# Patient Record
Sex: FEMALE | Race: WHITE | NOT HISPANIC OR LATINO | Employment: OTHER | URBAN - METROPOLITAN AREA
[De-identification: names, ages, dates, MRNs, and addresses within clinical notes are randomized per-mention and may not be internally consistent; named-entity substitution may affect disease eponyms.]

---

## 2023-05-16 LAB
ALANINE AMINOTRANSFERASE (SGPT) (U/L) IN SER/PLAS: 12 U/L (ref 7–45)
ALBUMIN (G/DL) IN SER/PLAS: 4 G/DL (ref 3.4–5)
ALKALINE PHOSPHATASE (U/L) IN SER/PLAS: 71 U/L (ref 33–136)
ANION GAP IN SER/PLAS: 17 MMOL/L (ref 10–20)
ASPARTATE AMINOTRANSFERASE (SGOT) (U/L) IN SER/PLAS: 10 U/L (ref 9–39)
BILIRUBIN TOTAL (MG/DL) IN SER/PLAS: 0.8 MG/DL (ref 0–1.2)
CALCIUM (MG/DL) IN SER/PLAS: 9.3 MG/DL (ref 8.6–10.6)
CARBON DIOXIDE, TOTAL (MMOL/L) IN SER/PLAS: 22 MMOL/L (ref 21–32)
CHLORIDE (MMOL/L) IN SER/PLAS: 98 MMOL/L (ref 98–107)
CREATININE (MG/DL) IN SER/PLAS: 1.58 MG/DL (ref 0.5–1.05)
ERYTHROCYTE DISTRIBUTION WIDTH (RATIO) BY AUTOMATED COUNT: 13.4 % (ref 11.5–14.5)
ERYTHROCYTE MEAN CORPUSCULAR HEMOGLOBIN CONCENTRATION (G/DL) BY AUTOMATED: 32.9 G/DL (ref 32–36)
ERYTHROCYTE MEAN CORPUSCULAR VOLUME (FL) BY AUTOMATED COUNT: 96 FL (ref 80–100)
ERYTHROCYTES (10*6/UL) IN BLOOD BY AUTOMATED COUNT: 4.11 X10E12/L (ref 4–5.2)
ESTIMATED AVERAGE GLUCOSE FOR HBA1C: 123 MG/DL
GFR FEMALE: 33 ML/MIN/1.73M2
GLUCOSE (MG/DL) IN SER/PLAS: 114 MG/DL (ref 74–99)
HEMATOCRIT (%) IN BLOOD BY AUTOMATED COUNT: 39.5 % (ref 36–46)
HEMOGLOBIN (G/DL) IN BLOOD: 13 G/DL (ref 12–16)
HEMOGLOBIN A1C/HEMOGLOBIN TOTAL IN BLOOD: 5.9 %
LEUKOCYTES (10*3/UL) IN BLOOD BY AUTOMATED COUNT: 14.7 X10E9/L (ref 4.4–11.3)
NRBC (PER 100 WBCS) BY AUTOMATED COUNT: 0 /100 WBC (ref 0–0)
PLATELETS (10*3/UL) IN BLOOD AUTOMATED COUNT: 209 X10E9/L (ref 150–450)
POTASSIUM (MMOL/L) IN SER/PLAS: 4.4 MMOL/L (ref 3.5–5.3)
PROTEIN TOTAL: 7.3 G/DL (ref 6.4–8.2)
SODIUM (MMOL/L) IN SER/PLAS: 133 MMOL/L (ref 136–145)
THYROTROPIN (MIU/L) IN SER/PLAS BY DETECTION LIMIT <= 0.05 MIU/L: 1.76 MIU/L (ref 0.44–3.98)
UREA NITROGEN (MG/DL) IN SER/PLAS: 32 MG/DL (ref 6–23)

## 2023-11-28 ENCOUNTER — OFFICE VISIT (OUTPATIENT)
Dept: ORTHOPEDIC SURGERY | Facility: HOSPITAL | Age: 78
End: 2023-11-28
Payer: MEDICARE

## 2023-11-28 DIAGNOSIS — M25.511 CHRONIC RIGHT SHOULDER PAIN: Primary | ICD-10-CM

## 2023-11-28 DIAGNOSIS — G89.29 CHRONIC RIGHT SHOULDER PAIN: Primary | ICD-10-CM

## 2023-11-28 PROCEDURE — 1126F AMNT PAIN NOTED NONE PRSNT: CPT | Performed by: ORTHOPAEDIC SURGERY

## 2023-11-28 PROCEDURE — 99213 OFFICE O/P EST LOW 20 MIN: CPT | Performed by: ORTHOPAEDIC SURGERY

## 2023-11-28 NOTE — PROGRESS NOTES
Bilateral shoulder pain    Subjective    Patient ID: Betty Samaniego is a 78 y.o. female.    Chief Complaint: No chief complaint on file.     Last Surgery: No surgery found  Last Surgery Date: No surgery found    BETTY SAMANIEGO is a 77 year old female presenting today for repeat evaluation of her bilateral shoulders. She explains that her shoulder pain has worsened over the last 18 months. She describes having limited range of motion and crepitus. Her right is currently worse than her left. This changes intermittently. She has had injections in the past which provided significant relief and she would like more today if possible.     08/29/23  She returns to the office today for a repeat followup visit regarding her bilateral shoulders.      She reports that she is still happy with injections today. She is hoping to continue them.     11/28/23  Betty returns to the clinic today for a repeat follow up visit regarding her bilateral shoulders.          Past medical history, surgical history, social history, and family history were all reviewed and are as per the [facility] patient health history questionnaire form that I signed and scanned into the chart today.           Objective   Patient is a well-developed, well-nourished female in no acute distress. Breathes with normal chest rises. Pupils are round and symmetric today. Awake alert and oriented x3.     Examination of the bilateral shoulders reveals the skin to be intact. 100° of forward elevation. 30°  of external rotation on right and 0°  on left. Crepitus throughout.       Image Results:    Patient ID: Betty Samaniego is a 78 y.o. female.    Procedures    Assessment/Plan   Encounter Diagnoses:  No diagnosis found.  Patient with bilateral shoulder arthritis     At this time we had a long discussion about the various options for shoulder arthritis.   1. Do nothing and continue activity modifications.   2. Consider cortisone injections into the glenohumeral joint.  This would give pain relief that is temporary. This would not stop the progression of arthritis. For some patients, this injection can provide no relief at all, relief for 2 weeks, 4 weeks, 3 months or longer. The risk of the injection is fairly minimal but does include included a very small chance of infection.   3. Option is a total shoulder replacement. This will give the patient a more permanent solution to pain relief. It would also improve function. There is no rush to this procedure it is an elective procedure.         No orders of the defined types were placed in this encounter.    No follow-ups on file.

## 2024-02-18 NOTE — PROGRESS NOTES
Bilateral shoulder pain    Subjective    Patient ID: Betty Samaniego is a 78 y.o. female.    Chief Complaint: No chief complaint on file.     Last Surgery: No surgery found  Last Surgery Date: No surgery found    BETTY SAMANIEGO is a 77 year old female presenting today for repeat evaluation of her bilateral shoulders. She explains that her shoulder pain has worsened over the last 18 months. She describes having limited range of motion and crepitus. Her right is currently worse than her left. This changes intermittently. She has had injections in the past which provided significant relief and she would like more today if possible.     08/29/23  She returns to the office today for a repeat followup visit regarding her bilateral shoulders.      She reports that she is still happy with injections today. She is hoping to continue them.     11/28/23  Betty returns to the clinic today for a repeat follow up visit regarding her bilateral shoulders.     02/20/24  Betty returns to the clinic for a follow up visit regarding her bilateral shoulders.     She reports she is here for repeat injections. They have continued to help her.      Past medical history, surgical history, social history, and family history were all reviewed and are as per the [facility] patient health history questionnaire form that I signed and scanned into the chart today.           Objective   Patient is a well-developed, well-nourished female in no acute distress. Breathes with normal chest rises. Pupils are round and symmetric today. Awake alert and oriented x3.     Examination of the bilateral shoulders reveals the skin to be intact. 100° of forward elevation. 30°  of external rotation on right and 0°  on left. Crepitus throughout.       Image Results:    Patient ID: Betty Samaniego is a 78 y.o. female.    L Inj/Asp: bilateral glenohumeral on 2/20/2024 12:41 PM  Indications: pain  Details: 20 G needle, anterior approach  Outcome: tolerated well, no  immediate complications  Procedure, treatment alternatives, risks and benefits explained, specific risks discussed. Consent was given by the patient. Immediately prior to procedure a time out was called to verify the correct patient, procedure, equipment, support staff and site/side marked as required. Patient was prepped and draped in the usual sterile fashion.           Assessment/Plan   Encounter Diagnoses:  No diagnosis found.  Patient with bilateral shoulder arthritis     At this time we had a long discussion about the various options for shoulder arthritis.   1. Do nothing and continue activity modifications.   2. Consider cortisone injections into the glenohumeral joint. This would give pain relief that is temporary. This would not stop the progression of arthritis. For some patients, this injection can provide no relief at all, relief for 2 weeks, 4 weeks, 3 months or longer. The risk of the injection is fairly minimal but does include included a very small chance of infection.   3. Option is a total shoulder replacement. This will give the patient a more permanent solution to pain relief. It would also improve function. There is no rush to this procedure it is an elective procedure.     We did repeat injections for her today. She tolerated them well. We will see her back in 3 months for consideration of repeat injections at that time.     No orders of the defined types were placed in this encounter.    Follow up in 3 months    Scribe Attestation  By signing my name below, Bria BUCHANAN Scribe   attest that this documentation has been prepared under the direction and in the presence of Kaleb Friedman MD.

## 2024-02-20 ENCOUNTER — OFFICE VISIT (OUTPATIENT)
Dept: ORTHOPEDIC SURGERY | Facility: HOSPITAL | Age: 79
End: 2024-02-20
Payer: MEDICARE

## 2024-02-20 VITALS — WEIGHT: 148 LBS | HEIGHT: 65 IN | BODY MASS INDEX: 24.66 KG/M2

## 2024-02-20 DIAGNOSIS — M25.511 CHRONIC RIGHT SHOULDER PAIN: Primary | ICD-10-CM

## 2024-02-20 DIAGNOSIS — G89.29 CHRONIC RIGHT SHOULDER PAIN: Primary | ICD-10-CM

## 2024-02-20 PROCEDURE — 1126F AMNT PAIN NOTED NONE PRSNT: CPT | Performed by: ORTHOPAEDIC SURGERY

## 2024-02-20 PROCEDURE — 1159F MED LIST DOCD IN RCRD: CPT | Performed by: ORTHOPAEDIC SURGERY

## 2024-02-20 PROCEDURE — 1036F TOBACCO NON-USER: CPT | Performed by: ORTHOPAEDIC SURGERY

## 2024-02-20 PROCEDURE — 99213 OFFICE O/P EST LOW 20 MIN: CPT | Performed by: ORTHOPAEDIC SURGERY

## 2024-02-20 PROCEDURE — 20610 DRAIN/INJ JOINT/BURSA W/O US: CPT | Performed by: ORTHOPAEDIC SURGERY

## 2024-02-20 RX ORDER — AMLODIPINE BESYLATE 5 MG/1
1 TABLET ORAL DAILY
COMMUNITY
Start: 2022-04-06 | End: 2024-03-20 | Stop reason: ALTCHOICE

## 2024-02-20 ASSESSMENT — PAIN - FUNCTIONAL ASSESSMENT: PAIN_FUNCTIONAL_ASSESSMENT: 0-10

## 2024-02-20 ASSESSMENT — PAIN SCALES - GENERAL: PAINLEVEL_OUTOF10: 6

## 2024-03-20 ENCOUNTER — OFFICE VISIT (OUTPATIENT)
Dept: CARDIOLOGY | Facility: CLINIC | Age: 79
End: 2024-03-20
Payer: MEDICARE

## 2024-03-20 VITALS
OXYGEN SATURATION: 100 % | WEIGHT: 132.8 LBS | SYSTOLIC BLOOD PRESSURE: 173 MMHG | BODY MASS INDEX: 22.13 KG/M2 | HEART RATE: 58 BPM | HEIGHT: 65 IN | DIASTOLIC BLOOD PRESSURE: 85 MMHG
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
## 2024-03-20 PROCEDURE — 1160F RVW MEDS BY RX/DR IN RCRD: CPT | Performed by: NURSE PRACTITIONER

## 2024-03-20 PROCEDURE — 1036F TOBACCO NON-USER: CPT | Performed by: NURSE PRACTITIONER

## 2024-03-20 PROCEDURE — 99214 OFFICE O/P EST MOD 30 MIN: CPT | Performed by: NURSE PRACTITIONER

## 2024-03-20 PROCEDURE — 1159F MED LIST DOCD IN RCRD: CPT | Performed by: NURSE PRACTITIONER

## 2024-03-20 PROCEDURE — 1126F AMNT PAIN NOTED NONE PRSNT: CPT | Performed by: NURSE PRACTITIONER

## 2024-03-20 RX ORDER — ATENOLOL 50 MG/1
50 TABLET ORAL
COMMUNITY
End: 2024-05-24 | Stop reason: HOSPADM

## 2024-03-20 ASSESSMENT — ENCOUNTER SYMPTOMS
CARDIOVASCULAR NEGATIVE: 1
MUSCULOSKELETAL NEGATIVE: 1
CONSTITUTIONAL NEGATIVE: 1
OCCASIONAL FEELINGS OF UNSTEADINESS: 1
LOSS OF SENSATION IN FEET: 0
RESPIRATORY NEGATIVE: 1
GASTROINTESTINAL NEGATIVE: 1
DEPRESSION: 1
NEUROLOGICAL NEGATIVE: 1

## 2024-03-20 ASSESSMENT — PAIN SCALES - GENERAL: PAINLEVEL: 0-NO PAIN

## 2024-03-20 NOTE — PROGRESS NOTES
"Chief Complaint:   Follow-up    History Of Present Illness:    .Ms Samaniego returns in follow up.  She has some bilateral pedal edema which is at baseline.  Denies chest pain, sob, or palpitations. Has had several back surgeries and has been falling recently.           Last Recorded Vitals:  Blood pressure 173/85, pulse 58, height 1.651 m (5' 5\"), weight 60.2 kg (132 lb 12.8 oz), SpO2 100 %.     Past Medical History:  No past medical history on file.     Past Surgical History:  Past Surgical History:   Procedure Laterality Date    AORTIC VALVE REPLACEMENT  02/11/2014    Aortic Valve Replacement    BREAST LUMPECTOMY  02/11/2014    Breast Surgery Lumpectomy    CERVICAL DISCECTOMY  02/11/2014    Spinal Diskectomy Cervical    HYSTERECTOMY  02/11/2014    Hysterectomy    OTHER SURGICAL HISTORY  02/11/2014    Aortic Coarctation Repair       Social History:  Social History     Socioeconomic History    Marital status:      Spouse name: None    Number of children: None    Years of education: None    Highest education level: None   Occupational History    None   Tobacco Use    Smoking status: Former     Types: Cigarettes    Smokeless tobacco: Never   Vaping Use    Vaping Use: Never used   Substance and Sexual Activity    Alcohol use: Yes    Drug use: Never    Sexual activity: None   Other Topics Concern    None   Social History Narrative    None     Social Determinants of Health     Financial Resource Strain: Not on file   Food Insecurity: Not on file   Transportation Needs: Not on file   Physical Activity: Not on file   Stress: Not on file   Social Connections: Not on file   Intimate Partner Violence: Not on file   Housing Stability: Not on file       Family History:  No family history on file.      Allergies:  Sulfa (sulfonamide antibiotics), Sulfamethoxazole-trimethoprim, Ciprofloxacin, and Nitrofurantoin    Outpatient Medications:  Current Outpatient Medications   Medication Sig Dispense Refill    atenolol (Tenormin) " 50 mg tablet Take 1 tablet (50 mg) by mouth once daily.       No current facility-administered medications for this visit.        Physical Exam:  Cardiovascular:      PMI at left midclavicular line. Normal rate. Regular rhythm. Normal S1. Normal S2.       Murmurs: There is a grade 1to 2/6 systolic murmur.      No gallop.  No click. No rub.   Pulses:     Intact distal pulses.   Edema:     Peripheral edema absent.         ROS:  Review of Systems   Constitutional: Negative.   Cardiovascular: Negative.    Respiratory: Negative.     Skin: Negative.    Musculoskeletal: Negative.    Gastrointestinal: Negative.    Genitourinary: Negative.    Neurological: Negative.           Last Labs:  CBC -  Lab Results   Component Value Date    WBC 14.7 (H) 05/16/2023    HGB 13.0 05/16/2023    HCT 39.5 05/16/2023    MCV 96 05/16/2023     05/16/2023       CMP -  Lab Results   Component Value Date    CALCIUM 9.3 05/16/2023    PHOS 3.4 08/28/2019    PROT 7.3 05/16/2023    ALBUMIN 4.0 05/16/2023    AST 10 05/16/2023    ALT 12 05/16/2023    ALKPHOS 71 05/16/2023    BILITOT 0.8 05/16/2023       LIPID PANEL -   Lab Results   Component Value Date    CHOL 234 (H) 02/03/2021    TRIG 223 (H) 02/03/2021    HDL 50.6 02/03/2021    CHHDL 4.6 02/03/2021    LDLF 139 (H) 02/03/2021    VLDL 45 (H) 02/03/2021    NHDL 183 02/03/2021       RENAL FUNCTION PANEL -   Lab Results   Component Value Date    GLUCOSE 114 (H) 05/16/2023     (L) 05/16/2023    K 4.4 05/16/2023    CL 98 05/16/2023    CO2 22 05/16/2023    ANIONGAP 17 05/16/2023    BUN 32 (H) 05/16/2023    CREATININE 1.58 (H) 05/16/2023    CALCIUM 9.3 05/16/2023    PHOS 3.4 08/28/2019    ALBUMIN 4.0 05/16/2023        Lab Results   Component Value Date    HGBA1C 5.9 (A) 05/16/2023         Assessment/Plan   Problem List Items Addressed This Visit    None    Assessment:     1. Status post aortic valve replacement utilizing #23 mm Freestyle bioprosthesis along with replacement of the ascending  aorta and transverse hemiarch utilizing a #24 mm Gelweave graft plus tricuspid valve repair utilizing a #21 mm Grayson ring plus occlusion of small membranous VSD, 03/06/2008, . This patient is doing extremely well from the cardiac standpoint. The patient denies any unusual complaints other than frequent urinary tract infections. She did have a repeat surveillance echocardiogram performed today which again demonstrates a preserved left ventricular ejection fraction of approximately 55% with mild to moderate hypokinesis of the anteroseptal wall due to previous thoracotomy. The bioprosthetic aortic valve replacement has a normal appearance and function. There is mild to moderate left atrial enlargement. Repeat echo done 08/2019 showed EF 55-60%, stentless AV bioprothesis, AV gradient is 9.5 mm Hg. The patient will return in 6 months for routine visit. Echo done 09/2023 was essentially unchanged with the addition of some mild to moderate MR.  2. Hypertension. Blood pressure is well within normal range today. The patient will continue on atenolol 100 mg daily unchanged.   3. Remote repair of coarctation of the aorta.  4. Status post lumpectomy and radiation therapy for left-sided breast carcinoma.  5. Status post C3 diskectomy and fusion, C5 corpectomy and anterior plate placement from C3 to C6, 04/12/2012, Dr. Burt, [facility]. Patient had laminectomy and facetectomy June 30, 2017 with Dr. Jessica Burt. Had another surgery on 09/2019.  6. Abdominal hysterectomy.  7. Former smoking.  8. Former alcohol excess.  9. Pedal edema. At baseline.  10. CKD. See Dr Thomas.   11. Venous insufficiency.          Terri Pena, APRN-CNP

## 2024-03-26 ENCOUNTER — HOSPITAL ENCOUNTER (OUTPATIENT)
Dept: RADIOLOGY | Facility: CLINIC | Age: 79
Discharge: HOME | End: 2024-03-26
Payer: MEDICARE

## 2024-03-26 ENCOUNTER — OFFICE VISIT (OUTPATIENT)
Dept: ORTHOPEDIC SURGERY | Facility: CLINIC | Age: 79
End: 2024-03-26
Payer: MEDICARE

## 2024-03-26 DIAGNOSIS — M48.061 SPINAL STENOSIS OF LUMBAR REGION, UNSPECIFIED WHETHER NEUROGENIC CLAUDICATION PRESENT: ICD-10-CM

## 2024-03-26 DIAGNOSIS — M54.16 LUMBAR RADICULOPATHY: Primary | ICD-10-CM
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
## 2024-03-26 DIAGNOSIS — M51.36 DISCOGENIC LOW BACK PAIN: ICD-10-CM

## 2024-03-26 PROCEDURE — 1160F RVW MEDS BY RX/DR IN RCRD: CPT

## 2024-03-26 PROCEDURE — 1125F AMNT PAIN NOTED PAIN PRSNT: CPT

## 2024-03-26 PROCEDURE — 72120 X-RAY BEND ONLY L-S SPINE: CPT

## 2024-03-26 PROCEDURE — 99214 OFFICE O/P EST MOD 30 MIN: CPT

## 2024-03-26 PROCEDURE — 1036F TOBACCO NON-USER: CPT

## 2024-03-26 PROCEDURE — 72114 X-RAY EXAM L-S SPINE BENDING: CPT | Performed by: RADIOLOGY

## 2024-03-26 PROCEDURE — 1159F MED LIST DOCD IN RCRD: CPT

## 2024-03-26 RX ORDER — PREDNISONE 10 MG/1
TABLET ORAL
Qty: 30 TABLET | Refills: 0 | Status: SHIPPED
Start: 2024-03-26 | End: 2024-04-16 | Stop reason: HOSPADM

## 2024-03-26 RX ORDER — KETOROLAC TROMETHAMINE 10 MG/1
10 TABLET, FILM COATED ORAL EVERY 8 HOURS PRN
Qty: 15 TABLET | Refills: 0 | Status: SHIPPED | OUTPATIENT
Start: 2024-03-26 | End: 2024-03-31

## 2024-03-26 ASSESSMENT — PAIN SCALES - GENERAL: PAINLEVEL_OUTOF10: 7

## 2024-03-26 ASSESSMENT — PAIN - FUNCTIONAL ASSESSMENT: PAIN_FUNCTIONAL_ASSESSMENT: 0-10

## 2024-03-26 NOTE — PROGRESS NOTES
HPI:  Rebecca is a pleasant 78-year-old female who presents today with a 1 month random onset history of low back pain that does not radiate down the legs.  She has a history of L2-5 fusion with cement augmentation about 3 years ago.  She states the pain is relatively constant but is worse when trying to walk and stand up straight.  She does state she has some numbness and tingling in the legs bilaterally that happens occasionally.  No pain is associated with this.  She has been taking Advil which helps somewhat.  She has not done therapy.    ROS:  Reviewed on EMR and patient intake sheet.    PMH/SH:  Reviewed on EMR and patient intake sheet.    Exam:  MSK: 4/5 strength of lower extremities bilaterally.  Negative straight leg raise bilaterally.  No tenderness to palpation of thoracic, lumbar spine midline or paraspinal muscles.  General: No acute distress. Awake and conversant.  Eyes: Normal conjunctiva, anicteric. Round symmetric pupils.  ENT: Hearing grossly intact. No nasal discharge.  Neck: Neck is supple. No masses or thyromegaly.  Respiratory: Respirations are non-labored. No wheezing.  Skin: Warm. No rashes or ulcers.  Psych: Alert and oriented. Cooperative, appropriate mood and affect, normal judgement.  CV: No lower extremity edema.  Neuro: Sensation and CN II-XII grossly normal.    Radiology:     Lumbar x-rays personally reviewed and demonstrate stable L2-L5 lumbar fusion with cement augmentation seated appropriately.  Adjacent level degeneration noted at L1/2 and L5/S1.  No acute fractures or dislocations.    Diagnosis:    Discogenic low back pain  Lumbar radiculopathy    Assessment and Plan:  Patient was seen today and evaluated for progressively worsening low back pain that is impeding her ability to stand and walk.  At this point, I discussed conservative management with the patient.  She was referred to physical therapy with core strengthening today.  Additionally, a prescribed a mild prednisone taper  and a short course of Toradol.  I educated the patient that she should discontinue other anti-inflammatories while taking the Toradol.  She should continue trying to walk and ambulate as tolerated.  Additionally, I referred the patient to pain management.  She may follow-up if her symptoms do not improve or worsen over the next few weeks with conservative management and physical therapy.  Patient feels all questions were properly answered at time of visit today.  Patient agrees to the plan above.    This note was dictated using speech recognition software and was not corrected for spelling or grammatical errors    Timo Avilez PA-C  Department of Orthopaedic Surgery  10:36 AM  03/26/24      [id number] [place] [address]    Voicemail: [phone]   Appts: [phone]  Fax: [phone]

## 2024-04-08 ENCOUNTER — APPOINTMENT (OUTPATIENT)
Dept: PHYSICAL THERAPY | Facility: CLINIC | Age: 79
End: 2024-04-08
Payer: MEDICARE

## 2024-04-08 ENCOUNTER — EVALUATION (OUTPATIENT)
Dept: PHYSICAL THERAPY | Facility: CLINIC | Age: 79
End: 2024-04-08
Payer: MEDICARE

## 2024-04-08 DIAGNOSIS — R29.898 LEG WEAKNESS: ICD-10-CM

## 2024-04-08 DIAGNOSIS — R26.9 GAIT DIFFICULTY: ICD-10-CM
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
## 2024-04-08 DIAGNOSIS — M54.16 LUMBAR RADICULOPATHY: ICD-10-CM

## 2024-04-08 DIAGNOSIS — M54.9 BACK PAIN: Primary | ICD-10-CM

## 2024-04-08 PROCEDURE — 97162 PT EVAL MOD COMPLEX 30 MIN: CPT | Mod: GP

## 2024-04-08 PROCEDURE — 97530 THERAPEUTIC ACTIVITIES: CPT | Mod: GP

## 2024-04-08 ASSESSMENT — ENCOUNTER SYMPTOMS
DEPRESSION: 0
OCCASIONAL FEELINGS OF UNSTEADINESS: 1
LOSS OF SENSATION IN FEET: 1

## 2024-04-08 NOTE — PROGRESS NOTES
Physical Therapy    Physical Therapy Evaluation and Treatment      Patient Name: Rebecca Samaniego  MRN: [id number]  Today's Date: 4/8/2024    Time Calculation  Start Time: 1020  Stop Time: 1110  Time Calculation (min): 50 min    Current Problem:   1. Back pain        2. Fall, initial encounter  Referral to Physical Therapy      3. Lumbar radiculopathy  Referral to Physical Therapy      4. Leg weakness        5. Gait difficulty            SUBJECTIVE:  Rebecca Samaniego is a 78 y.o. female referred to PT for falls, lumbar radiculopathy. Patient presents with chief complaint of difficulty with gait. Patient reports that about 3 months ago had trouble walking and 2 months ago needed assistance with everything. Patient presents with  for evaluation. Patient reports that she just woke up one day and had trouble walking. Patient's  states that patient tried to get out of bed and fell right down, had trouble getting up without assistance. Patient's  states that trying to get her to this appointment she fell twice which caused him to fall. Patient's  states that he had two people helping to get patient out of house secondary to stairs. Patient's  voices concern about how he is going to get her back into home going up stairs as they have not done that.  Patient denies injuries with fall or head injuries.  Patient's  states that he has to lead her everywhere. Patient does not use assistive device at home, has a cane that she can use for uses  instead. Patient states that walker is too big to fit in the home. Patient does not have wheel chair at home.     Patient denies numbness/tingling in legs, denies loss of bowel or bladder control. Patient states that she has neuropathy in R LE that keeps her up at night, not currently seeing neurologist. Patient denies legs giving out on her, states that she can feel wobbly and dizzy occasionally but that has been going on for a while,  currently seeing cardiologist, takes medication. Patient's  states that she has trouble putting off of chair and her legs are no help. Patient's  states that she does not have any arm strength to use assistive device.   Patient had back surgery 3 years ago (two fusions) and has had back pain since.       Onset: 3 months ago   Imaging:   XR lumbar spine (3/26/24)   FINDINGS:  L4-5 anterior and posterior fusion has been revised in the interval from the prior study. The fusion hardware now is from L2-L5 with vertebroplasties L2 to L4. The alignment is grossly unchanged with a mild anterolisthesis L4-5. Upper lumbar degenerative changes particularly above the level of the fusion at L1-2 progressed from previous. No pathologic motion.      IMPRESSION:  Satisfactory appearance L2-L5 fusion.  Progression of degenerative change L1-2.    Occupation: retired   Home living: Lives with    3 stairs to get into home - railing on both sides (has not tried to get up secondary to this being first time getting back in home)   Bedroom on second floor 13 steps- railing and a wall   Patient sleeps on bed on main floor (staying on main level for about a year)   Tub shower, shower chair, grab bars   Toilet seat standard     Relevant Past Medical History:Reviewed in chart; cancer, heart disease, high blood pressure, neuropathy, headaches, migraines, RA, kidney disease   Surgical History: Reviewed in chart; Aortic valve replacement, breast surgery lumpectomy, spinal discectomy cervical, hysterectomy   Medications: Reviewed in chart; Atenolol, prednisone   Allergies: sulfamethoxazole-trimethoprim, sulfa, ciprofloxacin, nitrofurantoin     Precautions: PMHx considerations: cancer, heart disease, high blood pressure, neuropathy, headaches, migraines, RA, kidney disease; L2-L5 lumbar fusion  STEADI Fall Risk: 10 (score of 4+ indicates fall risk)       PT Outcome Measure:   Modified Oswestry: patient did not fill out  properly.     Pain:  Lowest:  5/10  Highest:  8/10  Location:  lower back in middle   Description:  pain  Aggravating Factors:  nothing   Relieving Factors:  medication   Sleep Pattern:  back can cause her to wake up at night.   Previous Interventions:  none     Red flags: denies numbness/tingling or saddle paresthesia, no changes to bowel or bladder    Patient/Family Goal: able to walk     OBJECTIVE:    Observation/Posture: Forward flexed kyphotic posture, B knee valgus.     Gait: Patient presents in wheel chair for evaluation.  TU minutes 10 seconds with Min A using gait belt and FWW     Functional Mobility:   Patient required B UE support and Min A to perform sit to stand transfer- cues required for transfer  Patient demonstrates poor eccentric control and poor safety awareness with stand to sit transfer- despite cues to improve safety.     Range of Motion:   Unable to assess ROM at this date secondary to limited time during evaluation.     Strength:  HIP MMT LEFT RIGHT   Flexion 3/5 3/5   Adduction  3-/5 3-/5   Abduction 3-/5 3-/5     KNEE MMT LEFT RIGHT   Extension 3-/5 3-/5     ANKLE MMT LEFT RIGHT   Dorsiflexion 3-/5 3-/5     Treatments:  Therapeutic Exercise: ( minutes)  Verbally went over HEP with demonstration and pictures secondary to limited time.  Manual Therapy: ( minutes)    Gait Training: ( minutes)    Neuromuscular Re-education: ( minutes)    Therapeutic Activity: (15 minutes)  OP Education: Patient education on proper sit to stand transfer techniques with patient and patient's  to improve safety. Patient education on proper lifting mechanics with patients  to improve safety and promote improved biomechanics of  using gait belt secondary to concerns with getting patient in and out of house and within home. Patient education on importance of walking with assistive device to improve safety within home. Patient encouraged to call fire department to assist getting into home if  needed,. Patient's  education on use of gait belt to improve safety and body mechanics. Patient education on use of FWW to improve safety with ambulation around the home. Patient education on benefit from HHPT at this time secondary to current level of function, high fall risk, poor safety awareness, difficulty getting in and out of home.      HEP:  Access Code: [email or web address]  URL: [email or web address]  Date: 04/08/2024  Prepared by: Carmen    Exercises  - Seated Long Arc Quad  - 1 x daily - 7 x weekly - 2 sets - 10 reps  - Seated March  - 1 x daily - 7 x weekly - 2 sets - 10 reps  - Seated Ankle Pumps  - 1 x daily - 7 x weekly - 2 sets - 10 reps    ASSESSMENT:  Rebecca Samaniego is a 78 y.o. female referred to PT for falls, lumbar radiculopathy. Patient presents with chief complaint of difficulty with gait. Patient demonstrates decreased LE strength, high fall risk, poor gait mechanics and poor safety awareness. Patient demonstrates difficulty with transfers and ambulation without assistance. Patient demonstrated high fall risk through TUG test. Patient and family education on way to improve safety within home and safety with transfers. Patient discussion to contact MD to trial HHPT secondary to patients safety and current status to get in and out home safely. Patient education on benefit from HHPT at this time secondary to current level of function, high fall risk, poor safety awareness, difficulty getting in and out of home with and without cavegiver. Patient and  agreeable to plan.     Response to treatment: Pt verbalized good understanding of education provided. Pt demonstrated appropriate performance of HEP with good understanding. Did not exhibit exacerbation of symptoms at end of session.     PLAN:  Patient to trial HHPT secondary to safety and current status of getting in and out of home.     Goals:  Not appropriate at this time.

## 2024-04-09 ENCOUNTER — HOME HEALTH ADMISSION (OUTPATIENT)
Dept: HOME HEALTH SERVICES | Facility: HOME HEALTH | Age: 79
End: 2024-04-09
Payer: MEDICARE

## 2024-04-09 ENCOUNTER — DOCUMENTATION (OUTPATIENT)
Dept: HOME HEALTH SERVICES | Facility: HOME HEALTH | Age: 79
End: 2024-04-09
Payer: MEDICARE

## 2024-04-09 ENCOUNTER — DOCUMENTATION (OUTPATIENT)
Dept: PHYSICAL THERAPY | Facility: CLINIC | Age: 79
End: 2024-04-09
Payer: MEDICARE

## 2024-04-09 DIAGNOSIS — M54.16 LUMBAR RADICULOPATHY: Primary | ICD-10-CM

## 2024-04-09 NOTE — HH CARE COORDINATION
Home Care received a Referral for Physical Therapy. We have processed the referral for a Start of Care on 04/10.     If you have any questions or concerns, please feel free to contact us at [phone]. Follow the prompts, enter your five digit zip code, and you will be directed to your care team on [facility].

## 2024-04-09 NOTE — PROGRESS NOTES
I was contacted by Carmen Pena, PT stating Rebecca had difficulty ambulating to therapy and fell twice just trying to get to the appointment.  Carmen recommended home health physical therapy due to the patient's high risk of falling and inability to ambulate on her own.  Home health therapy ordered.

## 2024-04-09 NOTE — PROGRESS NOTES
"Physical Therapy                 Therapy Communication Note    Patient Name: Rebecca Samaniego  MRN: [id number]  Today's Date: 4/9/2024     Discipline: Physical Therapy    Patient and  called asking \"what is wrong with me\". Patient was educated on importance of home health PT secondary to high fall risk and poor safety. After discussion patient revealed that they were contacted to begin home health PT. Patient's  states that they misplaced the phone number. PT provided phone number to follow up with Home Health Care.  "

## 2024-04-10 ENCOUNTER — HOSPITAL ENCOUNTER (INPATIENT)
Facility: HOSPITAL | Age: 79
LOS: 5 days | Discharge: NURSING, REHAB OR LONG-TERM CARE | DRG: 064 | End: 2024-04-16
Attending: EMERGENCY MEDICINE | Admitting: INTERNAL MEDICINE
Payer: MEDICARE

## 2024-04-10 ENCOUNTER — APPOINTMENT (OUTPATIENT)
Dept: RADIOLOGY | Facility: HOSPITAL | Age: 79
DRG: 064 | End: 2024-04-10
Payer: MEDICARE

## 2024-04-10 ENCOUNTER — APPOINTMENT (OUTPATIENT)
Dept: CARDIOLOGY | Facility: HOSPITAL | Age: 79
DRG: 064 | End: 2024-04-10
Payer: MEDICARE

## 2024-04-10 DIAGNOSIS — G93.41 METABOLIC ENCEPHALOPATHY: ICD-10-CM

## 2024-04-10 DIAGNOSIS — M54.16 LUMBAR RADICULOPATHY: Primary | ICD-10-CM

## 2024-04-10 DIAGNOSIS — E86.0 DEHYDRATION: ICD-10-CM

## 2024-04-10 DIAGNOSIS — R29.6 FREQUENT FALLS: ICD-10-CM

## 2024-04-10 DIAGNOSIS — R79.89 ELEVATED TROPONIN: ICD-10-CM

## 2024-04-10 DIAGNOSIS — R21 RASH: ICD-10-CM

## 2024-04-10 DIAGNOSIS — R55 SYNCOPE AND COLLAPSE: ICD-10-CM

## 2024-04-10 DIAGNOSIS — R29.6 REPEATED FALLS: ICD-10-CM

## 2024-04-10 DIAGNOSIS — I10 PRIMARY HYPERTENSION: ICD-10-CM

## 2024-04-10 DIAGNOSIS — R53.1 GENERALIZED WEAKNESS: ICD-10-CM

## 2024-04-10 DIAGNOSIS — R26.89 SHUFFLING GAIT: ICD-10-CM

## 2024-04-10 DIAGNOSIS — N17.9 ACUTE RENAL FAILURE SUPERIMPOSED ON CHRONIC KIDNEY DISEASE, UNSPECIFIED ACUTE RENAL FAILURE TYPE, UNSPECIFIED CKD STAGE (CMS-HCC): ICD-10-CM

## 2024-04-10 DIAGNOSIS — I63.9 CEREBROVASCULAR ACCIDENT (CVA), UNSPECIFIED MECHANISM (MULTI): ICD-10-CM

## 2024-04-10 DIAGNOSIS — N39.0 UTI (URINARY TRACT INFECTION): Primary | ICD-10-CM

## 2024-04-10 DIAGNOSIS — N39.0 URINARY TRACT INFECTION WITHOUT HEMATURIA, SITE UNSPECIFIED: ICD-10-CM

## 2024-04-10 DIAGNOSIS — N18.9 ACUTE RENAL FAILURE SUPERIMPOSED ON CHRONIC KIDNEY DISEASE, UNSPECIFIED ACUTE RENAL FAILURE TYPE, UNSPECIFIED CKD STAGE (CMS-HCC): ICD-10-CM

## 2024-04-10 DIAGNOSIS — I63.81 OTHER CEREBRAL INFARCTION DUE TO OCCLUSION OR STENOSIS OF SMALL ARTERY: ICD-10-CM

## 2024-04-10 LAB
ALBUMIN SERPL-MCNC: 3.2 G/DL (ref 3.5–5)
ALP BLD-CCNC: 71 U/L (ref 35–125)
ALT SERPL-CCNC: 10 U/L (ref 5–40)
ANION GAP SERPL CALC-SCNC: 15 MMOL/L
AST SERPL-CCNC: 17 U/L (ref 5–40)
BASOPHILS # BLD AUTO: 0.03 X10*3/UL (ref 0–0.1)
BASOPHILS NFR BLD AUTO: 0.4 %
BILIRUB SERPL-MCNC: 0.4 MG/DL (ref 0.1–1.2)
BUN SERPL-MCNC: 41 MG/DL (ref 8–25)
CALCIUM SERPL-MCNC: 8.6 MG/DL (ref 8.5–10.4)
CHLORIDE SERPL-SCNC: 98 MMOL/L (ref 97–107)
CO2 SERPL-SCNC: 19 MMOL/L (ref 24–31)
CREAT SERPL-MCNC: 1.9 MG/DL (ref 0.4–1.6)
EGFRCR SERPLBLD CKD-EPI 2021: 27 ML/MIN/1.73M*2
EOSINOPHIL # BLD AUTO: 0.03 X10*3/UL (ref 0–0.4)
EOSINOPHIL NFR BLD AUTO: 0.4 %
ERYTHROCYTE [DISTWIDTH] IN BLOOD BY AUTOMATED COUNT: 13.3 % (ref 11.5–14.5)
GLUCOSE SERPL-MCNC: 106 MG/DL (ref 65–99)
HCT VFR BLD AUTO: 30.6 % (ref 36–46)
HGB BLD-MCNC: 10.1 G/DL (ref 12–16)
IMM GRANULOCYTES # BLD AUTO: 0.06 X10*3/UL (ref 0–0.5)
IMM GRANULOCYTES NFR BLD AUTO: 0.8 % (ref 0–0.9)
LIPASE SERPL-CCNC: 68 U/L (ref 16–63)
LYMPHOCYTES # BLD AUTO: 1.01 X10*3/UL (ref 0.8–3)
LYMPHOCYTES NFR BLD AUTO: 13.2 %
MAGNESIUM SERPL-MCNC: 1.9 MG/DL (ref 1.6–3.1)
MCH RBC QN AUTO: 33.6 PG (ref 26–34)
MCHC RBC AUTO-ENTMCNC: 33 G/DL (ref 32–36)
MCV RBC AUTO: 102 FL (ref 80–100)
MONOCYTES # BLD AUTO: 1.09 X10*3/UL (ref 0.05–0.8)
MONOCYTES NFR BLD AUTO: 14.2 %
NEUTROPHILS # BLD AUTO: 5.44 X10*3/UL (ref 1.6–5.5)
NEUTROPHILS NFR BLD AUTO: 71 %
NRBC BLD-RTO: 0 /100 WBCS (ref 0–0)
PLATELET # BLD AUTO: 191 X10*3/UL (ref 150–450)
POTASSIUM SERPL-SCNC: 4.5 MMOL/L (ref 3.4–5.1)
PROT SERPL-MCNC: 5.9 G/DL (ref 5.9–7.9)
RBC # BLD AUTO: 3.01 X10*6/UL (ref 4–5.2)
SODIUM SERPL-SCNC: 132 MMOL/L (ref 133–145)
TROPONIN T SERPL-MCNC: 69 NG/L
WBC # BLD AUTO: 7.7 X10*3/UL (ref 4.4–11.3)

## 2024-04-10 PROCEDURE — 83735 ASSAY OF MAGNESIUM: CPT

## 2024-04-10 PROCEDURE — 80053 COMPREHEN METABOLIC PANEL: CPT

## 2024-04-10 PROCEDURE — 85025 COMPLETE CBC W/AUTO DIFF WBC: CPT

## 2024-04-10 PROCEDURE — 83690 ASSAY OF LIPASE: CPT

## 2024-04-10 PROCEDURE — 36415 COLL VENOUS BLD VENIPUNCTURE: CPT

## 2024-04-10 PROCEDURE — 70450 CT HEAD/BRAIN W/O DYE: CPT

## 2024-04-10 PROCEDURE — 93005 ELECTROCARDIOGRAM TRACING: CPT

## 2024-04-10 PROCEDURE — 70450 CT HEAD/BRAIN W/O DYE: CPT | Performed by: SURGERY

## 2024-04-10 PROCEDURE — 99285 EMERGENCY DEPT VISIT HI MDM: CPT | Mod: 25

## 2024-04-10 PROCEDURE — 84484 ASSAY OF TROPONIN QUANT: CPT

## 2024-04-10 RX ORDER — TRAMADOL HYDROCHLORIDE 50 MG/1
50 TABLET ORAL EVERY 8 HOURS PRN
Qty: 15 TABLET | Refills: 0 | Status: SHIPPED
Start: 2024-04-10 | End: 2024-04-16 | Stop reason: HOSPADM

## 2024-04-10 ASSESSMENT — COLUMBIA-SUICIDE SEVERITY RATING SCALE - C-SSRS
1. IN THE PAST MONTH, HAVE YOU WISHED YOU WERE DEAD OR WISHED YOU COULD GO TO SLEEP AND NOT WAKE UP?: NO
6. HAVE YOU EVER DONE ANYTHING, STARTED TO DO ANYTHING, OR PREPARED TO DO ANYTHING TO END YOUR LIFE?: NO
2. HAVE YOU ACTUALLY HAD ANY THOUGHTS OF KILLING YOURSELF?: NO

## 2024-04-10 ASSESSMENT — PAIN SCALES - GENERAL: PAINLEVEL_OUTOF10: 10 - WORST POSSIBLE PAIN

## 2024-04-10 ASSESSMENT — PAIN - FUNCTIONAL ASSESSMENT: PAIN_FUNCTIONAL_ASSESSMENT: 0-10

## 2024-04-10 ASSESSMENT — LIFESTYLE VARIABLES
HAVE PEOPLE ANNOYED YOU BY CRITICIZING YOUR DRINKING: NO
EVER FELT BAD OR GUILTY ABOUT YOUR DRINKING: NO
EVER HAD A DRINK FIRST THING IN THE MORNING TO STEADY YOUR NERVES TO GET RID OF A HANGOVER: NO
TOTAL SCORE: 0
HAVE YOU EVER FELT YOU SHOULD CUT DOWN ON YOUR DRINKING: NO

## 2024-04-11 ENCOUNTER — APPOINTMENT (OUTPATIENT)
Dept: RADIOLOGY | Facility: HOSPITAL | Age: 79
DRG: 064 | End: 2024-04-11
Payer: MEDICARE

## 2024-04-11 ENCOUNTER — APPOINTMENT (OUTPATIENT)
Dept: CARDIOLOGY | Facility: HOSPITAL | Age: 79
DRG: 064 | End: 2024-04-11
Payer: MEDICARE

## 2024-04-11 PROBLEM — N18.9 ACUTE ON CHRONIC KIDNEY FAILURE (CMS-HCC): Status: ACTIVE | Noted: 2024-04-11

## 2024-04-11 PROBLEM — N17.9 ACUTE ON CHRONIC KIDNEY FAILURE (CMS-HCC): Status: ACTIVE | Noted: 2024-04-11

## 2024-04-11 PROBLEM — R29.6 REPEATED FALLS: Status: ACTIVE | Noted: 2024-04-11

## 2024-04-11 PROBLEM — N39.0 UTI (URINARY TRACT INFECTION): Status: ACTIVE | Noted: 2024-04-11

## 2024-04-11 PROBLEM — R26.81 GAIT INSTABILITY: Status: ACTIVE | Noted: 2024-04-08

## 2024-04-11 PROBLEM — E86.0 DEHYDRATION: Status: ACTIVE | Noted: 2024-04-11

## 2024-04-11 LAB
ANION GAP SERPL CALC-SCNC: 16 MMOL/L
APPEARANCE UR: CLEAR
ATRIAL RATE: 70 BPM
BACTERIA #/AREA URNS AUTO: ABNORMAL /HPF
BILIRUB UR STRIP.AUTO-MCNC: NEGATIVE MG/DL
BUN SERPL-MCNC: 36 MG/DL (ref 8–25)
CALCIUM SERPL-MCNC: 8.8 MG/DL (ref 8.5–10.4)
CHLORIDE SERPL-SCNC: 100 MMOL/L (ref 97–107)
CO2 SERPL-SCNC: 19 MMOL/L (ref 24–31)
COLOR UR: ABNORMAL
CREAT SERPL-MCNC: 1.5 MG/DL (ref 0.4–1.6)
EGFRCR SERPLBLD CKD-EPI 2021: 35 ML/MIN/1.73M*2
ERYTHROCYTE [DISTWIDTH] IN BLOOD BY AUTOMATED COUNT: 13.2 % (ref 11.5–14.5)
FOLATE SERPL-MCNC: 9.7 NG/ML (ref 4.2–19.9)
GLUCOSE SERPL-MCNC: 86 MG/DL (ref 65–99)
GLUCOSE UR STRIP.AUTO-MCNC: NORMAL MG/DL
HCT VFR BLD AUTO: 31.4 % (ref 36–46)
HGB BLD-MCNC: 10.6 G/DL (ref 12–16)
KETONES UR STRIP.AUTO-MCNC: ABNORMAL MG/DL
LEUKOCYTE ESTERASE UR QL STRIP.AUTO: ABNORMAL
MCH RBC QN AUTO: 32.5 PG (ref 26–34)
MCHC RBC AUTO-ENTMCNC: 33.8 G/DL (ref 32–36)
MCV RBC AUTO: 96 FL (ref 80–100)
MUCOUS THREADS #/AREA URNS AUTO: ABNORMAL /LPF
NITRITE UR QL STRIP.AUTO: ABNORMAL
NRBC BLD-RTO: 0 /100 WBCS (ref 0–0)
P AXIS: 71 DEGREES
P OFFSET: 165 MS
P ONSET: 113 MS
PH UR STRIP.AUTO: 5 [PH]
PLATELET # BLD AUTO: 204 X10*3/UL (ref 150–450)
POTASSIUM SERPL-SCNC: 4.7 MMOL/L (ref 3.4–5.1)
PR INTERVAL: 212 MS
PROT UR STRIP.AUTO-MCNC: NEGATIVE MG/DL
Q ONSET: 219 MS
QRS COUNT: 12 BEATS
QRS DURATION: 80 MS
QT INTERVAL: 372 MS
QTC CALCULATION(BAZETT): 401 MS
QTC FREDERICIA: 391 MS
R AXIS: -30 DEGREES
RBC # BLD AUTO: 3.26 X10*6/UL (ref 4–5.2)
RBC # UR STRIP.AUTO: NEGATIVE /UL
RBC #/AREA URNS AUTO: ABNORMAL /HPF
SODIUM SERPL-SCNC: 135 MMOL/L (ref 133–145)
SP GR UR STRIP.AUTO: 1.01
SQUAMOUS #/AREA URNS AUTO: ABNORMAL /HPF
T AXIS: 49 DEGREES
T OFFSET: 405 MS
TROPONIN T SERPL-MCNC: 57 NG/L
TROPONIN T SERPL-MCNC: 66 NG/L
TSH SERPL DL<=0.05 MIU/L-ACNC: 1.57 MIU/L (ref 0.27–4.2)
UROBILINOGEN UR STRIP.AUTO-MCNC: NORMAL MG/DL
VENTRICULAR RATE: 70 BPM
VIT B12 SERPL-MCNC: 597 PG/ML (ref 211–946)
WBC # BLD AUTO: 7 X10*3/UL (ref 4.4–11.3)
WBC #/AREA URNS AUTO: ABNORMAL /HPF

## 2024-04-11 PROCEDURE — 72148 MRI LUMBAR SPINE W/O DYE: CPT | Performed by: RADIOLOGY

## 2024-04-11 PROCEDURE — 97530 THERAPEUTIC ACTIVITIES: CPT | Mod: GP

## 2024-04-11 PROCEDURE — 99222 1ST HOSP IP/OBS MODERATE 55: CPT | Performed by: INTERNAL MEDICINE

## 2024-04-11 PROCEDURE — 84443 ASSAY THYROID STIM HORMONE: CPT | Performed by: INTERNAL MEDICINE

## 2024-04-11 PROCEDURE — 71045 X-RAY EXAM CHEST 1 VIEW: CPT

## 2024-04-11 PROCEDURE — 70551 MRI BRAIN STEM W/O DYE: CPT

## 2024-04-11 PROCEDURE — 2500000004 HC RX 250 GENERAL PHARMACY W/ HCPCS (ALT 636 FOR OP/ED): Performed by: INTERNAL MEDICINE

## 2024-04-11 PROCEDURE — 84484 ASSAY OF TROPONIN QUANT: CPT

## 2024-04-11 PROCEDURE — 97166 OT EVAL MOD COMPLEX 45 MIN: CPT | Mod: GO

## 2024-04-11 PROCEDURE — 70551 MRI BRAIN STEM W/O DYE: CPT | Performed by: RADIOLOGY

## 2024-04-11 PROCEDURE — 2500000001 HC RX 250 WO HCPCS SELF ADMINISTERED DRUGS (ALT 637 FOR MEDICARE OP): Performed by: STUDENT IN AN ORGANIZED HEALTH CARE EDUCATION/TRAINING PROGRAM

## 2024-04-11 PROCEDURE — 82746 ASSAY OF FOLIC ACID SERUM: CPT | Performed by: INTERNAL MEDICINE

## 2024-04-11 PROCEDURE — 93005 ELECTROCARDIOGRAM TRACING: CPT

## 2024-04-11 PROCEDURE — 80048 BASIC METABOLIC PNL TOTAL CA: CPT | Performed by: INTERNAL MEDICINE

## 2024-04-11 PROCEDURE — 36415 COLL VENOUS BLD VENIPUNCTURE: CPT

## 2024-04-11 PROCEDURE — 97161 PT EVAL LOW COMPLEX 20 MIN: CPT | Mod: GP

## 2024-04-11 PROCEDURE — 81001 URINALYSIS AUTO W/SCOPE: CPT

## 2024-04-11 PROCEDURE — 72148 MRI LUMBAR SPINE W/O DYE: CPT

## 2024-04-11 PROCEDURE — 71045 X-RAY EXAM CHEST 1 VIEW: CPT | Performed by: RADIOLOGY

## 2024-04-11 PROCEDURE — 85027 COMPLETE CBC AUTOMATED: CPT | Performed by: INTERNAL MEDICINE

## 2024-04-11 PROCEDURE — 2500000004 HC RX 250 GENERAL PHARMACY W/ HCPCS (ALT 636 FOR OP/ED): Performed by: EMERGENCY MEDICINE

## 2024-04-11 PROCEDURE — 82607 VITAMIN B-12: CPT | Performed by: INTERNAL MEDICINE

## 2024-04-11 PROCEDURE — 87186 SC STD MICRODIL/AGAR DIL: CPT | Mod: WESLAB

## 2024-04-11 PROCEDURE — 36415 COLL VENOUS BLD VENIPUNCTURE: CPT | Performed by: INTERNAL MEDICINE

## 2024-04-11 PROCEDURE — 1200000002 HC GENERAL ROOM WITH TELEMETRY DAILY

## 2024-04-11 PROCEDURE — 2500000001 HC RX 250 WO HCPCS SELF ADMINISTERED DRUGS (ALT 637 FOR MEDICARE OP): Performed by: INTERNAL MEDICINE

## 2024-04-11 RX ORDER — SODIUM CHLORIDE 9 MG/ML
75 INJECTION, SOLUTION INTRAVENOUS CONTINUOUS
Status: ACTIVE | OUTPATIENT
Start: 2024-04-11 | End: 2024-04-11

## 2024-04-11 RX ORDER — BACITRACIN 500 [USP'U]/G
OINTMENT TOPICAL 3 TIMES DAILY
Status: DISCONTINUED | OUTPATIENT
Start: 2024-04-11 | End: 2024-04-16 | Stop reason: HOSPADM

## 2024-04-11 RX ORDER — ONDANSETRON 4 MG/1
4 TABLET, ORALLY DISINTEGRATING ORAL EVERY 8 HOURS PRN
Status: DISCONTINUED | OUTPATIENT
Start: 2024-04-11 | End: 2024-04-16 | Stop reason: HOSPADM

## 2024-04-11 RX ORDER — ACETAMINOPHEN 650 MG/1
650 SUPPOSITORY RECTAL EVERY 4 HOURS PRN
Status: DISCONTINUED | OUTPATIENT
Start: 2024-04-11 | End: 2024-04-14

## 2024-04-11 RX ORDER — ATENOLOL 50 MG/1
50 TABLET ORAL DAILY
Status: DISCONTINUED | OUTPATIENT
Start: 2024-04-11 | End: 2024-04-11

## 2024-04-11 RX ORDER — GUAIFENESIN 600 MG/1
600 TABLET, EXTENDED RELEASE ORAL EVERY 12 HOURS PRN
Status: DISCONTINUED | OUTPATIENT
Start: 2024-04-11 | End: 2024-04-16 | Stop reason: HOSPADM

## 2024-04-11 RX ORDER — GUAIFENESIN/DEXTROMETHORPHAN 100-10MG/5
5 SYRUP ORAL EVERY 4 HOURS PRN
Status: DISCONTINUED | OUTPATIENT
Start: 2024-04-11 | End: 2024-04-16 | Stop reason: HOSPADM

## 2024-04-11 RX ORDER — LOPERAMIDE HYDROCHLORIDE 2 MG/1
2 CAPSULE ORAL 4 TIMES DAILY PRN
Status: DISCONTINUED | OUTPATIENT
Start: 2024-04-11 | End: 2024-04-16 | Stop reason: HOSPADM

## 2024-04-11 RX ORDER — ACETAMINOPHEN 325 MG/1
650 TABLET ORAL EVERY 4 HOURS PRN
Status: DISCONTINUED | OUTPATIENT
Start: 2024-04-11 | End: 2024-04-14

## 2024-04-11 RX ORDER — TRAMADOL HYDROCHLORIDE 50 MG/1
25 TABLET ORAL EVERY 12 HOURS PRN
Status: DISCONTINUED | OUTPATIENT
Start: 2024-04-11 | End: 2024-04-13

## 2024-04-11 RX ORDER — CEFTRIAXONE 1 G/50ML
1 INJECTION, SOLUTION INTRAVENOUS EVERY 24 HOURS
Qty: 200 ML | Refills: 0 | Status: DISCONTINUED | OUTPATIENT
Start: 2024-04-12 | End: 2024-04-13

## 2024-04-11 RX ORDER — POLYETHYLENE GLYCOL 3350 17 G/17G
17 POWDER, FOR SOLUTION ORAL DAILY PRN
Status: DISCONTINUED | OUTPATIENT
Start: 2024-04-11 | End: 2024-04-16 | Stop reason: HOSPADM

## 2024-04-11 RX ORDER — ACETAMINOPHEN 160 MG/5ML
650 SOLUTION ORAL EVERY 4 HOURS PRN
Status: DISCONTINUED | OUTPATIENT
Start: 2024-04-11 | End: 2024-04-14

## 2024-04-11 RX ORDER — CEFTRIAXONE 1 G/50ML
1 INJECTION, SOLUTION INTRAVENOUS ONCE
Status: COMPLETED | OUTPATIENT
Start: 2024-04-11 | End: 2024-04-11

## 2024-04-11 RX ORDER — METOPROLOL SUCCINATE 50 MG/1
50 TABLET, EXTENDED RELEASE ORAL DAILY
Status: DISCONTINUED | OUTPATIENT
Start: 2024-04-12 | End: 2024-04-13

## 2024-04-11 RX ORDER — ONDANSETRON HYDROCHLORIDE 2 MG/ML
4 INJECTION, SOLUTION INTRAVENOUS EVERY 8 HOURS PRN
Status: DISCONTINUED | OUTPATIENT
Start: 2024-04-11 | End: 2024-04-16 | Stop reason: HOSPADM

## 2024-04-11 RX ORDER — CEFTRIAXONE 1 G/50ML
1 INJECTION, SOLUTION INTRAVENOUS EVERY 24 HOURS
Status: DISCONTINUED | OUTPATIENT
Start: 2024-04-12 | End: 2024-04-11

## 2024-04-11 RX ORDER — HEPARIN SODIUM 5000 [USP'U]/ML
5000 INJECTION, SOLUTION INTRAVENOUS; SUBCUTANEOUS EVERY 12 HOURS
Status: DISCONTINUED | OUTPATIENT
Start: 2024-04-11 | End: 2024-04-16 | Stop reason: HOSPADM

## 2024-04-11 RX ADMIN — HEPARIN SODIUM 5000 UNITS: 5000 INJECTION, SOLUTION INTRAVENOUS; SUBCUTANEOUS at 08:25

## 2024-04-11 RX ADMIN — BACITRACIN: 500 OINTMENT TOPICAL at 15:00

## 2024-04-11 RX ADMIN — SODIUM CHLORIDE 500 ML: 9 INJECTION, SOLUTION INTRAVENOUS at 03:25

## 2024-04-11 RX ADMIN — SODIUM CHLORIDE 75 ML/HR: 900 INJECTION, SOLUTION INTRAVENOUS at 07:21

## 2024-04-11 RX ADMIN — TRAMADOL HYDROCHLORIDE 25 MG: 50 TABLET ORAL at 21:40

## 2024-04-11 RX ADMIN — LOPERAMIDE HYDROCHLORIDE 2 MG: 2 CAPSULE ORAL at 11:48

## 2024-04-11 RX ADMIN — CEFTRIAXONE SODIUM 1 G: 1 INJECTION, SOLUTION INTRAVENOUS at 03:25

## 2024-04-11 RX ADMIN — ATENOLOL 50 MG: 50 TABLET ORAL at 08:25

## 2024-04-11 RX ADMIN — BACITRACIN: 500 OINTMENT TOPICAL at 09:00

## 2024-04-11 RX ADMIN — TRAMADOL HYDROCHLORIDE 25 MG: 50 TABLET ORAL at 11:46

## 2024-04-11 RX ADMIN — BACITRACIN: 500 OINTMENT TOPICAL at 21:57

## 2024-04-11 SDOH — SOCIAL STABILITY: SOCIAL INSECURITY: WERE YOU ABLE TO COMPLETE ALL THE BEHAVIORAL HEALTH SCREENINGS?: YES

## 2024-04-11 SDOH — SOCIAL STABILITY: SOCIAL INSECURITY: HAVE YOU HAD THOUGHTS OF HARMING ANYONE ELSE?: NO

## 2024-04-11 SDOH — SOCIAL STABILITY: SOCIAL INSECURITY: ABUSE: ADULT

## 2024-04-11 SDOH — SOCIAL STABILITY: SOCIAL INSECURITY: DO YOU FEEL ANYONE HAS EXPLOITED OR TAKEN ADVANTAGE OF YOU FINANCIALLY OR OF YOUR PERSONAL PROPERTY?: NO

## 2024-04-11 SDOH — SOCIAL STABILITY: SOCIAL INSECURITY: DOES ANYONE TRY TO KEEP YOU FROM HAVING/CONTACTING OTHER FRIENDS OR DOING THINGS OUTSIDE YOUR HOME?: NO

## 2024-04-11 SDOH — SOCIAL STABILITY: SOCIAL INSECURITY: HAS ANYONE EVER THREATENED TO HURT YOUR FAMILY OR YOUR PETS?: NO

## 2024-04-11 SDOH — SOCIAL STABILITY: SOCIAL INSECURITY: DO YOU FEEL UNSAFE GOING BACK TO THE PLACE WHERE YOU ARE LIVING?: NO

## 2024-04-11 SDOH — SOCIAL STABILITY: SOCIAL INSECURITY: ARE YOU OR HAVE YOU BEEN THREATENED OR ABUSED PHYSICALLY, EMOTIONALLY, OR SEXUALLY BY ANYONE?: NO

## 2024-04-11 SDOH — SOCIAL STABILITY: SOCIAL INSECURITY: ARE THERE ANY APPARENT SIGNS OF INJURIES/BEHAVIORS THAT COULD BE RELATED TO ABUSE/NEGLECT?: NO

## 2024-04-11 ASSESSMENT — COGNITIVE AND FUNCTIONAL STATUS - GENERAL
DAILY ACTIVITIY SCORE: 13
DRESSING REGULAR LOWER BODY CLOTHING: A LOT
MOBILITY SCORE: 12
EATING MEALS: A LITTLE
STANDING UP FROM CHAIR USING ARMS: A LOT
WALKING IN HOSPITAL ROOM: TOTAL
CLIMB 3 TO 5 STEPS WITH RAILING: TOTAL
MOVING TO AND FROM BED TO CHAIR: A LOT
DRESSING REGULAR LOWER BODY CLOTHING: A LOT
DAILY ACTIVITIY SCORE: 13
WALKING IN HOSPITAL ROOM: TOTAL
HELP NEEDED FOR BATHING: A LOT
TURNING FROM BACK TO SIDE WHILE IN FLAT BAD: A LOT
DAILY ACTIVITIY SCORE: 13
TOILETING: A LOT
PERSONAL GROOMING: A LOT
MOBILITY SCORE: 11
CLIMB 3 TO 5 STEPS WITH RAILING: TOTAL
CLIMB 3 TO 5 STEPS WITH RAILING: TOTAL
DRESSING REGULAR UPPER BODY CLOTHING: A LOT
STANDING UP FROM CHAIR USING ARMS: A LOT
PERSONAL GROOMING: A LOT
DRESSING REGULAR LOWER BODY CLOTHING: A LOT
MOVING FROM LYING ON BACK TO SITTING ON SIDE OF FLAT BED WITH BEDRAILS: A LITTLE
TOILETING: A LOT
TURNING FROM BACK TO SIDE WHILE IN FLAT BAD: A LITTLE
MOBILITY SCORE: 12
TURNING FROM BACK TO SIDE WHILE IN FLAT BAD: A LITTLE
MOVING FROM LYING ON BACK TO SITTING ON SIDE OF FLAT BED WITH BEDRAILS: A LITTLE
HELP NEEDED FOR BATHING: A LOT
STANDING UP FROM CHAIR USING ARMS: A LOT
DRESSING REGULAR LOWER BODY CLOTHING: A LOT
MOVING FROM LYING ON BACK TO SITTING ON SIDE OF FLAT BED WITH BEDRAILS: A LITTLE
PATIENT BASELINE BEDBOUND: NO
EATING MEALS: A LITTLE
TURNING FROM BACK TO SIDE WHILE IN FLAT BAD: A LOT
STANDING UP FROM CHAIR USING ARMS: A LOT
TOILETING: A LOT
DRESSING REGULAR UPPER BODY CLOTHING: A LOT
MOVING TO AND FROM BED TO CHAIR: A LOT
MOVING FROM LYING ON BACK TO SITTING ON SIDE OF FLAT BED WITH BEDRAILS: A LITTLE
MOVING TO AND FROM BED TO CHAIR: A LOT
WALKING IN HOSPITAL ROOM: TOTAL
EATING MEALS: A LITTLE
MOVING TO AND FROM BED TO CHAIR: A LOT
PATIENT BASELINE BEDBOUND: NO
EATING MEALS: A LITTLE
HELP NEEDED FOR BATHING: A LOT
WALKING IN HOSPITAL ROOM: TOTAL
DRESSING REGULAR UPPER BODY CLOTHING: A LOT
HELP NEEDED FOR BATHING: A LOT
PERSONAL GROOMING: A LOT
DRESSING REGULAR UPPER BODY CLOTHING: A LOT
CLIMB 3 TO 5 STEPS WITH RAILING: TOTAL
DAILY ACTIVITIY SCORE: 13
TOILETING: A LOT
MOBILITY SCORE: 11
PERSONAL GROOMING: A LOT

## 2024-04-11 ASSESSMENT — PAIN SCALES - GENERAL
PAINLEVEL_OUTOF10: 0 - NO PAIN
PAINLEVEL_OUTOF10: 0 - NO PAIN
PAINLEVEL_OUTOF10: 4
PAINLEVEL_OUTOF10: 7
PAINLEVEL_OUTOF10: 0 - NO PAIN
PAINLEVEL_OUTOF10: 10 - WORST POSSIBLE PAIN
PAINLEVEL_OUTOF10: 7

## 2024-04-11 ASSESSMENT — LIFESTYLE VARIABLES
AUDIT-C TOTAL SCORE: 0
HOW MANY STANDARD DRINKS CONTAINING ALCOHOL DO YOU HAVE ON A TYPICAL DAY: PATIENT DOES NOT DRINK
HOW OFTEN DO YOU HAVE A DRINK CONTAINING ALCOHOL: NEVER
HOW OFTEN DO YOU HAVE 6 OR MORE DRINKS ON ONE OCCASION: NEVER
SKIP TO QUESTIONS 9-10: 1
AUDIT-C TOTAL SCORE: 0

## 2024-04-11 ASSESSMENT — PAIN - FUNCTIONAL ASSESSMENT
PAIN_FUNCTIONAL_ASSESSMENT: 0-10
PAIN_FUNCTIONAL_ASSESSMENT: FLACC (FACE, LEGS, ACTIVITY, CRY, CONSOLABILITY)

## 2024-04-11 ASSESSMENT — ACTIVITIES OF DAILY LIVING (ADL)
BATHING_ASSISTANCE: MODERATE
HEARING - RIGHT EAR: FUNCTIONAL
JUDGMENT_ADEQUATE_SAFELY_COMPLETE_DAILY_ACTIVITIES: YES
DRESSING YOURSELF: NEEDS ASSISTANCE
HEARING - LEFT EAR: FUNCTIONAL
FEEDING YOURSELF: INDEPENDENT
WALKS IN HOME: NEEDS ASSISTANCE
TOILETING: NEEDS ASSISTANCE
PATIENT'S MEMORY ADEQUATE TO SAFELY COMPLETE DAILY ACTIVITIES?: NO
ADL_ASSISTANCE: NEEDS ASSISTANCE
ADEQUATE_TO_COMPLETE_ADL: YES
GROOMING: NEEDS ASSISTANCE
BATHING: NEEDS ASSISTANCE

## 2024-04-11 ASSESSMENT — PAIN DESCRIPTION - LOCATION: LOCATION: FOOT

## 2024-04-11 ASSESSMENT — PATIENT HEALTH QUESTIONNAIRE - PHQ9
2. FEELING DOWN, DEPRESSED OR HOPELESS: NOT AT ALL
1. LITTLE INTEREST OR PLEASURE IN DOING THINGS: NOT AT ALL
SUM OF ALL RESPONSES TO PHQ9 QUESTIONS 1 & 2: 0

## 2024-04-11 ASSESSMENT — PAIN DESCRIPTION - DESCRIPTORS: DESCRIPTORS: DULL;NAGGING

## 2024-04-11 ASSESSMENT — PAIN DESCRIPTION - ORIENTATION: ORIENTATION: RIGHT;LEFT

## 2024-04-11 NOTE — H&P
History Of Present Illness        Rebecca Samaniego is a 78 y.o. female presenting with Multiple Mechanical Falls today at home. Denies LOC and any head trauma. She does not take any blood thinners.       She was too weak to get off of the toilet.       Patient is a 78-year-old female presenting to the emergency department accompanied by  for evaluation of frequent falls and generalized weakness.  was at bedside helping to provide history to ED staff prior to my arrival.  stated that over the past few weeks the patient has been very weak and unable to care for herself adequately at home. He states that she is barely able to walk and has had multiple falls at home over the past few days. He states she also occasionally hallucinates and thinks that people are there when they are not. She denies chest pain, shortness of breath, fever, chills, nausea, vomiting abdominal pain, recent travel, recent illness, cough, congestion, headaches, numbness, tingling.       In the ED the patient vital signs were stated to be normal.  Her temperature was Tmax 36.3, pulse rate 69, respiratory rate 18, /63, saturation 95%.      The patient's ED diagnostic workup was noted for a normal WBC count of 7.7.  The patient's H&H was low normal at 10.1/30.6.  Patient's platelet count was normal at 191.  The blood chemistry was noted for a minimally elevated glucose 106.  The sodium was 132.  The bicarbonate was 19.  The anion gap was normal.  Creatinine was a slightly elevated 1.9 compared to her baseline.  BUN was elevated 41.  Albumin level was 3.2.  Patient's magnesium level was normal at 1.9.  The lipase was slightly elevated 68.  She denied any abdominal pain.  Urinalysis was suggestive of infection with positive leuk esterase but minimal pyorrhea.  First set and second set cardiac enzyme levels were 69 and 66, respectively.  Patient's chest x-ray was noted for central vascular congestion and interstitial  "prominence suggesting mild edema.  Vague lucency overlying the left humeral head this could be representing over changing osteophyte or age-indeterminate fracture.  CT of the brain without contrast was negative for any evidence of acute cortical infarct or intracranial hemorrhage.  Chronic ischemic changes, including chronic right frontal lobe infarct.      Upon further questioning the patient is frustrated that she has been falling more frequently recently.  She states all she wants to do is get back to driving her big \"[employer].\"  She mentions some modifications she did to her car that she is looking forward to driving again 1 day.      EKG: Sinus rhythm with first-degree AV block, left axis deviation, otherwise intervals within normal limits, no STEMI. Similar to previous from May 16, 2023.        Past Medical History        History reviewed. No pertinent past medical history.        Surgical History        Past Surgical History:   Procedure Laterality Date    AORTIC VALVE REPLACEMENT  02/11/2014    Aortic Valve Replacement    BREAST LUMPECTOMY  02/11/2014    Breast Surgery Lumpectomy    CERVICAL DISCECTOMY  02/11/2014    Spinal Diskectomy Cervical    HYSTERECTOMY  02/11/2014    Hysterectomy    OTHER SURGICAL HISTORY  02/11/2014    Aortic Coarctation Repair          Social History      She reports that she has quit smoking. Her smoking use included cigarettes. She has never used smokeless tobacco. She reports current alcohol use. She reports that she does not use drugs.      Family History      No family history on file.       Allergies      Sulfa (sulfonamide antibiotics), Sulfamethoxazole-trimethoprim, Ciprofloxacin, and Nitrofurantoin        Review of Systems      14-point ROS otherwise negative, as per HPI/Interval History.    General: No change in weight. Yes weakenss. No Fevers/Chills/Night Sweats   Skin: No skin/hair/nail changes. No rashes or sores.  Head:  No trauma. No Headache/nasuea/vomitting. "   Eyes: No visual changes. No tearing. No itching.   Ears: No hearing loss. No tinnitus. No vertigo. No discharge.  Nose, Sinuses: No rhinorrhea, No nasal congestion. No epistaxis.  Mouth, Throat, Neck: No bleeding gums, hoarseness, sore throat or swollen neck  Cardiac: No palpitations. No VELASCO. No PND. No Orthopnea.   Respiratory: No Shortness of Breath. No wheezing. No cough. No hemoptysis.   GI: No nausea/vomiting. No indigestion. No diarrhea. No constipation.   Extremities: No numbness or tingling. No paresthesias.   Urinary: No change in urinary frequency. No change in hesitancy. No hematuria. No incontinence.         Physical Exam        Constitutional:  Delightful  Eyes: PERRL, EOMI,   ENMT: mucous membranes moist  Head/Neck: Neck supple, No JVD,   Respiratory/Thorax: Patent airways, CTAB,   Cardiovascular: Regular, rate and rhythm, no murmurs  Gastrointestinal: Soft, non-distended, +BS.  Extremities: peripheral pulses intact; no edema  Neurological: Alert and Oriented x 3; no focal deficits; gross motor and sensation intact; CN II-XII intact. No asterixis.  Psychological: Appropriate mood and behavior  Skin: B/L LE and UE ecchymotic lesions and excoriation         Last Recorded Vitals  Blood pressure 110/63, pulse 69, temperature 36.3 °C (97.3 °F), temperature source Oral, resp. rate 18, SpO2 95%.    Relevant Results    Lab Results   Component Value Date    WBC 7.7 04/10/2024    HGB 10.1 (L) 04/10/2024    HCT 30.6 (L) 04/10/2024     (H) 04/10/2024     04/10/2024       Lab Results   Component Value Date    GLUCOSE 106 (H) 04/10/2024    CALCIUM 8.6 04/10/2024     (L) 04/10/2024    K 4.5 04/10/2024    CO2 19 (L) 04/10/2024    CL 98 04/10/2024    BUN 41 (H) 04/10/2024    CREATININE 1.90 (H) 04/10/2024       Lab Results   Component Value Date    HGBA1C 5.9 (A) 05/16/2023         CT head wo IV contrast    Result Date: 4/10/2024  Interpreted By:  Gurvinder Kilgore, STUDY: CT HEAD WO IV CONTRAST;   4/10/2024 11:38 pm   INDICATION: Signs/Symptoms:AMS, generalized weakness.   COMPARISON: None.   ACCESSION NUMBER(S): [id number]   ORDERING CLINICIAN: EARLE ANSARI   TECHNIQUE: Noncontrast axial CT scan of head was performed. Angled reformats in brain and bone windows were generated. The images were reviewed in bone, brain, blood and soft tissue windows.   FINDINGS: CSF Spaces: The ventricles, sulci and basal cisterns are within normal limits. There is no extraaxial fluid collection.   Parenchyma: Advanced volume loss.  There is periventricular and subcortical white matter hypoattenuation, most in keeping with chronic microvascular ischemic change. Right frontal encephalomalacia. The grey-white differentiation is intact. There is no mass effect or midline shift.  There is no intracranial hemorrhage.   Calvarium: The calvarium is unremarkable.   Paranasal sinuses and mastoids: Visualized paranasal sinuses and mastoids are clear.       No evidence of acute cortical infarct or intracranial hemorrhage. Chronic ischemic changes, including chronic right frontal lobe infarct.     MACRO: None   Signed by: Gurvinder Kilgore 4/10/2024 11:57 PM Dictation workstation:   [id number]       Scheduled medications  atenolol, 50 mg, oral, Daily  cefTRIAXone, 1 g, intravenous, Once  [START ON 4/12/2024] cefTRIAXone, 1 g, intravenous, q24h  heparin (porcine), 5,000 Units, subcutaneous, q12h  sodium chloride, 500 mL, intravenous, Once  sodium chloride, 500 mL, intravenous, Once      Continuous medications  sodium chloride 0.9%, 75 mL/hr      PRN medications  PRN medications: acetaminophen **OR** acetaminophen **OR** acetaminophen, benzocaine-menthol, dextromethorphan-guaifenesin, guaiFENesin, ondansetron ODT **OR** ondansetron, polyethylene glycol        Assessment/Plan   Principal Problem:    UTI (urinary tract infection)  Active Problems:    Gait instability    Back pain    Leg weakness    Acute on chronic kidney failure (CMS/HCC)     Dehydration    Repeated falls        Rebecca Samaniego is a 78 y.o. female presenting with Multiple Mechanical Falls today at home. Denies LOC and any head trauma. She does not take any blood thinners.  Patient admitted for further evaluation and management.          Multiple Mechanical Falls/Gait Instability    Admit to [facility]  CTH w/o Contrast appreciated   PT/OT evaluation  Fall Precautions  Aspiration Precuations  Up w/ Assist   TSH level in AM  Vitamin B12 and folate levels requested to evaluate for peripheral neuropathy  Neurology consultation  Possible lumbar radiculopathy as patient has history of 3 for lumbar laminectomy and 4 5 lumbar laminectomy  U/A obtained and evaluated   Gentle IVF      Dehydration    Elevated BUN and creatinine from baseline  Gentle IVF in the setting of possible central vascular congestion      Acute on Chronic Renal Failure/NAGMA    Continue to monitor renal function  Gentle IVF for now  Can consider establishing care with nephrologist if kidney function worsens      Presumed UTI    Empiric IV antibiotics      Elevated Troponin Level    Most probably in the setting of demand ischemia acute on chronic renal failure  She denies any chest pain  I have consulted her cardiologist, Dr. Martin      History of sternotomy status post bioprosthetic aortic valve replacement    Stable              This Dictation was Transcribed using a Nuance Dragon Voice Recognition System Device (with Compatible Computer + Software) and as such may contain Grammatical Errors and Unintentional Typing Misprints.      I spent 31 minutes in the professional and overall care of this patient.      Waqas Callaway MD

## 2024-04-11 NOTE — PROGRESS NOTES
Rebecca Samaniego is a 78 y.o. female on day 0 of admission presenting with UTI (urinary tract infection).      Subjective   Having soft, pudding like stools. Patient states she has a hx of colitis and she has diarrhea intermittently - takes a medication at home for this but she is unsure what the medication is called. Tolerating PO.        Objective     Last Recorded Vitals  /82 (BP Location: Right arm)   Pulse 71   Temp 36.3 °C (97.3 °F) (Oral)   Resp 14   Wt 59.9 kg (132 lb)   SpO2 100%   Intake/Output last 3 Shifts:    Intake/Output Summary (Last 24 hours) at 4/11/2024 0948  Last data filed at 4/11/2024 0430  Gross per 24 hour   Intake 550 ml   Output --   Net 550 ml       Admission Weight  Weight: 59.9 kg (132 lb) (04/11/24 0548)    Daily Weight  04/11/24 : 59.9 kg (132 lb)    Image Results  ECG 12 lead  Sinus rhythm with 1st degree AV block  Left axis deviation  Anterior infarct , age undetermined  Abnormal ECG  When compared with ECG of 16-MAY-2023 14:57,  QRS voltage has decreased  ST now depressed in Anterior leads  Non-specific change in ST segment in Lateral leads  T wave inversion no longer evident in Lateral leads  XR chest 1 view  Narrative: Interpreted By:  Wilfred Avilez,   STUDY:  XR CHEST 1 VIEW;  4/11/2024 12:15 am      INDICATION:  Signs/Symptoms:weakness.      COMPARISON:  Chest radiograph 09/11/2017      ACCESSION NUMBER(S):  [id number]      ORDERING CLINICIAN:  EARLE ANSARI      FINDINGS:  Median sternotomy changes. Heart is normal in size. Calcifications of  the aortic arch. Median sternotomy changes and evidence of cardiac  valvuloplasty. Mild central pulmonary vascular congestion and  interstitial prominence. Slight elevation of the left hemidiaphragm  similar to prior exam. No focal consolidation, large pleural fluid,  or discernible pneumothorax. Vague lucency overlying the left humeral  head. Severe arthrosis of the bilateral shoulders.      Impression: 1. Central vascular  congestion and interstitial prominence suggesting  mild edema.  2. Vague lucency overlying the left humeral head, this could  represent overhanging osteophyte or age-indeterminate fracture.          Signed by: Wilfred Avilez 4/11/2024 12:27 AM  Dictation workstation:   [id number]      Physical Exam  Constitutional:       General: She is not in acute distress.     Appearance: She is not toxic-appearing.   HENT:      Mouth/Throat:      Mouth: Mucous membranes are moist.      Pharynx: Oropharynx is clear.   Eyes:      General: No scleral icterus.  Cardiovascular:      Rate and Rhythm: Normal rate.   Pulmonary:      Effort: No respiratory distress.      Breath sounds: No wheezing.   Abdominal:      General: There is no distension.      Palpations: Abdomen is soft.   Musculoskeletal:      Right lower leg: Edema present.      Left lower leg: Edema present.   Skin:     Findings: Bruising (scattered ecchymosis on B/L upper and lower extremities) present.   Neurological:      Mental Status: She is alert.      Comments: Able to state that she's at a  hospital in [place], year is 2024, and her name         Relevant Results               Assessment/Plan        Principal Problem:    UTI (urinary tract infection)  Active Problems:    Gait instability    Back pain    Leg weakness    Acute on chronic kidney failure (CMS/HCC)    Dehydration    Repeated falls    Frequent falls/gait instability  Lumbar radiculopathy  Hx of L2-L5 fusion  Follows with orthopedic surgeon Dr. Friedman  Neurology consulted  TSH wnl  B12 wnl  PT/OT  Fall precautions    ROSE on CKD stage 3, resolved  Suspect due to dehydration    Presumed UTI  UA + bacteria, nitrite, leukocyte esterase  Empirically treat with rocephin for a total of 5 days  Follow up urine culture    Elevated troponin  Suspect in the setting of demand and ROSE  Cardiology consulted    Hx of collitis  Add loperamide PRN    Chronic lower extremity edema  S/p aortic valve  replacment  HTN  Patient is unable to state whether or not lower extremity edema is at baseline, will defer management to cardiology  Continue home atenolol    Dispo: awaiting neurology, cardiology, PT/OT eval. May benefit from SNF placement at discharge.                   Lina Pena MD

## 2024-04-11 NOTE — PROGRESS NOTES
Physical Therapy    Physical Therapy Evaluation & Treatment    Patient Name: Rebecca Samaniego  MRN: [id number]  Today's Date: 4/11/2024   Time Calculation  Start Time: 1135  Stop Time: 1150  Time Calculation (min): 15 min    Assessment/Plan   PT Assessment  PT Assessment Results: Decreased strength, Decreased endurance, Impaired balance, Decreased mobility, Pain  Rehab Prognosis: Good  Evaluation/Treatment Tolerance: Patient tolerated treatment well  Medical Staff Made Aware: Yes  End of Session Communication: Bedside nurse  Assessment Comment: pt demonstrating decreased BLE strength, impaired balance, decreased tolerance to activity.  pt is a high fall risk at this time. requires mod A at this time.  End of Session Patient Position: Bed, 2 rail up, Alarm off, not on at start of session (spouse present)   IP OR SWING BED PT PLAN  Inpatient or Swing Bed: Inpatient  PT Plan  Treatment/Interventions: Bed mobility, Transfer training, Gait training, Stair training, Balance training, Strengthening, Endurance training, Therapeutic exercise, Therapeutic activity  PT Plan: Skilled PT  PT Frequency: 4 times per week  PT Discharge Recommendations: Moderate intensity level of continued care  Equipment Recommended upon Discharge: Wheeled walker  PT Recommended Transfer Status: Assist x1  PT - OK to Discharge: Yes      Subjective     General Visit Information:  General  Reason for Referral: 78-year-old female presenting to the emergency department for evaluation of frequent falls and generalized weakness. pt reports this has been going on for several months. denies paresthesias or pain radiating to BLEs. denies bladder changes.  Referred By: Waqas Callaway MD  Past Medical History Relevant to Rehab: gait instability, back pain, lumbar laminectomy, CKD, falls  Family/Caregiver Present: Yes (spouse)  Prior to Session Communication: Bedside nurse  Patient Position Received: Bed, 2 rail up, Alarm off, not on at start of  session  General Comment: cleared for therapy by nursing, supine upon arrival and eager to participate with therapy.  Home Living:  Home Living  Type of Home: House  Lives With: Spouse  Home Adaptive Equipment: Cane, Walker rolling or standard  Home Layout: One level  Home Access: Stairs to enter with rails  Entrance Stairs-Rails: None  Entrance Stairs-Number of Steps: 3  Bathroom Shower/Tub: Tub/shower unit  Bathroom Toilet: Handicapped height  Bathroom Equipment: Grab bars in shower, Shower chair with back  Prior Level of Function:  Prior Function Per Pt/Caregiver Report  Level of [place]: Needs assistance with ADLs, Needs assistance with functional transfers  Receives Help From: Family  ADL Assistance: Needs assistance  Homemaking Assistance: Needs assistance  Ambulatory Assistance:  (mod ind with cane)  Prior Function Comments: reports too many falls to count in past several months  Precautions:  Precautions  Hearing/Visual Limitations: +reading glasses  Medical Precautions: Fall precautions (spinal precautions for safety)      Objective   Pain:  Pain Assessment  Pain Assessment: 0-10  Pain Score: 7  Pain Type: Chronic pain  Pain Location: Back  Pain Orientation: Lower  Pain Interventions: Repositioned, Other (Comment) (RN aware and in to medicate)  Cognition:  Cognition  Overall Cognitive Status: Within Functional Limits    General Assessments:  General Observation  General Observation: alert, NAD.  bruising and lacerations throughout all extremities    Activity Tolerance  Endurance: Decreased tolerance for upright activites    Sensation  Light Touch: No apparent deficits  Sensation Comment: denies paresthesias in B legs; chronic neuropathy B feet    Strength  Strength Comments: BLEs grossly >3/5  Strength  Strength Comments: BLEs grossly >3/5     Coordination  Coordination Comment: decreased rate of movements    Postural Control  Posture Comment: forward head and rounded shoulders    Static Sitting  Balance  Static Sitting-Balance Support: Feet supported, Bilateral upper extremity supported  Static Sitting-Level of Assistance: Close supervision  Static Sitting-Comment/Number of Minutes: good    Static Standing Balance  Static Standing-Balance Support: Bilateral upper extremity supported  Static Standing-Level of Assistance: Moderate assistance  Static Standing-Comment/Number of Minutes: 1 min; fair-  Functional Assessments:  Bed Mobility  Bed Mobility: Yes  Bed Mobility 1  Bed Mobility 1: Supine to sitting, Sitting to supine  Level of Assistance 1: Close supervision  Bed Mobility Comments 1: cues for log roll technique; increased time/effort with use of bed rail and HOB slightly elevated; able to scoot to EOB with increased time. denies s/s    Transfers  Transfer: Yes  Transfer 1  Technique 1: Sit to stand, Stand to sit  Transfer Device 1: Walker  Transfer Level of Assistance 1: Moderate assistance  Trials/Comments 1: cues for safe hand placement, upright posture, forward weight shifting and pushing down on walker.  very retropulsive with difficulty obtaining COG over APARNA.  pt performed several standing marches and then a few steps to HOB.    Ambulation/Gait Training  Ambulation/Gait Training Performed:  (see transfer)  Extremity/Trunk Assessments:  RLE   RLE : Within Functional Limits  LLE   LLE : Within Functional Limits  Treatments:     Bed Mobility  Bed Mobility: Yes  Bed Mobility 1  Bed Mobility 1: Supine to sitting, Sitting to supine  Level of Assistance 1: Close supervision  Bed Mobility Comments 1: cues for log roll technique; increased time/effort with use of bed rail and HOB slightly elevated; able to scoot to EOB with increased time. denies s/s    Ambulation/Gait Training  Ambulation/Gait Training Performed:  (see transfer)  Transfers  Transfer: Yes  Transfer 1  Technique 1: Sit to stand, Stand to sit  Transfer Device 1: Walker  Transfer Level of Assistance 1: Moderate assistance  Trials/Comments 1:  cues for safe hand placement, upright posture, forward weight shifting and pushing down on walker.  very retropulsive with difficulty obtaining COG over APARNA.  pt performed several standing marches and then a few steps to HOB.  Outcome Measures:  [facility] Basic Mobility  Turning from your back to your side while in a flat bed without using bedrails: A little  Moving from lying on your back to sitting on the side of a flat bed without using bedrails: A little  Moving to and from bed to chair (including a wheelchair): A lot  Standing up from a chair using your arms (e.g. wheelchair or bedside chair): A lot  To walk in hospital room: Total  Climbing 3-5 steps with railing: Total  Basic Mobility - Total Score: 12    Encounter Problems       Encounter Problems (Active)       PT Problem       Patient will transfer supine <> sit modified independently  (Progressing)       Start:  04/11/24    Expected End:  05/08/24            Patient will transfer sit <> stand modified independently and use of rolling walker  (Progressing)       Start:  04/11/24    Expected End:  05/08/24            Patient will ambulate 50 ft with minimal assist and use of rolling walker  (Progressing)       Start:  04/11/24    Expected End:  05/08/24            .Patient will demonstrate improvements in strength  (Progressing)       Start:  04/11/24    Expected End:  05/08/24                   Education Documentation  Precautions, taught by Vicki Harding PT at 4/11/2024 12:06 PM.  Learner: Patient  Readiness: Acceptance  Method: Explanation  Response: Needs Reinforcement    Body Mechanics, taught by Vicki Harding PT at 4/11/2024 12:06 PM.  Learner: Patient  Readiness: Acceptance  Method: Explanation  Response: Needs Reinforcement    Mobility Training, taught by Vicki Harding PT at 4/11/2024 12:06 PM.  Learner: Patient  Readiness: Acceptance  Method: Explanation  Response: Needs Reinforcement    Education Comments  No comments found.

## 2024-04-11 NOTE — CONSULTS
Consults  History Of Present Illness:    Rebecca Samaniego is a 78 y.o. female presenting with .    The patient's past medical history includes the followin. Status post aortic valve replacement utilizing #23 mm Freestyle bioprosthesis along with replacement of the ascending aorta and transverse hemiarch utilizing a #24 mm Gelweave graft plus tricuspid valve repair utilizing a #21 mm Grayson ring plus occlusion of small membranous VSD, 2008, . This patient is doing extremely well from the cardiac standpoint. The patient denies any unusual complaints other than frequent urinary tract infections. She did have a repeat surveillance echocardiogram performed today which again demonstrates a preserved left ventricular ejection fraction of approximately 55% with mild to moderate hypokinesis of the anteroseptal wall due to previous thoracotomy. The bioprosthetic aortic valve replacement has a normal appearance and function. There is mild to moderate left atrial enlargement. Repeat echo done 2019 showed EF 55-60%, stentless AV bioprothesis, AV gradient is 9.5 mm Hg. The patient will return in 6 months for routine visit. Echo done 2023 was essentially unchanged with the addition of some mild to moderate MR.  2. Hypertension. Blood pressure is well within normal range today. The patient will continue on atenolol 100 mg daily unchanged.   3. Remote repair of coarctation of the aorta.  4. Status post lumpectomy and radiation therapy for left-sided breast carcinoma.  5. Status post C3 diskectomy and fusion, C5 corpectomy and anterior plate placement from C3 to C6, 2012, Dr. Burt, [facility]. Patient had laminectomy and facetectomy 2017 with Dr. Jessica Burt. Had another surgery on 2019.  6. Abdominal hysterectomy.  7. Former smoking.  8. Former alcohol excess.  9. Pedal edema. At baseline.  10. CKD. See Dr Thomas.   11. Venous insufficiency.     The patient presented to the  [facility] emergency room yesterday because of increasing weakness and several falls.  Patient had been unable to provide self-care and when was barely able to walk according to the report from the .  She had occasional delusions and delirium as well.  The initial evaluation in the emergency room thus far is included lumbosacral spine back films showing a satisfactory appearance of the L2-L5 fusion progressive degenerative change at L1 and L2.  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  CBC included hematocrit 30.6 WBC of 7700.  The comprehensive metabolic panel included creatinine 1.9 potassium of 4.5.  High-sensitivity troponins have been 69, 66, 57.  EKG demonstrates sinus rhythm left axis deviation poor R wave progression nonspecific ST-T abnormality  Last Recorded Vitals:    Last Labs:  CBC - 4/11/2024:  6:36 AM  7.0 10.6 204    31.4      CMP - 4/11/2024:  6:36 AM  8.8 5.9 17 --- 0.4   _ 3.2 10 71      PTT - No results in last year.  _   _ _     Hemoglobin A1C   Date/Time Value Ref Range Status   05/16/2023 03:49 PM 5.9 (A) % Final     Comment:          Diagnosis of Diabetes-Adults   Non-Diabetic: < or = 5.6%   Increased risk for developing diabetes: 5.7-6.4%   Diagnostic of diabetes: > or = 6.5%  .       Monitoring of Diabetes                Age (y)     Therapeutic Goal (%)   Adults:          >18           <7.0   Pediatrics:    13-18           <7.5                   7-12           <8.0                   0- 6            7.5-8.5   American Diabetes Association. Diabetes Care 33(S1), Jan 2010.     04/26/2022 12:56 PM 6.0 (A) % Final     Comment:          Diagnosis of Diabetes-Adults   Non-Diabetic: < or = 5.6%   Increased risk for developing diabetes: 5.7-6.4%   Diagnostic of diabetes: > or = 6.5%  .       Monitoring of Diabetes                Age (y)     Therapeutic Goal (%)   Adults:          >18           <7.0   Pediatrics:    13-18            "<7.5                   7-12           <8.0                   0- 6            7.5-8.5   American Diabetes Association. Diabetes Care 33(S1), Jan 2010.       VLDL   Date/Time Value Ref Range Status   02/03/2021 11:42 AM 45 (H) 0 - 40 mg/dL Final      Last I/O:  I/O last 3 completed shifts:  In: 550 (9.2 mL/kg) [IV Piggyback:550]  Out: - (0 mL/kg)   Weight: 59.9 kg     Past Cardiology Tests (Last 3 Years):  EKG:  ECG 12 lead 04/10/2024 (Preliminary)    Echo:  No results found for this or any previous visit from the past 1095 days.    Ejection Fractions:  No results found for: \"EF\"  Cath:  No results found for this or any previous visit from the past 1095 days.    Stress Test:  No results found for this or any previous visit from the past 1095 days.    Cardiac Imaging:  No results found for this or any previous visit from the past 1095 days.      Past Medical History:  She has no past medical history on file.    Past Surgical History:  She has a past surgical history that includes Aortic valve replacement (02/11/2014); Other surgical history (02/11/2014); Cervical discectomy (02/11/2014); Breast lumpectomy (02/11/2014); and Hysterectomy (02/11/2014).      Social History:  She reports that she has quit smoking. Her smoking use included cigarettes. She has never used smokeless tobacco. She reports current alcohol use. She reports that she does not use drugs.    Family History:  No family history on file.     Allergies:  Sulfa (sulfonamide antibiotics), Sulfamethoxazole-trimethoprim, Ciprofloxacin, and Nitrofurantoin    Inpatient Medications:  Scheduled medications   Medication Dose Route Frequency    atenolol  50 mg oral Daily    bacitracin   Topical TID    [START ON 4/12/2024] cefTRIAXone  1 g intravenous q24h    heparin (porcine)  5,000 Units subcutaneous q12h    sodium chloride  500 mL intravenous Once     PRN medications   Medication    acetaminophen    Or    acetaminophen    Or    acetaminophen    benzocaine-menthol "    dextromethorphan-guaifenesin    guaiFENesin    loperamide    ondansetron ODT    Or    ondansetron    polyethylene glycol     Continuous Medications   Medication Dose Last Rate    sodium chloride 0.9%  75 mL/hr 75 mL/hr (04/11/24 0721)     Outpatient Medications:  Current Outpatient Medications   Medication Instructions    atenolol (TENORMIN) 50 mg, oral, Daily RT    traMADol (ULTRAM) 50 mg, oral, Every 8 hours PRN       Physical Exam:  The patient is an elderly nonobese white female lying in bed.  She is awake alert conversant somewhat disagreeable  JVP not elevated carotid and pulses are 2+ no bruit  Chest fair air movement breath sounds are clear  The cardiac rhythm is regular no premature beats S1-S2 normal no gallop murmur rub or click.  Well-healed sternotomy incision scar  Abdomen is soft and benign  There is no peripheral edema.  There are multiple ecchymoses over the upper and lower extremities     Assessment/Plan     4/11: The patient is a 78-year-old white female whose past medical history includes hypertension, chronic kidney disease, former alcohol excess, status post bioprosthetic aortic valve replacement utilizing a number 23 mm freestyle bioprosthesis along with replacement of the ascending aorta and transverse hemiarch using a number 24 mm Gelweave graft plus tricuspid valve repair utilizing number 21 mm Mohini ring plus 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  This patient has done very well since her cardiac surgery with her last surveillance echocardiogram on 9/25/2023 showing an LV ejection fraction of 55-60% 1-2+ mitral valve regurgitation normal-appearing stentless aortic valve bioprosthesis with a peak systolic gradient of 11 mmHg and a prosthetic graft in the aortic root and ascending/arch position.  Patient is currently admitted with failure to thrive inability to provide self-care difficulty walking which appears to be related to progressing dementia.  Initial head CT did not  show new CVA.  Urine culture pending to rule out UTI as a cause of delirium and the patient has already been started on empiric IV Rocephin.  The patient's atenolol 50 mg daily will be switched to Toprol-XL 50 mg daily.  The minor elevation in high-sensitivity troponin is nondiagnostic no clinical concern at this point for an acute coronary syndrome.  Physical therapy has been consulted and most likely patient will need skilled nursing home placement unless the delirium substantially improves with antibiotic therapy.    Peripheral IV 04/11/24 20 G  Left Antecubital (Active)   Site Assessment Clean;Dry;Intact 04/11/24 0900   Dressing Status Clean;Dry 04/11/24 0900   Number of days: 0       Code Status:  Full Code    I spent  minutes in the professional and overall care of this patient.        Shaheed Martin MD

## 2024-04-11 NOTE — PROGRESS NOTES
Occupational Therapy    Evaluation    Patient Name: Rebecca Samaniego  MRN: [id number]  Today's Date: 4/11/2024  Time Calculation  Start Time: 0916  Stop Time: 0927  Time Calculation (min): 11 min        Assessment:  OT Assessment: pt presents with generalized weakness, frequent falls and decreased balance/endurance which impedes ADL performance. pt would benefit from skilled OT services to address these deficits and to facilitate highest level of independence.  Prognosis: Fair  Barriers to Discharge: Inaccessible home environment  Evaluation/Treatment Tolerance: Patient limited by fatigue  Medical Staff Made Aware: Yes  End of Session Communication: Bedside nurse  End of Session Patient Position: On cart, Bed, 2 rail up, Alarm off, caregiver present  OT Assessment Results: Decreased ADL status, Decreased endurance, Decreased functional mobility, Decreased trunk control for functional activities  Prognosis: Fair  Barriers to Discharge: Inaccessible home environment  Evaluation/Treatment Tolerance: Patient limited by fatigue  Medical Staff Made Aware: Yes  Strengths: Attitude of self, Ability to acquire knowledge  Barriers to Participation: Comorbidities  Plan:  Treatment Interventions: ADL retraining, Functional transfer training, UE strengthening/ROM, Endurance training, Equipment evaluation/education, Compensatory technique education  OT Frequency: 3 times per week  OT Discharge Recommendations: Moderate intensity level of continued care  Equipment Recommended upon Discharge: Wheeled walker  OT Recommended Transfer Status: Assist of 1, Moderate assist  OT - OK to Discharge: Yes  Treatment Interventions: ADL retraining, Functional transfer training, UE strengthening/ROM, Endurance training, Equipment evaluation/education, Compensatory technique education    Subjective       General:  General  Reason for Referral: 78-year-old female presenting to the emergency department for evaluation of frequent falls and generalized  weakness.  Referred By: Waqas Callaway MD  Family/Caregiver Present: No  Prior to Session Communication: Bedside nurse  Patient Position Received: On cart, Bed, 2 rail up  Preferred Learning Style: auditory, kinesthetic  General Comment: pt cooperative with OT Noreen.  Session cut short due to transport arriving to take patient upstairs  Precautions:  Medical Precautions: Fall precautions  Vital Signs:  Heart Rate: 67  Pain:  Pain Assessment  Pain Assessment: 0-10  Pain Score: 0 - No pain    Objective   Cognition:  Overall Cognitive Status: Within Functional Limits  Orientation Level: Oriented X4           Home Living:  Type of Home: House  Lives With: Spouse  Home Adaptive Equipment: Cane, Walker rolling or standard  Home Layout: One level  Home Access: Stairs to enter with rails  Entrance Stairs-Rails: None  Entrance Stairs-Number of Steps: 3  Bathroom Shower/Tub: Tub/shower unit  Bathroom Toilet: Handicapped height  Bathroom Equipment: Grab bars in shower, Shower chair with back  Prior Function:  Level of [place]: Needs assistance with ADLs, Needs assistance with functional transfers  Receives Help From:  ()  Vocational: Retired  Prior Function Comments:  assists with dressing, bathing and toileting as well as providing steadying assist when patient ambulates with cane and when negotiating stairs at baseline     ADL:  Eating Deficit: Setup  Grooming Assistance: Moderate  Grooming Deficit:  (anticipated)  Bathing Assistance: Moderate  Bathing Deficit:  (anticipated)  UE Dressing Assistance: Moderate  UE Dressing Deficit:  (anticipated)  LE Dressing Assistance: Maximal  LE Dressing Deficit:  (assist to don pita socks due to fatigue and decreased sitting balance)  Toileting Assistance with Device: Total  Toileting Deficit:  (external purewick, incontinence)  Activity Tolerance:  Endurance: Decreased tolerance for upright activites  Bed Mobility/Transfers: Bed Mobility  Bed Mobility: Yes  Bed  Mobility 1  Bed Mobility 1: Supine to sitting  Level of Assistance 1: Minimum assistance  Bed Mobility Comments 1: pt able to manage BLE to edge of bed however required MIN A for lifting trunk.  performed with HOB slightly elevated and cues for technique  Bed Mobility 2  Bed Mobility  2: Sitting to supine  Level of Assistance 2: Moderate assistance  Bed Mobility Comments 2: assist for managing BLE back into bed    Transfers  Transfer: Yes  Transfer 1  Technique 1: Sit to stand, Stand to sit  Transfer Device 1: Gait belt  Transfer Level of Assistance 1: Moderate assistance  Trials/Comments 1: from edge of bed with arm and arm assist; cues for positioning and hand placement; effortful  Transfers 2  Technique 2: Lateral  Transfer Device 2: Gait belt  Transfer Level of Assistance 2: Moderate assistance  Trials/Comments 2: lateral stepping towards left side to position self closer to head of bed; pt requiring MOD A throughout for steady assist    Sitting Balance:  Static Sitting Balance  Static Sitting-Balance Support: Feet supported  Static Sitting-Level of Assistance: Contact guard  Static Sitting-Comment/Number of Minutes: MIN for dynamic  Standing Balance:  Static Standing Balance  Static Standing-Balance Support: Bilateral upper extremity supported  Static Standing-Level of Assistance: Moderate assistance      Vision:Vision - Basic Assessment  Current Vision: No visual deficits  Sensation:  Light Touch: No apparent deficits  Strength:  Strength Comments: BUE grossly 3+/5 distally; pt with generalized weakness  Perception:  Inattention/Neglect: Appears intact  Coordination:  Movements are Fluid and Coordinated: Yes   Hand Function:  Gross Grasp: Functional  Coordination: Functional  Extremities: RUE   RUE :  (sh flex limited to ~90 degrees, WFL distally) and DAVIE   AMBROCIO:  (sh flex limited to ~90 degrees, WFL distally)    Outcome Measures:[facility] Daily Activity  Putting on and taking off regular lower body clothing: A  lot  Bathing (including washing, rinsing, drying): A lot  Putting on and taking off regular upper body clothing: A lot  Toileting, which includes using toilet, bedpan or urinal: A lot  Taking care of personal grooming such as brushing teeth: A lot  Eating Meals: A little  Daily Activity - Total Score: 13        Education Documentation  Body Mechanics, taught by Shoaib Mesa OT at 4/11/2024 10:35 AM.  Learner: Patient  Readiness: Acceptance  Method: Explanation, Demonstration  Response: Verbalizes Understanding    ADL Training, taught by Shoaib Mesa OT at 4/11/2024 10:35 AM.  Learner: Patient  Readiness: Acceptance  Method: Explanation, Demonstration  Response: Verbalizes Understanding    Education Comments  No comments found.        OP EDUCATION:       Goals:  Encounter Problems       Encounter Problems (Active)       ADLs       Patient with complete lower body dressing with minimal assist  level of assistance donning and doffing all LE clothes  with PRN adaptive equipment while edge of bed  (Progressing)       Start:  04/11/24    Expected End:  04/25/24            Patient will complete daily grooming tasks with supervision level of assistance and PRN adaptive equipment while standing. (Progressing)       Start:  04/11/24    Expected End:  04/25/24            Patient will complete toileting including hygiene clothing management/hygiene with minimal assist  level of assistance and raised toilet seat and grab bars. (Progressing)       Start:  04/11/24    Expected End:  04/25/24               MOBILITY       Patient will perform Functional mobility max Household distances/Community Distances with contact guard assist level of assistance and least restrictive device in order to improve safety and functional mobility. (Progressing)       Start:  04/11/24    Expected End:  04/25/24               TRANSFERS       Patient will complete functional transfer to toilet/commode with least restrictive device with contact  guard assist level of assistance. (Progressing)       Start:  04/11/24    Expected End:  04/25/24

## 2024-04-11 NOTE — ED PROVIDER NOTES
HPI   Chief Complaint   Patient presents with    Fall     Multiple falls today due to increased weakness, denies LOC and denies hitting head. Patient states she was too weak to get off toilet    Weakness, Gen       Patient is a 78-year-old female presenting to the emergency department accompanied by  for evaluation of frequent falls and generalized weakness.   is at bedside helping to provide history.   states that over the past few weeks patient has been very weak and unable to care for herself adequately at home.  He states that she is barely able to walk and has had multiple falls at home over the past few days.  He states she also occasionally hallucinates and thinks that people are there when they are not.  She denies chest pain, shortness of breath, fever, chills, nausea, vomiting abdominal pain, recent travel, recent illness, cough, congestion, headaches, numbness, tingling.                          [place] Coma Scale Score: 15                     Patient History   No past medical history on file.  Past Surgical History:   Procedure Laterality Date    AORTIC VALVE REPLACEMENT  02/11/2014    Aortic Valve Replacement    BREAST LUMPECTOMY  02/11/2014    Breast Surgery Lumpectomy    CERVICAL DISCECTOMY  02/11/2014    Spinal Diskectomy Cervical    HYSTERECTOMY  02/11/2014    Hysterectomy    OTHER SURGICAL HISTORY  02/11/2014    Aortic Coarctation Repair     No family history on file.  Social History     Tobacco Use    Smoking status: Former     Types: Cigarettes    Smokeless tobacco: Never   Vaping Use    Vaping status: Never Used   Substance Use Topics    Alcohol use: Yes    Drug use: Never       Physical Exam   ED Triage Vitals [04/10/24 2044]   Temperature Heart Rate Respirations BP   36.3 °C (97.3 °F) 69 18 110/63      Pulse Ox Temp Source Heart Rate Source Patient Position   95 % Oral Monitor --      BP Location FiO2 (%)     -- --       Physical Exam  Vitals and nursing note reviewed.    Constitutional:       General: She is not in acute distress.     Appearance: Normal appearance. She is not ill-appearing or toxic-appearing.   HENT:      Head: Normocephalic and atraumatic.      Nose: Nose normal.      Mouth/Throat:      Mouth: Mucous membranes are dry.   Eyes:      Extraocular Movements: Extraocular movements intact.      Conjunctiva/sclera: Conjunctivae normal.      Pupils: Pupils are equal, round, and reactive to light.   Cardiovascular:      Rate and Rhythm: Normal rate and regular rhythm.      Pulses: Normal pulses.      Heart sounds: Normal heart sounds. No murmur heard.     No friction rub. No gallop.   Pulmonary:      Effort: Pulmonary effort is normal.      Breath sounds: Normal breath sounds. No wheezing, rhonchi or rales.   Abdominal:      General: Abdomen is flat.      Palpations: Abdomen is soft.      Tenderness: There is no abdominal tenderness. There is no guarding or rebound.   Musculoskeletal:         General: Normal range of motion.      Cervical back: Normal range of motion.   Skin:     General: Skin is warm and dry.      Findings: Bruising (Extensive bruising noted on the bilateral upper extremities) present.   Neurological:      General: No focal deficit present.      Mental Status: She is alert and oriented to person, place, and time.   Psychiatric:         Mood and Affect: Mood normal.         Behavior: Behavior normal.         ED Course & MDM   ED Course as of 04/11/24 0137   Wed Apr 10, 2024   2257 ECG 12 lead  ECG performed on April 10, 2024 at 2230 and interpreted at 2231 showing a sinus rhythm with first-degree AV block, left axis deviation, otherwise intervals within normal limits, no STEMI.  Similar to previous from May 16, 2023 [EG]      ED Course User Index  [EG] Enid Pepe MD         Diagnoses as of 04/11/24 0137   Generalized weakness       Medical Decision Making  **Disclaimer parts of this chart have been completed using voice recognition software.  Please excuse any errors of transcription.     Patient seen in conjunction with attending physician .     HPI: Detailed above.    Exam: A medically appropriate exam performed, outlined above, given the known history and presentation.    History obtained from: Patient and  at bedside    EKG: Reviewed and interpreted by my attending physician    Labs/Diagnostics:  Labs Reviewed   CBC WITH AUTO DIFFERENTIAL - Abnormal       Result Value    WBC 7.7      nRBC 0.0      RBC 3.01 (*)     Hemoglobin 10.1 (*)     Hematocrit 30.6 (*)      (*)     MCH 33.6      MCHC 33.0      RDW 13.3      Platelets 191      Neutrophils % 71.0      Immature Granulocytes %, Automated 0.8      Lymphocytes % 13.2      Monocytes % 14.2      Eosinophils % 0.4      Basophils % 0.4      Neutrophils Absolute 5.44      Immature Granulocytes Absolute, Automated 0.06      Lymphocytes Absolute 1.01      Monocytes Absolute 1.09 (*)     Eosinophils Absolute 0.03      Basophils Absolute 0.03     COMPREHENSIVE METABOLIC PANEL - Abnormal    Glucose 106 (*)     Sodium 132 (*)     Potassium 4.5      Chloride 98      Bicarbonate 19 (*)     Urea Nitrogen 41 (*)     Creatinine 1.90 (*)     eGFR 27 (*)     Calcium 8.6      Albumin 3.2 (*)     Alkaline Phosphatase 71      Total Protein 5.9      AST 17      Bilirubin, Total 0.4      ALT 10      Anion Gap 15     LIPASE - Abnormal    Lipase 68 (*)    SERIAL TROPONIN, INITIAL ([facility]) - Abnormal    Troponin T, High Sensitivity 69 (*)    MAGNESIUM - Normal    Magnesium 1.90     URINALYSIS WITH REFLEX CULTURE AND MICROSCOPIC    Narrative:     The following orders were created for panel order Urinalysis with Reflex Culture and Microscopic.  Procedure                               Abnormality         Status                     ---------                               -----------         ------                     Urinalysis with Reflex C...[[id number]]                                                 Extra  Urine Mccormick Tube[[id number]]                                                         Please view results for these tests on the individual orders.   TROPONIN T SERIES, HIGH SENSITIVITY (0, 2 HR, 6 HR)    Narrative:     The following orders were created for panel order Troponin T Series, High Sensitivity (0, 2HR, 6HR).  Procedure                               Abnormality         Status                     ---------                               -----------         ------                     Serial Troponin, Initial...[[id number]]  Abnormal            Final result               Serial Troponin, 2 Hour ...[[id number]]                                                 Serial Troponin, 6 Hour ...[[id number]]                                                   Please view results for these tests on the individual orders.   URINALYSIS WITH REFLEX CULTURE AND MICROSCOPIC   EXTRA URINE GRAY TUBE   SERIAL TROPONIN,  2 HOUR ([facility])   SERIAL TROPONIN, 6 HOUR ([facility])     XR chest 1 view   Final Result   1. Central vascular congestion and interstitial prominence suggesting   mild edema.   2. Vague lucency overlying the left humeral head, this could   represent overhanging osteophyte or age-indeterminate fracture.             Signed by: Wilfred Avilez 4/11/2024 12:27 AM   Dictation workstation:   [id number]      CT head wo IV contrast   Final Result   No evidence of acute cortical infarct or intracranial hemorrhage.   Chronic ischemic changes, including chronic right frontal lobe   infarct.             MACRO:   None        Signed by: Gurvinder Kilgore 4/10/2024 11:57 PM   Dictation workstation:   [id number]        EMERGENCY DEPARTMENT COURSE and DIFFERENTIAL DIAGNOSIS/MDM:  Patient is a 78-year-old female presenting to the emergency department for evaluation of generalized weakness and multiple falls at home.  On physical exam vital signs stable patient is in no acute distress.  Abdomen is nontender to palpation with no rebound or guarding.  She  does have a significant amount of bruising noted on her arms from previous falls however  states she is not on any blood thinners.  Diagnostic labs and imaging ordered.  CMP shows sodium of 132 as well as a creatinine of 1.9 and EGFR of 27.  Creatinine is slightly elevated from previous creatinines which have been between 1.3 and 1.5.  Initial troponin 69.  CBC showed no leukocytosis but did show anemia with no transfusion necessary at this time.  Chest x-ray showed central vascular congestion interstitial prominence suggesting mild edema as well as vague lucency overlying the left femoral head.  CT of the head showed no evidence of acute cortical infarct or intracranial hemorrhage.  Urine is pending at this time and has reached the end of my shift.  Continuation of care as well as final disposition will be determined by attending physician in the emergency department.  Suspect patient will be admitted due to failure to thrive and generalized weakness.    Vitals:    Vitals:    04/10/24 2044   BP: 110/63   Pulse: 69   Resp: 18   Temp: 36.3 °C (97.3 °F)   TempSrc: Oral   SpO2: 95%     History Limited by:    Altered Mental Status/Confusion    Independent history obtained from:    Significant Other/Spouse      External records reviewed:    None      Diagnostics interpreted by me:    CT Scan(s) see MDM and Xrays - see my independent interpretation in MDM      Discussions with other clinicians:    None      Chronic conditions impacting care:    None      Social determinants of health affecting care:    None    Diagnostic tests considered but not performed: None    ED Medications managed:    Medications   sodium chloride 0.9 % bolus 500 mL (has no administration in time range)     Prescription drugs considered:    None      Procedure  Procedures     Renay Tang PA-C  04/11/24 0138

## 2024-04-11 NOTE — NURSING NOTE
Assumed patient care. BSSR received from previous Nurse. Seen patient lying on bed awake, no distress or discomfort noted.  is in the room. Safety measures ensured and call light in reach.   Plan of care ongoing.    1955H:  Patient off unit for MRI.    2120H:  Patient on the floor.  Awake, alert and oriented.

## 2024-04-11 NOTE — CONSULTS
Inpatient consult to Neurology  Consult performed by: Abdiaziz Anthony MD  Consult ordered by: Waqas Callaway MD      Chief complaints: Gait ataxia with multiple falls    History Of Present Illness  Rebecca Samaniego is a 78 y.o. female presenting with frequent falls and generalized weakness.  At this time, patient is a poor historian and history is mainly obtained from the chart.  As per the chart, the  has noticed that over the past few weeks the patient has been very weak and unable to care for herself and was unable to walk.  Patient upon questioning states that she has had significant right heel pain which has stopped her from ambulating.  Patient denies any other complaints at this time.     Past Medical History  She has no past medical history on file.    Surgical History  She has a past surgical history that includes Aortic valve replacement (2014); Other surgical history (2014); Cervical discectomy (2014); Breast lumpectomy (2014); and Hysterectomy (2014).     Social History  She reports that she has quit smoking. Her smoking use included cigarettes. She has never used smokeless tobacco. She reports current alcohol use. She reports that she does not use drugs.    Family history: No significant family history is noted    Allergies  Sulfa (sulfonamide antibiotics), Sulfamethoxazole-trimethoprim, Ciprofloxacin, and Nitrofurantoin    Medications  Medications Prior to Admission   Medication Sig Dispense Refill Last Dose    atenolol (Tenormin) 50 mg tablet Take 1 tablet (50 mg) by mouth once daily.   Unknown    [] predniSONE (Deltasone) 10 mg tablet Take 3 tablets (30 mg) by mouth once daily for 5 days, THEN 2 tablets (20 mg) once daily for 5 days, THEN 1 tablet (10 mg) once daily for 5 days. 30 tablet 0     traMADol (Ultram) 50 mg tablet Take 1 tablet (50 mg) by mouth every 8 hours if needed for severe pain (7 - 10) for up to 5 days. 15 tablet 0 Unknown     Review  "of Systems  14 point review of systems was reviewed and negative except as noted above.    Neurological Exam  Physical Exam  Mental Status :  Alert , O x 2  Attention and concentration normal    Speech : Clear , fluent  No aphasia or dysarthria noted    CN 2-12 :  Pupils round , regular reacting to light  Fundus could not be assessed  VA intact, EOM intact  Facial sensation intact  No facial asymmetry noted  Hearing acuity intact bilaterally  Tongue midline  Shoulder shrug intact    Motor:  Strength moves all extremities against gravity  No pronator drift  Normal bulk and tone    Sensory:   Intact to light touch    Reflexes : 1+  symmetric with equivocal toes    Coordination : Intact to Finger to Nose    Gait : Unable to assess.      Last Recorded Vitals   Blood pressure 169/60, pulse 66, temperature 36.5 °C (97.7 °F), temperature source Oral, resp. rate 17, height 1.651 m (5' 5\"), weight 59.9 kg (132 lb), SpO2 94%.    Relevant Results  ECG 12 lead    Result Date: 4/11/2024   Sinus rhythmÂ with 1st degree AV block Left axis deviation  PROMINENT R-WAVE  Nonspecific ST abnormalityÂ  Abnormal ECG When compared with ECG ofÂ 16-MAY-2023 14:57, QRS voltageÂ has decreased ST now depressed inÂ Anterior leads Non-specific change in ST segment inÂ Lateral leads T wave inversion no longer evident inÂ Lateral leads Â  Confirmed by Shaheed Martin ([id number]) on 4/11/2024 10:04:17 AM    XR chest 1 view    Result Date: 4/11/2024  Interpreted By:  Wilfred Avilez, STUDY: XR CHEST 1 VIEW;  4/11/2024 12:15 am   INDICATION: Signs/Symptoms:weakness.   COMPARISON: Chest radiograph 09/11/2017   ACCESSION NUMBER(S): [id number]   ORDERING CLINICIAN: EARLE ANSARI   FINDINGS: Median sternotomy changes. Heart is normal in size. Calcifications of the aortic arch. Median sternotomy changes and evidence of cardiac valvuloplasty. Mild central pulmonary vascular congestion and interstitial prominence. Slight elevation of the left hemidiaphragm similar " to prior exam. No focal consolidation, large pleural fluid, or discernible pneumothorax. Vague lucency overlying the left humeral head. Severe arthrosis of the bilateral shoulders.       1. Central vascular congestion and interstitial prominence suggesting mild edema. 2. Vague lucency overlying the left humeral head, this could represent overhanging osteophyte or age-indeterminate fracture.     Signed by: Wilfred Avilez 4/11/2024 12:27 AM Dictation workstation:   [id number]    CT head wo IV contrast    Result Date: 4/10/2024  Interpreted By:  Gurvinder Kilgore, STUDY: CT HEAD WO IV CONTRAST;  4/10/2024 11:38 pm   INDICATION: Signs/Symptoms:AMS, generalized weakness.   COMPARISON: None.   ACCESSION NUMBER(S): [id number]   ORDERING CLINICIAN: EARLE ANSARI   TECHNIQUE: Noncontrast axial CT scan of head was performed. Angled reformats in brain and bone windows were generated. The images were reviewed in bone, brain, blood and soft tissue windows.   FINDINGS: CSF Spaces: The ventricles, sulci and basal cisterns are within normal limits. There is no extraaxial fluid collection.   Parenchyma: Advanced volume loss.  There is periventricular and subcortical white matter hypoattenuation, most in keeping with chronic microvascular ischemic change. Right frontal encephalomalacia. The grey-white differentiation is intact. There is no mass effect or midline shift.  There is no intracranial hemorrhage.   Calvarium: The calvarium is unremarkable.   Paranasal sinuses and mastoids: Visualized paranasal sinuses and mastoids are clear.       No evidence of acute cortical infarct or intracranial hemorrhage. Chronic ischemic changes, including chronic right frontal lobe infarct.     MACRO: None   Signed by: Gurvinder Kilgore 4/10/2024 11:57 PM Dictation workstation:   [id number]     Results for orders placed or performed during the hospital encounter of 04/10/24 (from the past 24 hour(s))   CBC and Auto Differential   Result Value Ref Range     WBC 7.7 4.4 - 11.3 x10*3/uL    nRBC 0.0 0.0 - 0.0 /100 WBCs    RBC 3.01 (L) 4.00 - 5.20 x10*6/uL    Hemoglobin 10.1 (L) 12.0 - 16.0 g/dL    Hematocrit 30.6 (L) 36.0 - 46.0 %     (H) 80 - 100 fL    MCH 33.6 26.0 - 34.0 pg    MCHC 33.0 32.0 - 36.0 g/dL    RDW 13.3 11.5 - 14.5 %    Platelets 191 150 - 450 x10*3/uL    Neutrophils % 71.0 40.0 - 80.0 %    Immature Granulocytes %, Automated 0.8 0.0 - 0.9 %    Lymphocytes % 13.2 13.0 - 44.0 %    Monocytes % 14.2 2.0 - 10.0 %    Eosinophils % 0.4 0.0 - 6.0 %    Basophils % 0.4 0.0 - 2.0 %    Neutrophils Absolute 5.44 1.60 - 5.50 x10*3/uL    Immature Granulocytes Absolute, Automated 0.06 0.00 - 0.50 x10*3/uL    Lymphocytes Absolute 1.01 0.80 - 3.00 x10*3/uL    Monocytes Absolute 1.09 (H) 0.05 - 0.80 x10*3/uL    Eosinophils Absolute 0.03 0.00 - 0.40 x10*3/uL    Basophils Absolute 0.03 0.00 - 0.10 x10*3/uL   Comprehensive metabolic panel   Result Value Ref Range    Glucose 106 (H) 65 - 99 mg/dL    Sodium 132 (L) 133 - 145 mmol/L    Potassium 4.5 3.4 - 5.1 mmol/L    Chloride 98 97 - 107 mmol/L    Bicarbonate 19 (L) 24 - 31 mmol/L    Urea Nitrogen 41 (H) 8 - 25 mg/dL    Creatinine 1.90 (H) 0.40 - 1.60 mg/dL    eGFR 27 (L) >60 mL/min/1.73m*2    Calcium 8.6 8.5 - 10.4 mg/dL    Albumin 3.2 (L) 3.5 - 5.0 g/dL    Alkaline Phosphatase 71 35 - 125 U/L    Total Protein 5.9 5.9 - 7.9 g/dL    AST 17 5 - 40 U/L    Bilirubin, Total 0.4 0.1 - 1.2 mg/dL    ALT 10 5 - 40 U/L    Anion Gap 15 <=19 mmol/L   Magnesium   Result Value Ref Range    Magnesium 1.90 1.60 - 3.10 mg/dL   Lipase   Result Value Ref Range    Lipase 68 (H) 16 - 63 U/L   Serial Troponin, Initial ([facility])   Result Value Ref Range    Troponin T, High Sensitivity 69 (HH) <=14 ng/L   ECG 12 lead   Result Value Ref Range    Ventricular Rate 70 BPM    Atrial Rate 70 BPM    AL Interval 212 ms    QRS Duration 80 ms    QT Interval 372 ms    QTC Calculation(Bazett) 401 ms    P Axis 71 degrees    R Axis -30 degrees    T Axis 49  degrees    QRS Count 12 beats    Q Onset 219 ms    P Onset 113 ms    P Offset 165 ms    T Offset 405 ms    QTC Fredericia 391 ms   Serial Troponin, 2 Hour ([facility])   Result Value Ref Range    Troponin T, High Sensitivity 66 (HH) <=14 ng/L   TSH   Result Value Ref Range    Thyroid Stimulating Hormone 1.57 0.27 - 4.20 mIU/L   Vitamin B12   Result Value Ref Range    Vitamin B12 597 211 - 946 pg/mL   Folate   Result Value Ref Range    Folate, Serum 9.7 4.2 - 19.9 ng/mL   Urinalysis with Reflex Culture and Microscopic   Result Value Ref Range    Color, Urine Light-Yellow Light-Yellow, Yellow, Dark-Yellow    Appearance, Urine Clear Clear    Specific Gravity, Urine 1.011 1.005 - 1.035    pH, Urine 5.0 5.0, 5.5, 6.0, 6.5, 7.0, 7.5, 8.0    Protein, Urine NEGATIVE NEGATIVE, 10 (TRACE), 20 (TRACE) mg/dL    Glucose, Urine Normal Normal mg/dL    Blood, Urine NEGATIVE NEGATIVE    Ketones, Urine TRACE (A) NEGATIVE mg/dL    Bilirubin, Urine NEGATIVE NEGATIVE    Urobilinogen, Urine Normal Normal mg/dL    Nitrite, Urine 1+ (A) NEGATIVE    Leukocyte Esterase, Urine 250 Jeremy/µL (A) NEGATIVE   Microscopic Only, Urine   Result Value Ref Range    WBC, Urine 1-5 1-5, NONE /HPF    RBC, Urine 1-2 NONE, 1-2, 3-5 /HPF    Squamous Epithelial Cells, Urine 1-9 (SPARSE) Reference range not established. /HPF    Bacteria, Urine 4+ (A) NONE SEEN /HPF    Mucus, Urine FEW Reference range not established. /LPF   Serial Troponin, 6 Hour ([facility])   Result Value Ref Range    Troponin T, High Sensitivity 57 (HH) <=14 ng/L   CBC   Result Value Ref Range    WBC 7.0 4.4 - 11.3 x10*3/uL    nRBC 0.0 0.0 - 0.0 /100 WBCs    RBC 3.26 (L) 4.00 - 5.20 x10*6/uL    Hemoglobin 10.6 (L) 12.0 - 16.0 g/dL    Hematocrit 31.4 (L) 36.0 - 46.0 %    MCV 96 80 - 100 fL    MCH 32.5 26.0 - 34.0 pg    MCHC 33.8 32.0 - 36.0 g/dL    RDW 13.2 11.5 - 14.5 %    Platelets 204 150 - 450 x10*3/uL   Basic metabolic panel   Result Value Ref Range    Glucose 86 65 - 99 mg/dL    Sodium 135 133 -  145 mmol/L    Potassium 4.7 3.4 - 5.1 mmol/L    Chloride 100 97 - 107 mmol/L    Bicarbonate 19 (L) 24 - 31 mmol/L    Urea Nitrogen 36 (H) 8 - 25 mg/dL    Creatinine 1.50 0.40 - 1.60 mg/dL    eGFR 35 (L) >60 mL/min/1.73m*2    Calcium 8.8 8.5 - 10.4 mg/dL    Anion Gap 16 <=19 mmol/L         Assessment/Plan   Principal Problem:    UTI (urinary tract infection)  Active Problems:    Gait instability    Back pain    Leg weakness    Acute on chronic kidney failure (CMS/HCC)    Dehydration    Repeated falls    Rebecca Samaniego is a 78 y.o. female presenting with frequent falls and generalized weakness rule out lumbar radiculopathy and CVA ,though less likely metabolic, septic, toxic causes contributing, stable.    Recommendations;  Reviewed CT scan brain and carotid ultrasound/ CT angiogram head /neck results.  MRI brain and lumbar spine pending  Sepsis workup and treatment  Stroke work up including 2 d echo and labs  Correct underlying metabolic abnormalities  PT/OT eval and treatment  Blood pressure / Blood sugar monitoring and control  Telemetry monitoring   Continue current management  Discussed the above plan with the patient / nurse .    Parts of this chart have been completed using voice recognition software. Please excuse any errors of transcription.

## 2024-04-12 ENCOUNTER — APPOINTMENT (OUTPATIENT)
Dept: CARDIOLOGY | Facility: HOSPITAL | Age: 79
DRG: 064 | End: 2024-04-12
Payer: MEDICARE

## 2024-04-12 PROBLEM — I63.9 STROKE (MULTI): Status: ACTIVE | Noted: 2024-04-12

## 2024-04-12 LAB
ANION GAP SERPL CALC-SCNC: 11 MMOL/L
AORTIC VALVE MEAN GRADIENT: 6 MMHG
AORTIC VALVE PEAK VELOCITY: 1.56 M/S
AV PEAK GRADIENT: 9.7 MMHG
BUN SERPL-MCNC: 23 MG/DL (ref 8–25)
CALCIUM SERPL-MCNC: 8.4 MG/DL (ref 8.5–10.4)
CHLORIDE SERPL-SCNC: 100 MMOL/L (ref 97–107)
CHOLEST SERPL-MCNC: 182 MG/DL (ref 133–200)
CHOLEST/HDLC SERPL: 4.3 {RATIO}
CO2 SERPL-SCNC: 21 MMOL/L (ref 24–31)
CREAT SERPL-MCNC: 1 MG/DL (ref 0.4–1.6)
EGFRCR SERPLBLD CKD-EPI 2021: 57 ML/MIN/1.73M*2
EJECTION FRACTION APICAL 4 CHAMBER: 64
EST. AVERAGE GLUCOSE BLD GHB EST-MCNC: 103 MG/DL
GLUCOSE SERPL-MCNC: 114 MG/DL (ref 65–99)
HBA1C MFR BLD: 5.2 %
HDLC SERPL-MCNC: 42 MG/DL
HOLD SPECIMEN: NORMAL
LDLC SERPL CALC-MCNC: 106 MG/DL (ref 65–130)
LEFT ATRIUM VOLUME AREA LENGTH INDEX BSA: 19.8 ML/M2
LEFT VENTRICLE INTERNAL DIMENSION DIASTOLE: 4.48 CM (ref 3.5–6)
LV EJECTION FRACTION BIPLANE: 59 %
MITRAL VALVE E/A RATIO: 0.26
MITRAL VALVE E/E' RATIO: 3.47
POTASSIUM SERPL-SCNC: 4.1 MMOL/L (ref 3.4–5.1)
RIGHT VENTRICLE FREE WALL PEAK S': 13.8 CM/S
RIGHT VENTRICLE PEAK SYSTOLIC PRESSURE: 34.1 MMHG
SODIUM SERPL-SCNC: 132 MMOL/L (ref 133–145)
TRICUSPID ANNULAR PLANE SYSTOLIC EXCURSION: 1 CM
TRIGL SERPL-MCNC: 171 MG/DL (ref 40–150)

## 2024-04-12 PROCEDURE — 2500000001 HC RX 250 WO HCPCS SELF ADMINISTERED DRUGS (ALT 637 FOR MEDICARE OP): Performed by: INTERNAL MEDICINE

## 2024-04-12 PROCEDURE — 36415 COLL VENOUS BLD VENIPUNCTURE: CPT | Performed by: STUDENT IN AN ORGANIZED HEALTH CARE EDUCATION/TRAINING PROGRAM

## 2024-04-12 PROCEDURE — 93306 TTE W/DOPPLER COMPLETE: CPT

## 2024-04-12 PROCEDURE — 2500000004 HC RX 250 GENERAL PHARMACY W/ HCPCS (ALT 636 FOR OP/ED): Performed by: INTERNAL MEDICINE

## 2024-04-12 PROCEDURE — 83718 ASSAY OF LIPOPROTEIN: CPT | Performed by: STUDENT IN AN ORGANIZED HEALTH CARE EDUCATION/TRAINING PROGRAM

## 2024-04-12 PROCEDURE — 93306 TTE W/DOPPLER COMPLETE: CPT | Performed by: INTERNAL MEDICINE

## 2024-04-12 PROCEDURE — 93880 EXTRACRANIAL BILAT STUDY: CPT

## 2024-04-12 PROCEDURE — 83036 HEMOGLOBIN GLYCOSYLATED A1C: CPT | Performed by: STUDENT IN AN ORGANIZED HEALTH CARE EDUCATION/TRAINING PROGRAM

## 2024-04-12 PROCEDURE — 80048 BASIC METABOLIC PNL TOTAL CA: CPT | Performed by: STUDENT IN AN ORGANIZED HEALTH CARE EDUCATION/TRAINING PROGRAM

## 2024-04-12 PROCEDURE — 2060000001 HC INTERMEDIATE ICU ROOM DAILY

## 2024-04-12 PROCEDURE — 93880 EXTRACRANIAL BILAT STUDY: CPT | Performed by: SURGERY

## 2024-04-12 PROCEDURE — 2500000004 HC RX 250 GENERAL PHARMACY W/ HCPCS (ALT 636 FOR OP/ED): Performed by: STUDENT IN AN ORGANIZED HEALTH CARE EDUCATION/TRAINING PROGRAM

## 2024-04-12 PROCEDURE — 99232 SBSQ HOSP IP/OBS MODERATE 35: CPT | Performed by: INTERNAL MEDICINE

## 2024-04-12 RX ORDER — PANTOPRAZOLE SODIUM 40 MG/1
40 TABLET, DELAYED RELEASE ORAL
Status: DISCONTINUED | OUTPATIENT
Start: 2024-04-13 | End: 2024-04-16 | Stop reason: HOSPADM

## 2024-04-12 RX ORDER — DEXTROSE MONOHYDRATE AND SODIUM CHLORIDE 5; .45 G/100ML; G/100ML
75 INJECTION, SOLUTION INTRAVENOUS CONTINUOUS
Status: DISCONTINUED | OUTPATIENT
Start: 2024-04-12 | End: 2024-04-14

## 2024-04-12 RX ORDER — PANTOPRAZOLE SODIUM 40 MG/10ML
40 INJECTION, POWDER, LYOPHILIZED, FOR SOLUTION INTRAVENOUS
Status: DISCONTINUED | OUTPATIENT
Start: 2024-04-13 | End: 2024-04-16 | Stop reason: HOSPADM

## 2024-04-12 RX ORDER — NAPROXEN SODIUM 220 MG/1
81 TABLET, FILM COATED ORAL DAILY
Status: DISCONTINUED | OUTPATIENT
Start: 2024-04-12 | End: 2024-04-16 | Stop reason: HOSPADM

## 2024-04-12 RX ORDER — CLOPIDOGREL BISULFATE 75 MG/1
75 TABLET ORAL DAILY
Status: DISCONTINUED | OUTPATIENT
Start: 2024-04-12 | End: 2024-04-16 | Stop reason: HOSPADM

## 2024-04-12 RX ORDER — ATORVASTATIN CALCIUM 40 MG/1
40 TABLET, FILM COATED ORAL NIGHTLY
Status: DISCONTINUED | OUTPATIENT
Start: 2024-04-12 | End: 2024-04-16 | Stop reason: HOSPADM

## 2024-04-12 RX ADMIN — BACITRACIN: 500 OINTMENT TOPICAL at 09:18

## 2024-04-12 RX ADMIN — HEPARIN SODIUM 5000 UNITS: 5000 INJECTION, SOLUTION INTRAVENOUS; SUBCUTANEOUS at 20:04

## 2024-04-12 RX ADMIN — HEPARIN SODIUM 5000 UNITS: 5000 INJECTION, SOLUTION INTRAVENOUS; SUBCUTANEOUS at 09:17

## 2024-04-12 RX ADMIN — DEXTROSE MONOHYDRATE AND SODIUM CHLORIDE 75 ML/HR: 5; .45 INJECTION, SOLUTION INTRAVENOUS at 18:09

## 2024-04-12 RX ADMIN — CEFTRIAXONE SODIUM 1 G: 1 INJECTION, SOLUTION INTRAVENOUS at 04:37

## 2024-04-12 RX ADMIN — BACITRACIN: 500 OINTMENT TOPICAL at 21:00

## 2024-04-12 SDOH — SOCIAL STABILITY: SOCIAL NETWORK
DO YOU BELONG TO ANY CLUBS OR ORGANIZATIONS SUCH AS CHURCH GROUPS UNIONS, FRATERNAL OR ATHLETIC GROUPS, OR SCHOOL GROUPS?: NO

## 2024-04-12 SDOH — SOCIAL STABILITY: SOCIAL NETWORK
DO YOU BELONG TO ANY CLUBS OR ORGANIZATIONS SUCH AS CHURCH GROUPS, UNIONS, FRATERNAL OR ATHLETIC GROUPS, OR SCHOOL GROUPS?: NO

## 2024-04-12 SDOH — SOCIAL STABILITY: SOCIAL NETWORK: HOW OFTEN DO YOU ATTEND CHURCH OR RELIGIOUS SERVICES?: NEVER

## 2024-04-12 SDOH — HEALTH STABILITY: MENTAL HEALTH: HOW OFTEN DO YOU HAVE 6 OR MORE DRINKS ON ONE OCCASION?: NEVER

## 2024-04-12 SDOH — SOCIAL STABILITY: SOCIAL NETWORK: ARE YOU MARRIED, WIDOWED, DIVORCED, SEPARATED, NEVER MARRIED, OR LIVING WITH A PARTNER?: MARRIED

## 2024-04-12 SDOH — HEALTH STABILITY: MENTAL HEALTH: HOW MANY STANDARD DRINKS CONTAINING ALCOHOL DO YOU HAVE ON A TYPICAL DAY?: 1 OR 2

## 2024-04-12 SDOH — SOCIAL STABILITY: SOCIAL INSECURITY: WITHIN THE LAST YEAR, HAVE YOU BEEN AFRAID OF YOUR PARTNER OR EX-PARTNER?: PATIENT UNABLE TO ANSWER

## 2024-04-12 SDOH — SOCIAL STABILITY: SOCIAL NETWORK: IN A TYPICAL WEEK, HOW MANY TIMES DO YOU TALK ON THE PHONE WITH FAMILY, FRIENDS, OR NEIGHBORS?: PATIENT UNABLE TO ANSWER

## 2024-04-12 SDOH — SOCIAL STABILITY: SOCIAL NETWORK: HOW OFTEN DO YOU GET TOGETHER WITH FRIENDS OR RELATIVES?: PATIENT UNABLE TO ANSWER

## 2024-04-12 SDOH — HEALTH STABILITY: MENTAL HEALTH
HOW OFTEN DO YOU NEED TO HAVE SOMEONE HELP YOU WHEN YOU READ INSTRUCTIONS, PAMPHLETS, OR OTHER WRITTEN MATERIAL FROM YOUR DOCTOR OR PHARMACY?: NEVER

## 2024-04-12 SDOH — SOCIAL STABILITY: SOCIAL INSECURITY
WITHIN THE LAST YEAR, HAVE YOU BEEN RAPED OR FORCED TO HAVE ANY KIND OF SEXUAL ACTIVITY BY YOUR PARTNER OR EX-PARTNER?: PATIENT UNABLE TO ANSWER

## 2024-04-12 SDOH — SOCIAL STABILITY: SOCIAL NETWORK: HOW OFTEN DO YOU ATTEND MEETINGS OF THE CLUBS OR ORGANIZATIONS YOU BELONG TO?: NEVER

## 2024-04-12 SDOH — ECONOMIC STABILITY: INCOME INSECURITY: IN THE PAST 12 MONTHS HAS THE ELECTRIC, GAS, OIL, OR WATER COMPANY THREATENED TO SHUT OFF SERVICES IN YOUR HOME?: NO

## 2024-04-12 SDOH — SOCIAL STABILITY: SOCIAL INSECURITY
WITHIN THE LAST YEAR, HAVE TO BEEN RAPED OR FORCED TO HAVE ANY KIND OF SEXUAL ACTIVITY BY YOUR PARTNER OR EX-PARTNER?: PATIENT UNABLE TO ANSWER

## 2024-04-12 SDOH — ECONOMIC STABILITY: INCOME INSECURITY: IN THE PAST 12 MONTHS, HAS THE ELECTRIC, GAS, OIL, OR WATER COMPANY THREATENED TO SHUT OFF SERVICE IN YOUR HOME?: NO

## 2024-04-12 SDOH — SOCIAL STABILITY: SOCIAL NETWORK: HOW OFTEN DO YOU ATTENT MEETINGS OF THE CLUB OR ORGANIZATION YOU BELONG TO?: NEVER

## 2024-04-12 SDOH — HEALTH STABILITY: MENTAL HEALTH: HOW OFTEN DO YOU HAVE A DRINK CONTAINING ALCOHOL?: 4 OR MORE TIMES A WEEK

## 2024-04-12 SDOH — HEALTH STABILITY: MENTAL HEALTH: HOW MANY DRINKS CONTAINING ALCOHOL DO YOU HAVE ON A TYPICAL DAY WHEN YOU ARE DRINKING?: 1 OR 2

## 2024-04-12 SDOH — SOCIAL STABILITY: SOCIAL INSECURITY
WITHIN THE LAST YEAR, HAVE YOU BEEN KICKED, HIT, SLAPPED, OR OTHERWISE PHYSICALLY HURT BY YOUR PARTNER OR EX-PARTNER?: PATIENT UNABLE TO ANSWER

## 2024-04-12 SDOH — HEALTH STABILITY: MENTAL HEALTH
STRESS IS WHEN SOMEONE FEELS TENSE, NERVOUS, ANXIOUS, OR CAN'T SLEEP AT NIGHT BECAUSE THEIR MIND IS TROUBLED. HOW STRESSED ARE YOU?: PATIENT UNABLE TO ANSWER

## 2024-04-12 SDOH — SOCIAL STABILITY: SOCIAL INSECURITY: ARE YOU MARRIED, WIDOWED, DIVORCED, SEPARATED, NEVER MARRIED, OR LIVING WITH A PARTNER?: MARRIED

## 2024-04-12 SDOH — HEALTH STABILITY: MENTAL HEALTH: HOW OFTEN DO YOU HAVE SIX OR MORE DRINKS ON ONE OCCASION?: NEVER

## 2024-04-12 SDOH — SOCIAL STABILITY: SOCIAL INSECURITY
WITHIN THE LAST YEAR, HAVE YOU BEEN HUMILIATED OR EMOTIONALLY ABUSED IN OTHER WAYS BY YOUR PARTNER OR EX-PARTNER?: PATIENT UNABLE TO ANSWER

## 2024-04-12 SDOH — HEALTH STABILITY: MENTAL HEALTH
DO YOU FEEL STRESS - TENSE, RESTLESS, NERVOUS, OR ANXIOUS, OR UNABLE TO SLEEP AT NIGHT BECAUSE YOUR MIND IS TROUBLED ALL THE TIME - THESE DAYS?: PATIENT UNABLE TO ANSWER

## 2024-04-12 ASSESSMENT — LIFESTYLE VARIABLES
SKIP TO QUESTIONS 9-10: 1
AUDIT-C TOTAL SCORE: 4

## 2024-04-12 ASSESSMENT — COGNITIVE AND FUNCTIONAL STATUS - GENERAL
TURNING FROM BACK TO SIDE WHILE IN FLAT BAD: A LOT
MOVING FROM LYING ON BACK TO SITTING ON SIDE OF FLAT BED WITH BEDRAILS: A LOT
DRESSING REGULAR UPPER BODY CLOTHING: A LOT
CLIMB 3 TO 5 STEPS WITH RAILING: TOTAL
TOILETING: A LOT
TURNING FROM BACK TO SIDE WHILE IN FLAT BAD: A LOT
DRESSING REGULAR UPPER BODY CLOTHING: A LOT
HELP NEEDED FOR BATHING: A LOT
MOVING TO AND FROM BED TO CHAIR: A LOT
EATING MEALS: A LITTLE
HELP NEEDED FOR BATHING: A LOT
DRESSING REGULAR UPPER BODY CLOTHING: A LOT
TURNING FROM BACK TO SIDE WHILE IN FLAT BAD: A LOT
EATING MEALS: A LITTLE
WALKING IN HOSPITAL ROOM: TOTAL
EATING MEALS: A LITTLE
DRESSING REGULAR LOWER BODY CLOTHING: A LOT
STANDING UP FROM CHAIR USING ARMS: A LOT
MOBILITY SCORE: 10
TOILETING: A LOT
MOBILITY SCORE: 10
PERSONAL GROOMING: A LOT
CLIMB 3 TO 5 STEPS WITH RAILING: TOTAL
WALKING IN HOSPITAL ROOM: TOTAL
PERSONAL GROOMING: A LOT
MOVING FROM LYING ON BACK TO SITTING ON SIDE OF FLAT BED WITH BEDRAILS: A LOT
MOVING TO AND FROM BED TO CHAIR: A LOT
CLIMB 3 TO 5 STEPS WITH RAILING: TOTAL
DAILY ACTIVITIY SCORE: 13
DRESSING REGULAR LOWER BODY CLOTHING: A LOT
WALKING IN HOSPITAL ROOM: TOTAL
MOVING FROM LYING ON BACK TO SITTING ON SIDE OF FLAT BED WITH BEDRAILS: A LOT
TOILETING: A LOT
MOBILITY SCORE: 10
HELP NEEDED FOR BATHING: A LOT
DAILY ACTIVITIY SCORE: 13
DAILY ACTIVITIY SCORE: 13
STANDING UP FROM CHAIR USING ARMS: A LOT
MOVING TO AND FROM BED TO CHAIR: A LOT
DRESSING REGULAR LOWER BODY CLOTHING: A LOT
PERSONAL GROOMING: A LOT
STANDING UP FROM CHAIR USING ARMS: A LOT

## 2024-04-12 ASSESSMENT — PAIN SCALES - GENERAL
PAINLEVEL_OUTOF10: 0 - NO PAIN
PAINLEVEL_OUTOF10: 0 - NO PAIN

## 2024-04-12 ASSESSMENT — ACTIVITIES OF DAILY LIVING (ADL): LACK_OF_TRANSPORTATION: NO

## 2024-04-12 NOTE — DOCUMENTATION CLARIFICATION NOTE
"    PATIENT:               BETTY CELESTIN  ACCT #:                  [id number]  MRN:                       [id number]  :                       1945  ADMIT DATE:       4/10/2024 8:39 PM  DISCH DATE:  RESPONDING PROVIDER #:        [id number]          PROVIDER RESPONSE TEXT:    Metabolic Encephalopathy 2/2 UTI/ROSE    CDI QUERY TEXT:    Clarification        Instruction:    Based on your assessment of the patient and the clinical information, please provide the requested documentation by clicking on the appropriate radio button and enter any additional information if prompted.    Question: Please further clarify the type of Encephalopathy as        When answering this query, please exercise your independent professional judgment. The fact that a question is being asked, does not imply that any particular answer is desired or expected.    The patient's clinical indicators include:  Clinical Information:  78-year-old female presenting to the emergency department accompanied by  for evaluation of frequent falls and generalized weakness.   is at bedside helping to provide history.   states that over the past few weeks patient has been very weak and unable to care for herself adequately at home.  He states that she is barely able to walk and has had multiple falls at home over the past few days.  He states she also occasionally hallucinates and thinks that people are there when they are not.    Clinical Indicators:  4/10 ED PN:  \"Her lab work however does show that she has an acute kidney injury.  Patient is being treated with ceftriaxone for the urinary tract infection as well as small boluses of IV fluids to address the acute kidney injury.\"  \"Altered Mental Status/Confusion  Independent history obtained from:  Significant Other/Spouse\"  \" Although her frequent falls from generalized weakness could be explained by the urinary tract infection as well as encephalopathy,\"  -UA: Leuk Es:  250; Nitrites " "+1  -Creat:  1.5    4/10 Head CT WO:  \"IMPRESSION:  No evidence of acute cortical infarct or intracranial hemorrhage.  Chronic ischemic changes, including chronic right frontal lobe  infarct.\"    4/11 Cards Consult:  \"Urine culture pending to rule out UTI as a cause of delirium and the patient has already been started on empiric IV Rocephin.\"  \"Physical therapy has been consulted and most likely patient will need skilled nursing home placement unless the delirium substantially improves with antibiotic therapy.'    Treatment:  Head CT WO; IVF; UA/C&S; Ceftriaxone;    Risk Factors:  Age; CKD  Options provided:  -- Metabolic Encephalopathy 2/2 UTI/ROSE  -- Unspecified Dementia with Delirium only  -- Metabolic Encephalopathy 2/2 UTI/ROSE with Unspecified Dementia with Delirium  -- Other - I will add my own diagnosis  -- Refer to Clinical Documentation Reviewer    Query created by: Yue Oconnor on 4/11/2024 4:07 PM      Electronically signed by:  ALEX CAAL MD 4/11/2024 9:42 PM          "

## 2024-04-12 NOTE — DOCUMENTATION CLARIFICATION NOTE
"    PATIENT:               BETTY CELESTIN  ACCT #:                  [id number]  MRN:                       [id number]  :                       1945  ADMIT DATE:       4/10/2024 8:39 PM  DISCH DATE:  RESPONDING PROVIDER #:        [id number]          PROVIDER RESPONSE TEXT:    Demand Ischemia 2/2 ROSE only    CDI QUERY TEXT:    Clarification        Instruction:    Based on your assessment of the patient and the clinical information, please provide the requested documentation by clicking on the appropriate radio button and enter any additional information if prompted.    Question: Please further clarify the type and acuity of congestive heart failure    When answering this query, please exercise your independent professional judgment. The fact that a question is being asked, does not imply that any particular answer is desired or expected.    The patient's clinical indicators include:  Clinical Information:  Patient is a 78-year-old female presenting to the emergency department accompanied by  for evaluation of frequent falls and generalized weakness.   is at bedside helping to provide history.   states that over the past few weeks patient has been very weak and unable to care for herself adequately at home.  He states that she is barely able to walk and has had multiple falls at home over the past few days.    Clinical Indicators:  4/10  -CXR 1 V:  \"1.Central vascular congestion and interstitial prominence suggesting  mild edema.\"  -Troponin:  69/66/57     H/P:  \"Elevated Troponin Level Most probably in the setting of demand ischemia acute on chronic renal failure \"  \"She denies any chest pain'     Hospitalist PN:  \"Elevated troponin  Suspect in the setting of demand and ROSE \"     Cards Consult:  \"She did have a repeat surveillance echocardiogram performed today which again demonstrates a preserved left ventricular ejection fraction of approximately 55% with mild to moderate " "hypokinesis of the anteroseptal wall due to previous thoracotomy.\"  \"9/25/2023 showing an LV ejection fraction of 55-60%\"    Treatment: Echo; Cards consult;    Risk Factors:  Age; CKD; HTN; Tricuspid valve repair; H/O VSD; Former Smoker; Former ETOH abuse  Options provided:  -- Demand Ischemia 2/2 Acute Diastolic Congestive Heart Failure/ROSE  -- Demand Ischemia 2/2 Acute on Chronic Diastolic Congestive Heart Failure/ROSE  -- Demand Ischemia 2/2 ROSE only  -- Other - I will add my own diagnosis  -- Refer to Clinical Documentation Reviewer    Query created by: Yue Oconnor on 4/11/2024 4:43 PM      Electronically signed by:  ALEX CAAL MD 4/11/2024 9:42 PM          "

## 2024-04-12 NOTE — PROGRESS NOTES
04/12/24 1522   Discharge Planning   Patient expects to be discharged to: SNF     Met with patient and spouse at bedside.   A list of SNF was provided to spouse.     DC PLAN IS NOT SECURE

## 2024-04-12 NOTE — PROGRESS NOTES
Physical Therapy                 Therapy Communication Note    Patient Name: Rebecca Samaniego  MRN: [id number]  Today's Date: 4/12/2024     Discipline: Physical Therapy    Missed Visit Reason: Missed Visit Reason: Other (Comment) (off floor to [facility])    Missed Time: Attempt

## 2024-04-12 NOTE — PROGRESS NOTES
"Rebecca Samaniego is a 78 y.o. female on day 1 of admission presenting with UTI (urinary tract infection).      Subjective   Patient seen and examined.  Patient MRI consistent with acute occipital CVA.  Patient more lethargic and less responsive today.   at bedside.       Medications  Medications Prior to Admission   Medication Sig Dispense Refill Last Dose    atenolol (Tenormin) 50 mg tablet Take 1 tablet (50 mg) by mouth once daily.   Unknown    [] predniSONE (Deltasone) 10 mg tablet Take 3 tablets (30 mg) by mouth once daily for 5 days, THEN 2 tablets (20 mg) once daily for 5 days, THEN 1 tablet (10 mg) once daily for 5 days. 30 tablet 0     traMADol (Ultram) 50 mg tablet Take 1 tablet (50 mg) by mouth every 8 hours if needed for severe pain (7 - 10) for up to 5 days. 15 tablet 0 Unknown       Review of Systems  14 point review of systems was reviewed and negative except as noted above.    Objective     Last Recorded Vitals  Blood pressure 168/65, pulse 85, temperature 37.1 °C (98.8 °F), temperature source Axillary, resp. rate 18, height 1.651 m (5' 5\"), weight 59.9 kg (132 lb), SpO2 95%.    Neurological Exam: Limited neurological examination was obtained as patient is lethargic and less responsive  Physical Exam  Mental Status :  Patient less responsive and more lethargic today but follows simple questions.     Speech : Clear , slow  Verbally minimal     CN 2-12 :  Pupils round , sluggishly reacting to light  Fundus could not be assessed  Corneal reflex present  No facial asymmetry noted  Hearing acuity intact bilaterally  Tongue midline       Motor:  Strength moves all extremities spontaneously  Normal bulk and tone     Sensory:  Withdraws to painful stimuli     Reflexes : 1+  symmetric with equivocal toes     Coordination : Could not be assessed     Gait : Unable to assess.    Relevant Results  MR brain wo IV contrast    Addendum Date: 2024    Interpreted By:  Kaleb Barboza, ADDENDUM: Kaleb " Tamra discussed the significance and urgency of this critical finding by secure chat with  ARNOLDO MCGEE on 4/12/2024 at 9:43 am.  (**-RCF-**) Findings:  See findings.     Signed by: Kaleb Barboza 4/12/2024 9:51 AM   -------- ORIGINAL REPORT -------- Dictation workstation:   [id number]    Result Date: 4/12/2024  Interpreted By:  Kaleb Barboza, STUDY: MR BRAIN WO IV CONTRAST;  4/11/2024 9:07 pm   INDICATION: Signs/Symptoms:pt with gait atxia.r/o cva.   COMPARISON: CT head dated 04/10/2024.   ACCESSION NUMBER(S): [id number]   ORDERING CLINICIAN: ARNOLDO MCGEE   TECHNIQUE: Standard multiplanar multisequence MR imaging was performed through the brain without intravenous contrast. Axial T2, FLAIR, DWI, gradient echo T2 and sagittal and coronal T1 weighted images of brain were acquired.   FINDINGS: Parenchyma: There is a small focus of acute diffusion restriction signal abnormality involving the cortex of the left occipital lobe. There is corresponding T2/FLAIR hyperintensity within this region. There is no mass effect or hemorrhagic transformation. Moderate chronic small vessel ischemic disease with tiny remote bilateral basal ganglia lacunar infarcts. Additional remote ischemic insults within the deep white matter of the right frontal lobe. Tiny remote cerebellar infarcts bilaterally.   CSF Spaces: There is moderate to advanced brain atrophy. Basilar cisterns are patent.   Extra-axial spaces: No extra-axial fluid collection.   Paranasal Sinuses: Visualized paranasal sinuses are clear.   Mastoids: Clear.   Orbits: Normal.   Calvarium: No suspicious osseous marrow signal.       Small acute cortical infarct involving the left occipital lobe. No mass effect or hemorrhagic transformation.   Additional moderate chronic small vessel ischemic disease with tiny remote ischemic insults involving the deep white matter of the right frontal lobe, bilateral basal ganglia, and cerebellum.   Moderate to advanced brain  atrophy.   MACRO: None   Signed by: Kaleb Barboza 4/12/2024 9:43 AM Dictation workstation:   [id number]    MR lumbar spine wo IV contrast    Result Date: 4/12/2024  Interpreted By:  Kaleb Barboza, STUDY: MR LUMBAR SPINE WO IV CONTRAST performed 4/11/2024 9:13 pm   INDICATION: Signs/Symptoms:pt with multiple falls.r/o lumbar radiculopathy.   COMPARISON: Lumbar spine radiographs dated 03/26/2024. MRI lumbar spine dated 07/30/2019.   ACCESSION NUMBER(S): [id number]   ORDERING CLINICIAN: ARNOLDO MCGEE   TECHNIQUE: Multiplanar multisequence MR imaging of the lumbar spine performed without intravenous contrast. Sagittal T1, T2, STIR, axial T1 and T2 weighted images of the lumbar spine were acquired.   FINDINGS: Segmentation: Normal.   Conus: The lower thoracic cord appears unremarkable. The conus terminates at the L1-L2 interspace level. Cauda equina are unremarkable.   Epidural fluid: None.   Hardware: Postoperative change of L2-L5 posterior spinal fusion with bilateral posterior pedicular screws and stabilization rods. Interbody fixation at L4-L5. Laminectomy/posterior decompression involving the fusion levels.   Alignment: Grade 1 anterolisthesis of L4 on L5.   Vertebral bodies: Vertebral body heights are grossly maintained.   Marrow signal: Trace type 1 Modic endplate signal change suggested at L1-L2. Otherwise no suspicious STIR hyperintensity or focal marrow replacing lesion.   Intervertebral discs: Moderate degenerative disc height loss at L1-L2 with disc desiccation, significantly worsened from previous MRI. Additional moderate multilevel degenerative disc height loss.     Degenerative change:   T12-L1: Unremarkable.   L1-2: Mild facet arthropathy. Moderate to marked ligamentum flavum hypertrophy. There is moderate disc bulge with superimposed broad-based central disc extrusion with elements of cranial and caudal migration. Mildly prominent dorsal epidural fat. There is severe spinal canal stenosis with  the thecal sac measuring less than 5 mm in anterior-posterior dimension. Lateral recess narrowing/effacement is also evident. Moderate bilateral neural foraminal narrowing.   L2-3: Mild facet arthropathy and ligamentum flavum hypertrophy. Mild disc bulge and endplate spurring. Mild narrowing of the lateral recesses with no additional spinal canal stenosis. Mild bilateral neural foraminal narrowing.   L3-4: Facet arthropathy and mild endplate spurring. No spinal canal stenosis. Minimal/mild neural foraminal narrowing secondary to residual endplate and facet spurring.   L4-5: Disc uncovering with endplate spurring. There is lateral recess narrowing with no additional spinal canal stenosis. Mild-to-moderate right and mild left neural foraminal narrowing suggested secondary to disc uncovering and endplate spurring with hardware artifact slightly degrading assessment.   L5-S1: Mild facet arthropathy. Disc bulge and hypertrophic endplate spurring with left subarticular disc osteophyte protrusion component. Left lateral recess narrowing with probable mass effect on the S1 nerve root. Moderate left and mild right neural foraminal narrowing.   Soft tissues: Within the deep posterior paraspinal soft tissues overlying the laminectomy bed there is a 5.8 x 4.9 x 1.3 cm fluid collection with a few internal septation suggested and otherwise relative simple appearance, likely representing a postoperative seroma. There is additional scarring and fatty atrophy involving the inferior posterior paraspinal musculature. Small cortical renal cysts suggested bilaterally. Dependent sludge versus stones within the partially visualized gallbladder.       In comparison with prior MRI (07/03/2019) there has been revision/extension of posterior spinal fusion and decompression with L2-L5 posterior fusion/decompression evident. There is development of adjacent segment disease at L1-L2 with severe degenerative discogenic change and element of  epidural lipomatosis contributing to severe spinal canal stenosis and lateral recess effacement. There is moderate bilateral neural foraminal narrowing at this level as well.   At L5-S1 there is a left subarticular disc osteophyte protrusion component contributing to left lateral recess stenosis and suspected moderate left neural foraminal narrowing.   MACRO: None   Signed by: Kaleb Barboza 4/12/2024 9:35 AM Dictation workstation:   [id number]    ECG 12 lead    Result Date: 4/11/2024   Sinus rhythmÂ with 1st degree AV block Left axis deviation  PROMINENT R-WAVE  Nonspecific ST abnormalityÂ  Abnormal ECG When compared with ECG ofÂ 16-MAY-2023 14:57, QRS voltageÂ has decreased ST now depressed inÂ Anterior leads Non-specific change in ST segment inÂ Lateral leads T wave inversion no longer evident inÂ Lateral leads Â  Confirmed by Shaheed Martin ([id number]) on 4/11/2024 10:04:17 AM    XR chest 1 view    Result Date: 4/11/2024  Interpreted By:  Wilfred Avilez, STUDY: XR CHEST 1 VIEW;  4/11/2024 12:15 am   INDICATION: Signs/Symptoms:weakness.   COMPARISON: Chest radiograph 09/11/2017   ACCESSION NUMBER(S): [id number]   ORDERING CLINICIAN: EARLE ANSARI   FINDINGS: Median sternotomy changes. Heart is normal in size. Calcifications of the aortic arch. Median sternotomy changes and evidence of cardiac valvuloplasty. Mild central pulmonary vascular congestion and interstitial prominence. Slight elevation of the left hemidiaphragm similar to prior exam. No focal consolidation, large pleural fluid, or discernible pneumothorax. Vague lucency overlying the left humeral head. Severe arthrosis of the bilateral shoulders.       1. Central vascular congestion and interstitial prominence suggesting mild edema. 2. Vague lucency overlying the left humeral head, this could represent overhanging osteophyte or age-indeterminate fracture.     Signed by: Wilfred Avilez 4/11/2024 12:27 AM Dictation workstation:   [id number]    CT head wo IV  contrast    Result Date: 4/10/2024  Interpreted By:  Gurvinder Kilgore, STUDY: CT HEAD WO IV CONTRAST;  4/10/2024 11:38 pm   INDICATION: Signs/Symptoms:AMS, generalized weakness.   COMPARISON: None.   ACCESSION NUMBER(S): [id number]   ORDERING CLINICIAN: EARLE ANSARI   TECHNIQUE: Noncontrast axial CT scan of head was performed. Angled reformats in brain and bone windows were generated. The images were reviewed in bone, brain, blood and soft tissue windows.   FINDINGS: CSF Spaces: The ventricles, sulci and basal cisterns are within normal limits. There is no extraaxial fluid collection.   Parenchyma: Advanced volume loss.  There is periventricular and subcortical white matter hypoattenuation, most in keeping with chronic microvascular ischemic change. Right frontal encephalomalacia. The grey-white differentiation is intact. There is no mass effect or midline shift.  There is no intracranial hemorrhage.   Calvarium: The calvarium is unremarkable.   Paranasal sinuses and mastoids: Visualized paranasal sinuses and mastoids are clear.       No evidence of acute cortical infarct or intracranial hemorrhage. Chronic ischemic changes, including chronic right frontal lobe infarct.     MACRO: None   Signed by: Gurvinder Kilgore 4/10/2024 11:57 PM Dictation workstation:   [id number]     Results for orders placed or performed during the hospital encounter of 04/10/24 (from the past 24 hour(s))   Basic metabolic panel   Result Value Ref Range    Glucose 114 (H) 65 - 99 mg/dL    Sodium 132 (L) 133 - 145 mmol/L    Potassium 4.1 3.4 - 5.1 mmol/L    Chloride 100 97 - 107 mmol/L    Bicarbonate 21 (L) 24 - 31 mmol/L    Urea Nitrogen 23 8 - 25 mg/dL    Creatinine 1.00 0.40 - 1.60 mg/dL    eGFR 57 (L) >60 mL/min/1.73m*2    Calcium 8.4 (L) 8.5 - 10.4 mg/dL    Anion Gap 11 <=19 mmol/L   Lavender Top   Result Value Ref Range    Extra Tube Hold for add-ons.    Lipid panel   Result Value Ref Range    Cholesterol 182 133 - 200 mg/dL     HDL-Cholesterol 42.0 (L) >50.0 mg/dL    Cholesterol/HDL Ratio 4.3 SEE COMMENT    LDL Calculated 106 65 - 130 mg/dL    Triglycerides 171 (H) 40 - 150 mg/dL   Hemoglobin A1c   Result Value Ref Range    Hemoglobin A1C 5.2 See below %    Estimated Average Glucose 103 Not Established mg/dL             Assessment/Plan      Principal Problem:    UTI (urinary tract infection)  Active Problems:    Gait instability    Back pain    Leg weakness    Acute on chronic kidney failure (CMS-HCC)    Dehydration    Repeated falls    Stroke (Multi)    Rebecca Samaniego is a 78 y.o. female presenting with frequent falls and generalized weakness with MRI consistent with acute CVA and lumbar radiculopathy and with worsening mental status to rule out encephalopathy with metabolic, septic, toxic causes contributing, prognosis guarded.     Recommendations;  Reviewed CT scan brain and carotid ultrasound/ CT angiogram head /neck results.  MRI brain and lumbar spine results reviewed and discussed with the   Sepsis workup and treatment  Routine EEG  Correct underlying metabolic abnormalities  PT/OT eval and treatment  Blood pressure / Blood sugar monitoring and control  Telemetry monitoring   Continue current management  Discussed the above plan with the patient,  and nurse .     Parts of this chart have been completed using voice recognition software. Please excuse any errors of transcription.       I spent 40 minutes in the professional and overall care of this patient.      Abdiaziz Anthony MD

## 2024-04-12 NOTE — PROGRESS NOTES
Subjective Data:  Patient slightly more alert but remains confused.   is present    Overnight Events:    As described below     Objective Data:  Last Recorded Vitals:  Vitals:    04/11/24 1527 04/11/24 2313 04/12/24 0700 04/12/24 1202   BP: 169/60 160/61 144/60 168/65   BP Location: Right arm Right arm Right arm Right arm   Patient Position: Lying Lying Lying Lying   Pulse: 66 73 85    Resp: 17 17 18    Temp: 36.5 °C (97.7 °F) 36.5 °C (97.7 °F) 36 °C (96.8 °F) 37.1 °C (98.8 °F)   TempSrc: Oral Oral Oral Axillary   SpO2: 94% 98% 94% 95%   Weight:       Height:           Last Labs:  CBC - 4/11/2024:  6:36 AM  7.0 10.6 204    31.4      CMP - 4/12/2024:  7:46 AM  8.4 5.9 17 --- 0.4   _ 3.2 10 71      PTT - No results in last year.  _   _ _     HGBA1C   Date/Time Value Ref Range Status   04/12/2024 07:46 AM 5.2 See below % Final   05/16/2023 03:49 PM 5.9 % Final     Comment:          Diagnosis of Diabetes-Adults   Non-Diabetic: < or = 5.6%   Increased risk for developing diabetes: 5.7-6.4%   Diagnostic of diabetes: > or = 6.5%  .       Monitoring of Diabetes                Age (y)     Therapeutic Goal (%)   Adults:          >18           <7.0   Pediatrics:    13-18           <7.5                   7-12           <8.0                   0- 6            7.5-8.5   American Diabetes Association. Diabetes Care 33(S1), Jan 2010.     04/26/2022 12:56 PM 6.0 % Final     Comment:          Diagnosis of Diabetes-Adults   Non-Diabetic: < or = 5.6%   Increased risk for developing diabetes: 5.7-6.4%   Diagnostic of diabetes: > or = 6.5%  .       Monitoring of Diabetes                Age (y)     Therapeutic Goal (%)   Adults:          >18           <7.0   Pediatrics:    13-18           <7.5                   7-12           <8.0                   0- 6            7.5-8.5   American Diabetes Association. Diabetes Care 33(S1), Jan 2010.       LDLCALC   Date/Time Value Ref Range Status   04/12/2024 07:46  65 - 130 mg/dL Final  "    VLDL   Date/Time Value Ref Range Status   02/03/2021 11:42 AM 45 0 - 40 mg/dL Final      Last I/O:  I/O last 3 completed shifts:  In: 2398.8 (40.1 mL/kg) [P.O.:925; I.V.:873.8 (14.6 mL/kg); IV Piggyback:600]  Out: 450 (7.5 mL/kg) [Urine:450 (0.2 mL/kg/hr)]  Weight: 59.9 kg     Past Cardiology Tests (Last 3 Years):  EKG:  ECG 12 lead 04/10/2024    Echo:  Transthoracic Echo (TTE) Complete 04/12/2024    Ejection Fractions:  No results found for: \"EF\"  Cath:  No results found for this or any previous visit from the past 1095 days.    Stress Test:  No results found for this or any previous visit from the past 1095 days.    Cardiac Imaging:  No results found for this or any previous visit from the past 1095 days.      Inpatient Medications:  Scheduled medications   Medication Dose Route Frequency    aspirin  81 mg oral Daily    atorvastatin  40 mg oral Nightly    bacitracin   Topical TID    cefTRIAXone  1 g intravenous q24h    clopidogrel  75 mg oral Daily    heparin (porcine)  5,000 Units subcutaneous q12h    metoprolol succinate XL  50 mg oral Daily    [START ON 4/13/2024] pantoprazole  40 mg oral Daily before breakfast    Or    [START ON 4/13/2024] pantoprazole  40 mg intravenous Daily before breakfast     PRN medications   Medication    acetaminophen    Or    acetaminophen    Or    acetaminophen    benzocaine-menthol    dextromethorphan-guaifenesin    guaiFENesin    loperamide    ondansetron ODT    Or    ondansetron    polyethylene glycol    traMADol     Continuous Medications   Medication Dose Last Rate       Physical Exam:  The patient is an elderly nonobese white female lying in bed.  She is awake alert conversant somewhat disagreeable  JVP not elevated carotid and pulses are 2+ no bruit  Chest fair air movement breath sounds are clear  The cardiac rhythm is regular no premature beats S1-S2 normal no gallop murmur rub or click.  Well-healed sternotomy incision scar  Abdomen is soft and benign  There is no " peripheral edema.  There are multiple ecchymoses over the upper and lower extremities        Assessment/Plan     4/11: The patient is a 78-year-old white female whose past medical history includes hypertension, chronic kidney disease, former alcohol excess, status post bioprosthetic aortic valve replacement utilizing a number 23 mm freestyle bioprosthesis along with replacement of the ascending aorta and transverse hemiarch using a number 24 mm Gelweave graft plus tricuspid valve repair utilizing number 21 mm [place] ring plus 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  This patient has done very well since her cardiac surgery with her last surveillance echocardiogram on 9/25/2023 showing an LV ejection fraction of 55-60% 1-2+ mitral valve regurgitation normal-appearing stentless aortic valve bioprosthesis with a peak systolic gradient of 11 mmHg and a prosthetic graft in the aortic root and ascending/arch position.  Patient is currently admitted with failure to thrive inability to provide self-care difficulty walking which appears to be related to progressing dementia.  Initial head CT did not show new CVA.  Urine culture pending to rule out UTI as a cause of delirium and the patient has already been started on empiric IV Rocephin.  The patient's atenolol 50 mg daily will be switched to Toprol-XL 50 mg daily.  The minor elevation in high-sensitivity troponin is nondiagnostic no clinical concern at this point for an acute coronary syndrome.  Physical therapy has been consulted and most likely patient will need skilled nursing home placement unless the delirium substantially improves with antibiotic therapy.    4/12: Patient is somewhat more alert and aware but remains generally confused.  MRI of the brain actually confirms the occurrence of a small acute cortical infarct involving the left occipital lobe.  She has moderate chronic small vessel ischemic disease with possible tiny insults of the deep white matter  in the right frontal lobe bilateral basal ganglia and cerebellum plus moderate to advanced brain atrophy.  MRI of the lumbar spine demonstrated revision extension of posterior spinal fusion and decompression with L2-L5 posterior fusion/decompression findings.  There is new adjacent segment of disease at the L1-L2 segment with severe degenerative discogenic change and an element of epidural lipomatosis contributing to severe spinal canal stenosis.  There was moderate bilateral neuroforaminal narrowing at this level as well.  There were is also left subarticular disc osteophyte protrusions with moderate left neuroforaminal narrowing.  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  The patient's echocardiogram demonstrates a preserved LV ejection fraction at 60% with a normal-appearing stentless bioprosthetic aortic valve replacement with a minimal systolic pressure gradient.  There is evidence of a prior tricuspid valve repair with only mild to moderate tricuspid valve regurgitation.  There is trace to mild mitral valve regurgitation.  Bubble study was negative for any patent foramen ovale or residual VSD which was closed at time of her operative procedure.   is aware that the patient will require skilled nursing facility placement.  The patient currently is n.p.o. and appears to be clinically dry and will begin low-dose replacement IV fluids.       Peripheral IV 04/11/24 20 G  Left Antecubital (Active)   Site Assessment Clean;Dry;Intact 04/12/24 0900   Dressing Status Dry;Clean 04/12/24 0900   Number of days: 1       Code Status:  Full Code    I spent 20 minutes in the professional and overall care of this patient.        Shaheed Martin MD

## 2024-04-12 NOTE — CARE PLAN
The patient's goals for the shift include feel better    The clinical goals for the shift include Adequate sleep, pain control    Over the shift, the patient did not make progress toward the following goals. Barriers to progression include . Recommendations to address these barriers include .      Problem: Pain  Goal: My pain/discomfort is manageable  4/12/2024 0617 by Pa Thompson RN  Outcome: Progressing  4/12/2024 0056 by Pa Thompson RN  Flowsheets (Taken 4/12/2024 0056)  Resident's pain/discomfort is manageable:   Include resident/family/caregiver in decisions related to pain management   Administer pain medication prior to activities that may trigger pain   Offer non-pharmacological pain management interventions   Identify and avoid pain triggers     Problem: Safety  Goal: Patient will be injury free during hospitalization  Outcome: Progressing  Goal: I will remain free of falls  4/12/2024 0617 by Pa Thompson RN  Outcome: Progressing  4/12/2024 0056 by Pa Thompson RN  Flowsheets (Taken 4/12/2024 0056)  Resident will remain free of falls:   Utilize fall response kit   Apply bed/chair alarms as appropriate   Assist with toileting as orderd   Maintain bed at position as ordered (chair height, low bed)   Assess and monitor medications that may increase fall risk   Visual checks per facility policy   Consult with physical therapy as needed     Problem: Daily Care  Goal: Daily care needs are met  4/12/2024 0617 by Pa Thompson RN  Outcome: Progressing  4/12/2024 0056 by Pa Thompson RN  Flowsheets (Taken 4/12/2024 0056)  Daily care needs are met:   Assess and monitor ability to perform self care and identify potential discharge needs   Assist patient with activities of daily living as needed   Provide mouth care   Assess skin integrity/risk for skin breakdown   Encourage independent activity per ability   Include patient/family/caregiver in decisions related to daily care     Problem: Psychosocial Needs  Goal:  Demonstrates ability to cope with hospitalization/illness  4/12/2024 0617 by Pa Thompson RN  Outcome: Progressing  4/12/2024 0056 by Pa Thompson RN  Flowsheets (Taken 4/12/2024 0056)  Demonstrates ability to cope with hospitalization/illness:   Assist resident to identify and practice own strengths and abilities   Encourage verbalization of feelings/concerns/expectations   Include resident/family/caregiver in decisions related to psychosocial needs   Encourage participation in diversional activities   Provide low-stimulation environment as needed   Encourage resident to set and complete small goals for self   Reinforce positive adaptation of new coping behaviors  Goal: Collaborate with me, my family, and caregiver to identify my specific goals  4/12/2024 0617 by Pa Thompson RN  Outcome: Progressing  4/12/2024 0056 by Pa Thompson RN  Flowsheets (Taken 4/11/2024 1023 by Patricia Rivera RN)  Cultural Requests During Hospitalization: N/A  Spiritual Requests During Hospitalization: N/A     Problem: Discharge Barriers  Goal: My discharge needs are met  4/12/2024 0617 by Pa Thompson RN  Outcome: Progressing  4/12/2024 0056 by Pa Thompson RN  Flowsheets (Taken 4/12/2024 0056)  Resident's discharge needs are met:   Identify potential discharge barriers on admission and throughout stay   Involve resident/family/caregiver in discharge planning process     Problem: Fall/Injury  Goal: Verbalize understanding of personal risk factors for fall in the hospital  Outcome: Progressing  Goal: Verbalize understanding of risk factor reduction measures to prevent injury from fall in the home  Outcome: Progressing  Goal: Use assistive devices by end of the shift  Outcome: Progressing  Goal: Pace activities to prevent fatigue by end of the shift  Outcome: Progressing     Problem: Skin  Goal: Prevent/manage excess moisture  4/12/2024 0617 by Pa Thompson RN  Outcome: Progressing  4/12/2024 0058 by Pa Thompson RN  Flowsheets (Taken  4/11/2024 1032 by Patricia Rivera RN)  Prevent/manage excess moisture:   Moisturize dry skin   Cleanse incontinence/protect with barrier cream   Monitor for/manage infection if present  4/12/2024 0056 by Pa Thompson RN  Flowsheets (Taken 4/11/2024 1032 by Patricia Rivera RN)  Prevent/manage excess moisture:   Moisturize dry skin   Cleanse incontinence/protect with barrier cream   Monitor for/manage infection if present  Goal: Prevent/minimize sheer/friction injuries  4/12/2024 0617 by Pa Thompson RN  Outcome: Progressing  4/12/2024 0058 by Pa Thompson RN  Flowsheets (Taken 4/11/2024 1032 by Patricia Rivera RN)  Prevent/minimize sheer/friction injuries:   Increase activity/out of bed for meals   Use pull sheet   HOB 30 degrees or less   Turn/reposition every 2 hours/use positioning/transfer devices  4/12/2024 0056 by Pa Thompson RN  Flowsheets (Taken 4/11/2024 1032 by Patricia Rivera RN)  Prevent/minimize sheer/friction injuries:   Increase activity/out of bed for meals   Use pull sheet   HOB 30 degrees or less   Turn/reposition every 2 hours/use positioning/transfer devices  Goal: Promote/optimize nutrition  4/12/2024 0617 by Pa Thompson RN  Outcome: Progressing  4/12/2024 0058 by Pa Thompson RN  Flowsheets (Taken 4/11/2024 1032 by Patricia Rivera, BECK)  Promote/optimize nutrition:   Assist with feeding   Monitor/record intake including meals   Consume > 50% meals/supplements  4/12/2024 0056 by Pa Thompson RN  Flowsheets (Taken 4/11/2024 1032 by Patricia Rivera, BECK)  Promote/optimize nutrition:   Assist with feeding   Monitor/record intake including meals   Consume > 50% meals/supplements  Goal: Promote skin healing  4/12/2024 0617 by Pa Thompson RN  Outcome: Progressing  4/12/2024 0058 by Pa Thompson RN  Flowsheets (Taken 4/11/2024 1032 by Patricia Rivera RN)  Promote skin healing:   Protective dressings over bony prominences   Turn/reposition every 2 hours/use positioning/transfer  devices  4/12/2024 0056 by Pa Thompson RN  Flowsheets (Taken 4/11/2024 1032 by Patricia Rivera RN)  Promote skin healing:   Protective dressings over bony prominences   Turn/reposition every 2 hours/use positioning/transfer devices

## 2024-04-12 NOTE — PROGRESS NOTES
Speech-Language Pathology                 Therapy Communication Note    Patient Name: Rebecca Samaniego  MRN: [id number]  Today's Date: 4/12/2024     Discipline: Speech Language Pathology    Missed Visit Reason:  Pt off floor for testing @ this time    Missed Time: Attempt    Comment:

## 2024-04-12 NOTE — CARE PLAN
Problem: Skin  Goal: Prevent/manage excess moisture  4/12/2024 0058 by Pa Thompson RN  Flowsheets (Taken 4/11/2024 1032 by Patricia Rivera RN)  Prevent/manage excess moisture:   Moisturize dry skin   Cleanse incontinence/protect with barrier cream   Monitor for/manage infection if present  4/12/2024 0056 by Pa Thompson RN  Flowsheets (Taken 4/11/2024 1032 by Patricia Rivera RN)  Prevent/manage excess moisture:   Moisturize dry skin   Cleanse incontinence/protect with barrier cream   Monitor for/manage infection if present     Problem: Skin  Goal: Prevent/minimize sheer/friction injuries  4/12/2024 0058 by Pa Thompson RN  Flowsheets (Taken 4/11/2024 1032 by Patricia Rivera RN)  Prevent/minimize sheer/friction injuries:   Increase activity/out of bed for meals   Use pull sheet   HOB 30 degrees or less   Turn/reposition every 2 hours/use positioning/transfer devices  4/12/2024 0056 by Pa Thompson RN  Flowsheets (Taken 4/11/2024 1032 by Patricia Rivera RN)  Prevent/minimize sheer/friction injuries:   Increase activity/out of bed for meals   Use pull sheet   HOB 30 degrees or less   Turn/reposition every 2 hours/use positioning/transfer devices

## 2024-04-12 NOTE — PROGRESS NOTES
04/12/24 1512   Stress   Do you feel stress - tense, restless, nervous, or anxious, or unable to sleep at night because your mind is troubled all the time - these days? Pt Unable   Social Connections   In a typical week, how many times do you talk on the phone with family, friends, or neighbors? Pt Unable   How often do you get together with friends or relatives? Pt Unable   How often do you attend Bahai or Confucianist services? Never   Do you belong to any clubs or organizations such as Bahai groups, unions, fraAuramist or athletic groups, or school groups? No   How often do you attend meetings of the clubs or organizations you belong to? Never   Are you , , , , never , or living with a partner?    Intimate Partner Violence   Within the last year, have you been afraid of your partner or ex-partner? Pt Unable   Within the last year, have you been humiliated or emotionally abused in other ways by your partner or ex-partner? Pt Unable   Within the last year, have you been kicked, hit, slapped, or otherwise physically hurt by your partner or ex-partner? Pt Unable   Within the last year, have you been raped or forced to have any kind of sexual activity by your partner or ex-partner? Pt Unable   Alcohol Use   Q1: How often do you have a drink containing alcohol? 4 or more ti   Q2: How many drinks containing alcohol do you have on a typical day when you are drinking? 1 or 2   Q3: How often do you have six or more drinks on one occasion? Never   Utilities   In the past 12 months has the electric, gas, oil, or water company threatened to shut off services in your home? No   Health Literacy   How often do you need to have someone help you when you read instructions, pamphlets, or other written material from your doctor or pharmacy? Never     Met with patient and spouse at bedside.  An explanation of dischagre planning was provided.  Patient is unable to respond to questions.   Assessment questions answered by spouse.  Patient resides in a two story house with her spouse .  There are three steps to enter thee shome.  Patient Is unable to navigate the steps into the home.  Spouse has to call the fire department to help her into the home. Patient does have a walker available in the home but does not use it.  Spouse  does all of the cooking , cleaning and shopping.  Patient does not smoke.  Drinks 2-3 glasses of wine daily. Patient sees cardiologist Dr. Martin for all needs. Spouse is not yet POA.  A copy of paperwork was provided to spouse.  Spouse understands that patient must be alert and oriented and able to provide POA choice .

## 2024-04-12 NOTE — PROGRESS NOTES
Occupational Therapy                 Therapy Communication Note    Patient Name: Rebecca Samaniego  MRN: [id number]  Today's Date: 4/12/2024     Discipline: Occupational Therapy    Missed Visit Reason: Missed Visit Reason:  (off floor to [facility])    Missed Time: Attempt    Comment:

## 2024-04-12 NOTE — PROGRESS NOTES
Rebecca Samaniego is a 78 y.o. female on day 1 of admission presenting with UTI (urinary tract infection).      Subjective   Sleeping comfortably in bed. Awakens easily to voice. Seems a little more confused today. States she feels tired. Denies SOB, CP, abd pain.        Objective     Last Recorded Vitals  /60 (BP Location: Right arm, Patient Position: Lying)   Pulse 85   Temp 36 °C (96.8 °F) (Oral)   Resp 18   Wt 59.9 kg (132 lb)   SpO2 94%   Intake/Output last 3 Shifts:    Intake/Output Summary (Last 24 hours) at 4/12/2024 1138  Last data filed at 4/12/2024 0437  Gross per 24 hour   Intake 1548.75 ml   Output 450 ml   Net 1098.75 ml       Admission Weight  Weight: 59.9 kg (132 lb) (04/11/24 0548)    Daily Weight  04/11/24 : 59.9 kg (132 lb)    Image Results  MR brain wo IV contrast  Addendum: Interpreted By:  Kaleb Barboza,    ADDENDUM:   Kaleb Barboza discussed the significance and urgency of this critical   finding by secure chat with  ARNOLDO MCGEE on 4/12/2024 at 9:43   am.  (**-RCF-**) Findings:  See findings.             Signed by: Kaleb Barboza 4/12/2024 9:51 AM        -------- ORIGINAL REPORT --------   Dictation workstation:   [id number]  Narrative: Interpreted By:  Kaleb Barboza,   STUDY:  MR BRAIN WO IV CONTRAST;  4/11/2024 9:07 pm      INDICATION:  Signs/Symptoms:pt with gait atxia.r/o cva.      COMPARISON:  CT head dated 04/10/2024.      ACCESSION NUMBER(S):  [id number]      ORDERING CLINICIAN:  ARNOLDO MCGEE      TECHNIQUE:  Standard multiplanar multisequence MR imaging was performed through  the brain without intravenous contrast. Axial T2, FLAIR, DWI,  gradient echo T2 and sagittal and coronal T1 weighted images of brain  were acquired.      FINDINGS:  Parenchyma: There is a small focus of acute diffusion restriction  signal abnormality involving the cortex of the left occipital lobe.  There is corresponding T2/FLAIR hyperintensity within this region.  There is no mass  effect or hemorrhagic transformation. Moderate  chronic small vessel ischemic disease with tiny remote bilateral  basal ganglia lacunar infarcts. Additional remote ischemic insults  within the deep white matter of the right frontal lobe. Tiny remote  cerebellar infarcts bilaterally.      CSF Spaces: There is moderate to advanced brain atrophy. Basilar  cisterns are patent.      Extra-axial spaces: No extra-axial fluid collection.      Paranasal Sinuses: Visualized paranasal sinuses are clear.      Mastoids: Clear.      Orbits: Normal.      Calvarium: No suspicious osseous marrow signal.      Impression: Small acute cortical infarct involving the left occipital lobe. No  mass effect or hemorrhagic transformation.      Additional moderate chronic small vessel ischemic disease with tiny  remote ischemic insults involving the deep white matter of the right  frontal lobe, bilateral basal ganglia, and cerebellum.      Moderate to advanced brain atrophy.      MACRO:  None      Signed by: Kaleb Barboza 4/12/2024 9:43 AM  Dictation workstation:   [id number]  MR lumbar spine wo IV contrast  Narrative: Interpreted By:  Kaleb Barboza,   STUDY:  MR LUMBAR SPINE WO IV CONTRAST performed 4/11/2024 9:13 pm      INDICATION:  Signs/Symptoms:pt with multiple falls.r/o lumbar radiculopathy.      COMPARISON:  Lumbar spine radiographs dated 03/26/2024. MRI lumbar spine dated  07/30/2019.      ACCESSION NUMBER(S):  [id number]      ORDERING CLINICIAN:  ARNOLDO MCGEE      TECHNIQUE:  Multiplanar multisequence MR imaging of the lumbar spine performed  without intravenous contrast. Sagittal T1, T2, STIR, axial T1 and T2  weighted images of the lumbar spine were acquired.      FINDINGS:  Segmentation: Normal.      Conus: The lower thoracic cord appears unremarkable. The conus  terminates at the L1-L2 interspace level. Cauda equina are  unremarkable.      Epidural fluid: None.      Hardware: Postoperative change of L2-L5 posterior  spinal fusion with  bilateral posterior pedicular screws and stabilization rods.  Interbody fixation at L4-L5. Laminectomy/posterior decompression  involving the fusion levels.      Alignment: Grade 1 anterolisthesis of L4 on L5.      Vertebral bodies: Vertebral body heights are grossly maintained.      Marrow signal: Trace type 1 Modic endplate signal change suggested at  L1-L2. Otherwise no suspicious STIR hyperintensity or focal marrow  replacing lesion.      Intervertebral discs: Moderate degenerative disc height loss at L1-L2  with disc desiccation, significantly worsened from previous MRI.  Additional moderate multilevel degenerative disc height loss.          Degenerative change:      T12-L1: Unremarkable.      L1-2: Mild facet arthropathy. Moderate to marked ligamentum flavum  hypertrophy. There is moderate disc bulge with superimposed  broad-based central disc extrusion with elements of cranial and  caudal migration. Mildly prominent dorsal epidural fat. There is  severe spinal canal stenosis with the thecal sac measuring less than  5 mm in anterior-posterior dimension. Lateral recess  narrowing/effacement is also evident. Moderate bilateral neural  foraminal narrowing.      L2-3: Mild facet arthropathy and ligamentum flavum hypertrophy. Mild  disc bulge and endplate spurring. Mild narrowing of the lateral  recesses with no additional spinal canal stenosis. Mild bilateral  neural foraminal narrowing.      L3-4: Facet arthropathy and mild endplate spurring. No spinal canal  stenosis. Minimal/mild neural foraminal narrowing secondary to  residual endplate and facet spurring.      L4-5: Disc uncovering with endplate spurring. There is lateral recess  narrowing with no additional spinal canal stenosis. Mild-to-moderate  right and mild left neural foraminal narrowing suggested secondary to  disc uncovering and endplate spurring with hardware artifact slightly  degrading assessment.      L5-S1: Mild facet  arthropathy. Disc bulge and hypertrophic endplate  spurring with left subarticular disc osteophyte protrusion component.  Left lateral recess narrowing with probable mass effect on the S1  nerve root. Moderate left and mild right neural foraminal narrowing.      Soft tissues: Within the deep posterior paraspinal soft tissues  overlying the laminectomy bed there is a 5.8 x 4.9 x 1.3 cm fluid  collection with a few internal septation suggested and otherwise  relative simple appearance, likely representing a postoperative  seroma. There is additional scarring and fatty atrophy involving the  inferior posterior paraspinal musculature. Small cortical renal cysts  suggested bilaterally. Dependent sludge versus stones within the  partially visualized gallbladder.      Impression: In comparison with prior MRI (07/03/2019) there has been  revision/extension of posterior spinal fusion and decompression with  L2-L5 posterior fusion/decompression evident. There is development of  adjacent segment disease at L1-L2 with severe degenerative discogenic  change and element of epidural lipomatosis contributing to severe  spinal canal stenosis and lateral recess effacement. There is  moderate bilateral neural foraminal narrowing at this level as well.      At L5-S1 there is a left subarticular disc osteophyte protrusion  component contributing to left lateral recess stenosis and suspected  moderate left neural foraminal narrowing.      MACRO:  None      Signed by: Kaleb Barboza 4/12/2024 9:35 AM  Dictation workstation:   [id number]      Physical Exam  Constitutional:       General: She is not in acute distress.     Appearance: She is not toxic-appearing.      Comments: Sleeping but awakens easily to voice   HENT:      Head: Normocephalic.      Mouth/Throat:      Mouth: Mucous membranes are dry.      Pharynx: Oropharynx is clear.   Eyes:      General: No scleral icterus.  Cardiovascular:      Rate and Rhythm: Normal rate.  "  Pulmonary:      Effort: No respiratory distress.      Breath sounds: No wheezing.   Abdominal:      General: There is no distension.      Palpations: Abdomen is soft.   Musculoskeletal:      Right lower leg: Edema present.      Left lower leg: Edema present.   Skin:     Findings: Bruising (scattered bruises throughout B/L upper and lower extremities) present.   Neurological:      Comments: A&Ox2, oriented to self and year. Thinks she's \"[place]\"         Relevant Results               Assessment/Plan          Principal Problem:    UTI (urinary tract infection)  Active Problems:    Gait instability    Back pain    Leg weakness    Acute on chronic kidney failure (CMS-HCC)    Dehydration    Repeated falls    Stroke (Multi)    Frequent falls/gait instability  Lumbar radiculopathy  Hx of L2-L5 fusion  Follows with orthopedic surgeon Dr. Friedman  Neurology consulted  MRI brain showing small, acute cortical infarct involving the left occipital lobe, chronic small vessel ischemic disease, moderate to advanced brain atrophy  MRI lumbar spine showing severe spinal canal stenosis   TSH wnl  B12 wnl  PT/OT  Fall precautions    Acute stroke  Neurology following  MRI brain showing small acute cortical infarct involving the left occipital lobe  Will order echo and carotid US  Check lipid panel  Check HbA1c  Start aspirin, plavix, and lipitor    Metabolic encephalopathy  Suspect likely has cognitive impairment/dementia at baseline, given MRI brain possibly ?vascular dementia   May also be related to UTI  Maintain appropriate sleep-wake cycles  Ambulate and OOB when able     ROSE on CKD stage 3, resolved  Suspect due to dehydration     Presumed UTI  UA + bacteria, nitrite, leukocyte esterase  Urine culture positive for enteric bacilli  Continue rocephin for a total of 5 days     Elevated troponin  Suspect in the setting of demand and ROSE  Cardiology consulted  Low suspicion for ACS     Hx of collitis  Add loperamide " PRN     Chronic lower extremity edema  S/p aortic valve replacment  HTN  Patient is unable to state whether or not lower extremity edema is at baseline, will defer management to cardiology  Continue home atenolol    Given acute stroke on MRI brain, will transfer to step down.     Addendum 11:00 - had lengthy discussion with patient's , Alex, who is at bedside. Discussed MRI lumbar spine showing severe spinal canal stenosis and how that is likely contributing to her frequent falls. Discussed MRI brain that showed acute stroke. Because of this, echo and carotid US were also ordered for stroke work up. Alex states that patient has been having worsening confusion and intermittent hallucinations over the last month or so. Seems like patient is normally A&Ox1-2, usually self and time but gets confused about place which is consistent with her mentation while in the hospital. Discussed that given the confusion, hallucinations, and MRI brain showing chronic ischemic changes/moderate to advanced atrophy, I am concerned that patient could have vascular dementia. Patient's  is concerned about his ability to care for her at home as she has become more and more debilitated.  is agreeable to SNF placement. Discussed patient's code status with  -  states that she would want to be FULL CODE.        Dispo: transfer to step down. Telemetry. Cardiology and neurology following. Anticipate SNF placement at discharge.               Lina Pena MD

## 2024-04-13 ENCOUNTER — APPOINTMENT (OUTPATIENT)
Dept: RADIOLOGY | Facility: HOSPITAL | Age: 79
DRG: 064 | End: 2024-04-13
Payer: MEDICARE

## 2024-04-13 LAB
ANION GAP SERPL CALC-SCNC: 12 MMOL/L
BACTERIA UR CULT: ABNORMAL
BUN SERPL-MCNC: 17 MG/DL (ref 8–25)
CALCIUM SERPL-MCNC: 8.1 MG/DL (ref 8.5–10.4)
CHLORIDE SERPL-SCNC: 100 MMOL/L (ref 97–107)
CO2 SERPL-SCNC: 21 MMOL/L (ref 24–31)
CREAT SERPL-MCNC: 0.9 MG/DL (ref 0.4–1.6)
EGFRCR SERPLBLD CKD-EPI 2021: 65 ML/MIN/1.73M*2
ERYTHROCYTE [DISTWIDTH] IN BLOOD BY AUTOMATED COUNT: 13 % (ref 11.5–14.5)
FLUAV RNA RESP QL NAA+PROBE: NOT DETECTED
FLUBV RNA RESP QL NAA+PROBE: NOT DETECTED
GLUCOSE SERPL-MCNC: 179 MG/DL (ref 65–99)
HCT VFR BLD AUTO: 28.7 % (ref 36–46)
HGB BLD-MCNC: 9.8 G/DL (ref 12–16)
LACTATE BLDV-SCNC: 1.3 MMOL/L (ref 0.4–2)
MCH RBC QN AUTO: 32.3 PG (ref 26–34)
MCHC RBC AUTO-ENTMCNC: 34.1 G/DL (ref 32–36)
MCV RBC AUTO: 95 FL (ref 80–100)
NRBC BLD-RTO: 0 /100 WBCS (ref 0–0)
PLATELET # BLD AUTO: 185 X10*3/UL (ref 150–450)
POTASSIUM SERPL-SCNC: 3.4 MMOL/L (ref 3.4–5.1)
RBC # BLD AUTO: 3.03 X10*6/UL (ref 4–5.2)
SARS-COV-2 RNA RESP QL NAA+PROBE: NOT DETECTED
SODIUM SERPL-SCNC: 133 MMOL/L (ref 133–145)
WBC # BLD AUTO: 11.7 X10*3/UL (ref 4.4–11.3)

## 2024-04-13 PROCEDURE — 87636 SARSCOV2 & INF A&B AMP PRB: CPT | Performed by: STUDENT IN AN ORGANIZED HEALTH CARE EDUCATION/TRAINING PROGRAM

## 2024-04-13 PROCEDURE — 99223 1ST HOSP IP/OBS HIGH 75: CPT

## 2024-04-13 PROCEDURE — 92610 EVALUATE SWALLOWING FUNCTION: CPT | Mod: GN

## 2024-04-13 PROCEDURE — 99231 SBSQ HOSP IP/OBS SF/LOW 25: CPT | Performed by: NURSE PRACTITIONER

## 2024-04-13 PROCEDURE — 36415 COLL VENOUS BLD VENIPUNCTURE: CPT | Performed by: STUDENT IN AN ORGANIZED HEALTH CARE EDUCATION/TRAINING PROGRAM

## 2024-04-13 PROCEDURE — 71045 X-RAY EXAM CHEST 1 VIEW: CPT

## 2024-04-13 PROCEDURE — 71045 X-RAY EXAM CHEST 1 VIEW: CPT | Performed by: RADIOLOGY

## 2024-04-13 PROCEDURE — 85027 COMPLETE CBC AUTOMATED: CPT | Performed by: INTERNAL MEDICINE

## 2024-04-13 PROCEDURE — 2500000004 HC RX 250 GENERAL PHARMACY W/ HCPCS (ALT 636 FOR OP/ED): Performed by: STUDENT IN AN ORGANIZED HEALTH CARE EDUCATION/TRAINING PROGRAM

## 2024-04-13 PROCEDURE — 2500000001 HC RX 250 WO HCPCS SELF ADMINISTERED DRUGS (ALT 637 FOR MEDICARE OP): Performed by: INTERNAL MEDICINE

## 2024-04-13 PROCEDURE — 2500000004 HC RX 250 GENERAL PHARMACY W/ HCPCS (ALT 636 FOR OP/ED): Performed by: INTERNAL MEDICINE

## 2024-04-13 PROCEDURE — 80048 BASIC METABOLIC PNL TOTAL CA: CPT | Performed by: INTERNAL MEDICINE

## 2024-04-13 PROCEDURE — 87081 CULTURE SCREEN ONLY: CPT | Mod: WESLAB | Performed by: STUDENT IN AN ORGANIZED HEALTH CARE EDUCATION/TRAINING PROGRAM

## 2024-04-13 PROCEDURE — 2060000001 HC INTERMEDIATE ICU ROOM DAILY

## 2024-04-13 PROCEDURE — 99497 ADVNCD CARE PLAN 30 MIN: CPT

## 2024-04-13 PROCEDURE — C9113 INJ PANTOPRAZOLE SODIUM, VIA: HCPCS | Performed by: STUDENT IN AN ORGANIZED HEALTH CARE EDUCATION/TRAINING PROGRAM

## 2024-04-13 PROCEDURE — 36415 COLL VENOUS BLD VENIPUNCTURE: CPT | Performed by: INTERNAL MEDICINE

## 2024-04-13 PROCEDURE — 87040 BLOOD CULTURE FOR BACTERIA: CPT | Mod: WESLAB | Performed by: STUDENT IN AN ORGANIZED HEALTH CARE EDUCATION/TRAINING PROGRAM

## 2024-04-13 PROCEDURE — 83605 ASSAY OF LACTIC ACID: CPT | Performed by: STUDENT IN AN ORGANIZED HEALTH CARE EDUCATION/TRAINING PROGRAM

## 2024-04-13 PROCEDURE — 2500000005 HC RX 250 GENERAL PHARMACY W/O HCPCS: Performed by: INTERNAL MEDICINE

## 2024-04-13 RX ORDER — VANCOMYCIN HYDROCHLORIDE 1 G/20ML
INJECTION, POWDER, LYOPHILIZED, FOR SOLUTION INTRAVENOUS DAILY PRN
Status: DISCONTINUED | OUTPATIENT
Start: 2024-04-13 | End: 2024-04-15

## 2024-04-13 RX ORDER — METOPROLOL TARTRATE 1 MG/ML
5 INJECTION, SOLUTION INTRAVENOUS EVERY 6 HOURS PRN
Status: DISCONTINUED | OUTPATIENT
Start: 2024-04-13 | End: 2024-04-16 | Stop reason: HOSPADM

## 2024-04-13 RX ORDER — VANCOMYCIN 1 G/200ML
1 INJECTION, SOLUTION INTRAVENOUS EVERY 24 HOURS
Status: DISCONTINUED | OUTPATIENT
Start: 2024-04-13 | End: 2024-04-14

## 2024-04-13 RX ADMIN — HEPARIN SODIUM 5000 UNITS: 5000 INJECTION, SOLUTION INTRAVENOUS; SUBCUTANEOUS at 09:13

## 2024-04-13 RX ADMIN — METOPROLOL TARTRATE 5 MG: 5 INJECTION INTRAVENOUS at 15:28

## 2024-04-13 RX ADMIN — DEXTROSE MONOHYDRATE AND SODIUM CHLORIDE 75 ML/HR: 5; .45 INJECTION, SOLUTION INTRAVENOUS at 05:04

## 2024-04-13 RX ADMIN — BACITRACIN: 500 OINTMENT TOPICAL at 09:00

## 2024-04-13 RX ADMIN — PIPERACILLIN SODIUM AND TAZOBACTAM SODIUM 3.38 G: 3; .375 INJECTION, SOLUTION INTRAVENOUS at 14:43

## 2024-04-13 RX ADMIN — CEFTRIAXONE SODIUM 1 G: 1 INJECTION, SOLUTION INTRAVENOUS at 05:04

## 2024-04-13 RX ADMIN — VANCOMYCIN 1 G: 1 INJECTION, SOLUTION INTRAVENOUS at 10:43

## 2024-04-13 RX ADMIN — PANTOPRAZOLE SODIUM 40 MG: 40 INJECTION, POWDER, FOR SOLUTION INTRAVENOUS at 05:03

## 2024-04-13 RX ADMIN — ACETAMINOPHEN 650 MG: 650 SUPPOSITORY RECTAL at 18:09

## 2024-04-13 RX ADMIN — PIPERACILLIN SODIUM AND TAZOBACTAM SODIUM 3.38 G: 3; .375 INJECTION, SOLUTION INTRAVENOUS at 21:55

## 2024-04-13 RX ADMIN — BACITRACIN: 500 OINTMENT TOPICAL at 21:00

## 2024-04-13 RX ADMIN — BACITRACIN: 500 OINTMENT TOPICAL at 15:00

## 2024-04-13 RX ADMIN — PIPERACILLIN SODIUM AND TAZOBACTAM SODIUM 3.38 G: 3; .375 INJECTION, SOLUTION INTRAVENOUS at 09:13

## 2024-04-13 RX ADMIN — DEXTROSE MONOHYDRATE AND SODIUM CHLORIDE 75 ML/HR: 5; .45 INJECTION, SOLUTION INTRAVENOUS at 21:55

## 2024-04-13 RX ADMIN — HEPARIN SODIUM 5000 UNITS: 5000 INJECTION, SOLUTION INTRAVENOUS; SUBCUTANEOUS at 21:54

## 2024-04-13 ASSESSMENT — PAIN SCALES - GENERAL
PAINLEVEL_OUTOF10: 0 - NO PAIN
PAINLEVEL_OUTOF10: 2
PAINLEVEL_OUTOF10: 0 - NO PAIN

## 2024-04-13 ASSESSMENT — PAIN - FUNCTIONAL ASSESSMENT
PAIN_FUNCTIONAL_ASSESSMENT: FLACC (FACE, LEGS, ACTIVITY, CRY, CONSOLABILITY)
PAIN_FUNCTIONAL_ASSESSMENT: 0-10
PAIN_FUNCTIONAL_ASSESSMENT: 0-10

## 2024-04-13 ASSESSMENT — COGNITIVE AND FUNCTIONAL STATUS - GENERAL
HELP NEEDED FOR BATHING: A LOT
DRESSING REGULAR LOWER BODY CLOTHING: A LOT
PERSONAL GROOMING: A LOT
DAILY ACTIVITIY SCORE: 11
DAILY ACTIVITIY SCORE: 13
TOILETING: A LOT
MOBILITY SCORE: 10
MOVING TO AND FROM BED TO CHAIR: A LOT
CLIMB 3 TO 5 STEPS WITH RAILING: TOTAL
MOVING FROM LYING ON BACK TO SITTING ON SIDE OF FLAT BED WITH BEDRAILS: A LOT
EATING MEALS: A LITTLE
MOVING TO AND FROM BED TO CHAIR: A LOT
TURNING FROM BACK TO SIDE WHILE IN FLAT BAD: A LOT
HELP NEEDED FOR BATHING: A LOT
DRESSING REGULAR LOWER BODY CLOTHING: A LOT
WALKING IN HOSPITAL ROOM: TOTAL
STANDING UP FROM CHAIR USING ARMS: A LOT
STANDING UP FROM CHAIR USING ARMS: A LOT
EATING MEALS: TOTAL
DRESSING REGULAR UPPER BODY CLOTHING: A LOT
MOVING FROM LYING ON BACK TO SITTING ON SIDE OF FLAT BED WITH BEDRAILS: A LOT
MOBILITY SCORE: 10
CLIMB 3 TO 5 STEPS WITH RAILING: TOTAL
TURNING FROM BACK TO SIDE WHILE IN FLAT BAD: A LOT
DRESSING REGULAR UPPER BODY CLOTHING: A LOT
WALKING IN HOSPITAL ROOM: TOTAL
PERSONAL GROOMING: A LOT
TOILETING: A LOT

## 2024-04-13 NOTE — PROGRESS NOTES
Spiritual Care Visit    Clinical Encounter Type  Visited With: Patient and family together  Routine Visit: Introduction  Continue Visiting: Yes         Values/Beliefs  Spiritual Requests During Hospitalization: Anointed today    Sacramental Encounters  Sacrament of Sick-Anointing: Anointed     =Alex  Nealsuad Das

## 2024-04-13 NOTE — CONSULTS
Consults    Reason For Consult  Reason for Consult: patient/family support     History Of Present Illness  Rebecca Samaniego is a 78 y.o. female with past medical history of Dementia from home with her  is presenting with falls and generalized weakness over the past few weeks prior to this hospitalization. Her  can no longer take care of her at home. She also just recently started to have hallucinations of seeing people who were not there at home. During this hospital stay she was found to have a stroke and UTI. Palliative care was consulted for goals of care.      Symptoms (0 - 10, Best to Worst)  [facility] Symptom Assessment System  Pain Score: 0 - No pain    BM in last 48 hours? unknown      Personal/Social History  She reports that she has quit smoking. Her smoking use included cigarettes. She has never used smokeless tobacco. She reports current alcohol use. She reports that she does not use drugs.    Caregiving/Caregiver Support  Does the patient require assistance in some or all components of his care, including coordination of medical care? Yes  If Yes, which person serves that role?  spouse   Caregiver emotional or practical needs:      Past Medical History  She has no past medical history on file.    Surgical History  She has a past surgical history that includes Aortic valve replacement (02/11/2014); Other surgical history (02/11/2014); Cervical discectomy (02/11/2014); Breast lumpectomy (02/11/2014); and Hysterectomy (02/11/2014).     Family History  No family history on file.  Allergies  Sulfa (sulfonamide antibiotics), Sulfamethoxazole-trimethoprim, Ciprofloxacin, and Nitrofurantoin    Review of Systems   Reason unable to perform ROS: BALBINA - due to patient cognition.        Physical Exam  Vitals and nursing note reviewed.   Constitutional:       General: She is not in acute distress.  HENT:      Head: Normocephalic and atraumatic.      Mouth/Throat:      Mouth: Mucous membranes are dry.  "  Eyes:      General: No scleral icterus.     Pupils: Pupils are equal, round, and reactive to light.   Cardiovascular:      Rate and Rhythm: Tachycardia present.      Heart sounds: No murmur heard.     No friction rub. No gallop.   Pulmonary:      Effort: No respiratory distress.   Abdominal:      General: Bowel sounds are normal.      Tenderness: There is no abdominal tenderness. There is no guarding or rebound.   Musculoskeletal:      Cervical back: No rigidity.      Right lower leg: Edema present.      Left lower leg: Edema present.   Skin:     General: Skin is warm and dry.      Coloration: Skin is pale.   Neurological:      Mental Status: She is disoriented.       Last Recorded Vitals  Blood pressure 159/78, pulse (!) 111, temperature 38.5 °C (101.3 °F), temperature source Axillary, resp. rate 21, height 1.651 m (5' 5\"), weight 62.9 kg (138 lb 10.7 oz), SpO2 98%.    Relevant Results  Results for orders placed or performed during the hospital encounter of 04/10/24 (from the past 24 hour(s))   Transthoracic Echo (TTE) Complete   Result Value Ref Range    AV pk nayeli 1.56 m/s    AV mn grad 6.0 mmHg    MV E/A ratio 0.26     Tricuspid annular plane systolic excursion 1.0 cm    LV Biplane EF 59 %    LA vol index A/L 19.8 ml/m2    MV avg E/e' ratio 3.47     RV free wall pk S' 13.80 cm/s    RVSP 34.1 mmHg    LVIDd 4.48 cm    AV pk grad 9.7 mmHg    LV A4C EF 64.0    CBC   Result Value Ref Range    WBC 11.7 (H) 4.4 - 11.3 x10*3/uL    nRBC 0.0 0.0 - 0.0 /100 WBCs    RBC 3.03 (L) 4.00 - 5.20 x10*6/uL    Hemoglobin 9.8 (L) 12.0 - 16.0 g/dL    Hematocrit 28.7 (L) 36.0 - 46.0 %    MCV 95 80 - 100 fL    MCH 32.3 26.0 - 34.0 pg    MCHC 34.1 32.0 - 36.0 g/dL    RDW 13.0 11.5 - 14.5 %    Platelets 185 150 - 450 x10*3/uL   Basic Metabolic Panel   Result Value Ref Range    Glucose 179 (H) 65 - 99 mg/dL    Sodium 133 133 - 145 mmol/L    Potassium 3.4 3.4 - 5.1 mmol/L    Chloride 100 97 - 107 mmol/L    Bicarbonate 21 (L) 24 - 31 " mmol/L    Urea Nitrogen 17 8 - 25 mg/dL    Creatinine 0.90 0.40 - 1.60 mg/dL    eGFR 65 >60 mL/min/1.73m*2    Calcium 8.1 (L) 8.5 - 10.4 mg/dL    Anion Gap 12 <=19 mmol/L   BLOOD GAS LACTIC ACID, VENOUS   Result Value Ref Range    POCT Lactate, Venous 1.3 0.4 - 2.0 mmol/L      Transthoracic Echo (TTE) Complete  Result Date: 4/12/2024  CONCLUSIONS:  1. Left ventricular systolic function is normal with a 60% estimated ejection fraction.  2. Abnormal septal motion consistent with post-operative status.  3. Spectral Doppler shows an impaired relaxation pattern of left ventricular diastolic filling.  4. Trace to mild mitral valve regurgitation.  5. Mild to moderate tricuspid regurgitation.  6. RVSP within normal limits.  7. There is a tricuspid annular ring repair.  8. Aortic valve appears abnormal.  9. There is a stentless aortic valve bioprosthesis. 10. Prosthetic graft in the ascending and transverse aorta position. 11. The estimated PASP is 34 mmHg. 12. There is no evidence of a patent foramen ovale. 13. The peak instantaneous gradient of the aortic valve is 9.7 mmHg.     Carotid duplex bilateral  Result Date: 4/12/2024  PRELIMINARY CONCLUSIONS:  Right Carotid: Findings are consistent with less than 50% stenosis of the right proximal internal carotid artery. Laminar flow seen by color Doppler. There are elevated velocities in the right ECA that are suggestive of disease. The right vertebral artery is patent with antegrade flow. No evidence of hemodynamically significant stenosis in the right subclavian artery. Left Carotid: Findings are consistent with less than 50% stenosis of the left proximal internal carotid artery. The internal carotid artery appears tortuous. Left external carotid artery appears patent with no evidence of stenosis. The left vertebral artery is patent with antegrade flow. No evidence of hemodynamically significant stenosis in the left subclavian artery. Additional Findings: Technically difficult  exam due to patient's positioning.  Imaging & Doppler Findings: Right Plaque Morph: The proximal right external carotid artery demonstrates heterogenous plaque. Left Plaque Morph: The proximal left external carotid artery demonstrates heterogenous and homogenous plaque. The distal left common carotid artery demonstrates heterogenous and homogenous plaque.       MR brain wo IV contrast  Addendum Date: 4/12/2024    Result Date: 4/12/2024  Small acute cortical infarct involving the left occipital lobe. No mass effect or hemorrhagic transformation.   Additional moderate chronic small vessel ischemic disease with tiny remote ischemic insults involving the deep white matter of the right frontal lobe, bilateral basal ganglia, and cerebellum.   Moderate to advanced brain atrophy.   MACRO: None   Signed by: Kaleb Barboza 4/12/2024 9:43 AM Dictation workstation:   [id number]    MR lumbar spine wo IV contrast  Result Date: 4/12/2024  In comparison with prior MRI (07/03/2019) there has been revision/extension of posterior spinal fusion and decompression with L2-L5 posterior fusion/decompression evident. There is development of adjacent segment disease at L1-L2 with severe degenerative discogenic change and element of epidural lipomatosis contributing to severe spinal canal stenosis and lateral recess effacement. There is moderate bilateral neural foraminal narrowing at this level as well.   At L5-S1 there is a left subarticular disc osteophyte protrusion component contributing to left lateral recess stenosis and suspected moderate left neural foraminal narrowing.   MACRO: None   Signed by: Kaleb Barboza 4/12/2024 9:35 AM Dictation workstation:   [id number]    ECG 12 lead  Result Date: 4/11/2024   Sinus rhythmÂ with 1st degree AV block Left axis deviation  PROMINENT R-WAVE  Nonspecific ST abnormalityÂ  Abnormal ECG When compared with ECG ofÂ 16-MAY-2023 14:57, QRS voltageÂ has decreased ST now depressed inÂ Anterior leads  Non-specific change in ST segment inÂ Lateral leads T wave inversion no longer evident inÂ Lateral leads Â  Confirmed by Shaheed Martin ([id number]) on 4/11/2024 10:04:17 AM    XR chest 1 view  Result Date: 4/11/2024  1. Central vascular congestion and interstitial prominence suggesting mild edema. 2. Vague lucency overlying the left humeral head, this could represent overhanging osteophyte or age-indeterminate fracture.     Signed by: Wilfred Avilez 4/11/2024 12:27 AM Dictation workstation:   [id number]    CT head wo IV contrast  Result Date: 4/10/2024  No evidence of acute cortical infarct or intracranial hemorrhage. Chronic ischemic changes, including chronic right frontal lobe infarct.     MACRO: None   Signed by: Gurvinder Kilgore 4/10/2024 11:57 PM Dictation workstation:   [id number]     Assessment/Plan   IMP:    Sepsis  - WBC 11.7  - Lactate 1.3  - Blood cultures in progress   - On Zosyn & Vanco   - ID consulted     2. Acute CVA   - MRI brain showing Small acute cortical infarct involving the left occipital lobe. No  mass effect or hemorrhagic transformation.  - Neuro following     3. UTI  - culture showing E-coli    4. Metabolic Encephalopathy  - suspect due to underlying disease process and acute infection, as well as history of dementia.   - CT brain shows Chronic ischemic changes, including chronic right frontal lobe  infarct.    5. Elevated troponin   - Cardiology following   - ECHO with Preserved EF s/p bioprosthetic aortic valve replacement, tricuspid valve repair, ascending aorta and transverse hemiarch repair    6. ROSE on CKD   - resolving   - suspect r/t dehydration and current infection   - IVF    7. Weakness/Falls   - with malnutrition, Albumin 3.2  - The patient has had a poor appetite and not been eating well in the past few months, increasing inability to take care of self     8. Palliative Care   FULL CODE  The patient does not have decision making capacity   is Kaleb Gustavojess - surrogate decision  maker  There are no ACP documents     4/13/2024  Discussion with  about patient current illness and trajectory. He does endorse a decline over the last 3 months. He states that he can no longer take care of the patient at home. We did talk about the patient having dementia and how this is a progressive illness where the trajectory does not typically improve (albeit wax and wean) but typically if it continues down the usual trajectory, this will worsen over time.     Symptom Management  Pain: no  Medications recommended for pain?  No  Tiredness: yes   Nausea: no  Depression: BALBINA  Anxiety: BALBINA  Drowsiness: mild  Appetite: poor  Wellbeing: BALBINA  Dyspnea: No  Intervention recommended for dyspnea?  no  Other: N/A   Intervention recommended for constipation?  NA    Patient/proxy preference for information  Prefers full information    Goals of Care  Treatment model of care     I spent 60 minutes in the professional and overall care of this patient. Total ACP time was 20 minutes.    Rhonda Potts, APRN-CNP

## 2024-04-13 NOTE — CONSULTS
"Vancomycin Dosing by Pharmacy- INITIAL    Rebecca Samaniego is a 78 y.o. year old female who Pharmacy has been consulted for vancomycin dosing for other uti . Based on the patient's indication and renal status this patient will be dosed based on a goal AUC of 400-600.     Renal function is currently stable.    Visit Vitals  /78 (BP Location: Right arm, Patient Position: Lying)   Pulse (!) 111   Temp 38.5 °C (101.3 °F) (Axillary)   Resp 21        Lab Results   Component Value Date    CREATININE 0.90 04/13/2024    CREATININE 1.00 04/12/2024    CREATININE 1.50 04/11/2024    CREATININE 1.90 (H) 04/10/2024        Patient weight is No results found for: \"PTWEIGHT\"    No results found for: \"CULTURE\"     I/O last 3 completed shifts:  In: 916.3 (14.6 mL/kg) [I.V.:816.3 (13 mL/kg); IV Piggyback:100]  Out: 600 (9.5 mL/kg) [Urine:600 (0.3 mL/kg/hr)]  Weight: 62.9 kg   [unfilled]    No results found for: \"PATIENTTEMP\"       Assessment/Plan     Patient will not be given a loading dose.  Will initiate vancomycin maintenance,  1000 mg every 24 hours.    This dosing regimen is predicted by [employer] to result in the following pharmacokinetic parameters:    Loading dose: N/A  Regimen: 1000 mg IV every 24 hours.  Start time: 07:46 on 04/13/2024  Exposure target: AUC24 (range)400-600 mg/L.hr   AUC24,ss: 403 mg/L.hr  Probability of AUC24 > 400: 51 %  Ctrough,ss: 11.8 mg/L  Probability of Ctrough,ss > 20: 6 %  Probability of nephrotoxicity (Lodise DARIN 2009): 7 %    Follow-up level will be ordered on 4/14 at 0500 unless clinically indicated sooner.  Will continue to monitor renal function daily while on vancomycin and order serum creatinine at least every 48 hours if not already ordered.  Follow for continued vancomycin needs, clinical response, and signs/symptoms of toxicity.       Bria Jacques, PharmD       "

## 2024-04-13 NOTE — PROGRESS NOTES
"Rebecca Samaniego is a 78 y.o. female on day 2 of admission presenting with UTI (urinary tract infection).      Subjective   Patient seen and examined.  Patient more alert and interactive today compared to yesterday.  Follows simple commands but uncooperative at times.  Daughter at bedside who states at times the patient does not want to be bothered.       Medications  Medications Prior to Admission   Medication Sig Dispense Refill Last Dose    atenolol (Tenormin) 50 mg tablet Take 1 tablet (50 mg) by mouth once daily.   Unknown    [] predniSONE (Deltasone) 10 mg tablet Take 3 tablets (30 mg) by mouth once daily for 5 days, THEN 2 tablets (20 mg) once daily for 5 days, THEN 1 tablet (10 mg) once daily for 5 days. 30 tablet 0     traMADol (Ultram) 50 mg tablet Take 1 tablet (50 mg) by mouth every 8 hours if needed for severe pain (7 - 10) for up to 5 days. 15 tablet 0 Unknown       Review of Systems  14 point review of systems was reviewed and negative except as noted above.    Objective     Last Recorded Vitals  Blood pressure 140/66, pulse (!) 122, temperature 38.5 °C (101.3 °F), temperature source Axillary, resp. rate 22, height 1.651 m (5' 5\"), weight 62.9 kg (138 lb 10.7 oz), SpO2 98%.    Neurological Exam: Limited neurological examination was obtained given the patient's uncooperativeness to participate.  Physical Exam  Mental Status :  Patient more alert and interactive today but follows simple questions.     Speech : Clear , slow       CN 2-12 :  Pupils round , sluggishly reacting to light  Fundus could not be assessed  No facial asymmetry noted  Hearing acuity intact bilaterally  Tongue midline        Motor:  Strength moves all extremities spontaneously  Normal bulk and tone     Sensory:  Intact to light touch     Reflexes : 1+  symmetric with equivocal toes     Coordination : Could not be assessed     Gait : Unable to assess.    Relevant Results  Transthoracic Echo (TTE) Complete    Result Date: " 4/12/2024           [facility] [id number] [place] [address]            Phone [phone] TRANSTHORACIC ECHOCARDIOGRAM REPORT  Patient Name:     BETTY PETER FAWAD     Reading Physician:  [id number] Shaheed Martin MD Study Date:       4/12/2024            Ordering Provider:  [id number] SHILPA TEJADA MRN/PID:          [id number]             Fellow: Accession#:       [id number]         Nurse: Date of           1945 / 78 years Sonographer:        Homa Romano MIGUELINA Birth/Age: Gender:           F                    Additional Staff: Height:           165.10 cm            Admit Date:         4/12/2024 Weight:           59.88 kg             Admission Status:   Inpatient - Routine BSA / BMI:        1.66 m2 / 21.97      Department          [facility]                   kg/m2                Location: Blood Pressure: 168 /65 mmHg Study Type:    TRANSTHORACIC ECHO (TTE) COMPLETE Diagnosis/ICD: Cerebral Infarction, unspecified-I63.9 Indication:    Cerebrovascular Accident CPT Codes:     Echo Complete w Full Doppler-[id number] Patient History: Valve Disorders: Aortic Valve Replacement. Study Detail: The following Echo studies were performed: 2D, M-Mode, Doppler and               color flow. Agitated saline used as a contrast agent for               intraseptal flow evaluation.  PHYSICIAN INTERPRETATION: Left Ventricle: Left ventricular systolic function is normal, with an estimated ejection fraction of 60%. The calculated ejection fraction is normal at 59 % using the Zamora's Bi-plane MOD calculation. There are no regional wall motion abnormalities. The left ventricular cavity size is normal. There is borderline concentric left ventricular hypertrophy. Abnormal (paradoxical) septal motion consistent with post-operative status. Spectral Doppler shows an impaired relaxation pattern of left ventricular diastolic filling. There is no ventricular septal  defect. Left Atrium: The left atrium is mildly dilated. There is no evidence of a patent foramen ovale. A bubble study using agitated saline was performed. Bubble study is negative. Right Ventricle: The right ventricle is normal in size. There is normal right ventriclar wall thickness. There is normal right ventricular global systolic function. Right Atrium: The right atrium is normal in size. Aortic Valve: The aortic valve appears abnormal. There is stentless aortic valve bioprosthesis. There is no evidence of aortic valve regurgitation. The peak instantaneous gradient of the aortic valve is 9.7 mmHg. The mean gradient of the aortic valve is 6.0 mmHg. Mitral Valve: The mitral valve is normal in structure. There is normal mitral valve leaflet mobility. There is trace to mild mitral valve regurgitation. Tricuspid Valve: The tricuspid valve is structurally normal. There is normal tricuspid valve leaflet mobility. Status post triscuspid annular ring repair. There is mild to moderate tricuspid regurgitation. The Doppler estimated RVSP is within normal limits at 34.1 mmHg. There is a tricuspid annular ring repair. Pulmonic Valve: The pulmonic valve is structurally normal. There is no indication of pulmonic valve regurgitation. Pericardium: There is no pericardial effusion noted. There is a pericardial fat pad present. Aorta: The aortic root is normal. There is no dilatation of the aortic arch. There is no dilatation of the ascending aorta. There is no dilatation of the aortic root. There is a prosthetic graft seen in the position of the ascending and transverse aorta. Pulmonary Artery: The pulmonary artery is normal in size. The tricuspid regurgitant velocity is 2.79 m/s, and with an estimated right atrial pressure of 3 mmHg, the estimated pulmonary artery pressure is normal with the RVSP at 34.1 mmHg. The estimated PASP is 34 mmHg.  CONCLUSIONS:  1. Left ventricular systolic function is normal with a 60% estimated  ejection fraction.  2. Abnormal septal motion consistent with post-operative status.  3. Spectral Doppler shows an impaired relaxation pattern of left ventricular diastolic filling.  4. Trace to mild mitral valve regurgitation.  5. Mild to moderate tricuspid regurgitation.  6. RVSP within normal limits.  7. There is a tricuspid annular ring repair.  8. Aortic valve appears abnormal.  9. There is a stentless aortic valve bioprosthesis. 10. Prosthetic graft in the ascending and transverse aorta position. 11. The estimated PASP is 34 mmHg. 12. There is no evidence of a patent foramen ovale. 13. The peak instantaneous gradient of the aortic valve is 9.7 mmHg. QUANTITATIVE DATA SUMMARY: 2D MEASUREMENTS:                          Normal Ranges: IVSd:          0.90 cm   (0.6-1.1cm) LVPWd:         0.84 cm   (0.6-1.1cm) LVIDd:         4.48 cm   (3.9-5.9cm) LVIDs:         2.95 cm LV Mass Index: 75.9 g/m2 LV % FS        34.2 % LA VOLUME:                               Normal Ranges: LA Vol A4C:        36.1 ml    (22+/-6mL/m2) LA Vol A2C:        25.4 ml LA Vol BP:         32.9 ml LA Vol Index A4C:  21.8ml/m2 LA Vol Index A2C:  15.3 ml/m2 LA Vol Index BP:   19.8 ml/m2 LA Area A4C:       13.2 cm2 LA Area A2C:       10.2 cm2 LA Major Axis A4C: 4.1 cm LA Major Axis A2C: 3.5 cm LA Volume Index:   17.9 ml/m2 LA Vol A4C:        30.4 ml LA Vol A2C:        24.9 ml RA VOLUME BY A/L METHOD:                               Normal Ranges: RA Vol A4C:        24.6 ml    (8.3-19.5ml) RA Vol Index A4C:  14.8 ml/m2 RA Area A4C:       11.3 cm2 RA Major Axis A4C: 4.4 cm AORTA MEASUREMENTS:                    Normal Ranges: Asc Ao, d: 2.57 cm (2.1-3.4cm) LV SYSTOLIC FUNCTION BY 2D PLANIMETRY (MOD):                     Normal Ranges: EF-A4C View: 64.0 % (>=55%) EF-A2C View: 53.9 % EF-Biplane:  59.1 % LV DIASTOLIC FUNCTION:                            Normal Ranges: MV Peak E:      0.36 m/s   (0.7-1.2 m/s) MV Peak A:      1.42 m/s   (0.42-0.7 m/s) E/A  Ratio:      0.26       (1.0-2.2) MV e'           0.11 m/s   (>8.0) MV lateral e'   0.14 m/s MV medial e'    0.07 m/s E/e' Ratio:     3.47       (<8.0) PulmV Sys Santy:  47.60 cm/s PulmV Harris Santy: 58.30 cm/s PulmV S/D Santy:  0.80 MITRAL VALVE:                 Normal Ranges: MV DT: 121 msec (150-240msec) AORTIC VALVE:                                   Normal Ranges: AoV Vmax:                1.56 m/s (<=1.7m/s) AoV Peak P.7 mmHg (<20mmHg) AoV Mean P.0 mmHg (1.7-11.5mmHg) LVOT Max Santy:            1.00 m/s (<=1.1m/s) AoV VTI:                 30.10 cm (18-25cm) LVOT VTI:                19.70 cm AoV Dimensionless Index: 0.65  RIGHT VENTRICLE: RV Basal 2.73 cm RV Mid   3.55 cm RV Major 6.6 cm TAPSE:   10.4 mm RV s'    0.14 m/s TRICUSPID VALVE/RVSP:                             Normal Ranges: Peak TR Velocity: 2.79 m/s RV Syst Pressure: 34.1 mmHg (< 30mmHg) Pulmonary Veins: PulmV Harris Santy: 58.30 cm/s PulmV S/D Santy:  0.80 PulmV Sys Santy:  47.60 cm/s  [id number] Shaheed Martin MD Electronically signed on 2024 at 4:16:02 PM  ** Final **     Carotid duplex bilateral    Result Date: 2024  Preliminary Cardiology [place] [address]            Phone [phone]      Preliminary Vascular Lab Report  [facility] CAROTID ARTERY DUPLEX BILATERAL  Patient Name:     BETTY Springer Physician:  [id number] Leong MD Study Date:       2024        Ordering Provider:  [id number] SHILPA TEJADA MRN/PID:          [id number]         Fellow: Accession#:       [id number]     Technologist:       Zarina Riggins RVT YOB: 1945        Technologist 2: Gender:           F                Encounter#:         [id number] Admission Status: Inpatient        Location Performed: [facility]  Diagnosis/ICD: Syncope and collapse-R55 CPT Codes:     53031 Cerebrovascular Carotid Duplex scan complete  PRELIMINARY CONCLUSIONS:  Right  Carotid: Findings are consistent with less than 50% stenosis of the right proximal internal carotid artery. Laminar flow seen by color Doppler. There are elevated velocities in the right ECA that are suggestive of disease. The right vertebral artery is patent with antegrade flow. No evidence of hemodynamically significant stenosis in the right subclavian artery. Left Carotid: Findings are consistent with less than 50% stenosis of the left proximal internal carotid artery. The internal carotid artery appears tortuous. Left external carotid artery appears patent with no evidence of stenosis. The left vertebral artery is patent with antegrade flow. No evidence of hemodynamically significant stenosis in the left subclavian artery. Additional Findings: Technically difficult exam due to patient's positioning.  Imaging & Doppler Findings: Right Plaque Morph: The proximal right external carotid artery demonstrates heterogenous plaque. Left Plaque Morph: The proximal left external carotid artery demonstrates heterogenous and homogenous plaque. The distal left common carotid artery demonstrates heterogenous and homogenous plaque.   Right                        Left   PSV      EDV                PSV      EDV 65 cm/s            CCA P    68 cm/s 62 cm/s            CCA D    57 cm/s 52 cm/s  8 cm/s    ICA P    75 cm/s  15 cm/s 55 cm/s  13 cm/s   ICA M    68 cm/s  12 cm/s 89 cm/s  15 cm/s   ICA D    77 cm/s  17 cm/s 223 cm/s            ECA     75 cm/s 48 cm/s  9 cm/s  Vertebral  59 cm/s  15 cm/s 109 cm/s         Subclavian 130 cm/s                Right Left ICA/CCA Ratio  0.8  1.3   VASCULAR PRELIMINARY REPORT completed by Zarina Riggins T on 4/12/2024 at 4:06:10 PM  ** Final **     MR brain wo IV contrast    Addendum Date: 4/12/2024    Interpreted By:  Kaleb Barboza, ADDENDUM: Kaleb Barboza discussed the significance and urgency of this critical finding by secure chat with  ARNOLDO MCGEE on 4/12/2024 at 9:43 am.   (**-RCF-**) Findings:  See findings.     Signed by: Kaleb Barboza 4/12/2024 9:51 AM   -------- ORIGINAL REPORT -------- Dictation workstation:   [id number]    Result Date: 4/12/2024  Interpreted By:  Kaleb Barboza, STUDY: MR BRAIN WO IV CONTRAST;  4/11/2024 9:07 pm   INDICATION: Signs/Symptoms:pt with gait atxia.r/o cva.   COMPARISON: CT head dated 04/10/2024.   ACCESSION NUMBER(S): [id number]   ORDERING CLINICIAN: ARNOLDO MCGEE   TECHNIQUE: Standard multiplanar multisequence MR imaging was performed through the brain without intravenous contrast. Axial T2, FLAIR, DWI, gradient echo T2 and sagittal and coronal T1 weighted images of brain were acquired.   FINDINGS: Parenchyma: There is a small focus of acute diffusion restriction signal abnormality involving the cortex of the left occipital lobe. There is corresponding T2/FLAIR hyperintensity within this region. There is no mass effect or hemorrhagic transformation. Moderate chronic small vessel ischemic disease with tiny remote bilateral basal ganglia lacunar infarcts. Additional remote ischemic insults within the deep white matter of the right frontal lobe. Tiny remote cerebellar infarcts bilaterally.   CSF Spaces: There is moderate to advanced brain atrophy. Basilar cisterns are patent.   Extra-axial spaces: No extra-axial fluid collection.   Paranasal Sinuses: Visualized paranasal sinuses are clear.   Mastoids: Clear.   Orbits: Normal.   Calvarium: No suspicious osseous marrow signal.       Small acute cortical infarct involving the left occipital lobe. No mass effect or hemorrhagic transformation.   Additional moderate chronic small vessel ischemic disease with tiny remote ischemic insults involving the deep white matter of the right frontal lobe, bilateral basal ganglia, and cerebellum.   Moderate to advanced brain atrophy.   MACRO: None   Signed by: Kaleb Barboza 4/12/2024 9:43 AM Dictation workstation:   [id number]    MR lumbar spine wo IV  contrast    Result Date: 4/12/2024  Interpreted By:  Kaleb Barboza, STUDY: MR LUMBAR SPINE WO IV CONTRAST performed 4/11/2024 9:13 pm   INDICATION: Signs/Symptoms:pt with multiple falls.r/o lumbar radiculopathy.   COMPARISON: Lumbar spine radiographs dated 03/26/2024. MRI lumbar spine dated 07/30/2019.   ACCESSION NUMBER(S): [id number]   ORDERING CLINICIAN: ARNOLDO MCGEE   TECHNIQUE: Multiplanar multisequence MR imaging of the lumbar spine performed without intravenous contrast. Sagittal T1, T2, STIR, axial T1 and T2 weighted images of the lumbar spine were acquired.   FINDINGS: Segmentation: Normal.   Conus: The lower thoracic cord appears unremarkable. The conus terminates at the L1-L2 interspace level. Cauda equina are unremarkable.   Epidural fluid: None.   Hardware: Postoperative change of L2-L5 posterior spinal fusion with bilateral posterior pedicular screws and stabilization rods. Interbody fixation at L4-L5. Laminectomy/posterior decompression involving the fusion levels.   Alignment: Grade 1 anterolisthesis of L4 on L5.   Vertebral bodies: Vertebral body heights are grossly maintained.   Marrow signal: Trace type 1 Modic endplate signal change suggested at L1-L2. Otherwise no suspicious STIR hyperintensity or focal marrow replacing lesion.   Intervertebral discs: Moderate degenerative disc height loss at L1-L2 with disc desiccation, significantly worsened from previous MRI. Additional moderate multilevel degenerative disc height loss.     Degenerative change:   T12-L1: Unremarkable.   L1-2: Mild facet arthropathy. Moderate to marked ligamentum flavum hypertrophy. There is moderate disc bulge with superimposed broad-based central disc extrusion with elements of cranial and caudal migration. Mildly prominent dorsal epidural fat. There is severe spinal canal stenosis with the thecal sac measuring less than 5 mm in anterior-posterior dimension. Lateral recess narrowing/effacement is also evident.  Moderate bilateral neural foraminal narrowing.   L2-3: Mild facet arthropathy and ligamentum flavum hypertrophy. Mild disc bulge and endplate spurring. Mild narrowing of the lateral recesses with no additional spinal canal stenosis. Mild bilateral neural foraminal narrowing.   L3-4: Facet arthropathy and mild endplate spurring. No spinal canal stenosis. Minimal/mild neural foraminal narrowing secondary to residual endplate and facet spurring.   L4-5: Disc uncovering with endplate spurring. There is lateral recess narrowing with no additional spinal canal stenosis. Mild-to-moderate right and mild left neural foraminal narrowing suggested secondary to disc uncovering and endplate spurring with hardware artifact slightly degrading assessment.   L5-S1: Mild facet arthropathy. Disc bulge and hypertrophic endplate spurring with left subarticular disc osteophyte protrusion component. Left lateral recess narrowing with probable mass effect on the S1 nerve root. Moderate left and mild right neural foraminal narrowing.   Soft tissues: Within the deep posterior paraspinal soft tissues overlying the laminectomy bed there is a 5.8 x 4.9 x 1.3 cm fluid collection with a few internal septation suggested and otherwise relative simple appearance, likely representing a postoperative seroma. There is additional scarring and fatty atrophy involving the inferior posterior paraspinal musculature. Small cortical renal cysts suggested bilaterally. Dependent sludge versus stones within the partially visualized gallbladder.       In comparison with prior MRI (07/03/2019) there has been revision/extension of posterior spinal fusion and decompression with L2-L5 posterior fusion/decompression evident. There is development of adjacent segment disease at L1-L2 with severe degenerative discogenic change and element of epidural lipomatosis contributing to severe spinal canal stenosis and lateral recess effacement. There is moderate bilateral neural  foraminal narrowing at this level as well.   At L5-S1 there is a left subarticular disc osteophyte protrusion component contributing to left lateral recess stenosis and suspected moderate left neural foraminal narrowing.   MACRO: None   Signed by: Kaleb Barboza 4/12/2024 9:35 AM Dictation workstation:   [id number]     Results for orders placed or performed during the hospital encounter of 04/10/24 (from the past 24 hour(s))   Transthoracic Echo (TTE) Complete   Result Value Ref Range    AV pk nayeli 1.56 m/s    AV mn grad 6.0 mmHg    MV E/A ratio 0.26     Tricuspid annular plane systolic excursion 1.0 cm    LV Biplane EF 59 %    LA vol index A/L 19.8 ml/m2    MV avg E/e' ratio 3.47     RV free wall pk S' 13.80 cm/s    RVSP 34.1 mmHg    LVIDd 4.48 cm    AV pk grad 9.7 mmHg    LV A4C EF 64.0    CBC   Result Value Ref Range    WBC 11.7 (H) 4.4 - 11.3 x10*3/uL    nRBC 0.0 0.0 - 0.0 /100 WBCs    RBC 3.03 (L) 4.00 - 5.20 x10*6/uL    Hemoglobin 9.8 (L) 12.0 - 16.0 g/dL    Hematocrit 28.7 (L) 36.0 - 46.0 %    MCV 95 80 - 100 fL    MCH 32.3 26.0 - 34.0 pg    MCHC 34.1 32.0 - 36.0 g/dL    RDW 13.0 11.5 - 14.5 %    Platelets 185 150 - 450 x10*3/uL   Basic Metabolic Panel   Result Value Ref Range    Glucose 179 (H) 65 - 99 mg/dL    Sodium 133 133 - 145 mmol/L    Potassium 3.4 3.4 - 5.1 mmol/L    Chloride 100 97 - 107 mmol/L    Bicarbonate 21 (L) 24 - 31 mmol/L    Urea Nitrogen 17 8 - 25 mg/dL    Creatinine 0.90 0.40 - 1.60 mg/dL    eGFR 65 >60 mL/min/1.73m*2    Calcium 8.1 (L) 8.5 - 10.4 mg/dL    Anion Gap 12 <=19 mmol/L   BLOOD GAS LACTIC ACID, VENOUS   Result Value Ref Range    POCT Lactate, Venous 1.3 0.4 - 2.0 mmol/L           Assessment/Plan      Principal Problem:    UTI (urinary tract infection)  Active Problems:    Gait instability    Back pain    Leg weakness    Acute on chronic kidney failure (CMS-HCC)    Dehydration    Repeated falls    Stroke (Multi)    Rebecca ROME Samaniego is a 78 y.o. female presenting with frequent  falls and generalized weakness with MRI consistent with acute CVA and lumbar radiculopathy and with worsening mental status to rule out encephalopathy with metabolic, septic, toxic causes contributing, improving since yesterday, prognosis guarded.     Recommendations;  Reviewed CT scan brain and carotid ultrasound/ CT angiogram head /neck results.  MRI brain and lumbar spine results reviewed  Continue sepsis workup and treatment with antibiotics  2D echo results reviewed  Routine EEG pending  Correct underlying metabolic abnormalities  PT/OT eval and treatment  Blood pressure / Blood sugar monitoring and control  Telemetry monitoring   Continue current management  Discussed the above plan with the patient and daughter.  Will follow-up on the EEG when done and if negative will sign off.  No further recommendations from neurology standpoint     Parts of this chart have been completed using voice recognition software. Please excuse any errors of transcription.     I spent 30 minutes in the professional and overall care of this patient.      Abdiaziz Anthony MD

## 2024-04-13 NOTE — HOSPITAL COURSE
78yoF hx of lumbar radiculopathy s/p L2-L5 fusion, CKD3, hx of collitis, HTN, s/p aortic valve replacement who presented with generalized weakness, worsening confusion with hallucinations, inability to care for herself, and frequent falls at home. Workup showing ROSE on CKD3 and elevated troponins. CXR showing mild pulmonary edema. UA suggestive of UTI. Admitted for further workup. Started on IV antibiotics. Neurology and cardiology were consulted. MRI lumbar spine showed severe spinal canal stenosis, likely the cause of her lower extremity/generalized weakness. MRI brain showed small acute cortical infarct involving the left occipital lobe, chronic small vessel ischemic disease, and moderate to advanced brain atrophy. Patient was started on aspirin, plavix, and lipitor per stroke protocol. Echo showed EF 60% without evidence of PFO. Carotid US wnl. Hospital course complicated by fever, tachycardia, and new leukocytosis concerning for sepsis. Blood cultures obtained. Started on broad spectrum antibiotics and ID was consulted. Palliative care was also consulted for GOC/code status discussions.

## 2024-04-13 NOTE — NURSING NOTE
Assumed care of patient at this time. At bedside report, patient had no c/o pain. vss and afebrile. Family at bedside. Bed low and locked, call button within reach and bed alarm on. Patient has no needs that require immediate nursing interventions.

## 2024-04-13 NOTE — PROGRESS NOTES
"Rebecca Samaniego is a 78 y.o. female on day 3 of admission presenting with UTI (urinary tract infection).    Subjective   Awakens to verbal command, is able to answer some questions appropriately, does appear confused.  Significantly fatigued.  No obvious distress.       Objective     Physical Exam  Vitals and nursing note reviewed.   Constitutional:       General: She is not in acute distress.     Appearance: She is not ill-appearing or toxic-appearing.   HENT:      Head: Normocephalic and atraumatic.      Nose: Nose normal.      Mouth/Throat:      Mouth: Mucous membranes are dry.      Pharynx: Oropharynx is clear.   Cardiovascular:      Rate and Rhythm: Normal rate and regular rhythm.      Pulses: Normal pulses.      Heart sounds: Normal heart sounds. No murmur heard.     No friction rub. No gallop.   Pulmonary:      Effort: Pulmonary effort is normal. No respiratory distress.      Breath sounds: Normal breath sounds.   Abdominal:      General: Bowel sounds are normal.      Palpations: Abdomen is soft.   Musculoskeletal:      Cervical back: Normal range of motion and neck supple.      Right lower leg: Edema present.      Left lower leg: Edema present.      Comments: Trace pain clinic trace pedal and ankle edema   Skin:     Capillary Refill: Capillary refill takes less than 2 seconds.   Neurological:      Mental Status: She is alert. She is disoriented.         Last Recorded Vitals  Blood pressure 159/78, pulse (!) 111, temperature 38.5 °C (101.3 °F), temperature source Axillary, resp. rate 21, height 1.651 m (5' 5\"), weight 62.9 kg (138 lb 10.7 oz), SpO2 98%.  Intake/Output last 3 Shifts:  I/O last 3 completed shifts:  In: 916.3 (14.6 mL/kg) [I.V.:816.3 (13 mL/kg); IV Piggyback:100]  Out: 600 (9.5 mL/kg) [Urine:600 (0.3 mL/kg/hr)]  Weight: 62.9 kg     Relevant Results  Results for orders placed or performed during the hospital encounter of 04/10/24 (from the past 24 hour(s))   Transthoracic Echo (TTE) Complete "   Result Value Ref Range    AV pk nayeli 1.56 m/s    AV mn grad 6.0 mmHg    MV E/A ratio 0.26     Tricuspid annular plane systolic excursion 1.0 cm    LV Biplane EF 59 %    LA vol index A/L 19.8 ml/m2    MV avg E/e' ratio 3.47     RV free wall pk S' 13.80 cm/s    RVSP 34.1 mmHg    LVIDd 4.48 cm    AV pk grad 9.7 mmHg    LV A4C EF 64.0    CBC   Result Value Ref Range    WBC 11.7 (H) 4.4 - 11.3 x10*3/uL    nRBC 0.0 0.0 - 0.0 /100 WBCs    RBC 3.03 (L) 4.00 - 5.20 x10*6/uL    Hemoglobin 9.8 (L) 12.0 - 16.0 g/dL    Hematocrit 28.7 (L) 36.0 - 46.0 %    MCV 95 80 - 100 fL    MCH 32.3 26.0 - 34.0 pg    MCHC 34.1 32.0 - 36.0 g/dL    RDW 13.0 11.5 - 14.5 %    Platelets 185 150 - 450 x10*3/uL   Basic Metabolic Panel   Result Value Ref Range    Glucose 179 (H) 65 - 99 mg/dL    Sodium 133 133 - 145 mmol/L    Potassium 3.4 3.4 - 5.1 mmol/L    Chloride 100 97 - 107 mmol/L    Bicarbonate 21 (L) 24 - 31 mmol/L    Urea Nitrogen 17 8 - 25 mg/dL    Creatinine 0.90 0.40 - 1.60 mg/dL    eGFR 65 >60 mL/min/1.73m*2    Calcium 8.1 (L) 8.5 - 10.4 mg/dL    Anion Gap 12 <=19 mmol/L         Assessment/Plan   Principal Problem:    UTI (urinary tract infection)  Active Problems:    Gait instability    Back pain    Leg weakness    Acute on chronic kidney failure (CMS-HCC)    Dehydration    Repeated falls    Stroke (Multi)    4/11: The patient is a 78-year-old white female whose past medical history includes hypertension, chronic kidney disease, former alcohol excess, status post bioprosthetic aortic valve replacement utilizing a number 23 mm freestyle bioprosthesis along with replacement of the ascending aorta and transverse hemiarch using a number 24 mm Gelweave graft plus tricuspid valve repair utilizing number 21 mm [place] ring plus 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  This patient has done very well since her cardiac surgery with her last surveillance echocardiogram on 9/25/2023 showing an LV ejection fraction of 55-60% 1-2+ mitral  valve regurgitation normal-appearing stentless aortic valve bioprosthesis with a peak systolic gradient of 11 mmHg and a prosthetic graft in the aortic root and ascending/arch position.  Patient is currently admitted with failure to thrive inability to provide self-care difficulty walking which appears to be related to progressing dementia.  Initial head CT did not show new CVA.  Urine culture pending to rule out UTI as a cause of delirium and the patient has already been started on empiric IV Rocephin.  The patient's atenolol 50 mg daily will be switched to Toprol-XL 50 mg daily.  The minor elevation in high-sensitivity troponin is nondiagnostic no clinical concern at this point for an acute coronary syndrome.  Physical therapy has been consulted and most likely patient will need skilled nursing home placement unless the delirium substantially improves with antibiotic therapy.     4/12: Patient is somewhat more alert and aware but remains generally confused.  MRI of the brain actually confirms the occurrence of a small acute cortical infarct involving the left occipital lobe.  She has moderate chronic small vessel ischemic disease with possible tiny insults of the deep white matter in the right frontal lobe bilateral basal ganglia and cerebellum plus moderate to advanced brain atrophy.  MRI of the lumbar spine demonstrated revision extension of posterior spinal fusion and decompression with L2-L5 posterior fusion/decompression findings.  There is new adjacent segment of disease at the L1-L2 segment with severe degenerative discogenic change and an element of epidural lipomatosis contributing to severe spinal canal stenosis.  There was moderate bilateral neuroforaminal narrowing at this level as well.  There were is also left subarticular disc osteophyte protrusions with moderate left neuroforaminal narrowing.  Patient has been complaining of paresthesias in her feet prior to admission presumably from her  lumbosacral spinal DJD.  The patient's echocardiogram demonstrates a preserved LV ejection fraction at 60% with a normal-appearing stentless bioprosthetic aortic valve replacement with a minimal systolic pressure gradient.  There is evidence of a prior tricuspid valve repair with only mild to moderate tricuspid valve regurgitation.  There is trace to mild mitral valve regurgitation.  Bubble study was negative for any patent foramen ovale or residual VSD which was closed at time of her operative procedure.   is aware that the patient will require skilled nursing facility placement.  The patient currently is n.p.o. and appears to be clinically dry and will begin low-dose replacement IV fluids.    4/13: Patient remains confused, however she did awaken to verbal command, she was able to answer some questions appropriately.  There is no acute distress at time my assessment.  She is chest pain-free.  She does have some trace pedal ankle edema.  Otherwise euvolemic breathing comfortably on room air.  Continues to receive her IV antibiotics for acute lower urinary tract infection.  Otherwise her blood pressure stable albeit mildly hypertensive with most recent of 159/78, she has yet to receive her morning medications.  Creatinine stable at 0.9.  Hemoglobin 9.8.  White blood cell count 11.7.  Generally stable from a cardiac perspective.  No significant events on telemetry, remains in sinus rhythm.  Will follow with you.    I spent 35 minutes in the professional and overall care of this patient.      Marquis Carbajal, BILLY-CNP

## 2024-04-13 NOTE — PROGRESS NOTES
Rebecca Celestin is a 78 y.o. female on day 2 of admission presenting with UTI (urinary tract infection).      Subjective   Patient appears to be more confused today and is not cooperative with answering questions this morning. Now febrile and tachycardic.        Objective     Last Recorded Vitals  /78 (BP Location: Right arm, Patient Position: Lying)   Pulse (!) 111   Temp 38.5 °C (101.3 °F) (Axillary)   Resp 21   Wt 62.9 kg (138 lb 10.7 oz)   SpO2 98%   Intake/Output last 3 Shifts:    Intake/Output Summary (Last 24 hours) at 4/13/2024 0844  Last data filed at 4/13/2024 0525  Gross per 24 hour   Intake 855 ml   Output 600 ml   Net 255 ml       Admission Weight  Weight: 59.9 kg (132 lb) (04/11/24 0548)    Daily Weight  04/13/24 : 62.9 kg (138 lb 10.7 oz)    Image Results  Transthoracic Echo (TTE) [place] [address]             Phone [phone]    TRANSTHORACIC ECHOCARDIOGRAM REPORT       Patient Name:     REBECCA CELESTIN     Reading Physician:  [id number] Shaheed Martin MD  Study Date:       4/12/2024            Ordering Provider:  [id number] SHILPA TEJADA  MRN/PID:          [id number]             Fellow:  Accession#:       [id number]         Nurse:  Date of           1945 / 78 years Sonographer:        Homa Romano RDCS  Birth/Age:  Gender:           F                    Additional Staff:  Height:           165.10 cm            Admit Date:         4/12/2024  Weight:           59.88 kg             Admission Status:   Inpatient - Routine  BSA / BMI:        1.66 m2 / 21.97      Department          [facility]                    kg/m2                Location:  Blood Pressure: 168 /65 mmHg    Study Type:    TRANSTHORACIC ECHO (TTE) COMPLETE  Diagnosis/ICD: Cerebral Infarction, unspecified-I63.9  Indication:    Cerebrovascular Accident  CPT Codes:     Echo Complete w Full  [id number]    Patient History:  Valve Disorders: Aortic Valve Replacement.    Study Detail: The following Echo studies were performed: 2D, M-Mode, Doppler and                color flow. Agitated saline used as a contrast agent for                intraseptal flow evaluation.       PHYSICIAN INTERPRETATION:  Left Ventricle: Left ventricular systolic function is normal, with an estimated ejection fraction of 60%. The calculated ejection fraction is normal at 59 % using the Zamora's Bi-plane MOD calculation. There are no regional wall motion abnormalities. The left ventricular cavity size is normal. There is borderline concentric left ventricular hypertrophy. Abnormal (paradoxical) septal motion consistent with post-operative status. Spectral Doppler shows an impaired relaxation pattern of left ventricular diastolic filling. There is no ventricular septal defect.  Left Atrium: The left atrium is mildly dilated. There is no evidence of a patent foramen ovale. A bubble study using agitated saline was performed. Bubble study is negative.  Right Ventricle: The right ventricle is normal in size. There is normal right ventriclar wall thickness. There is normal right ventricular global systolic function.  Right Atrium: The right atrium is normal in size.  Aortic Valve: The aortic valve appears abnormal. There is stentless aortic valve bioprosthesis. There is no evidence of aortic valve regurgitation. The peak instantaneous gradient of the aortic valve is 9.7 mmHg. The mean gradient of the aortic valve is 6.0 mmHg.  Mitral Valve: The mitral valve is normal in structure. There is normal mitral valve leaflet mobility. There is trace to mild mitral valve regurgitation.  Tricuspid Valve: The tricuspid valve is structurally normal. There is normal tricuspid valve leaflet mobility. Status post triscuspid annular ring repair. There is mild to moderate tricuspid regurgitation. The Doppler estimated RVSP is within normal limits at  34.1 mmHg. There is a tricuspid annular ring repair.  Pulmonic Valve: The pulmonic valve is structurally normal. There is no indication of pulmonic valve regurgitation.  Pericardium: There is no pericardial effusion noted. There is a pericardial fat pad present.  Aorta: The aortic root is normal. There is no dilatation of the aortic arch. There is no dilatation of the ascending aorta. There is no dilatation of the aortic root. There is a prosthetic graft seen in the position of the ascending and transverse aorta.  Pulmonary Artery: The pulmonary artery is normal in size. The tricuspid regurgitant velocity is 2.79 m/s, and with an estimated right atrial pressure of 3 mmHg, the estimated pulmonary artery pressure is normal with the RVSP at 34.1 mmHg. The estimated PASP is 34 mmHg.       CONCLUSIONS:   1. Left ventricular systolic function is normal with a 60% estimated ejection fraction.   2. Abnormal septal motion consistent with post-operative status.   3. Spectral Doppler shows an impaired relaxation pattern of left ventricular diastolic filling.   4. Trace to mild mitral valve regurgitation.   5. Mild to moderate tricuspid regurgitation.   6. RVSP within normal limits.   7. There is a tricuspid annular ring repair.   8. Aortic valve appears abnormal.   9. There is a stentless aortic valve bioprosthesis.  10. Prosthetic graft in the ascending and transverse aorta position.  11. The estimated PASP is 34 mmHg.  12. There is no evidence of a patent foramen ovale.  13. The peak instantaneous gradient of the aortic valve is 9.7 mmHg.    QUANTITATIVE DATA SUMMARY:  2D MEASUREMENTS:                           Normal Ranges:  IVSd:          0.90 cm   (0.6-1.1cm)  LVPWd:         0.84 cm   (0.6-1.1cm)  LVIDd:         4.48 cm   (3.9-5.9cm)  LVIDs:         2.95 cm  LV Mass Index: 75.9 g/m2  LV % FS        34.2 %    LA VOLUME:                                Normal Ranges:  LA Vol A4C:        36.1 ml    (22+/-6mL/m2)  LA Vol A2C:         25.4 ml  LA Vol BP:         32.9 ml  LA Vol Index A4C:  21.8ml/m2  LA Vol Index A2C:  15.3 ml/m2  LA Vol Index BP:   19.8 ml/m2  LA Area A4C:       13.2 cm2  LA Area A2C:       10.2 cm2  LA Major Axis A4C: 4.1 cm  LA Major Axis A2C: 3.5 cm  LA Volume Index:   17.9 ml/m2  LA Vol A4C:        30.4 ml  LA Vol A2C:        24.9 ml    RA VOLUME BY A/L METHOD:                                Normal Ranges:  RA Vol A4C:        24.6 ml    (8.3-19.5ml)  RA Vol Index A4C:  14.8 ml/m2  RA Area A4C:       11.3 cm2  RA Major Axis A4C: 4.4 cm    AORTA MEASUREMENTS:                     Normal Ranges:  Asc Ao, d: 2.57 cm (2.1-3.4cm)    LV SYSTOLIC FUNCTION BY 2D PLANIMETRY (MOD):                      Normal Ranges:  EF-A4C View: 64.0 % (>=55%)  EF-A2C View: 53.9 %  EF-Biplane:  59.1 %    LV DIASTOLIC FUNCTION:                             Normal Ranges:  MV Peak E:      0.36 m/s   (0.7-1.2 m/s)  MV Peak A:      1.42 m/s   (0.42-0.7 m/s)  E/A Ratio:      0.26       (1.0-2.2)  MV e'           0.11 m/s   (>8.0)  MV lateral e'   0.14 m/s  MV medial e'    0.07 m/s  E/e' Ratio:     3.47       (<8.0)  PulmV Sys Santy:  47.60 cm/s  PulmV Harris Santy: 58.30 cm/s  PulmV S/D Santy:  0.80    MITRAL VALVE:                  Normal Ranges:  MV DT: 121 msec (150-240msec)    AORTIC VALVE:                                    Normal Ranges:  AoV Vmax:                1.56 m/s (<=1.7m/s)  AoV Peak P.7 mmHg (<20mmHg)  AoV Mean P.0 mmHg (1.7-11.5mmHg)  LVOT Max Santy:            1.00 m/s (<=1.1m/s)  AoV VTI:                 30.10 cm (18-25cm)  LVOT VTI:                19.70 cm  AoV Dimensionless Index: 0.65       RIGHT VENTRICLE:  RV Basal 2.73 cm  RV Mid   3.55 cm  RV Major 6.6 cm  TAPSE:   10.4 mm  RV s'    0.14 m/s    TRICUSPID VALVE/RVSP:                              Normal Ranges:  Peak TR Velocity: 2.79 m/s  RV Syst Pressure: 34.1 mmHg (< 30mmHg)    Pulmonary Veins:  PulmV Harris Santy: 58.30 cm/s  PulmV S/D Santy:  0.80  PulmV  Sys Santy:  47.60 cm/s       [id number] Shaheed Martin MD  Electronically signed on 4/12/2024 at 4:16:02 PM       ** Final **  Carotid duplex bilateral  Preliminary Cardiology Report                [place] [id number]             Phone [phone]           Preliminary Vascular Lab Report     [facility] US CAROTID ARTERY DUPLEX BILATERAL       Patient Name:     BETTY CELESTIN Reading Physician:  [id number] Maximiliano Leong MD  Study Date:       4/12/2024        Ordering Provider:  [id number] SHILPA TEJADA  MRN/PID:          [id number]         Fellow:  Accession#:       [id number]     Technologist:       Zarina Riggins RVT  YOB: 1945        Technologist 2:  Gender:           F                Encounter#:         [id number]  Admission Status: Inpatient        Location Performed: [facility]       Diagnosis/ICD: Syncope and collapse-R55  CPT Codes:     99889 Cerebrovascular Carotid Duplex scan complete       PRELIMINARY CONCLUSIONS:     Right Carotid: Findings are consistent with less than 50% stenosis of the right proximal internal carotid artery. Laminar flow seen by color Doppler. There are elevated velocities in the right ECA that are suggestive of disease. The right vertebral artery is patent with antegrade flow. No evidence of hemodynamically significant stenosis in the right subclavian artery.  Left Carotid: Findings are consistent with less than 50% stenosis of the left proximal internal carotid artery. The internal carotid artery appears tortuous. Left external carotid artery appears patent with no evidence of stenosis. The left vertebral artery is patent with antegrade flow. No evidence of hemodynamically significant stenosis in the left subclavian artery.  Additional Findings:  Technically difficult exam due to patient's positioning.       Imaging & Doppler Findings:  Right Plaque Morph: The proximal right external carotid artery demonstrates heterogenous  plaque.  Left Plaque Morph: The proximal left external carotid artery demonstrates heterogenous and homogenous plaque. The distal left common carotid artery demonstrates heterogenous and homogenous plaque.      Right                        Left    PSV      EDV                PSV      EDV  65 cm/s            CCA P    68 cm/s  62 cm/s            CCA D    57 cm/s  52 cm/s  8 cm/s    ICA P    75 cm/s  15 cm/s  55 cm/s  13 cm/s   ICA M    68 cm/s  12 cm/s  89 cm/s  15 cm/s   ICA D    77 cm/s  17 cm/s  223 cm/s            ECA     75 cm/s  48 cm/s  9 cm/s  Vertebral  59 cm/s  15 cm/s  109 cm/s         Subclavian 130 cm/s                     Right Left  ICA/CCA Ratio  0.8  1.3          VASCULAR PRELIMINARY REPORT  completed by Zarina Riggins T on 4/12/2024 at 4:06:10 PM       ** Final **  MR brain wo IV contrast  Addendum: Interpreted By:  Kaleb Barboza,    ADDENDUM:   Kaleb Barboza discussed the significance and urgency of this critical   finding by secure chat with  ARNOLDO MCGEE on 4/12/2024 at 9:43   am.  (**-RCF-**) Findings:  See findings.             Signed by: Kaleb Barboza 4/12/2024 9:51 AM        -------- ORIGINAL REPORT --------   Dictation workstation:   [id number]  Narrative: Interpreted By:  Kaleb Barboza,   STUDY:  MR BRAIN WO IV CONTRAST;  4/11/2024 9:07 pm      INDICATION:  Signs/Symptoms:pt with gait atxia.r/o cva.      COMPARISON:  CT head dated 04/10/2024.      ACCESSION NUMBER(S):  [id number]      ORDERING CLINICIAN:  ARNOLDO MCGEE      TECHNIQUE:  Standard multiplanar multisequence MR imaging was performed through  the brain without intravenous contrast. Axial T2, FLAIR, DWI,  gradient echo T2 and sagittal and coronal T1 weighted images of brain  were acquired.      FINDINGS:  Parenchyma: There is a small focus of acute diffusion restriction  signal abnormality involving the cortex of the left occipital lobe.  There is corresponding T2/FLAIR hyperintensity within this region.  There  is no mass effect or hemorrhagic transformation. Moderate  chronic small vessel ischemic disease with tiny remote bilateral  basal ganglia lacunar infarcts. Additional remote ischemic insults  within the deep white matter of the right frontal lobe. Tiny remote  cerebellar infarcts bilaterally.      CSF Spaces: There is moderate to advanced brain atrophy. Basilar  cisterns are patent.      Extra-axial spaces: No extra-axial fluid collection.      Paranasal Sinuses: Visualized paranasal sinuses are clear.      Mastoids: Clear.      Orbits: Normal.      Calvarium: No suspicious osseous marrow signal.      Impression: Small acute cortical infarct involving the left occipital lobe. No  mass effect or hemorrhagic transformation.      Additional moderate chronic small vessel ischemic disease with tiny  remote ischemic insults involving the deep white matter of the right  frontal lobe, bilateral basal ganglia, and cerebellum.      Moderate to advanced brain atrophy.      MACRO:  None      Signed by: Kaleb Barboza 4/12/2024 9:43 AM  Dictation workstation:   [id number]  MR lumbar spine wo IV contrast  Narrative: Interpreted By:  Kaleb Barboza,   STUDY:  MR LUMBAR SPINE WO IV CONTRAST performed 4/11/2024 9:13 pm      INDICATION:  Signs/Symptoms:pt with multiple falls.r/o lumbar radiculopathy.      COMPARISON:  Lumbar spine radiographs dated 03/26/2024. MRI lumbar spine dated  07/30/2019.      ACCESSION NUMBER(S):  [id number]      ORDERING CLINICIAN:  ARNOLDO MCGEE      TECHNIQUE:  Multiplanar multisequence MR imaging of the lumbar spine performed  without intravenous contrast. Sagittal T1, T2, STIR, axial T1 and T2  weighted images of the lumbar spine were acquired.      FINDINGS:  Segmentation: Normal.      Conus: The lower thoracic cord appears unremarkable. The conus  terminates at the L1-L2 interspace level. Cauda equina are  unremarkable.      Epidural fluid: None.      Hardware: Postoperative change of L2-L5  posterior spinal fusion with  bilateral posterior pedicular screws and stabilization rods.  Interbody fixation at L4-L5. Laminectomy/posterior decompression  involving the fusion levels.      Alignment: Grade 1 anterolisthesis of L4 on L5.      Vertebral bodies: Vertebral body heights are grossly maintained.      Marrow signal: Trace type 1 Modic endplate signal change suggested at  L1-L2. Otherwise no suspicious STIR hyperintensity or focal marrow  replacing lesion.      Intervertebral discs: Moderate degenerative disc height loss at L1-L2  with disc desiccation, significantly worsened from previous MRI.  Additional moderate multilevel degenerative disc height loss.          Degenerative change:      T12-L1: Unremarkable.      L1-2: Mild facet arthropathy. Moderate to marked ligamentum flavum  hypertrophy. There is moderate disc bulge with superimposed  broad-based central disc extrusion with elements of cranial and  caudal migration. Mildly prominent dorsal epidural fat. There is  severe spinal canal stenosis with the thecal sac measuring less than  5 mm in anterior-posterior dimension. Lateral recess  narrowing/effacement is also evident. Moderate bilateral neural  foraminal narrowing.      L2-3: Mild facet arthropathy and ligamentum flavum hypertrophy. Mild  disc bulge and endplate spurring. Mild narrowing of the lateral  recesses with no additional spinal canal stenosis. Mild bilateral  neural foraminal narrowing.      L3-4: Facet arthropathy and mild endplate spurring. No spinal canal  stenosis. Minimal/mild neural foraminal narrowing secondary to  residual endplate and facet spurring.      L4-5: Disc uncovering with endplate spurring. There is lateral recess  narrowing with no additional spinal canal stenosis. Mild-to-moderate  right and mild left neural foraminal narrowing suggested secondary to  disc uncovering and endplate spurring with hardware artifact slightly  degrading assessment.      L5-S1: Mild  facet arthropathy. Disc bulge and hypertrophic endplate  spurring with left subarticular disc osteophyte protrusion component.  Left lateral recess narrowing with probable mass effect on the S1  nerve root. Moderate left and mild right neural foraminal narrowing.      Soft tissues: Within the deep posterior paraspinal soft tissues  overlying the laminectomy bed there is a 5.8 x 4.9 x 1.3 cm fluid  collection with a few internal septation suggested and otherwise  relative simple appearance, likely representing a postoperative  seroma. There is additional scarring and fatty atrophy involving the  inferior posterior paraspinal musculature. Small cortical renal cysts  suggested bilaterally. Dependent sludge versus stones within the  partially visualized gallbladder.      Impression: In comparison with prior MRI (07/03/2019) there has been  revision/extension of posterior spinal fusion and decompression with  L2-L5 posterior fusion/decompression evident. There is development of  adjacent segment disease at L1-L2 with severe degenerative discogenic  change and element of epidural lipomatosis contributing to severe  spinal canal stenosis and lateral recess effacement. There is  moderate bilateral neural foraminal narrowing at this level as well.      At L5-S1 there is a left subarticular disc osteophyte protrusion  component contributing to left lateral recess stenosis and suspected  moderate left neural foraminal narrowing.      MACRO:  None      Signed by: Kaleb Barboza 4/12/2024 9:35 AM  Dictation workstation:   [id number]      Physical Exam  Constitutional:       General: She is not in acute distress.     Appearance: She is ill-appearing. She is not toxic-appearing.   HENT:      Head: Normocephalic.      Mouth/Throat:      Mouth: Mucous membranes are dry.   Eyes:      General: No scleral icterus.  Cardiovascular:      Rate and Rhythm: Normal rate.   Pulmonary:      Effort: No respiratory distress.      Breath sounds:  No wheezing.   Abdominal:      General: There is no distension.      Palpations: Abdomen is soft.   Musculoskeletal:      Right lower leg: Edema (chronic) present.      Left lower leg: Edema (chronic) present.   Skin:     Findings: Bruising (scattered on B/L upper and lower extremities) present.   Neurological:      Mental Status: She is alert.      Comments: Unable to assess orientation due to patient cooperation         Relevant Results               Assessment/Plan          Principal Problem:    UTI (urinary tract infection)  Active Problems:    Gait instability    Back pain    Leg weakness    Acute on chronic kidney failure (CMS-HCC)    Dehydration    Repeated falls    Stroke (Multi)    Concern for sepsis  Newly febrile + leukocytosis + tachycardia, suspect possible urinary source given UTI on admission  Urine culture positive for enteric bacilli  Will obtain blood cultures and lactic acid  Start broad spectrum antibiotics  Consult ID    Metabolic encephalopathy  Suspect acute on chronic 2/2 acute infection with probable underlying cognitive impairment  Neurology following  Maintain appropriate sleep-wake cycles  TSH wnl  B12 wnl    Acute stroke  Neurology following  MRI brain showing small acute cortical infarct involving the left occipital lobe   Echo showing EF 60%, no evidence of PFO  Carotid US with <50% stenosis bilaterally  Aspirin, plavix, lipitor  PT/OT   SLP consult  Aspiration precautions  Fall precautions    Frequent falls/gait instability  Lumbar radiculopathy  Hx of L2-L5 fusion  Follows with orthopedic surgeon Dr. Friedman  Neurology consulted  MRI lumbar spine showing severe spinal canal stenosis   PT/OT  Fall precautions    ROSE on CKD3, resolved  Stable     Elevated troponin  Suspect in the setting of demand and ROSE  Cardiology consulted  Low suspicion for ACS     Hx of collitis  Add loperamide PRN     Chronic lower extremity edema  S/p aortic valve replacment  HTN  Continue home  atenolol    Advanced care planning   FULL CODE  Consult palliative care    Dispo: anticipate discharge to SNF when medically ready                Lina Pena MD

## 2024-04-13 NOTE — CONSULTS
Inpatient consult to Infectious Diseases  Consult performed by: Veronica Chen DO  Consult ordered by: Lina Pena MD          Referred by Dr Jean Jacob MD: No Assigned PCP Generic Provider, MD    Reason For Consult  Fever, sepsis    History Of Present Illness  Rebecca Samaniego is a 78 y.o. female presenting with weakness    On admission, there was concern for UTI with Ucx growing pan sensitive Ecoli. She was started on CTX    She seemed to be doing ok until she spiked a fever overnight    Blood cultures were obtained and she was started on vancomycin/pip tazo    No complaints. When I ask to listen to her lungs she tells me no. No difficulty urinating, no SOB.  at bedside tells me she looks pretty good today     Past Medical History  She has no past medical history on file.    Surgical History  She has a past surgical history that includes Aortic valve replacement (02/11/2014); Other surgical history (02/11/2014); Cervical discectomy (02/11/2014); Breast lumpectomy (02/11/2014); and Hysterectomy (02/11/2014).     Social History  She reports that she has quit smoking. Her smoking use included cigarettes. She has never used smokeless tobacco. She reports current alcohol use. She reports that she does not use drugs.   Travel Screening       Question Response    Do you have any of the following new or worsening symptoms? None of these    In the last 10 days, have you been in contact with someone who was confirmed or suspected to have Coronavirus/COVID-19? No / Unsure    Have you had a COVID-19 viral test in the last 10 days? No    Have you traveled internationally or domestically in the last month? No          Travel History   Travel since 03/13/24    No documented travel since 03/13/24           Family History  No family history on file.  Allergies  Sulfa (sulfonamide antibiotics), Sulfamethoxazole-trimethoprim, Ciprofloxacin, and Nitrofurantoin     Immunization History   Administered Date(s)  "Administered    Moderna SARS-CoV-2 Vaccination 03/30/2021, 04/27/2021, 12/16/2021     Review of Systems  No fevers, chills, N/V/D, abd pain, SOB, chest pain, headache, sore throat, dysuria. Otherwise ROS is negative     Physical Exam  General: Awake, alert, NAD  Eyes: PERRL, EOMI, no scleral icterus  ENT: no oral thrush or lesions  CV: RRR, +S1/S2  Resp: lungs CTA b/l, normal resp effort  Abd: soft, nontender, nondistended, +bowel sounds  : no nicholson  Ext: no edema  MSK: good ROM  Skin: many areas of bruising, no thrombophlebitis noted     Range of Vitals (last 24 hours)  Heart Rate:  []   Temp:  [36.3 °C (97.3 °F)-38.5 °C (101.3 °F)]   Resp:  [16-22]   BP: (128-160)/(59-92)   Weight:  [62.9 kg (138 lb 10.7 oz)]   SpO2:  [95 %-100 %]     Relevant Results  Lab Results   Component Value Date    WBC 11.7 (H) 04/13/2024    HGB 9.8 (L) 04/13/2024    HCT 28.7 (L) 04/13/2024    MCV 95 04/13/2024     04/13/2024      Results from last 72 hours   Lab Units 04/13/24  0526   SODIUM mmol/L 133   POTASSIUM mmol/L 3.4   CHLORIDE mmol/L 100   CO2 mmol/L 21*   BUN mg/dL 17   CREATININE mg/dL 0.90   GLUCOSE mg/dL 179*   CALCIUM mg/dL 8.1*   ANION GAP mmol/L 12   EGFR mL/min/1.73m*2 65     Results from last 72 hours   Lab Units 04/10/24  2232   ALK PHOS U/L 71   BILIRUBIN TOTAL mg/dL 0.4   PROTEIN TOTAL g/dL 5.9   ALT U/L 10   AST U/L 17   ALBUMIN g/dL 3.2*     Estimated Creatinine Clearance: 45.6 mL/min (by C-G formula based on SCr of 0.9 mg/dL).  No results found for: \"CRP\", \"SEDRATE\"  No results found for: \"HIV1X2\", \"HIVCONF\", \"AVEQRI8LH\"  No results found for: \"HCVPCRQUANT\"    Cultures/Micro  Susceptibility data from last 14 days.  Collected Specimen Info Organism Ampicillin Cefazolin Cefazolin (uncomplicated UTIs only) Ciprofloxacin Gentamicin Nitrofurantoin Piperacillin/Tazobactam Trimethoprim/Sulfamethoxazole   04/11/24 Urine from Straight Catheter Escherichia coli S S S S S S S S     4/12 blood cx: " pending  Imaging:  CXR  IMPRESSION:  1. Central vascular congestion and interstitial prominence suggesting  mild edema.  2. Vague lucency overlying the left humeral head, this could  represent overhanging osteophyte or age-indeterminate fracture.        Assessment:  Fever, in the setting of UTI treatment. On CTX. R/o bacteremia, COVID/Flu, or PNA or drug fever from CTX  Ecoli UTI, was on CTX    Plan/Recommendations:  Check CXR  Vancomycin dosed by pharmacy--monitoring for adverse effects  Continue pip tazo  Follow up blood cultures  Check MRSA screen  Check CBC with diff in the AM  Check Cdiff if having diarrhea  Checking COVID/flu swab  Will follow    Discussed with Dr Jean Chen, DO  [facility]  #[phone]

## 2024-04-13 NOTE — PROGRESS NOTES
"Speech-Language Pathology    SLP Adult Inpatient LW Clinical Swallow Evaluation    Patient Name: Rebecca Samaniego  MRN: [id number]  Today's Date: 4/13/2024   Time Calculation  Start Time: 0820  Stop Time: 0835  Time Calculation (min): 15 min     0/2sw    Current Problem:   1. UTI (urinary tract infection)        2. Generalized weakness        3. Acute renal failure superimposed on chronic kidney disease, unspecified acute renal failure type, unspecified CKD stage (CMS-HCC)        4. Urinary tract infection without hematuria, site unspecified        5. Metabolic encephalopathy        6. Frequent falls  MR lumbar spine wo IV contrast    MR lumbar spine wo IV contrast      7. Shuffling gait        8. Elevated troponin        9. Repeated falls  MR lumbar spine wo IV contrast    MR lumbar spine wo IV contrast      10. Dehydration        11. Cerebrovascular accident (CVA), unspecified mechanism (Multi)  Transthoracic Echo (TTE) Complete    Carotid duplex bilateral    Transthoracic Echo (TTE) Complete    Carotid duplex bilateral      12. 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 Syncope and collapse  Carotid duplex bilateral            Recommendations:  Solid Diet Recommendations : NPO  Liquid Diet Recommendations: NPO  Medication Administration Recommendations: Non Oral      Assessment:  Treatment Tolerance: Other (Comment) (pt refused any p.o. trials)  Medical Staff Made Aware: Yes    Pt is seen upright in bed. Initially agreeable to swallow evaluation. Pt keeps eyes closed. Lips appear dry. Attempted oral care to lips with oral swabs/toothettes and pt shouted, \"no\" and turned away. Offered a variety of items such as ice-chips, water, applesauce etc. And pt further refused \"no, get away.\"    Uncertain it pt will participate in further " reassessment.    Plan:  Inpatient/Swing Bed or Outpatient: Inpatient  Treatment/Interventions: Other (Comment) (REASSESS)  SLP Plan: Skilled SLP  SLP Frequency: 2x per week  Duration: Current admission  Diet Recommendations: Solid, Liquid  Solid Consistency: NPO  Liquid Consistency: NPO  Next Treatment Priority: REASSESS  Discussed POC: Patient, Nursing (Lisa SOLARES)  Discussed Risks/Benefits: Yes, Patient, Nursing  Patient/Caregiver Agreeable: Yes    At this time pt is refusing p.o. and further swallow assessment. Continue NPO status and SLP will attempt to reassess for p.o. trials. Discussed with Lisa SOLARES.    DYSPHAGIA GOALS (established 4/13, anticipated end 4/20):  Ongoing reassessment with SLP only via p.o. trials as pt is able to participate. (Baseline: refusal)    Subjective   Pt seen in upright in bed. Keeps eyes closed, responds to name. Agitated, uncoopertaive.    General Visit Information:  Patient Class: Inpatient  Ordering Physician: Lina Pena MD  Reason for Referral: Per RN d/t altered mental status, increased confusion. RN asking if pt is able to take any med's p.o.  Referred By: Lina Pena MD (P)  Prior to Session Communication: Bedside nurse (Lisa SOLARES)  [employer] Diet: pt unable to provide history  Current Diet : NPO    Objective     Pain:  Pain Score: 0 - No pain     Oral/Motor Assessment:  Oral Hygiene: unable to fully assess, pt refusing to open mouth. Lips appear dry  Dentition: Adequate/Natural, Other (Comment) (unable to fully view)  Vocal Quality: Within Functional Limits (RN sts hoarseness at times)  Intelligibility: Intelligible    Consistencies Trialed:  Consistencies Trialed: Unable to assess    Clinical Observations:  Patient Positioning: Upright in Bed  Management of Oral Secretions: Adequate  Was The 3 oz Swallow Protocol Completed: Other (Comment) (unable d/t pt refusal, safety concerns)    Inpatient Education:  Adult inpatient Education  Individual(s) Educated: Patient  Verbal  Education : purpose of swallow eval., role of  SLP, current NPO status  Risk and Benefits Discussed with Patient/Caregiver/Other: yes  Patient/Caregiver Demonstrated Understanding: yes  Plan of Care Discussed and Agreed Upon: yes  Education Comment: discussed with Fely SOLARES d/t pt refusal

## 2024-04-14 LAB
ANION GAP SERPL CALC-SCNC: 14 MMOL/L
BASOPHILS # BLD AUTO: 0.04 X10*3/UL (ref 0–0.1)
BASOPHILS NFR BLD AUTO: 0.4 %
BUN SERPL-MCNC: 19 MG/DL (ref 8–25)
CALCIUM SERPL-MCNC: 7.7 MG/DL (ref 8.5–10.4)
CHLORIDE SERPL-SCNC: 102 MMOL/L (ref 97–107)
CO2 SERPL-SCNC: 19 MMOL/L (ref 24–31)
CREAT SERPL-MCNC: 1.2 MG/DL (ref 0.4–1.6)
EGFRCR SERPLBLD CKD-EPI 2021: 46 ML/MIN/1.73M*2
EOSINOPHIL # BLD AUTO: 0.14 X10*3/UL (ref 0–0.4)
EOSINOPHIL NFR BLD AUTO: 1.4 %
ERYTHROCYTE [DISTWIDTH] IN BLOOD BY AUTOMATED COUNT: 13.2 % (ref 11.5–14.5)
GLUCOSE SERPL-MCNC: 133 MG/DL (ref 65–99)
HCT VFR BLD AUTO: 25.9 % (ref 36–46)
HGB BLD-MCNC: 8.8 G/DL (ref 12–16)
IMM GRANULOCYTES # BLD AUTO: 0.19 X10*3/UL (ref 0–0.5)
IMM GRANULOCYTES NFR BLD AUTO: 1.9 % (ref 0–0.9)
LACTATE BLDV-SCNC: 1.2 MMOL/L (ref 0.4–2)
LYMPHOCYTES # BLD AUTO: 1.74 X10*3/UL (ref 0.8–3)
LYMPHOCYTES NFR BLD AUTO: 17.3 %
MCH RBC QN AUTO: 32.5 PG (ref 26–34)
MCHC RBC AUTO-ENTMCNC: 34 G/DL (ref 32–36)
MCV RBC AUTO: 96 FL (ref 80–100)
MONOCYTES # BLD AUTO: 1.17 X10*3/UL (ref 0.05–0.8)
MONOCYTES NFR BLD AUTO: 11.6 %
NEUTROPHILS # BLD AUTO: 6.77 X10*3/UL (ref 1.6–5.5)
NEUTROPHILS NFR BLD AUTO: 67.4 %
NRBC BLD-RTO: 0 /100 WBCS (ref 0–0)
PLATELET # BLD AUTO: 174 X10*3/UL (ref 150–450)
POTASSIUM SERPL-SCNC: 3.2 MMOL/L (ref 3.4–5.1)
RBC # BLD AUTO: 2.71 X10*6/UL (ref 4–5.2)
SODIUM SERPL-SCNC: 135 MMOL/L (ref 133–145)
URATE SERPL-MCNC: 3.4 MG/DL (ref 2.5–6.8)
VANCOMYCIN SERPL-MCNC: 10.4 UG/ML (ref 10–20)
WBC # BLD AUTO: 10.1 X10*3/UL (ref 4.4–11.3)

## 2024-04-14 PROCEDURE — 99232 SBSQ HOSP IP/OBS MODERATE 35: CPT | Performed by: NURSE PRACTITIONER

## 2024-04-14 PROCEDURE — 99232 SBSQ HOSP IP/OBS MODERATE 35: CPT

## 2024-04-14 PROCEDURE — 2500000001 HC RX 250 WO HCPCS SELF ADMINISTERED DRUGS (ALT 637 FOR MEDICARE OP): Performed by: INTERNAL MEDICINE

## 2024-04-14 PROCEDURE — 2500000001 HC RX 250 WO HCPCS SELF ADMINISTERED DRUGS (ALT 637 FOR MEDICARE OP): Performed by: STUDENT IN AN ORGANIZED HEALTH CARE EDUCATION/TRAINING PROGRAM

## 2024-04-14 PROCEDURE — 80202 ASSAY OF VANCOMYCIN: CPT | Performed by: STUDENT IN AN ORGANIZED HEALTH CARE EDUCATION/TRAINING PROGRAM

## 2024-04-14 PROCEDURE — 85025 COMPLETE CBC W/AUTO DIFF WBC: CPT | Performed by: STUDENT IN AN ORGANIZED HEALTH CARE EDUCATION/TRAINING PROGRAM

## 2024-04-14 PROCEDURE — 83605 ASSAY OF LACTIC ACID: CPT | Performed by: STUDENT IN AN ORGANIZED HEALTH CARE EDUCATION/TRAINING PROGRAM

## 2024-04-14 PROCEDURE — 36415 COLL VENOUS BLD VENIPUNCTURE: CPT | Performed by: STUDENT IN AN ORGANIZED HEALTH CARE EDUCATION/TRAINING PROGRAM

## 2024-04-14 PROCEDURE — 82374 ASSAY BLOOD CARBON DIOXIDE: CPT | Performed by: INTERNAL MEDICINE

## 2024-04-14 PROCEDURE — 2060000001 HC INTERMEDIATE ICU ROOM DAILY

## 2024-04-14 PROCEDURE — C9113 INJ PANTOPRAZOLE SODIUM, VIA: HCPCS | Performed by: STUDENT IN AN ORGANIZED HEALTH CARE EDUCATION/TRAINING PROGRAM

## 2024-04-14 PROCEDURE — 2500000004 HC RX 250 GENERAL PHARMACY W/ HCPCS (ALT 636 FOR OP/ED): Performed by: STUDENT IN AN ORGANIZED HEALTH CARE EDUCATION/TRAINING PROGRAM

## 2024-04-14 PROCEDURE — 84550 ASSAY OF BLOOD/URIC ACID: CPT | Performed by: STUDENT IN AN ORGANIZED HEALTH CARE EDUCATION/TRAINING PROGRAM

## 2024-04-14 PROCEDURE — 2500000004 HC RX 250 GENERAL PHARMACY W/ HCPCS (ALT 636 FOR OP/ED): Performed by: INTERNAL MEDICINE

## 2024-04-14 RX ORDER — ACETAMINOPHEN 650 MG/1
650 SUPPOSITORY RECTAL EVERY 4 HOURS PRN
Status: DISCONTINUED | OUTPATIENT
Start: 2024-04-14 | End: 2024-04-14

## 2024-04-14 RX ORDER — ACETAMINOPHEN 160 MG/5ML
650 SOLUTION ORAL EVERY 4 HOURS PRN
Status: DISCONTINUED | OUTPATIENT
Start: 2024-04-14 | End: 2024-04-14

## 2024-04-14 RX ORDER — METOPROLOL SUCCINATE 50 MG/1
50 TABLET, EXTENDED RELEASE ORAL DAILY
Status: DISCONTINUED | OUTPATIENT
Start: 2024-04-14 | End: 2024-04-16 | Stop reason: HOSPADM

## 2024-04-14 RX ORDER — ACETAMINOPHEN 160 MG/5ML
650 SOLUTION ORAL EVERY 4 HOURS PRN
Status: DISCONTINUED | OUTPATIENT
Start: 2024-04-14 | End: 2024-04-16 | Stop reason: HOSPADM

## 2024-04-14 RX ORDER — DEXTROSE MONOHYDRATE, SODIUM CHLORIDE, AND POTASSIUM CHLORIDE 50; 1.49; 4.5 G/1000ML; G/1000ML; G/1000ML
75 INJECTION, SOLUTION INTRAVENOUS CONTINUOUS
Status: DISCONTINUED | OUTPATIENT
Start: 2024-04-14 | End: 2024-04-15

## 2024-04-14 RX ORDER — VANCOMYCIN 750 MG/150ML
750 INJECTION, SOLUTION INTRAVENOUS EVERY 24 HOURS
Status: DISCONTINUED | OUTPATIENT
Start: 2024-04-14 | End: 2024-04-15

## 2024-04-14 RX ORDER — ACETAMINOPHEN 325 MG/1
650 TABLET ORAL EVERY 4 HOURS PRN
Status: DISCONTINUED | OUTPATIENT
Start: 2024-04-14 | End: 2024-04-16 | Stop reason: HOSPADM

## 2024-04-14 RX ORDER — ACETAMINOPHEN 325 MG/1
650 TABLET ORAL EVERY 4 HOURS PRN
Status: DISCONTINUED | OUTPATIENT
Start: 2024-04-14 | End: 2024-04-14

## 2024-04-14 RX ORDER — POTASSIUM CHLORIDE 1.5 G/1.58G
20 POWDER, FOR SOLUTION ORAL
Qty: 2 PACKET | Refills: 0 | Status: DISPENSED | OUTPATIENT
Start: 2024-04-14 | End: 2024-04-15

## 2024-04-14 RX ORDER — ACETAMINOPHEN 650 MG/1
650 SUPPOSITORY RECTAL EVERY 4 HOURS PRN
Status: DISCONTINUED | OUTPATIENT
Start: 2024-04-14 | End: 2024-04-16 | Stop reason: HOSPADM

## 2024-04-14 RX ADMIN — HEPARIN SODIUM 5000 UNITS: 5000 INJECTION, SOLUTION INTRAVENOUS; SUBCUTANEOUS at 20:38

## 2024-04-14 RX ADMIN — PIPERACILLIN SODIUM AND TAZOBACTAM SODIUM 3.38 G: 3; .375 INJECTION, SOLUTION INTRAVENOUS at 01:11

## 2024-04-14 RX ADMIN — BACITRACIN: 500 OINTMENT TOPICAL at 21:00

## 2024-04-14 RX ADMIN — DEXTROSE MONOHYDRATE, SODIUM CHLORIDE, AND POTASSIUM CHLORIDE 75 ML/HR: 50; 4.5; 1.49 INJECTION, SOLUTION INTRAVENOUS at 09:02

## 2024-04-14 RX ADMIN — DEXTROSE MONOHYDRATE, SODIUM CHLORIDE, AND POTASSIUM CHLORIDE 75 ML/HR: 50; 4.5; 1.49 INJECTION, SOLUTION INTRAVENOUS at 11:17

## 2024-04-14 RX ADMIN — HEPARIN SODIUM 5000 UNITS: 5000 INJECTION, SOLUTION INTRAVENOUS; SUBCUTANEOUS at 09:03

## 2024-04-14 RX ADMIN — VANCOMYCIN 750 MG: 750 INJECTION, SOLUTION INTRAVENOUS at 09:46

## 2024-04-14 RX ADMIN — ASPIRIN 81 MG CHEWABLE TABLET 81 MG: 81 TABLET CHEWABLE at 11:55

## 2024-04-14 RX ADMIN — PANTOPRAZOLE SODIUM 40 MG: 40 INJECTION, POWDER, FOR SOLUTION INTRAVENOUS at 06:32

## 2024-04-14 RX ADMIN — PIPERACILLIN SODIUM AND TAZOBACTAM SODIUM 3.38 G: 3; .375 INJECTION, SOLUTION INTRAVENOUS at 20:39

## 2024-04-14 RX ADMIN — PIPERACILLIN SODIUM AND TAZOBACTAM SODIUM 3.38 G: 3; .375 INJECTION, SOLUTION INTRAVENOUS at 14:06

## 2024-04-14 RX ADMIN — ACETAMINOPHEN 650 MG: 325 TABLET ORAL at 16:17

## 2024-04-14 RX ADMIN — BACITRACIN: 500 OINTMENT TOPICAL at 15:00

## 2024-04-14 RX ADMIN — BACITRACIN: 500 OINTMENT TOPICAL at 09:00

## 2024-04-14 RX ADMIN — CLOPIDOGREL BISULFATE 75 MG: 75 TABLET ORAL at 11:53

## 2024-04-14 RX ADMIN — PIPERACILLIN SODIUM AND TAZOBACTAM SODIUM 3.38 G: 3; .375 INJECTION, SOLUTION INTRAVENOUS at 09:02

## 2024-04-14 ASSESSMENT — PAIN SCALES - GENERAL
PAINLEVEL_OUTOF10: 5 - MODERATE PAIN
PAINLEVEL_OUTOF10: 0 - NO PAIN
PAINLEVEL_OUTOF10: 8
PAINLEVEL_OUTOF10: 0 - NO PAIN
PAINLEVEL_OUTOF10: 0 - NO PAIN

## 2024-04-14 ASSESSMENT — PAIN DESCRIPTION - DESCRIPTORS
DESCRIPTORS: ACHING
DESCRIPTORS: ACHING

## 2024-04-14 ASSESSMENT — PAIN DESCRIPTION - ORIENTATION: ORIENTATION: RIGHT

## 2024-04-14 ASSESSMENT — PAIN - FUNCTIONAL ASSESSMENT
PAIN_FUNCTIONAL_ASSESSMENT: 0-10

## 2024-04-14 ASSESSMENT — PAIN DESCRIPTION - LOCATION: LOCATION: FOOT

## 2024-04-14 NOTE — PROGRESS NOTES
04/14/24 1433   Discharge Planning   Home or Post Acute Services Post acute facilities (Rehab/SNF/etc)   Type of Post Acute Facility Services Skilled nursing   Patient expects to be discharged to: Referrals in review - [facility]   Does the patient need discharge transport arranged? Yes   RoundTrip coordination needed? Yes   Has discharge transport been arranged? No   Patient Choice   Provider Choice list and CMS website (https://medicare.gov/care-compare#search) for post-acute Quality and Resource Measure Data were provided and reviewed with: Family   Patient / Family choosing to utilize agency / facility established prior to hospitalization No     Spouse inquiring about POA and Medicaid paperwork.  Palliative Care NP aware.  Spouse provided above choices for SNF.  Referrals sent to [facility] for processing.  Awaiting updates.  RN TCC following.

## 2024-04-14 NOTE — PROGRESS NOTES
Rebecca Celestin is a 78 y.o. female on day 3 of admission presenting with UTI (urinary tract infection).      Subjective   More alert and awake today. More pleasant as well. Mental status improved, answering questions more appropriately today. States she feels a little hungry today. Denies feeling constipated.        Objective     Last Recorded Vitals  /53 (BP Location: Left arm, Patient Position: Lying)   Pulse 86   Temp 36.4 °C (97.5 °F) (Oral)   Resp 14   Wt 62.5 kg (137 lb 12.6 oz)   SpO2 100%   Intake/Output last 3 Shifts:    Intake/Output Summary (Last 24 hours) at 4/14/2024 0841  Last data filed at 4/14/2024 0612  Gross per 24 hour   Intake 2248.75 ml   Output 325 ml   Net 1923.75 ml       Admission Weight  Weight: 59.9 kg (132 lb) (04/11/24 0548)    Daily Weight  04/14/24 : 62.5 kg (137 lb 12.6 oz)    Image Results  Transthoracic Echo (TTE) [place] [address]             Phone [phone]    TRANSTHORACIC ECHOCARDIOGRAM REPORT       Patient Name:     REBECCA CELESTIN     Reading Physician:  [id number] Shaheed Martin MD  Study Date:       4/12/2024            Ordering Provider:  [id number] SHILPA TEJADA  MRN/PID:          [id number]             Fellow:  Accession#:       [id number]         Nurse:  Date of           1945 / 78 years Sonographer:        Homa Romano RDCS  Birth/Age:  Gender:           F                    Additional Staff:  Height:           165.10 cm            Admit Date:         4/12/2024  Weight:           59.88 kg             Admission Status:   Inpatient - Routine  BSA / BMI:        1.66 m2 / 21.97      Department          [facility]                    kg/m2                Location:  Blood Pressure: 168 /65 mmHg    Study Type:    TRANSTHORACIC ECHO (TTE) COMPLETE  Diagnosis/ICD: Cerebral Infarction, unspecified-I63.9  Indication:    Cerebrovascular  Accident  CPT Codes:     Echo Complete w Full Doppler-[id number]    Patient History:  Valve Disorders: Aortic Valve Replacement.    Study Detail: The following Echo studies were performed: 2D, M-Mode, Doppler and                color flow. Agitated saline used as a contrast agent for                intraseptal flow evaluation.       PHYSICIAN INTERPRETATION:  Left Ventricle: Left ventricular systolic function is normal, with an estimated ejection fraction of 60%. The calculated ejection fraction is normal at 59 % using the Zamora's Bi-plane MOD calculation. There are no regional wall motion abnormalities. The left ventricular cavity size is normal. There is borderline concentric left ventricular hypertrophy. Abnormal (paradoxical) septal motion consistent with post-operative status. Spectral Doppler shows an impaired relaxation pattern of left ventricular diastolic filling. There is no ventricular septal defect.  Left Atrium: The left atrium is mildly dilated. There is no evidence of a patent foramen ovale. A bubble study using agitated saline was performed. Bubble study is negative.  Right Ventricle: The right ventricle is normal in size. There is normal right ventriclar wall thickness. There is normal right ventricular global systolic function.  Right Atrium: The right atrium is normal in size.  Aortic Valve: The aortic valve appears abnormal. There is stentless aortic valve bioprosthesis. There is no evidence of aortic valve regurgitation. The peak instantaneous gradient of the aortic valve is 9.7 mmHg. The mean gradient of the aortic valve is 6.0 mmHg.  Mitral Valve: The mitral valve is normal in structure. There is normal mitral valve leaflet mobility. There is trace to mild mitral valve regurgitation.  Tricuspid Valve: The tricuspid valve is structurally normal. There is normal tricuspid valve leaflet mobility. Status post triscuspid annular ring repair. There is mild to moderate tricuspid regurgitation. The  Doppler estimated RVSP is within normal limits at 34.1 mmHg. There is a tricuspid annular ring repair.  Pulmonic Valve: The pulmonic valve is structurally normal. There is no indication of pulmonic valve regurgitation.  Pericardium: There is no pericardial effusion noted. There is a pericardial fat pad present.  Aorta: The aortic root is normal. There is no dilatation of the aortic arch. There is no dilatation of the ascending aorta. There is no dilatation of the aortic root. There is a prosthetic graft seen in the position of the ascending and transverse aorta.  Pulmonary Artery: The pulmonary artery is normal in size. The tricuspid regurgitant velocity is 2.79 m/s, and with an estimated right atrial pressure of 3 mmHg, the estimated pulmonary artery pressure is normal with the RVSP at 34.1 mmHg. The estimated PASP is 34 mmHg.       CONCLUSIONS:   1. Left ventricular systolic function is normal with a 60% estimated ejection fraction.   2. Abnormal septal motion consistent with post-operative status.   3. Spectral Doppler shows an impaired relaxation pattern of left ventricular diastolic filling.   4. Trace to mild mitral valve regurgitation.   5. Mild to moderate tricuspid regurgitation.   6. RVSP within normal limits.   7. There is a tricuspid annular ring repair.   8. Aortic valve appears abnormal.   9. There is a stentless aortic valve bioprosthesis.  10. Prosthetic graft in the ascending and transverse aorta position.  11. The estimated PASP is 34 mmHg.  12. There is no evidence of a patent foramen ovale.  13. The peak instantaneous gradient of the aortic valve is 9.7 mmHg.    QUANTITATIVE DATA SUMMARY:  2D MEASUREMENTS:                           Normal Ranges:  IVSd:          0.90 cm   (0.6-1.1cm)  LVPWd:         0.84 cm   (0.6-1.1cm)  LVIDd:         4.48 cm   (3.9-5.9cm)  LVIDs:         2.95 cm  LV Mass Index: 75.9 g/m2  LV % FS        34.2 %    LA VOLUME:                                Normal Ranges:  LA Vol  A4C:        36.1 ml    (22+/-6mL/m2)  LA Vol A2C:        25.4 ml  LA Vol BP:         32.9 ml  LA Vol Index A4C:  21.8ml/m2  LA Vol Index A2C:  15.3 ml/m2  LA Vol Index BP:   19.8 ml/m2  LA Area A4C:       13.2 cm2  LA Area A2C:       10.2 cm2  LA Major Axis A4C: 4.1 cm  LA Major Axis A2C: 3.5 cm  LA Volume Index:   17.9 ml/m2  LA Vol A4C:        30.4 ml  LA Vol A2C:        24.9 ml    RA VOLUME BY A/L METHOD:                                Normal Ranges:  RA Vol A4C:        24.6 ml    (8.3-19.5ml)  RA Vol Index A4C:  14.8 ml/m2  RA Area A4C:       11.3 cm2  RA Major Axis A4C: 4.4 cm    AORTA MEASUREMENTS:                     Normal Ranges:  Asc Ao, d: 2.57 cm (2.1-3.4cm)    LV SYSTOLIC FUNCTION BY 2D PLANIMETRY (MOD):                      Normal Ranges:  EF-A4C View: 64.0 % (>=55%)  EF-A2C View: 53.9 %  EF-Biplane:  59.1 %    LV DIASTOLIC FUNCTION:                             Normal Ranges:  MV Peak E:      0.36 m/s   (0.7-1.2 m/s)  MV Peak A:      1.42 m/s   (0.42-0.7 m/s)  E/A Ratio:      0.26       (1.0-2.2)  MV e'           0.11 m/s   (>8.0)  MV lateral e'   0.14 m/s  MV medial e'    0.07 m/s  E/e' Ratio:     3.47       (<8.0)  PulmV Sys Santy:  47.60 cm/s  PulmV Harris Santy: 58.30 cm/s  PulmV S/D Santy:  0.80    MITRAL VALVE:                  Normal Ranges:  MV DT: 121 msec (150-240msec)    AORTIC VALVE:                                    Normal Ranges:  AoV Vmax:                1.56 m/s (<=1.7m/s)  AoV Peak P.7 mmHg (<20mmHg)  AoV Mean P.0 mmHg (1.7-11.5mmHg)  LVOT Max Santy:            1.00 m/s (<=1.1m/s)  AoV VTI:                 30.10 cm (18-25cm)  LVOT VTI:                19.70 cm  AoV Dimensionless Index: 0.65       RIGHT VENTRICLE:  RV Basal 2.73 cm  RV Mid   3.55 cm  RV Major 6.6 cm  TAPSE:   10.4 mm  RV s'    0.14 m/s    TRICUSPID VALVE/RVSP:                              Normal Ranges:  Peak TR Velocity: 2.79 m/s  RV Syst Pressure: 34.1 mmHg (< 30mmHg)    Pulmonary Veins:  PulmV  Harris Santy: 58.30 cm/s  PulmV S/D Santy:  0.80  PulmV Sys Santy:  47.60 cm/s       [id number] Shaheed Martin MD  Electronically signed on 4/12/2024 at 4:16:02 PM       ** Final **  Carotid duplex bilateral  Preliminary Cardiology Report                [facility]  [id number] [place], [place] [address]             Phone [phone]           Preliminary Vascular Lab Report     [facility] US CAROTID ARTERY DUPLEX BILATERAL       Patient Name:     BETTY PETER LUCRETIAGARCÍA Reading Physician:  [id number] Maximiliano Leong MD  Study Date:       4/12/2024        Ordering Provider:  [id number] SHILPA TEJADA  MRN/PID:          [id number]         Fellow:  Accession#:       [id number]     Technologist:       Zarina Riggins RVBABITA  YOB: 1945        Technologist 2:  Gender:           F                Encounter#:         [id number]  Admission Status: Inpatient        Location Performed: [facility]       Diagnosis/ICD: Syncope and collapse-R55  CPT Codes:     42472 Cerebrovascular Carotid Duplex scan complete       PRELIMINARY CONCLUSIONS:     Right Carotid: Findings are consistent with less than 50% stenosis of the right proximal internal carotid artery. Laminar flow seen by color Doppler. There are elevated velocities in the right ECA that are suggestive of disease. The right vertebral artery is patent with antegrade flow. No evidence of hemodynamically significant stenosis in the right subclavian artery.  Left Carotid: Findings are consistent with less than 50% stenosis of the left proximal internal carotid artery. The internal carotid artery appears tortuous. Left external carotid artery appears patent with no evidence of stenosis. The left vertebral artery is patent with antegrade flow. No evidence of hemodynamically significant stenosis in the left subclavian artery.  Additional Findings:  Technically difficult exam due to patient's positioning.       Imaging & Doppler Findings:  Right Plaque Morph: The proximal right  external carotid artery demonstrates heterogenous plaque.  Left Plaque Morph: The proximal left external carotid artery demonstrates heterogenous and homogenous plaque. The distal left common carotid artery demonstrates heterogenous and homogenous plaque.      Right                        Left    PSV      EDV                PSV      EDV  65 cm/s            CCA P    68 cm/s  62 cm/s            CCA D    57 cm/s  52 cm/s  8 cm/s    ICA P    75 cm/s  15 cm/s  55 cm/s  13 cm/s   ICA M    68 cm/s  12 cm/s  89 cm/s  15 cm/s   ICA D    77 cm/s  17 cm/s  223 cm/s            ECA     75 cm/s  48 cm/s  9 cm/s  Vertebral  59 cm/s  15 cm/s  109 cm/s         Subclavian 130 cm/s                     Right Left  ICA/CCA Ratio  0.8  1.3          VASCULAR PRELIMINARY REPORT  completed by Zarina Riggins T on 4/12/2024 at 4:06:10 PM       ** Final **  MR brain wo IV contrast  Addendum: Interpreted By:  Kaleb Barboza,    ADDENDUM:   Kaleb Barboza discussed the significance and urgency of this critical   finding by secure chat with  ARNOLDO MCGEE on 4/12/2024 at 9:43   am.  (**-RCF-**) Findings:  See findings.             Signed by: Kaleb Barboza 4/12/2024 9:51 AM        -------- ORIGINAL REPORT --------   Dictation workstation:   [id number]  Narrative: Interpreted By:  Kaleb Barboza,   STUDY:  MR BRAIN WO IV CONTRAST;  4/11/2024 9:07 pm      INDICATION:  Signs/Symptoms:pt with gait atxia.r/o cva.      COMPARISON:  CT head dated 04/10/2024.      ACCESSION NUMBER(S):  [id number]      ORDERING CLINICIAN:  ARNOLDO MCGEE      TECHNIQUE:  Standard multiplanar multisequence MR imaging was performed through  the brain without intravenous contrast. Axial T2, FLAIR, DWI,  gradient echo T2 and sagittal and coronal T1 weighted images of brain  were acquired.      FINDINGS:  Parenchyma: There is a small focus of acute diffusion restriction  signal abnormality involving the cortex of the left occipital lobe.  There is corresponding  T2/FLAIR hyperintensity within this region.  There is no mass effect or hemorrhagic transformation. Moderate  chronic small vessel ischemic disease with tiny remote bilateral  basal ganglia lacunar infarcts. Additional remote ischemic insults  within the deep white matter of the right frontal lobe. Tiny remote  cerebellar infarcts bilaterally.      CSF Spaces: There is moderate to advanced brain atrophy. Basilar  cisterns are patent.      Extra-axial spaces: No extra-axial fluid collection.      Paranasal Sinuses: Visualized paranasal sinuses are clear.      Mastoids: Clear.      Orbits: Normal.      Calvarium: No suspicious osseous marrow signal.      Impression: Small acute cortical infarct involving the left occipital lobe. No  mass effect or hemorrhagic transformation.      Additional moderate chronic small vessel ischemic disease with tiny  remote ischemic insults involving the deep white matter of the right  frontal lobe, bilateral basal ganglia, and cerebellum.      Moderate to advanced brain atrophy.      MACRO:  None      Signed by: Kaleb Barboza 4/12/2024 9:43 AM  Dictation workstation:   [id number]  MR lumbar spine wo IV contrast  Narrative: Interpreted By:  Kaleb Barboza,   STUDY:  MR LUMBAR SPINE WO IV CONTRAST performed 4/11/2024 9:13 pm      INDICATION:  Signs/Symptoms:pt with multiple falls.r/o lumbar radiculopathy.      COMPARISON:  Lumbar spine radiographs dated 03/26/2024. MRI lumbar spine dated  07/30/2019.      ACCESSION NUMBER(S):  [id number]      ORDERING CLINICIAN:  ARNOLDO MCGEE      TECHNIQUE:  Multiplanar multisequence MR imaging of the lumbar spine performed  without intravenous contrast. Sagittal T1, T2, STIR, axial T1 and T2  weighted images of the lumbar spine were acquired.      FINDINGS:  Segmentation: Normal.      Conus: The lower thoracic cord appears unremarkable. The conus  terminates at the L1-L2 interspace level. Cauda equina are  unremarkable.      Epidural fluid:  None.      Hardware: Postoperative change of L2-L5 posterior spinal fusion with  bilateral posterior pedicular screws and stabilization rods.  Interbody fixation at L4-L5. Laminectomy/posterior decompression  involving the fusion levels.      Alignment: Grade 1 anterolisthesis of L4 on L5.      Vertebral bodies: Vertebral body heights are grossly maintained.      Marrow signal: Trace type 1 Modic endplate signal change suggested at  L1-L2. Otherwise no suspicious STIR hyperintensity or focal marrow  replacing lesion.      Intervertebral discs: Moderate degenerative disc height loss at L1-L2  with disc desiccation, significantly worsened from previous MRI.  Additional moderate multilevel degenerative disc height loss.          Degenerative change:      T12-L1: Unremarkable.      L1-2: Mild facet arthropathy. Moderate to marked ligamentum flavum  hypertrophy. There is moderate disc bulge with superimposed  broad-based central disc extrusion with elements of cranial and  caudal migration. Mildly prominent dorsal epidural fat. There is  severe spinal canal stenosis with the thecal sac measuring less than  5 mm in anterior-posterior dimension. Lateral recess  narrowing/effacement is also evident. Moderate bilateral neural  foraminal narrowing.      L2-3: Mild facet arthropathy and ligamentum flavum hypertrophy. Mild  disc bulge and endplate spurring. Mild narrowing of the lateral  recesses with no additional spinal canal stenosis. Mild bilateral  neural foraminal narrowing.      L3-4: Facet arthropathy and mild endplate spurring. No spinal canal  stenosis. Minimal/mild neural foraminal narrowing secondary to  residual endplate and facet spurring.      L4-5: Disc uncovering with endplate spurring. There is lateral recess  narrowing with no additional spinal canal stenosis. Mild-to-moderate  right and mild left neural foraminal narrowing suggested secondary to  disc uncovering and endplate spurring with hardware artifact  slightly  degrading assessment.      L5-S1: Mild facet arthropathy. Disc bulge and hypertrophic endplate  spurring with left subarticular disc osteophyte protrusion component.  Left lateral recess narrowing with probable mass effect on the S1  nerve root. Moderate left and mild right neural foraminal narrowing.      Soft tissues: Within the deep posterior paraspinal soft tissues  overlying the laminectomy bed there is a 5.8 x 4.9 x 1.3 cm fluid  collection with a few internal septation suggested and otherwise  relative simple appearance, likely representing a postoperative  seroma. There is additional scarring and fatty atrophy involving the  inferior posterior paraspinal musculature. Small cortical renal cysts  suggested bilaterally. Dependent sludge versus stones within the  partially visualized gallbladder.      Impression: In comparison with prior MRI (07/03/2019) there has been  revision/extension of posterior spinal fusion and decompression with  L2-L5 posterior fusion/decompression evident. There is development of  adjacent segment disease at L1-L2 with severe degenerative discogenic  change and element of epidural lipomatosis contributing to severe  spinal canal stenosis and lateral recess effacement. There is  moderate bilateral neural foraminal narrowing at this level as well.      At L5-S1 there is a left subarticular disc osteophyte protrusion  component contributing to left lateral recess stenosis and suspected  moderate left neural foraminal narrowing.      MACRO:  None      Signed by: Kaleb Barboza 4/12/2024 9:35 AM  Dictation workstation:   [id number]      Physical Exam  Constitutional:       General: She is not in acute distress.     Appearance: She is not toxic-appearing.   HENT:      Head: Normocephalic.      Mouth/Throat:      Mouth: Mucous membranes are dry.      Pharynx: Oropharynx is clear.   Eyes:      General: No scleral icterus.  Cardiovascular:      Rate and Rhythm: Normal rate and  regular rhythm.   Pulmonary:      Effort: No respiratory distress.      Breath sounds: No wheezing.   Abdominal:      General: There is no distension.      Palpations: Abdomen is soft.   Musculoskeletal:      Right lower leg: Edema (trace at the ankle) present.      Left lower leg: Edema (trace at the ankle) present.   Neurological:      Mental Status: She is alert.   Psychiatric:         Behavior: Behavior normal.         Relevant Results               Assessment/Plan        Principal Problem:    UTI (urinary tract infection)  Active Problems:    Gait instability    Back pain    Leg weakness    Acute on chronic kidney failure (CMS-HCC)    Dehydration    Repeated falls    Stroke (Multi)    Concern for sepsis  Fever + leukocytosis + tachycardia, suspect possible urinary source given UTI on admission  ID following   Urine culture positive for E. Coli  Lactic acid wnl  Follow up blood cultures  Continue vanc/zosyn per ID     Metabolic encephalopathy, improving  Suspect acute on chronic 2/2 acute infection with probable underlying cognitive impairment  Neurology following  Maintain appropriate sleep-wake cycles  TSH wnl  B12 wnl     Acute stroke  Neurology following  MRI brain showing small acute cortical infarct involving the left occipital lobe   Echo showing EF 60%, no evidence of PFO  Carotid US with <50% stenosis bilaterally  Aspirin, plavix, lipitor  PT/OT   SLP consult  Aspiration precautions  Fall precautions     Frequent falls/gait instability  Lumbar radiculopathy  Hx of L2-L5 fusion  Follows with orthopedic surgeon Dr. Friedman  Neurology consulted  MRI lumbar spine showing severe spinal canal stenosis   PT/OT  Fall precautions     ROSE on CKD3, resolved  Stable      Elevated troponin  Suspect in the setting of demand and ROSE  Cardiology consulted  Low suspicion for ACS    Hypokalemia  Decreased PO intake  Continue gentle IVF, will add Kcl  SLP following - patient has not been cooperative with swallow  eval  Strict NPO for now     Hx of collitis  Add loperamide PRN     Chronic lower extremity edema  S/p aortic valve replacment  HTN  Continue home atenolol     Advanced care planning   FULL CODE  Consult palliative care     Dispo: anticipate SNF placement at discharge              Lina Pena MD

## 2024-04-14 NOTE — CARE PLAN
Chart reviewed    Fevers better    Continue vancomycin/pip tazo until blood cultures negative--currently NGTD. If MRSA screen negative, stop pip tazo    If blood cultures negative, I would plan for a 7 day total course of abx    Covid/flu/rsv was negative    Will follow    DO LUCY [facility]  #[phone]

## 2024-04-14 NOTE — NURSING NOTE
Changed dressing to right knee at this time. Placed all preventatives: waffle mattress, mepilex. Patient tolerated well.

## 2024-04-14 NOTE — PROGRESS NOTES
"Vancomycin Dosing by Pharmacy- FOLLOW UP    Rebecca Samaniego is a 78 y.o. year old female who Pharmacy has been consulted for vancomycin dosing for other uti . Based on the patient's indication and renal status this patient is being dosed based on a goal AUC of 400-600.     Renal function is currently stable.    Current vancomycin dose: 1000 mg given every 24 hours    Estimated vancomycin AUC on current dose: 611 mg/L.hr     Visit Vitals  /61 (BP Location: Left arm, Patient Position: Lying)   Pulse 86   Temp 36.5 °C (97.7 °F) (Temporal)   Resp 14        Lab Results   Component Value Date    CREATININE 1.20 04/14/2024    CREATININE 0.90 04/13/2024    CREATININE 1.00 04/12/2024    CREATININE 1.50 04/11/2024        Patient weight is No results found for: \"PTWEIGHT\"    No results found for: \"CULTURE\"     I/O last 3 completed shifts:  In: 3103.8 (49.7 mL/kg) [I.V.:2703.8 (43.3 mL/kg); IV Piggyback:400]  Out: 925 (14.8 mL/kg) [Urine:925 (0.4 mL/kg/hr)]  Weight: 62.5 kg   [unfilled]    No results found for: \"PATIENTTEMP\"     Assessment/Plan    Above goal AUC. Orders placed for new vancomcyin regimen of 750mg every 24 hours to begin at 1000.    This dosing regimen is predicted by [employer] to result in the following pharmacokinetic parameters:    Loading dose: N/A  Regimen: 750 mg IV every 24 hours.  Start time: 10:43 on 04/14/2024  Exposure target: AUC24 (range)400-600 mg/L.hr   AUC24,ss: 467 mg/L.hr  Probability of AUC24 > 400: 83 %  Ctrough,ss: 14.2 mg/L  Probability of Ctrough,ss > 20: 6 %  Probability of nephrotoxicity (Lodise DARIN 2009): 9 %    The next level will be obtained on 4/15 at 0500. May be obtained sooner if clinically indicated.   Will continue to monitor renal function daily while on vancomycin and order serum creatinine at least every 48 hours if not already ordered.  Follow for continued vancomycin needs, clinical response, and signs/symptoms of toxicity.       Bria Jacques, PharmD   "

## 2024-04-14 NOTE — CARE PLAN
The clinical goals for the shift include Manage pain and continue Q2 turns    Over the shift, the patient did make progress toward the following goals.       Problem: Pain  Goal: My pain/discomfort is manageable  Outcome: Progressing     Problem: Safety  Goal: I will remain free of falls  Outcome: Progressing     Problem: Daily Care  Goal: Daily care needs are met  Outcome: Progressing     Problem: Skin  Goal: Prevent/manage excess moisture  Outcome: Progressing  Flowsheets (Taken 4/14/2024 0332)  Prevent/manage excess moisture:   Cleanse incontinence/protect with barrier cream   Follow provider orders for dressing changes   Moisturize dry skin   Monitor for/manage infection if present  Goal: Prevent/minimize sheer/friction injuries  Outcome: Progressing  Flowsheets (Taken 4/14/2024 0332)  Prevent/minimize sheer/friction injuries:   Complete micro-shifts as needed if patient unable. Adjust patient position to relieve pressure points, not a full turn   HOB 30 degrees or less   Turn/reposition every 2 hours/use positioning/transfer devices   Use pull sheet  Goal: Promote/optimize nutrition  Outcome: Progressing  Flowsheets (Taken 4/14/2024 0332)  Promote/optimize nutrition:   Discuss with provider if NPO > 2 days   Reassess MST if dietician not consulted  Goal: Promote skin healing  Outcome: Progressing  Flowsheets (Taken 4/14/2024 0332)  Promote skin healing:   Assess skin/pad under line(s)/device(s)   Ensure correct size (line/device) and apply per  instructions   Rotate device position/do not position patient on device   Protective dressings over bony prominences   Turn/reposition every 2 hours/use positioning/transfer devices

## 2024-04-14 NOTE — CARE PLAN
The patient's goals for the shift include feel better    The clinical goals for the shift include prevent skin breakdown

## 2024-04-14 NOTE — NURSING NOTE
Patient resting in bed, respirations even and unlabored. No distress noted on exam. Plan of care ongoing. Bed low and locked, call button within reach and bed alarm on.

## 2024-04-14 NOTE — PROGRESS NOTES
"Rebecca Samaniego is a 78 y.o. female on day 3 of admission presenting with UTI (urinary tract infection).    Subjective   Alert, oriented to self, able to hold a conversation this morning.  Patient does appear improved, although she is confused.  No obvious distress       Objective     Physical Exam  Vitals and nursing note reviewed.   Constitutional:       General: She is not in acute distress.     Appearance: She is normal weight. She is not ill-appearing or toxic-appearing.   HENT:      Head: Normocephalic and atraumatic.      Nose: Nose normal.      Mouth/Throat:      Mouth: Mucous membranes are moist.      Pharynx: Oropharynx is clear.   Cardiovascular:      Rate and Rhythm: Normal rate and regular rhythm.      Pulses: Normal pulses.      Heart sounds: Normal heart sounds. No murmur heard.     No friction rub. No gallop.   Pulmonary:      Effort: Pulmonary effort is normal.      Breath sounds: Normal breath sounds. No wheezing, rhonchi or rales.   Musculoskeletal:      Cervical back: Normal range of motion.      Right lower leg: No edema.      Left lower leg: No edema.   Skin:     General: Skin is warm and dry.      Capillary Refill: Capillary refill takes less than 2 seconds.   Neurological:      Mental Status: She is alert. Mental status is at baseline. She is disoriented.         Last Recorded Vitals  Blood pressure 122/53, pulse 86, temperature 36.4 °C (97.5 °F), temperature source Oral, resp. rate 14, height 1.651 m (5' 5\"), weight 62.5 kg (137 lb 12.6 oz), SpO2 100%.  Intake/Output last 3 Shifts:  I/O last 3 completed shifts:  In: 3103.8 (49.7 mL/kg) [I.V.:2703.8 (43.3 mL/kg); IV Piggyback:400]  Out: 925 (14.8 mL/kg) [Urine:925 (0.4 mL/kg/hr)]  Weight: 62.5 kg     Relevant Results  Results for orders placed or performed during the hospital encounter of 04/10/24 (from the past 24 hour(s))   Sars-CoV-2 and Influenza A/B PCR   Result Value Ref Range    Flu A Result Not Detected Not Detected    Flu B Result " Not Detected Not Detected    Coronavirus 2019, PCR Not Detected Not Detected   Vancomycin   Result Value Ref Range    Vancomycin 10.4 10.0 - 20.0 ug/mL   Basic Metabolic Panel   Result Value Ref Range    Glucose 133 (H) 65 - 99 mg/dL    Sodium 135 133 - 145 mmol/L    Potassium 3.2 (L) 3.4 - 5.1 mmol/L    Chloride 102 97 - 107 mmol/L    Bicarbonate 19 (L) 24 - 31 mmol/L    Urea Nitrogen 19 8 - 25 mg/dL    Creatinine 1.20 0.40 - 1.60 mg/dL    eGFR 46 (L) >60 mL/min/1.73m*2    Calcium 7.7 (L) 8.5 - 10.4 mg/dL    Anion Gap 14 <=19 mmol/L   CBC and Auto Differential   Result Value Ref Range    WBC 10.1 4.4 - 11.3 x10*3/uL    nRBC 0.0 0.0 - 0.0 /100 WBCs    RBC 2.71 (L) 4.00 - 5.20 x10*6/uL    Hemoglobin 8.8 (L) 12.0 - 16.0 g/dL    Hematocrit 25.9 (L) 36.0 - 46.0 %    MCV 96 80 - 100 fL    MCH 32.5 26.0 - 34.0 pg    MCHC 34.0 32.0 - 36.0 g/dL    RDW 13.2 11.5 - 14.5 %    Platelets 174 150 - 450 x10*3/uL    Neutrophils % 67.4 40.0 - 80.0 %    Immature Granulocytes %, Automated 1.9 (H) 0.0 - 0.9 %    Lymphocytes % 17.3 13.0 - 44.0 %    Monocytes % 11.6 2.0 - 10.0 %    Eosinophils % 1.4 0.0 - 6.0 %    Basophils % 0.4 0.0 - 2.0 %    Neutrophils Absolute 6.77 (H) 1.60 - 5.50 x10*3/uL    Immature Granulocytes Absolute, Automated 0.19 0.00 - 0.50 x10*3/uL    Lymphocytes Absolute 1.74 0.80 - 3.00 x10*3/uL    Monocytes Absolute 1.17 (H) 0.05 - 0.80 x10*3/uL    Eosinophils Absolute 0.14 0.00 - 0.40 x10*3/uL    Basophils Absolute 0.04 0.00 - 0.10 x10*3/uL       Assessment/Plan   Principal Problem:    UTI (urinary tract infection)  Active Problems:    Gait instability    Back pain    Leg weakness    Acute on chronic kidney failure (CMS-HCC)    Dehydration    Repeated falls    Stroke (Multi)    4/11: The patient is a 78-year-old white female whose past medical history includes hypertension, chronic kidney disease, former alcohol excess, status post bioprosthetic aortic valve replacement utilizing a number 23 mm freestyle bioprosthesis  along with replacement of the ascending aorta and transverse hemiarch using a number 24 mm Gelweave graft plus tricuspid valve repair utilizing number 21 mm [place] ring plus 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  This patient has done very well since her cardiac surgery with her last surveillance echocardiogram on 9/25/2023 showing an LV ejection fraction of 55-60% 1-2+ mitral valve regurgitation normal-appearing stentless aortic valve bioprosthesis with a peak systolic gradient of 11 mmHg and a prosthetic graft in the aortic root and ascending/arch position.  Patient is currently admitted with failure to thrive inability to provide self-care difficulty walking which appears to be related to progressing dementia.  Initial head CT did not show new CVA.  Urine culture pending to rule out UTI as a cause of delirium and the patient has already been started on empiric IV Rocephin.  The patient's atenolol 50 mg daily will be switched to Toprol-XL 50 mg daily.  The minor elevation in high-sensitivity troponin is nondiagnostic no clinical concern at this point for an acute coronary syndrome.  Physical therapy has been consulted and most likely patient will need skilled nursing home placement unless the delirium substantially improves with antibiotic therapy.     4/12: Patient is somewhat more alert and aware but remains generally confused.  MRI of the brain actually confirms the occurrence of a small acute cortical infarct involving the left occipital lobe.  She has moderate chronic small vessel ischemic disease with possible tiny insults of the deep white matter in the right frontal lobe bilateral basal ganglia and cerebellum plus moderate to advanced brain atrophy.  MRI of the lumbar spine demonstrated revision extension of posterior spinal fusion and decompression with L2-L5 posterior fusion/decompression findings.  There is new adjacent segment of disease at the L1-L2 segment with severe degenerative  discogenic change and an element of epidural lipomatosis contributing to severe spinal canal stenosis.  There was moderate bilateral neuroforaminal narrowing at this level as well.  There were is also left subarticular disc osteophyte protrusions with moderate left neuroforaminal narrowing.  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  The patient's echocardiogram demonstrates a preserved LV ejection fraction at 60% with a normal-appearing stentless bioprosthetic aortic valve replacement with a minimal systolic pressure gradient.  There is evidence of a prior tricuspid valve repair with only mild to moderate tricuspid valve regurgitation.  There is trace to mild mitral valve regurgitation.  Bubble study was negative for any patent foramen ovale or residual VSD which was closed at time of her operative procedure.   is aware that the patient will require skilled nursing facility placement.  The patient currently is n.p.o. and appears to be clinically dry and will begin low-dose replacement IV fluids.     4/13: Patient remains confused, however she did awaken to verbal command, she was able to answer some questions appropriately.  There is no acute distress at time my assessment.  She is chest pain-free.  She does have some trace pedal ankle edema.  Otherwise euvolemic breathing comfortably on room air.  Continues to receive her IV antibiotics for acute lower urinary tract infection.  Otherwise her blood pressure stable albeit mildly hypertensive with most recent of 159/78, she has yet to receive her morning medications.  Creatinine stable at 0.9.  Hemoglobin 9.8.  White blood cell count 11.7.  Generally stable from a cardiac perspective.  No significant events on telemetry, remains in sinus rhythm.  Will follow with you.    4/14: Appears improved from previous day, her mentation does appear improved, she is alert and awake, she is able  to hold a conversation although much of it is confabulation.  Her blood pressure has remained stable, on the telemetry monitor her heart rate has improved initially and she is now trending in 80s in sinus rhythm.  There is no significant ectopy.  Her potassium is mildly low at 3.2.  Will order oral repletion.  Gotten stable at 1.2.  Hemoglobin has mildly decreased to 8.8.  Overall the patient appears to be improving.  She is euvolemic on examination.   pending transfer to rehab.      I spent 35 minutes in the professional and overall care of this patient.      Marquis Carbajal, APRN-CNP

## 2024-04-14 NOTE — PROGRESS NOTES
"Rebecca Samaniego is a 78 y.o. female on day 3 of admission presenting with UTI (urinary tract infection).    Subjective   The patient was seen and examined. She is more conversational today and more alert but still confused.     Objective     Physical Exam  Vitals and nursing note reviewed.   Constitutional:       General: She is not in acute distress.  HENT:      Head: Normocephalic and atraumatic.      Mouth/Throat:      Mouth: Mucous membranes are dry.   Eyes:      General: No scleral icterus.     Pupils: Pupils are equal, round, and reactive to light.   Cardiovascular:      Rate and Rhythm: Tachycardia present.      Heart sounds: No murmur heard.     No friction rub. No gallop.   Pulmonary:      Effort: No respiratory distress.   Abdominal:      General: Bowel sounds are normal.      Tenderness: There is no abdominal tenderness. There is no guarding or rebound.   Musculoskeletal:      Cervical back: No rigidity.      Right lower leg: Edema present.      Left lower leg: Edema present.   Skin:     General: Skin is warm and dry.      Coloration: Skin is pale. Ecchymosis/bruising throughout bilateral upper and lower extremities.   Neurological:      Mental Status: She is disoriented.     Last Recorded Vitals  Blood pressure 129/61, pulse 94, temperature 36.5 °C (97.7 °F), temperature source Oral, resp. rate 14, height 1.651 m (5' 5\"), weight 62.5 kg (137 lb 12.6 oz), SpO2 98%.  Intake/Output last 3 Shifts:  I/O last 3 completed shifts:  In: 3103.8 (49.7 mL/kg) [I.V.:2703.8 (43.3 mL/kg); IV Piggyback:400]  Out: 925 (14.8 mL/kg) [Urine:925 (0.4 mL/kg/hr)]  Weight: 62.5 kg     Relevant Results  Results for orders placed or performed during the hospital encounter of 04/10/24 (from the past 24 hour(s))   Sars-CoV-2 and Influenza A/B PCR   Result Value Ref Range    Flu A Result Not Detected Not Detected    Flu B Result Not Detected Not Detected    Coronavirus 2019, PCR Not Detected Not Detected   Vancomycin   Result Value " Ref Range    Vancomycin 10.4 10.0 - 20.0 ug/mL   Basic Metabolic Panel   Result Value Ref Range    Glucose 133 (H) 65 - 99 mg/dL    Sodium 135 133 - 145 mmol/L    Potassium 3.2 (L) 3.4 - 5.1 mmol/L    Chloride 102 97 - 107 mmol/L    Bicarbonate 19 (L) 24 - 31 mmol/L    Urea Nitrogen 19 8 - 25 mg/dL    Creatinine 1.20 0.40 - 1.60 mg/dL    eGFR 46 (L) >60 mL/min/1.73m*2    Calcium 7.7 (L) 8.5 - 10.4 mg/dL    Anion Gap 14 <=19 mmol/L   CBC and Auto Differential   Result Value Ref Range    WBC 10.1 4.4 - 11.3 x10*3/uL    nRBC 0.0 0.0 - 0.0 /100 WBCs    RBC 2.71 (L) 4.00 - 5.20 x10*6/uL    Hemoglobin 8.8 (L) 12.0 - 16.0 g/dL    Hematocrit 25.9 (L) 36.0 - 46.0 %    MCV 96 80 - 100 fL    MCH 32.5 26.0 - 34.0 pg    MCHC 34.0 32.0 - 36.0 g/dL    RDW 13.2 11.5 - 14.5 %    Platelets 174 150 - 450 x10*3/uL    Neutrophils % 67.4 40.0 - 80.0 %    Immature Granulocytes %, Automated 1.9 (H) 0.0 - 0.9 %    Lymphocytes % 17.3 13.0 - 44.0 %    Monocytes % 11.6 2.0 - 10.0 %    Eosinophils % 1.4 0.0 - 6.0 %    Basophils % 0.4 0.0 - 2.0 %    Neutrophils Absolute 6.77 (H) 1.60 - 5.50 x10*3/uL    Immature Granulocytes Absolute, Automated 0.19 0.00 - 0.50 x10*3/uL    Lymphocytes Absolute 1.74 0.80 - 3.00 x10*3/uL    Monocytes Absolute 1.17 (H) 0.05 - 0.80 x10*3/uL    Eosinophils Absolute 0.14 0.00 - 0.40 x10*3/uL    Basophils Absolute 0.04 0.00 - 0.10 x10*3/uL       Scheduled medications  aspirin, 81 mg, oral, Daily  atorvastatin, 40 mg, oral, Nightly  bacitracin, , Topical, TID  clopidogrel, 75 mg, oral, Daily  heparin (porcine), 5,000 Units, subcutaneous, q12h  metoprolol succinate XL, 50 mg, oral, Daily  pantoprazole, 40 mg, oral, Daily before breakfast   Or  pantoprazole, 40 mg, intravenous, Daily before breakfast  piperacillin-tazobactam, 3.375 g, intravenous, q6h  potassium chloride, 20 mEq, oral, BID with meals  vancomycin-diluent combo no.1, 750 mg, intravenous, q24h      Continuous medications  potassium alosqoj-K8-6.45%NaCl, 75  mL/hr, Last Rate: 75 mL/hr (04/14/24 1117)      PRN medications  PRN medications: acetaminophen **OR** acetaminophen **OR** acetaminophen, benzocaine-menthol, dextromethorphan-guaifenesin, guaiFENesin, loperamide, metoprolol, ondansetron ODT **OR** ondansetron, polyethylene glycol, vancomycin     Assessment/Plan   Principal Problem:    UTI (urinary tract infection)  Active Problems:    Gait instability    Back pain    Leg weakness    Acute on chronic kidney failure (CMS-HCC)    Dehydration    Repeated falls    Stroke (Multi)    Sepsis  - WBC 11.7  - Lactate 1.3  - Blood cultures in progress   - On Zosyn & Vanco   - ID consulted   4/14/24  WBC today 10.1    2. Acute CVA   - MRI brain showing Small acute cortical infarct involving the left occipital lobe. No  mass effect or hemorrhagic transformation.  - Neuro following      3. UTI  - culture showing E-coli     4. Metabolic Encephalopathy  - suspect due to underlying disease process and acute infection, as well as history of dementia.   - CT brain shows Chronic ischemic changes, including chronic right frontal lobe  infarct.     5. Elevated troponin   - Cardiology following   - ECHO with Preserved EF s/p bioprosthetic aortic valve replacement, tricuspid valve repair, ascending aorta and transverse hemiarch repair     6. ROSE on CKD   - resolving   - suspect r/t dehydration and current infection   - IVF     7. Weakness/Falls   - with malnutrition, Albumin 3.2  - The patient has had a poor appetite and not been eating well in the past few months, increasing inability to take care of self      8. Palliative Care   FULL CODE  The patient does not have decision making capacity   is Kaleb Aden - surrogate decision maker  There are no ACP documents     4/14/2024   Attempts to speak to  today, he was not reachable by telephone. Attempts to discuss code status, will try to reach him again and call back. He is definitely considering a SNF as a discharge option.  Will update this note with any further conversations.    4/13/2024  Discussion with  about patient current illness and trajectory. He does endorse a decline over the last 3 months. He states that he can no longer take care of the patient at home. We did talk about the patient having dementia and how this is a progressive illness where the trajectory does not typically improve (albeit wax and wean) but typically if it continues down the usual trajectory, this will worsen over time. Discussion of code status, the  did state that he wanted attempts for CPR, has a lot to think about and would appreciate a revision of this discussion again.      Symptom Management  Pain: no  Medications recommended for pain?  No  Tiredness: yes   Nausea: no  Depression: BALBINA  Anxiety: BALBINA  Drowsiness: mild  Appetite: poor  Wellbeing: BALBINA  Dyspnea: No  Intervention recommended for dyspnea?  no  Other: N/A   Intervention recommended for constipation?  NA     Patient/proxy preference for information  Prefers full information     Goals of Care  Treatment model of care     I spent 30 minutes in the professional and overall care of this patient.      Rhonda Potts, APRN-CNP

## 2024-04-14 NOTE — NURSING NOTE
Assumed care of patient at this time. At bedside report, patient had no c/o pain. vss and afebrile. Family is at bedside. Bed low and locked, call button within reach and bed alarm on. Patient has no needs that require immediate nursing interventions.

## 2024-04-15 LAB
ANION GAP SERPL CALC-SCNC: 11 MMOL/L
BUN SERPL-MCNC: 16 MG/DL (ref 8–25)
CALCIUM SERPL-MCNC: 7.5 MG/DL (ref 8.5–10.4)
CHLORIDE SERPL-SCNC: 105 MMOL/L (ref 97–107)
CO2 SERPL-SCNC: 19 MMOL/L (ref 24–31)
CREAT SERPL-MCNC: 1.1 MG/DL (ref 0.4–1.6)
EGFRCR SERPLBLD CKD-EPI 2021: 51 ML/MIN/1.73M*2
GLUCOSE SERPL-MCNC: 99 MG/DL (ref 65–99)
HOLD SPECIMEN: NORMAL
POTASSIUM SERPL-SCNC: 3.2 MMOL/L (ref 3.4–5.1)
SODIUM SERPL-SCNC: 135 MMOL/L (ref 133–145)
STAPHYLOCOCCUS SPEC CULT: NORMAL
VANCOMYCIN SERPL-MCNC: 14 UG/ML (ref 10–20)

## 2024-04-15 PROCEDURE — 97530 THERAPEUTIC ACTIVITIES: CPT | Mod: GP,CQ

## 2024-04-15 PROCEDURE — 2500000004 HC RX 250 GENERAL PHARMACY W/ HCPCS (ALT 636 FOR OP/ED): Mod: JZ | Performed by: INTERNAL MEDICINE

## 2024-04-15 PROCEDURE — 99232 SBSQ HOSP IP/OBS MODERATE 35: CPT | Performed by: INTERNAL MEDICINE

## 2024-04-15 PROCEDURE — 97535 SELF CARE MNGMENT TRAINING: CPT | Mod: GO,CO

## 2024-04-15 PROCEDURE — 36415 COLL VENOUS BLD VENIPUNCTURE: CPT | Performed by: INTERNAL MEDICINE

## 2024-04-15 PROCEDURE — 2500000004 HC RX 250 GENERAL PHARMACY W/ HCPCS (ALT 636 FOR OP/ED): Performed by: INTERNAL MEDICINE

## 2024-04-15 PROCEDURE — 2500000004 HC RX 250 GENERAL PHARMACY W/ HCPCS (ALT 636 FOR OP/ED): Performed by: STUDENT IN AN ORGANIZED HEALTH CARE EDUCATION/TRAINING PROGRAM

## 2024-04-15 PROCEDURE — 82374 ASSAY BLOOD CARBON DIOXIDE: CPT | Performed by: INTERNAL MEDICINE

## 2024-04-15 PROCEDURE — 2060000001 HC INTERMEDIATE ICU ROOM DAILY

## 2024-04-15 PROCEDURE — 2500000001 HC RX 250 WO HCPCS SELF ADMINISTERED DRUGS (ALT 637 FOR MEDICARE OP): Performed by: STUDENT IN AN ORGANIZED HEALTH CARE EDUCATION/TRAINING PROGRAM

## 2024-04-15 PROCEDURE — 99233 SBSQ HOSP IP/OBS HIGH 50: CPT | Performed by: NURSE PRACTITIONER

## 2024-04-15 PROCEDURE — 92526 ORAL FUNCTION THERAPY: CPT | Mod: GN | Performed by: SPEECH-LANGUAGE PATHOLOGIST

## 2024-04-15 PROCEDURE — C9113 INJ PANTOPRAZOLE SODIUM, VIA: HCPCS | Performed by: STUDENT IN AN ORGANIZED HEALTH CARE EDUCATION/TRAINING PROGRAM

## 2024-04-15 PROCEDURE — 99497 ADVNCD CARE PLAN 30 MIN: CPT | Performed by: NURSE PRACTITIONER

## 2024-04-15 PROCEDURE — 80202 ASSAY OF VANCOMYCIN: CPT | Performed by: STUDENT IN AN ORGANIZED HEALTH CARE EDUCATION/TRAINING PROGRAM

## 2024-04-15 PROCEDURE — 2500000001 HC RX 250 WO HCPCS SELF ADMINISTERED DRUGS (ALT 637 FOR MEDICARE OP): Performed by: INTERNAL MEDICINE

## 2024-04-15 RX ORDER — CEFAZOLIN SODIUM 1 G/50ML
1 SOLUTION INTRAVENOUS EVERY 8 HOURS
Qty: 450 ML | Refills: 0 | Status: DISCONTINUED | OUTPATIENT
Start: 2024-04-15 | End: 2024-04-16

## 2024-04-15 RX ORDER — POTASSIUM CHLORIDE 14.9 MG/ML
20 INJECTION INTRAVENOUS
Status: DISPENSED | OUTPATIENT
Start: 2024-04-15 | End: 2024-04-15

## 2024-04-15 RX ORDER — POTASSIUM CHLORIDE 1.5 G/1.58G
20 POWDER, FOR SOLUTION ORAL
Status: COMPLETED | OUTPATIENT
Start: 2024-04-15 | End: 2024-04-15

## 2024-04-15 RX ADMIN — HEPARIN SODIUM 5000 UNITS: 5000 INJECTION, SOLUTION INTRAVENOUS; SUBCUTANEOUS at 08:36

## 2024-04-15 RX ADMIN — POTASSIUM CHLORIDE 20 MEQ: 1.5 FOR SOLUTION ORAL at 11:41

## 2024-04-15 RX ADMIN — VANCOMYCIN 750 MG: 750 INJECTION, SOLUTION INTRAVENOUS at 09:44

## 2024-04-15 RX ADMIN — PANTOPRAZOLE SODIUM 40 MG: 40 INJECTION, POWDER, FOR SOLUTION INTRAVENOUS at 06:15

## 2024-04-15 RX ADMIN — CEFAZOLIN SODIUM 1 G: 1 INJECTION, SOLUTION INTRAVENOUS at 13:01

## 2024-04-15 RX ADMIN — BACITRACIN: 500 OINTMENT TOPICAL at 15:00

## 2024-04-15 RX ADMIN — PIPERACILLIN SODIUM AND TAZOBACTAM SODIUM 3.38 G: 3; .375 INJECTION, SOLUTION INTRAVENOUS at 02:26

## 2024-04-15 RX ADMIN — DEXTROSE MONOHYDRATE, SODIUM CHLORIDE, AND POTASSIUM CHLORIDE 75 ML/HR: 50; 4.5; 1.49 INJECTION, SOLUTION INTRAVENOUS at 06:16

## 2024-04-15 RX ADMIN — CEFAZOLIN SODIUM 1 G: 1 INJECTION, SOLUTION INTRAVENOUS at 21:24

## 2024-04-15 RX ADMIN — POTASSIUM CHLORIDE 20 MEQ: 200 INJECTION, SOLUTION INTRAVENOUS at 09:38

## 2024-04-15 RX ADMIN — PIPERACILLIN SODIUM AND TAZOBACTAM SODIUM 3.38 G: 3; .375 INJECTION, SOLUTION INTRAVENOUS at 08:36

## 2024-04-15 RX ADMIN — ATORVASTATIN CALCIUM 40 MG: 40 TABLET, FILM COATED ORAL at 21:24

## 2024-04-15 RX ADMIN — POTASSIUM CHLORIDE 20 MEQ: 1.5 FOR SOLUTION ORAL at 17:48

## 2024-04-15 RX ADMIN — BACITRACIN: 500 OINTMENT TOPICAL at 21:00

## 2024-04-15 RX ADMIN — HEPARIN SODIUM 5000 UNITS: 5000 INJECTION, SOLUTION INTRAVENOUS; SUBCUTANEOUS at 21:24

## 2024-04-15 ASSESSMENT — COGNITIVE AND FUNCTIONAL STATUS - GENERAL
MOVING FROM LYING ON BACK TO SITTING ON SIDE OF FLAT BED WITH BEDRAILS: A LOT
STANDING UP FROM CHAIR USING ARMS: A LOT
EATING MEALS: A LITTLE
WALKING IN HOSPITAL ROOM: TOTAL
DRESSING REGULAR UPPER BODY CLOTHING: A LOT
MOVING TO AND FROM BED TO CHAIR: A LOT
DAILY ACTIVITIY SCORE: 14
DRESSING REGULAR UPPER BODY CLOTHING: A LOT
HELP NEEDED FOR BATHING: A LOT
DRESSING REGULAR LOWER BODY CLOTHING: A LOT
MOBILITY SCORE: 10
DRESSING REGULAR LOWER BODY CLOTHING: A LOT
EATING MEALS: A LITTLE
HELP NEEDED FOR BATHING: A LOT
STANDING UP FROM CHAIR USING ARMS: A LOT
WALKING IN HOSPITAL ROOM: TOTAL
MOBILITY SCORE: 10
MOVING FROM LYING ON BACK TO SITTING ON SIDE OF FLAT BED WITH BEDRAILS: A LOT
PERSONAL GROOMING: A LITTLE
DAILY ACTIVITIY SCORE: 14
TURNING FROM BACK TO SIDE WHILE IN FLAT BAD: A LOT
TURNING FROM BACK TO SIDE WHILE IN FLAT BAD: A LOT
TOILETING: A LOT
PERSONAL GROOMING: A LITTLE
CLIMB 3 TO 5 STEPS WITH RAILING: TOTAL
TOILETING: A LOT
CLIMB 3 TO 5 STEPS WITH RAILING: TOTAL
MOVING TO AND FROM BED TO CHAIR: A LOT

## 2024-04-15 ASSESSMENT — PAIN SCALES - GENERAL
PAINLEVEL_OUTOF10: 0 - NO PAIN

## 2024-04-15 ASSESSMENT — PAIN - FUNCTIONAL ASSESSMENT
PAIN_FUNCTIONAL_ASSESSMENT: 0-10

## 2024-04-15 ASSESSMENT — ACTIVITIES OF DAILY LIVING (ADL): HOME_MANAGEMENT_TIME_ENTRY: 12

## 2024-04-15 NOTE — PROGRESS NOTES
Palliative Care Progress Note    Date of Admission: 4/10/2024    Patient is a 78 y.o. female admitted with UTI (urinary tract infection). Uneventful night. Worked with PT this am. K low again this AM, receiving supplementation.    Mental/Cognitive Status: awake, alert    Respiratory Status: denies difficulty with breathing or cough    Pain Assessment:denies    Pertinent Symptoms: denies dizziness with activity    Diet/Nutrition: poor    Bowel Regimen: states she is moving bowels w/o issue, miralax prn    Patient's current condition/Anticipated Prognosis: guarded.  Family/Healthcare Proxy involvement: spouse Kaleb is surrogate decision maker.    Scheduled medications  aspirin, 81 mg, oral, Daily  atorvastatin, 40 mg, oral, Nightly  bacitracin, , Topical, TID  ceFAZolin, 1 g, intravenous, q8h  clopidogrel, 75 mg, oral, Daily  heparin (porcine), 5,000 Units, subcutaneous, q12h  metoprolol succinate XL, 50 mg, oral, Daily  pantoprazole, 40 mg, oral, Daily before breakfast   Or  pantoprazole, 40 mg, intravenous, Daily before breakfast  potassium chloride, 20 mEq, oral, BID with meals      Continuous medications     PRN medications  PRN medications: acetaminophen **OR** acetaminophen **OR** acetaminophen, benzocaine-menthol, dextromethorphan-guaifenesin, guaiFENesin, loperamide, metoprolol, ondansetron ODT **OR** ondansetron, polyethylene glycol     Results for orders placed or performed during the hospital encounter of 04/10/24 (from the past 24 hour(s))   BLOOD GAS LACTIC ACID, VENOUS   Result Value Ref Range    POCT Lactate, Venous 1.2 0.4 - 2.0 mmol/L   Uric Acid   Result Value Ref Range    Uric Acid 3.4 2.5 - 6.8 mg/dL   Basic Metabolic Panel   Result Value Ref Range    Glucose 99 65 - 99 mg/dL    Sodium 135 133 - 145 mmol/L    Potassium 3.2 (L) 3.4 - 5.1 mmol/L    Chloride 105 97 - 107 mmol/L    Bicarbonate 19 (L) 24 - 31 mmol/L    Urea Nitrogen 16 8 - 25 mg/dL    Creatinine 1.10 0.40 - 1.60 mg/dL    eGFR 51 (L)  >60 mL/min/1.73m*2    Calcium 7.5 (L) 8.5 - 10.4 mg/dL    Anion Gap 11 <=19 mmol/L   Vancomycin   Result Value Ref Range    Vancomycin 14.0 10.0 - 20.0 ug/mL   Lavender Top   Result Value Ref Range    Extra Tube Hold for add-ons.         XR chest 1 view  Result Date: 4/15/2024  Postoperative changes as outlined above with mild elevation of left hemidiaphragm unchanged.   There is partial silhouetting of the right hemidiaphragm laterally suggesting small right pleural effusion or some minimal infiltrate at this location..   Signed by: Brianna De La Torre 4/15/2024 11:31 AM Dictation workstation:   [id number]    Transthoracic Echo (TTE) Complete  Result Date: 4/12/2024  CONCLUSIONS:  1. Left ventricular systolic function is normal with a 60% estimated ejection fraction.  2. Abnormal septal motion consistent with post-operative status.  3. Spectral Doppler shows an impaired relaxation pattern of left ventricular diastolic filling.  4. Trace to mild mitral valve regurgitation.  5. Mild to moderate tricuspid regurgitation.  6. RVSP within normal limits.  7. There is a tricuspid annular ring repair.  8. Aortic valve appears abnormal.  9. There is a stentless aortic valve bioprosthesis. 10. Prosthetic graft in the ascending and transverse aorta position. 11. The estimated PASP is 34 mmHg. 12. There is no evidence of a patent foramen ovale. 13. The peak instantaneous gradient of the aortic valve is 9.7 mmHg.     Carotid duplex bilateral  Result Date: 4/12/2024  PRELIMINARY CONCLUSIONS:  Right Carotid: Findings are consistent with less than 50% stenosis of the right proximal internal carotid artery. Laminar flow seen by color Doppler. There are elevated velocities in the right ECA that are suggestive of disease. The right vertebral artery is patent with antegrade flow. No evidence of hemodynamically significant stenosis in the right subclavian artery. Left Carotid: Findings are consistent with less than 50% stenosis of the left  proximal internal carotid artery. The internal carotid artery appears tortuous. Left external carotid artery appears patent with no evidence of stenosis. The left vertebral artery is patent with antegrade flow. No evidence of hemodynamically significant stenosis in the left subclavian artery. Additional Findings: Technically difficult exam due to patient's positioning.  Imaging & Doppler Findings: Right Plaque Morph: The proximal right external carotid artery demonstrates heterogenous plaque. Left Plaque Morph: The proximal left external carotid artery demonstrates heterogenous and homogenous plaque. The distal left common carotid artery demonstrates heterogenous and homogenous plaque.       MR brain wo IV contrast  Small acute cortical infarct involving the left occipital lobe. No mass effect or hemorrhagic transformation.   Additional moderate chronic small vessel ischemic disease with tiny remote ischemic insults involving the deep white matter of the right frontal lobe, bilateral basal ganglia, and cerebellum.   Moderate to advanced brain atrophy.   MACRO: None   Signed by: Kaleb Barboza 4/12/2024 9:43 AM Dictation workstation:   [id number]    MR lumbar spine wo IV contrast  Result Date: 4/12/2024  In comparison with prior MRI (07/03/2019) there has been revision/extension of posterior spinal fusion and decompression with L2-L5 posterior fusion/decompression evident. There is development of adjacent segment disease at L1-L2 with severe degenerative discogenic change and element of epidural lipomatosis contributing to severe spinal canal stenosis and lateral recess effacement. There is moderate bilateral neural foraminal narrowing at this level as well.   At L5-S1 there is a left subarticular disc osteophyte protrusion component contributing to left lateral recess stenosis and suspected moderate left neural foraminal narrowing.   MACRO: None   Signed by: Kaleb Barboza 4/12/2024 9:35 AM Dictation workstation:    [id number]     Vitals:    04/15/24 0859   BP: 132/61   Pulse: 79   Resp: 17   Temp: 36.9 °C (98.4 °F)   SpO2: 99%     Physical Exam  Vitals and nursing note reviewed.   Constitutional:       General: She is not in acute distress.     Appearance: She is ill-appearing.   HENT:      Head: Normocephalic and atraumatic.      Nose: Nose normal.      Mouth/Throat:      Mouth: Mucous membranes are dry.      Pharynx: Oropharynx is clear.   Eyes:      General: No scleral icterus.     Extraocular Movements: Extraocular movements intact.      Pupils: Pupils are equal, round, and reactive to light.   Neck:      Comments: Cervical rotation decreased  Cardiovascular:      Rate and Rhythm: Normal rate and regular rhythm.      Pulses: Normal pulses.      Heart sounds: Normal heart sounds.   Pulmonary:      Effort: Pulmonary effort is normal. No respiratory distress.      Breath sounds: No wheezing or rales.      Comments: Diminished bases  Abdominal:      General: There is no distension.      Palpations: Abdomen is soft.      Tenderness: There is no abdominal tenderness. There is no guarding.   Musculoskeletal:      Cervical back: No tenderness.      Right lower leg: Edema present.      Left lower leg: Edema present.      Comments: Small joint deformities   Skin:     General: Skin is warm and dry.      Findings: Bruising and erythema present.      Comments: Bruising over left arm, shoulder and axilla 2/2 falls at home, bruises over hands and FA blood draws, mild lymphedema and erythema right bicep area -no tenderness, temperature normal. Both feet ankles hyperpigmented c/w CVI/venous stasis   Neurological:      Mental Status: She is alert.          Assessment/Plan   UTI (urinary tract infection)   IMP:    Sepsis - 2/2 UTI - resolved - ID following, on cefazolin. WBC 10.1, afebrile, BP stable HR in 90s  Acute CVA - left occipital lobe, on statin, plavix, ASA; Hgb 8.8  UTI - culture + E.coli, ID following, on cefazolin  Delirium  "superimposed on dementia - improving back to her baseline  ROSE on CKD - improving creat today 1.10, encourage po fluids as elena  Weakness/falls - 2/2 protein calorie malnutrition, deconditioning, cognitive impairment - encourage po intake as tolerated, liberalize diet, will need PT/OT/SNF at discharge    Palliative Care Encounter  Full Code  Pt is much more awake and alert today.   and spouse were both present at the bedside when I entered the room. Pt completed Living Will and hc-POA designating spouse as primary agent which I witnessed and she has decision making capacity to do so. Documents given to pt nurse who will place in paper chart to be scanned into Epic.   Spouse asked that we have GOC discussion outside of patient room. Addressed code  status with him. Explained differences between FM vs. CCA vs. Comfort care. From a palliative standpoint would recommend change of code status to DNR-CCA. Reviewed risks versus benefits of CPR, intubation. Due to patient age, frailty, underlying comorbidities likelihood of positive outcome and any QOL IF resuscitation were successful is very poor. Change to CCA would allow her to continue to be treated aggressively, participate in PT/OT to optimize function. Spouse stated he is no longer able to provide care in the home for her. After discharge to SNF she will need to work with PT/OT and then transition to LTC. The  at facility will work with him for Medicaid application to cover LTC once skilled days are exhausted.   He is not ready to change code status and wants her to make the decision. Her baseline cognition fluctuates. He says she had good days (like today) and bad days. I explained to him that now is the time to speak with her about goals because as cognition gets worse the \"good days\" will be few and far between. He would like to speak with her privately and will follow up with us.    Advance Directives Info: as above, to be scanned in EMR  Discharge " Planning: as above  Palliative Care Team will continue to follow patient.  Total visit time 75 minutes including 25 minutes ACP.    Ese Chandler, APRN-CNP

## 2024-04-15 NOTE — CARE PLAN
Problem: Skin  Goal: Prevent/manage excess moisture  Flowsheets (Taken 4/15/2024 0408 by Marian Garcia RN)  Prevent/manage excess moisture:   Cleanse incontinence/protect with barrier cream   Follow provider orders for dressing changes   Moisturize dry skin   Monitor for/manage infection if present   The patient's goals for the shift include feel better    The clinical goals for the shift include remain free of falls

## 2024-04-15 NOTE — PROGRESS NOTES
Physical Therapy                 Therapy Communication Note    Patient Name: Rebecca Samaniego  MRN: [id number]  Today's Date: 4/15/2024     Discipline: Physical Therapy    Missed Visit Reason: Missed Visit Reason: Other (Comment) (Attempted to see pt 2x throughout am. 1st attempt RN in to assess and pass meds. 2nd attempt PICC nurse present.)    Missed Time: Attempt    Comment:

## 2024-04-15 NOTE — PROGRESS NOTES
INFECTIOUS DISEASES PROGRESS NOTE    Consulted / following patient for:  Fever/E. coli UTI    Subjective   Interval History:   No specific complaints, says she is doing well    Objective   PHYSICAL EXAMINATION  Vital signs:  Visit Vitals  /61 (BP Location: Right arm, Patient Position: Lying)   Pulse 79   Temp 36.9 °C (98.4 °F) (Oral)   Resp 17      General: Alert and responsive, not toxic  Lungs:  Clear to auscultation  Heart:  S1, S2 normal,  Abdomen:  Soft, nontender.   Extremities:  No cords, phlebitis, cellulitis.  Multiple ecchymoses    Relevant Results  WBC: (10,100)     Results from last 72 hours   Lab Units 04/15/24  0510   CREATININE mg/dL 1.10   ANION GAP mmol/L 11   EGFR mL/min/1.73m*2 51*     Microbiology:  Blood (4/13): Negative X2  Urine (4/11): Pan-susceptible E. coli    Imaging:  CXR images personally reviewed: No pneumonia      ASSESSMENT:  Fever/E. coli UTI  Afebrile for the last 48 hours.  Feels well.  Exam unremarkable.  Blood cultures sterile.  E. coli is pan-susceptible    PLANS:  -   Stop vancomycin and Zosyn  -   Cefazolin 1 g IV every 8 hours through 4/17  -   If she passes the swallowing evaluation, will convert cefazolin to cephalexin    Discussed with RN at bedside    Doni Morales MD  [facility]  Office:  [phone]

## 2024-04-15 NOTE — PROGRESS NOTES
Occupational Therapy    OT Treatment    Patient Name: Rebecca Samaniego  MRN: [id number]  Today's Date: 4/15/2024  Time Calculation  Start Time: 1126  Stop Time: 1138  Time Calculation (min): 12 min         Assessment:  OT Assessment: Limited OT session completed this date d/t pt reports of increased fatigue from previous PT session. Gradual progress observed towards OT goals. Continue with current OT POC to increase strength, balance and functional tolerance to maximize potential during ADLs.  Evaluation/Treatment Tolerance: Treatment limited secondary to agitation  End of Session Communication: Bedside nurse  End of Session Patient Position: Up in chair, Alarm on (all needs in reach)  OT Assessment Results: Decreased ADL status, Decreased endurance, Decreased functional mobility, Decreased trunk control for functional activities  Evaluation/Treatment Tolerance: Treatment limited secondary to agitation  Plan:  Treatment Interventions: ADL retraining, Functional transfer training, UE strengthening/ROM, Endurance training, Equipment evaluation/education, Compensatory technique education  OT Frequency: 3 times per week  OT Discharge Recommendations: Moderate intensity level of continued care  Equipment Recommended upon Discharge: Wheeled walker  OT Recommended Transfer Status: Assist of 1, Moderate assist  OT - OK to Discharge: Yes  Treatment Interventions: ADL retraining, Functional transfer training, UE strengthening/ROM, Endurance training, Equipment evaluation/education, Compensatory technique education    Subjective   Previous Visit Info:  OT Last Visit  OT Received On: 04/15/24  General:  General  Reason for Referral: impaired ADLs s/p frequent falls  Past Medical History Relevant to Rehab: gait instability, back pain, lumbar laminectomy, CKD, falls  Prior to Session Communication: Bedside nurse  Patient Position Received: Up in chair, Alarm on  General Comment: Pt cleared for therapy session per nursing,  cooperative with encouragement.  Precautions:  Hearing/Visual Limitations: +reading glasses  Medical Precautions: Fall precautions  Precautions Comment: telemetry  Vital Signs:     Pain:  Pain Assessment  Pain Assessment: 0-10  Pain Score: 0 - No pain  Clinical Progression: Not changed    Objective    Cognition:  Cognition  Overall Cognitive Status: Impaired  Orientation Level: Disoriented to time, Disoriented to situation  Safety/Judgement: Exceptions to WFL  Insight: Moderate  Impulsive: Mildly  Task Initiation: Initiates with cues  Processing Speed: Delayed  Coordination:  Movements are Fluid and Coordinated: Yes  Activities of Daily Living: Toileting  Toileting Comments: pt incontinent requiring maxAx2 for posterior hygiene completion in supported standing with FWW  Functional Standing Tolerance:  Time: <30 seconds  Activity: static standing with FWW and maxA for hygiene completion  Functional Standing Tolerance Comments: Tasks completed in supported standing to increase functional tolerance and strength to maximize safety and independence during ADLs.  Bed Mobility/Transfers: Bed Mobility  Bed Mobility: No    Transfers  Transfer: Yes  Transfer 1  Trials/Comments 1: sit<>stands completed x2 from standard chair height with FWW and modAx2- verbal/ tactile cues required for proper hand placement to increase safety and decrease risk of falls, poor carry-over observed.    Outcome Measures:[facility] Daily Activity  Putting on and taking off regular lower body clothing: A lot  Bathing (including washing, rinsing, drying): A lot  Putting on and taking off regular upper body clothing: A lot  Toileting, which includes using toilet, bedpan or urinal: A lot  Taking care of personal grooming such as brushing teeth: A little  Eating Meals: A little  Daily Activity - Total Score: 14        Education Documentation  Body Mechanics, taught by GABBY Baldwin at 4/15/2024  1:15 PM.  Learner: Patient  Readiness:  Acceptance  Method: Explanation, Demonstration  Response: Needs Reinforcement    ADL Training, taught by GABBY Baldwin at 4/15/2024  1:15 PM.  Learner: Patient  Readiness: Acceptance  Method: Explanation, Demonstration  Response: Needs Reinforcement    Education Comments  Education provided on role of OT/POC, safety awareness throughout functional tasks/transfers, importance of activity/ rest routine, and use of call light for assistance. Questions, comments and concerns addressed regarding OT.      Goals:  Encounter Problems       Encounter Problems (Active)       ADLs       Patient with complete lower body dressing with minimal assist  level of assistance donning and doffing all LE clothes  with PRN adaptive equipment while edge of bed  (Progressing)       Start:  04/11/24    Expected End:  04/17/24            Patient will complete daily grooming tasks with supervision level of assistance and PRN adaptive equipment while standing. (Progressing)       Start:  04/11/24    Expected End:  04/17/24            Patient will complete toileting including hygiene clothing management/hygiene with minimal assist  level of assistance and raised toilet seat and grab bars. (Progressing)       Start:  04/11/24    Expected End:  04/17/24               MOBILITY       Patient will perform Functional mobility max Household distances/Community Distances with contact guard assist level of assistance and least restrictive device in order to improve safety and functional mobility. (Progressing)       Start:  04/11/24    Expected End:  04/17/24               TRANSFERS       Patient will complete functional transfer to toilet/commode with least restrictive device with contact guard assist level of assistance. (Progressing)       Start:  04/11/24    Expected End:  04/17/24

## 2024-04-15 NOTE — CARE PLAN
The clinical goals for the shift include Continue Q2 turns and get adequate sleep    Over the shift, the patient did make progress toward the following goals.       Problem: Pain  Goal: My pain/discomfort is manageable  4/15/2024 0408 by Marian Garcia RN  Outcome: Progressing  4/15/2024 0408 by Marian Garcia RN  Outcome: Progressing     Problem: Safety  Goal: I will remain free of falls  4/15/2024 0408 by Marian Garcia RN  Outcome: Progressing  4/15/2024 0408 by Marian Garcia RN  Outcome: Progressing     Problem: Daily Care  Goal: Daily care needs are met  4/15/2024 0408 by Marian Garcia RN  Outcome: Progressing  4/15/2024 0408 by Marian Garcia RN  Outcome: Progressing     Problem: Skin  Goal: Prevent/manage excess moisture  4/15/2024 0408 by Marian Garcia RN  Outcome: Progressing  Flowsheets (Taken 4/15/2024 0408)  Prevent/manage excess moisture:   Cleanse incontinence/protect with barrier cream   Follow provider orders for dressing changes   Moisturize dry skin   Monitor for/manage infection if present  4/15/2024 0408 by Marian Garcia RN  Outcome: Progressing  Flowsheets (Taken 4/15/2024 0408)  Prevent/manage excess moisture:   Cleanse incontinence/protect with barrier cream   Follow provider orders for dressing changes   Moisturize dry skin   Monitor for/manage infection if present  Goal: Prevent/minimize sheer/friction injuries  4/15/2024 0408 by Marian Garcia RN  Outcome: Progressing  Flowsheets (Taken 4/15/2024 0408)  Prevent/minimize sheer/friction injuries:   Complete micro-shifts as needed if patient unable. Adjust patient position to relieve pressure points, not a full turn   HOB 30 degrees or less   Increase activity/out of bed for meals   Turn/reposition every 2 hours/use positioning/transfer devices   Use pull sheet  4/15/2024 0408 by Marian Garcia RN  Outcome: Progressing  Flowsheets (Taken 4/15/2024 0408)  Prevent/minimize sheer/friction injuries:   Complete micro-shifts as needed if patient  unable. Adjust patient position to relieve pressure points, not a full turn   HOB 30 degrees or less   Increase activity/out of bed for meals   Turn/reposition every 2 hours/use positioning/transfer devices   Use pull sheet  Goal: Promote/optimize nutrition  4/15/2024 0408 by Marian Garcia RN  Outcome: Progressing  Flowsheets (Taken 4/15/2024 0408)  Promote/optimize nutrition:   Discuss with provider if NPO > 2 days   Reassess MST if dietician not consulted  4/15/2024 0408 by Marian Garcia RN  Outcome: Progressing  Flowsheets (Taken 4/15/2024 0408)  Promote/optimize nutrition:   Discuss with provider if NPO > 2 days   Reassess MST if dietician not consulted  Goal: Promote skin healing  4/15/2024 0408 by Marian Garcia RN  Outcome: Progressing  Flowsheets (Taken 4/15/2024 0408)  Promote skin healing:   Assess skin/pad under line(s)/device(s)   Ensure correct size (line/device) and apply per  instructions   Rotate device position/do not position patient on device   Protective dressings over bony prominences   Turn/reposition every 2 hours/use positioning/transfer devices  4/15/2024 0408 by Marian Garcia RN  Outcome: Progressing  Flowsheets (Taken 4/15/2024 0408)  Promote skin healing:   Assess skin/pad under line(s)/device(s)   Ensure correct size (line/device) and apply per  instructions   Rotate device position/do not position patient on device   Protective dressings over bony prominences   Turn/reposition every 2 hours/use positioning/transfer devices

## 2024-04-15 NOTE — PROGRESS NOTES
"Rebecca Samaniego is a 78 y.o. female on day 4 of admission presenting with frequent falls. She was also found to have an acute left occipital lobe infarct on MRI of the brain.     Subjective   She has been NPO and had just had a swallow evaluation. She has no acute complaints, no pain.      Objective     Physical Exam  General: awake, alert, oriented, responsive  Cardiovascular: regular, normal S1 and S2  Lungs: good air entry bilaterally, clear to auscultation  Abdomen: soft, nontender, bowel sounds present, normoactive  Extremities: no peripheral cyanosis, no pedal edema  Neuro: alert, oriented x 3, no focal weakness    Last Recorded Vitals  Blood pressure 132/61, pulse 79, temperature 36.9 °C (98.4 °F), temperature source Oral, resp. rate 17, height 1.651 m (5' 5\"), weight 67.2 kg (148 lb 2.4 oz), SpO2 99%.  Intake/Output last 3 Shifts:  I/O last 3 completed shifts:  In: 2713.8 (40.4 mL/kg) [I.V.:1898.8 (28.3 mL/kg); IV Piggyback:815]  Out: 125 (1.9 mL/kg) [Urine:125 (0.1 mL/kg/hr)]  Weight: 67.2 kg     Relevant Results    Lab Results   Component Value Date    GLUCOSE 99 04/15/2024    CALCIUM 7.5 (L) 04/15/2024     04/15/2024    K 3.2 (L) 04/15/2024    CO2 19 (L) 04/15/2024     04/15/2024    BUN 16 04/15/2024    CREATININE 1.10 04/15/2024           Assessment/Plan   Principal Problem:    UTI (urinary tract infection)  Active Problems:    Gait instability    Back pain    Leg weakness    Acute on chronic kidney failure (CMS-HCC)    Dehydration    Repeated falls    Stroke (Multi)    Metabolic encephalopathy  Improved  TSH and B12 are within normal limits    Acute cerebrovascular accident  Acute cortical infarct involving the left occipital region  Echo showing EF 60%, no evidence of PFO  Carotid US with <50% stenosis bilaterally  Aspirin, Plavix, Lipitor  Swallow evaluation    Urinary tract infection  Fever and leukocytosis with concern for sepsis  Urine culture positive for E. coli  Blood cultures from " 4/13 negative  On IV vancomycin and Zosyn.  ID following    Repeated falls  PT/OT    Lumbar radiculopathy  Follows with orthopedic surgeon as outpatient    Acute kidney injury on chronic kidney disease  Resolved, stable    Elevated troponin  Specter secondary to ROSE and demand ischemia  Low suspicion for acute coronary syndrome    Hypokalemia  Replace as needed    Plan  Swallow evaluation done, tolerated liquids and solids, starting a diet.   Plan to discharge to a SNF when arrangements are complete.               Coleen Santos MD

## 2024-04-15 NOTE — PROGRESS NOTES
Subjective Data:      Overnight Events:         Objective Data:  Last Recorded Vitals:  Vitals:    04/14/24 1621 04/14/24 2050 04/15/24 0024 04/15/24 0657   BP: 139/67 119/52 (!) 117/43    BP Location: Right arm Right arm Right arm    Patient Position: Lying Lying Lying    Pulse: 89 90 76    Resp: 18 21 19    Temp: 36.5 °C (97.7 °F) 37.1 °C (98.8 °F) 36.8 °C (98.2 °F)    TempSrc: Oral Temporal Temporal    SpO2: 98% 96% 98%    Weight:    67.2 kg (148 lb 2.4 oz)   Height:           Last Labs:  CBC - 4/14/2024:  5:18 AM  10.1 8.8 174    25.9      CMP - 4/15/2024:  5:10 AM  7.5 5.9 17 --- 0.4   _ 3.2 10 71      PTT - No results in last year.  _   _ _     HGBA1C   Date/Time Value Ref Range Status   04/12/2024 07:46 AM 5.2 See below % Final   05/16/2023 03:49 PM 5.9 % Final     Comment:          Diagnosis of Diabetes-Adults   Non-Diabetic: < or = 5.6%   Increased risk for developing diabetes: 5.7-6.4%   Diagnostic of diabetes: > or = 6.5%  .       Monitoring of Diabetes                Age (y)     Therapeutic Goal (%)   Adults:          >18           <7.0   Pediatrics:    13-18           <7.5                   7-12           <8.0                   0- 6            7.5-8.5   American Diabetes Association. Diabetes Care 33(S1), Jan 2010.     04/26/2022 12:56 PM 6.0 % Final     Comment:          Diagnosis of Diabetes-Adults   Non-Diabetic: < or = 5.6%   Increased risk for developing diabetes: 5.7-6.4%   Diagnostic of diabetes: > or = 6.5%  .       Monitoring of Diabetes                Age (y)     Therapeutic Goal (%)   Adults:          >18           <7.0   Pediatrics:    13-18           <7.5                   7-12           <8.0                   0- 6            7.5-8.5   American Diabetes Association. Diabetes Care 33(S1), Jan 2010.       LDLCALC   Date/Time Value Ref Range Status   04/12/2024 07:46  65 - 130 mg/dL Final     VLDL   Date/Time Value Ref Range Status   02/03/2021 11:42 AM 45 0 - 40 mg/dL Final      Last  "I/O:  I/O last 3 completed shifts:  In: 2713.8 (40.4 mL/kg) [I.V.:1898.8 (28.3 mL/kg); IV Piggyback:815]  Out: 125 (1.9 mL/kg) [Urine:125 (0.1 mL/kg/hr)]  Weight: 67.2 kg     Past Cardiology Tests (Last 3 Years):  EKG:  ECG 12 lead 04/10/2024    Echo:  Transthoracic Echo (TTE) Complete 04/12/2024    Ejection Fractions:  No results found for: \"EF\"  Cath:  No results found for this or any previous visit from the past 1095 days.    Stress Test:  No results found for this or any previous visit from the past 1095 days.    Cardiac Imaging:  No results found for this or any previous visit from the past 1095 days.      Inpatient Medications:  Scheduled medications   Medication Dose Route Frequency    aspirin  81 mg oral Daily    atorvastatin  40 mg oral Nightly    bacitracin   Topical TID    clopidogrel  75 mg oral Daily    heparin (porcine)  5,000 Units subcutaneous q12h    metoprolol succinate XL  50 mg oral Daily    pantoprazole  40 mg oral Daily before breakfast    Or    pantoprazole  40 mg intravenous Daily before breakfast    piperacillin-tazobactam  3.375 g intravenous q6h    potassium chloride  20 mEq intravenous q2h    vancomycin-diluent combo no.1  750 mg intravenous q24h     PRN medications   Medication    acetaminophen    Or    acetaminophen    Or    acetaminophen    benzocaine-menthol    dextromethorphan-guaifenesin    guaiFENesin    loperamide    metoprolol    ondansetron ODT    Or    ondansetron    polyethylene glycol    vancomycin     Continuous Medications   Medication Dose Last Rate    potassium lwiwjmg-N2-6.45%NaCl  75 mL/hr 75 mL/hr (04/15/24 0616)       Physical Exam:    Principal Problem:    UTI (urinary tract infection)  Active Problems:    Gait instability    Back pain    Leg weakness    Acute on chronic kidney failure (CMS-HCC)    Dehydration    Repeated falls    Stroke (Multi)     4/11: The patient is a 78-year-old white female whose past medical history includes hypertension, chronic kidney " disease, former alcohol excess, status post bioprosthetic aortic valve replacement utilizing a number 23 mm freestyle bioprosthesis along with replacement of the ascending aorta and transverse hemiarch using a number 24 mm Gelweave graft plus tricuspid valve repair utilizing number 21 mm [place] ring plus 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  This patient has done very well since her cardiac surgery with her last surveillance echocardiogram on 9/25/2023 showing an LV ejection fraction of 55-60% 1-2+ mitral valve regurgitation normal-appearing stentless aortic valve bioprosthesis with a peak systolic gradient of 11 mmHg and a prosthetic graft in the aortic root and ascending/arch position.  Patient is currently admitted with failure to thrive inability to provide self-care difficulty walking which appears to be related to progressing dementia.  Initial head CT did not show new CVA.  Urine culture pending to rule out UTI as a cause of delirium and the patient has already been started on empiric IV Rocephin.  The patient's atenolol 50 mg daily will be switched to Toprol-XL 50 mg daily.  The minor elevation in high-sensitivity troponin is nondiagnostic no clinical concern at this point for an acute coronary syndrome.  Physical therapy has been consulted and most likely patient will need skilled nursing home placement unless the delirium substantially improves with antibiotic therapy.     4/12: Patient is somewhat more alert and aware but remains generally confused.  MRI of the brain actually confirms the occurrence of a small acute cortical infarct involving the left occipital lobe.  She has moderate chronic small vessel ischemic disease with possible tiny insults of the deep white matter in the right frontal lobe bilateral basal ganglia and cerebellum plus moderate to advanced brain atrophy.  MRI of the lumbar spine demonstrated revision extension of posterior spinal fusion and decompression with L2-L5  posterior fusion/decompression findings.  There is new adjacent segment of disease at the L1-L2 segment with severe degenerative discogenic change and an element of epidural lipomatosis contributing to severe spinal canal stenosis.  There was moderate bilateral neuroforaminal narrowing at this level as well.  There were is also left subarticular disc osteophyte protrusions with moderate left neuroforaminal narrowing.  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  The patient's echocardiogram demonstrates a preserved LV ejection fraction at 60% with a normal-appearing stentless bioprosthetic aortic valve replacement with a minimal systolic pressure gradient.  There is evidence of a prior tricuspid valve repair with only mild to moderate tricuspid valve regurgitation.  There is trace to mild mitral valve regurgitation.  Bubble study was negative for any patent foramen ovale or residual VSD which was closed at time of her operative procedure.   is aware that the patient will require skilled nursing facility placement.  The patient currently is n.p.o. and appears to be clinically dry and will begin low-dose replacement IV fluids.     4/13: Patient remains confused, however she did awaken to verbal command, she was able to answer some questions appropriately.  There is no acute distress at time my assessment.  She is chest pain-free.  She does have some trace pedal ankle edema.  Otherwise euvolemic breathing comfortably on room air.  Continues to receive her IV antibiotics for acute lower urinary tract infection.  Otherwise her blood pressure stable albeit mildly hypertensive with most recent of 159/78, she has yet to receive her morning medications.  Creatinine stable at 0.9.  Hemoglobin 9.8.  White blood cell count 11.7.  Generally stable from a cardiac perspective.  No significant events on telemetry, remains in sinus rhythm.  Will  follow with you.     4/14: Appears improved from previous day, her mentation does appear improved, she is alert and awake, she is able to hold a conversation although much of it is confabulation.  Her blood pressure has remained stable, on the telemetry monitor her heart rate has improved initially and she is now trending in 80s in sinus rhythm.  There is no significant ectopy.  Her potassium is mildly low at 3.2.  Will order oral repletion.  Gotten stable at 1.2.  Hemoglobin has mildly decreased to 8.8.  Overall the patient appears to be improving.  She is euvolemic on examination.   pending transfer to rehab.    4/15: Patient lying in bed comfortable without any complaints.  She denies shortness of breath or chest pain.  She was evidently able to take a small amount of pudding but is scheduled to have a more formal swallowing evaluation today.  Will discontinue supplemental IV fluids at this time pending observation of her oral intake.  Stable sinus rhythm at in the 70s per minute with the resumption of oral metoprolol.  Renal parameters stable creatinine 1.1.  Patient continues to require potassium supplementation.  Patient being prepared for transfer to a rehab facility.        Assessment/Plan     Peripheral IV 04/11/24 20 G  Left Antecubital (Active)   Site Assessment Clean;Dry;Intact 04/14/24 2044   Dressing Status Clean;Dry;Occlusive 04/14/24 2044   Number of days: 4       Peripheral IV 04/14/24 20 G Right;Posterior Wrist (Active)   Site Assessment Clean;Dry;Intact 04/14/24 2044   Dressing Status Clean;Dry;Occlusive 04/14/24 2044   Number of days: 1       Code Status:  Full Code    I spent 20 minutes in the professional and overall care of this patient.        Shaheed Martin MD

## 2024-04-15 NOTE — PROGRESS NOTES
Speech-Language Pathology    Speech-Language Pathology Clinical Swallow Treatment    Patient Name: Rebecca Samaniego  MRN: [id number]  : 1945  Today's Date: 04/15/24  Start time: Start Time: 845  Stop time: Stop Time: 902  Time calculation (min) : Time Calculation (min): 17 min    ASSESSMENT  SLP TX Intervention Outcome: Making Progress Towards Goals  Treatment Tolerance: Patient tolerated treatment well    Impressions: Pt safely tolerated thin liquids, puree and solids without s/s apiration, No furhter tx indicated    PLAN  Recommended solid consistency: Regular (IDDSI level 7)  Recommended liquid consistency: Thin (IDDSI 0)  Recommended medication administration: Whole, in thin liquid, in puree  Safe swallow strategies: Upright positioning for all PO intake  Discharge recommendation: Home with no further SLP  Inpatient/Swing Bed or Outpatient: Inpatient  SLP TX Plan: Discharge from Speech Therapy  SLP Plan: No skilled SLP    SUBJECTIVE  Prior to Session Communication: Bedside nurse  RN cleared pt to participate in session and reported pt alert and oriented  Respiratory status: Room air  Positioning: Upright in bed  Pt was alert, pleasant, and cooperative for session.    Pain Assessment  Pain Assessment: 0-10  Pain Score: 0 - No pain       Orientation: Oriented to self, Oriented to hospital but not name of facility, Oriented to month, and Oriented to year  Ability to follow functional commands: WFL    OBJECTIVE  Therapeutic Swallow  Therapeutic Swallow Intervention : PO Trials  Liquid Diet Recommendations: regular and thin liquids  Swallow Comments: Pt consumed the following trials: 4 oz thin liquids via straw, single and consecutive sips, 2 oz applesauce, 2 marizol crackers. Mastication WFL, adequate oral clearance, laryngeal elevation palpated, no overt s/s aspiration. Pt refused to feed self, SLP fed. Pt does not require further skilled intervention    Treatment/Education:  Results and recommendations were  relayed to: Patient and Bedside nurse  Education provided: Yes   Learner: Patient   Barriers to learning: Cognitive limitations barrier   Method of teaching: Verbal   Topic: Role of ST, results of assessment, risk for aspiration, recommended diet modifications, recommendation for MBSS, recommended safe swallow strategies, and recommendation for dysphagia follow-up   Outcome of teaching: Pt demonstrated partial understanding    Goals: DYSPHAGIA GOALS (established 4/13, anticipated end 4/20):  Ongoing reassessment with SLP only via p.o. trials as pt is able to participate. (Baseline: refusal)    Status: Goal met   Progress this date: Pt reassessed, cooperative.  Pt safely tolerated thin liquids, puree and solids without s/s apiration, No furhter tx indicated

## 2024-04-15 NOTE — PROGRESS NOTES
Spiritual Care Visit    Clinical Encounter Type  Visited With: Patient  Routine Visit: Follow-up  Continue Visiting: Yes         Values/Beliefs  Spiritual Requests During Hospitalization: [place] today     Neal Das

## 2024-04-15 NOTE — PROGRESS NOTES
"Vancomycin Dosing by Pharmacy- FOLLOW UP    Rebecca Samaniego is a 78 y.o. year old female who Pharmacy has been consulted for vancomycin dosing for other UTI . Based on the patient's indication and renal status this patient is being dosed based on a goal AUC of 400-600.     Renal function is currently stable.  Scr = 1.1 -CRCL ~ 37.3 ml/min    Current vancomycin dose: 750 mg given every 24 hours    Estimated vancomycin AUC on current dose: 484 mg/L.hr     Visit Vitals  BP (!) 117/43 (BP Location: Right arm, Patient Position: Lying)   Pulse 76   Temp 36.8 °C (98.2 °F) (Temporal)   Resp 19        Lab Results   Component Value Date    CREATININE 1.10 04/15/2024    CREATININE 1.20 04/14/2024    CREATININE 0.90 04/13/2024    CREATININE 1.00 04/12/2024        Patient weight is No results found for: \"PTWEIGHT\"    No results found for: \"CULTURE\"     I/O last 3 completed shifts:  In: 2713.8 (40.4 mL/kg) [I.V.:1898.8 (28.3 mL/kg); IV Piggyback:815]  Out: 125 (1.9 mL/kg) [Urine:125 (0.1 mL/kg/hr)]  Weight: 67.2 kg   [unfilled]    No results found for: \"PATIENTTEMP\"     Assessment/Plan    Within goal AUC range. Continue current vancomycin regimen.    This dosing regimen is predicted by Insight[employer] to result in the following pharmacokinetic parameters:    Regimen: 750 mg IV every 24 hours.  Start time: 09:46 on 04/15/2024  Exposure target: AUC24 (range)400-600 mg/L.hr   AUC24,ss: 484 mg/L.hr  Probability of AUC24 > 400: 91 %  Ctrough,ss: 14.9 mg/L  Probability of Ctrough,ss > 20: 8 %  Probability of nephrotoxicity (Lodise DARIN 2009): 10 %    The next level will be obtained on 4/18 at 0500. May be obtained sooner if clinically indicated.   Will continue to monitor renal function daily while on vancomycin and order serum creatinine at least every 48 hours if not already ordered.  Follow for continued vancomycin needs, clinical response, and signs/symptoms of toxicity.       [facility], located [facility]           "

## 2024-04-15 NOTE — PROGRESS NOTES
Physical Therapy    Physical Therapy Treatment    Patient Name: Rebecca Samaniego  MRN: [id number]  Today's Date: 4/15/2024  Time Calculation  Start Time: 1050  Stop Time: 1120  Time Calculation (min): 30 min       Assessment/Plan   PT Assessment  PT Assessment Results: Decreased strength, Decreased endurance, Impaired balance, Decreased mobility, Pain  Rehab Prognosis: Good  Evaluation/Treatment Tolerance: Patient limited by fatigue  Barriers to Participation: Comorbidities  End of Session Communication: Bedside nurse  Assessment Comment: pt demonstrating decreased BLE strength, impaired balance, decreased tolerance to activity.  pt is a high fall risk at this time. requires mod A<>max A at this time.  End of Session Patient Position: Up in chair, Alarm on  PT Plan  Inpatient/Swing Bed or Outpatient: Inpatient  PT Plan  Treatment/Interventions: Bed mobility, Transfer training, Gait training, Balance training, Stair training, Strengthening, Endurance training, Therapeutic exercise, Therapeutic activity  PT Plan: Skilled PT  PT Frequency: 4 times per week  PT Discharge Recommendations: Moderate intensity level of continued care  Equipment Recommended upon Discharge: Wheeled walker  PT Recommended Transfer Status: Assist x1, Assist x2, Assistive device  PT - OK to Discharge: Yes      General Visit Information:   PT  Visit  PT Received On: 04/15/24  Response to Previous Treatment: Patient with no complaints from previous session.  General  Reason for Referral: 78-year-old female presenting to the emergency department for evaluation of frequent falls and generalized weakness. pt reports this has been going on for several months. denies paresthesias or pain radiating to BLEs. denies bladder changes.  Referred By: Lina Pena MD (P)  Past Medical History Relevant to Rehab: gait instability, back pain, lumbar laminectomy, CKD, falls  Missed Visit: No  Missed Visit Reason: Other (Comment) (Attempted to see pt 2x throughout  am. 1st attempt RN in to assess and pass meds. 2nd attempt PICC nurse present.)  Family/Caregiver Present: No  Prior to Session Communication: Bedside nurse  Patient Position Received: Bed, 2 rail up, Alarm off, not on at start of session  Preferred Learning Style: verbal, visual  General Comment: Cleared by RN, NAD, agreeable to PT    Subjective   Precautions:  Precautions  Hearing/Visual Limitations: +reading glasses  Medical Precautions: Fall precautions  Vital Signs:     Objective   Pain:  Pain Assessment  Pain Score: 0 - No pain  Cognition:  Cognition  Overall Cognitive Status: Within Functional Limits  Orientation Level: Disoriented to time, Disoriented to situation  Insight: Moderate  Processing Speed: Delayed  Postural Control:  Postural Control  Posture Comment: forward head and rounded shoulders  Static Sitting Balance  Static Sitting-Balance Support: Feet supported, Bilateral upper extremity supported  Static Sitting-Level of Assistance: Close supervision  Static Sitting-Comment/Number of Minutes: EOB 5 min  Dynamic Sitting Balance  Dynamic Sitting-Balance Support: Feet supported, Left upper extremity supported  Dynamic Sitting-Balance: Forward lean, Lateral lean  Dynamic Sitting-Comments: Fair  Static Standing Balance  Static Standing-Balance Support: Bilateral upper extremity supported  Static Standing-Level of Assistance: Maximum assistance  Static Standing-Comment/Number of Minutes: Poor  Dynamic Standing Balance  Dynamic Standing-Balance Support: Bilateral upper extremity supported  Dynamic Standing-Balance: Turning  Dynamic Standing-Comments: MaxA     Activity Tolerance:  Activity Tolerance  Endurance: Decreased tolerance for upright activites  Activity Tolerance Comments: Rest breaks for fatigue intermittently with good recovery  Rate of Perceived Exertion (RPE): 5  Treatments:  Therapeutic Exercise  Therapeutic Exercise Performed: Yes  Therapeutic Exercise Activity 1: B AP x10  Therapeutic  Exercise Activity 2: B SAQ x10  Therapeutic Exercise Activity 3: Seated in straight back chair. (Max verbal/visual cueing for technique and completion.)    Therapeutic Activity  Therapeutic Activity Performed: Yes  Therapeutic Activity 1: Functional mobility, transfers, activity tolerance.    Bed Mobility  Bed Mobility: Yes  Bed Mobility 1  Bed Mobility 1: Supine to sitting, Scooting  Level of Assistance 1: Moderate assistance  Bed Mobility Comments 1: HOB elevated, side rail, draw sheet for assist to complete trunk up and BLE's off bed.    Ambulation/Gait Training  Ambulation/Gait Training Performed: No  Transfers  Transfer: Yes  Transfer 1  Transfer From 1: Bed to  Transfer to 1: Chair with arms  Technique 1: Stand pivot, To left  Transfer Level of Assistance 1: Maximum assistance, Maximum verbal cues  Trials/Comments 1: Cues to complete trunk up and for hand placement.  Transfers 2  Transfer From 2: Chair with arms to  Transfer to 2: Chair with arms  Technique 2: Sit to stand, Stand to sit  Transfer Level of Assistance 2: Moderate assistance, +2  Trials/Comments 2: second stand trial for positioning and mallory hygeine as needed for small amount stool incont.    Stairs  Stairs: No    Outcome Measures:  [facility] Basic Mobility  Turning from your back to your side while in a flat bed without using bedrails: A lot  Moving from lying on your back to sitting on the side of a flat bed without using bedrails: A lot  Moving to and from bed to chair (including a wheelchair): A lot  Standing up from a chair using your arms (e.g. wheelchair or bedside chair): A lot  To walk in hospital room: Total  Climbing 3-5 steps with railing: Total  Basic Mobility - Total Score: 10    OP EDUCATION:  Outpatient Education  Individual(s) Educated: Patient  Education Provided: Body Mechanics, Fall Risk, Posture  Patient/Caregiver Demonstrated Understanding: no    Encounter Problems       Encounter Problems (Active)       PT Problem       Patient  will transfer supine <> sit modified independently  (Progressing)       Start:  04/11/24    Expected End:  04/17/24            Patient will transfer sit <> stand modified independently and use of rolling walker  (Progressing)       Start:  04/11/24    Expected End:  04/17/24            Patient will ambulate 50 ft with minimal assist and use of rolling walker  (Not Progressing)       Start:  04/11/24    Expected End:  04/17/24            .Patient will demonstrate improvements in strength  (Progressing)       Start:  04/11/24    Expected End:  04/17/24               Pain - Adult

## 2024-04-15 NOTE — PROGRESS NOTES
04/15/24 1433   Discharge Planning   Patient expects to be discharged to: [facility]     Spoke to patients [facility] and they would like LTC after SNF   Facility updated   Patient does not need precert

## 2024-04-15 NOTE — PROGRESS NOTES
Vancomycin Dosing by Pharmacy- Cessation of Therapy    Consult to pharmacy for vancomycin dosing has been discontinued by the prescriber, pharmacy will sign off at this time.    Please call pharmacy if there are further questions or re-enter a consult if vancomycin is resumed.     Bria Jacques, PharmD

## 2024-04-16 VITALS
HEART RATE: 72 BPM | HEIGHT: 65 IN | WEIGHT: 146.61 LBS | RESPIRATION RATE: 21 BRPM | SYSTOLIC BLOOD PRESSURE: 160 MMHG | TEMPERATURE: 98.1 F | BODY MASS INDEX: 24.43 KG/M2 | DIASTOLIC BLOOD PRESSURE: 71 MMHG | OXYGEN SATURATION: 98 %

## 2024-04-16 LAB
ANION GAP SERPL CALC-SCNC: 11 MMOL/L
BUN SERPL-MCNC: 13 MG/DL (ref 8–25)
CALCIUM SERPL-MCNC: 7.8 MG/DL (ref 8.5–10.4)
CHLORIDE SERPL-SCNC: 106 MMOL/L (ref 97–107)
CO2 SERPL-SCNC: 20 MMOL/L (ref 24–31)
CREAT SERPL-MCNC: 1 MG/DL (ref 0.4–1.6)
EGFRCR SERPLBLD CKD-EPI 2021: 57 ML/MIN/1.73M*2
GLUCOSE SERPL-MCNC: 91 MG/DL (ref 65–99)
HOLD SPECIMEN: NORMAL
POTASSIUM SERPL-SCNC: 3.8 MMOL/L (ref 3.4–5.1)
SODIUM SERPL-SCNC: 137 MMOL/L (ref 133–145)

## 2024-04-16 PROCEDURE — 2500000001 HC RX 250 WO HCPCS SELF ADMINISTERED DRUGS (ALT 637 FOR MEDICARE OP): Performed by: INTERNAL MEDICINE

## 2024-04-16 PROCEDURE — 2500000004 HC RX 250 GENERAL PHARMACY W/ HCPCS (ALT 636 FOR OP/ED): Performed by: INTERNAL MEDICINE

## 2024-04-16 PROCEDURE — 80048 BASIC METABOLIC PNL TOTAL CA: CPT | Performed by: INTERNAL MEDICINE

## 2024-04-16 PROCEDURE — 2500000004 HC RX 250 GENERAL PHARMACY W/ HCPCS (ALT 636 FOR OP/ED): Mod: JZ | Performed by: INTERNAL MEDICINE

## 2024-04-16 PROCEDURE — 99232 SBSQ HOSP IP/OBS MODERATE 35: CPT | Performed by: INTERNAL MEDICINE

## 2024-04-16 PROCEDURE — 36415 COLL VENOUS BLD VENIPUNCTURE: CPT | Performed by: INTERNAL MEDICINE

## 2024-04-16 PROCEDURE — 2500000001 HC RX 250 WO HCPCS SELF ADMINISTERED DRUGS (ALT 637 FOR MEDICARE OP): Performed by: STUDENT IN AN ORGANIZED HEALTH CARE EDUCATION/TRAINING PROGRAM

## 2024-04-16 RX ORDER — BACITRACIN 500 [USP'U]/G
OINTMENT TOPICAL 3 TIMES DAILY
Start: 2024-04-16 | End: 2024-05-24 | Stop reason: HOSPADM

## 2024-04-16 RX ORDER — METOPROLOL SUCCINATE 50 MG/1
50 TABLET, EXTENDED RELEASE ORAL DAILY
Start: 2024-04-17 | End: 2024-05-24 | Stop reason: HOSPADM

## 2024-04-16 RX ORDER — METOPROLOL SUCCINATE 50 MG/1
50 TABLET, EXTENDED RELEASE ORAL DAILY
Start: 2024-04-17 | End: 2024-04-16 | Stop reason: HOSPADM

## 2024-04-16 RX ORDER — CEPHALEXIN 500 MG/1
500 CAPSULE ORAL EVERY 8 HOURS
Start: 2024-04-16 | End: 2024-04-17

## 2024-04-16 RX ORDER — ATORVASTATIN CALCIUM 40 MG/1
40 TABLET, FILM COATED ORAL NIGHTLY
Status: ON HOLD
Start: 2024-04-16 | End: 2024-05-22

## 2024-04-16 RX ORDER — NAPROXEN SODIUM 220 MG/1
81 TABLET, FILM COATED ORAL DAILY
Status: ON HOLD
Start: 2024-04-17 | End: 2024-05-22

## 2024-04-16 RX ORDER — CEPHALEXIN 500 MG/1
500 CAPSULE ORAL EVERY 8 HOURS
Qty: 6 CAPSULE | Refills: 0 | Status: DISCONTINUED | OUTPATIENT
Start: 2024-04-16 | End: 2024-04-16 | Stop reason: HOSPADM

## 2024-04-16 RX ORDER — CALCIUM CARBONATE 500(1250)
1250 TABLET ORAL
Status: DISCONTINUED | OUTPATIENT
Start: 2024-04-16 | End: 2024-04-16 | Stop reason: HOSPADM

## 2024-04-16 RX ORDER — CLOPIDOGREL BISULFATE 75 MG/1
75 TABLET ORAL DAILY
Status: ON HOLD
Start: 2024-04-17 | End: 2024-05-22

## 2024-04-16 RX ADMIN — CLOPIDOGREL BISULFATE 75 MG: 75 TABLET ORAL at 09:32

## 2024-04-16 RX ADMIN — ASPIRIN 81 MG CHEWABLE TABLET 81 MG: 81 TABLET CHEWABLE at 09:32

## 2024-04-16 RX ADMIN — BACITRACIN: 500 OINTMENT TOPICAL at 09:00

## 2024-04-16 RX ADMIN — CEFAZOLIN SODIUM 1 G: 1 INJECTION, SOLUTION INTRAVENOUS at 04:54

## 2024-04-16 RX ADMIN — METOPROLOL SUCCINATE 50 MG: 50 TABLET, EXTENDED RELEASE ORAL at 09:32

## 2024-04-16 RX ADMIN — CALCIUM 1250 MG: 500 TABLET ORAL at 09:32

## 2024-04-16 RX ADMIN — CEPHALEXIN 500 MG: 500 CAPSULE ORAL at 09:42

## 2024-04-16 ASSESSMENT — PAIN - FUNCTIONAL ASSESSMENT: PAIN_FUNCTIONAL_ASSESSMENT: 0-10

## 2024-04-16 ASSESSMENT — PAIN SCALES - GENERAL: PAINLEVEL_OUTOF10: 0 - NO PAIN

## 2024-04-16 NOTE — PROGRESS NOTES
04/16/24 1130   Discharge Planning   [facility] expects to be discharged [facility]: [facility]   Has discharge transport been arranged? Yes   What day is the transport expected? 04/16/24   What time is the transport expected? 1230     Spouse updated, states he is unhappy he did not know they pt was going to discharge so soon, updated Dr ryder who states he tried to update spouse via phone with no answer and updated daughter at bedside  [place] and AVS sent to facility   7000 completed   Rn made aware and given report number     **Patient has a  safe discharge plan**

## 2024-04-16 NOTE — DISCHARGE SUMMARY
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       atorvastatin 40 mg tablet  Commonly known as: Lipitor      Take 1 tablet (40 mg) by mouth once daily at bedtime.       bacitracin 500 unit/gram ointment      Apply topically 3 times a day. Apply to legs when she has excoriations       cephalexin 500 mg capsule  Commonly known as: Keflex      Take 1 capsule (500 mg) by mouth every 8 hours for 1 day.       clopidogrel 75 mg tablet  Commonly known as: Plavix  Start taking on: April 17, 2024      Take 1 tablet (75 mg) by mouth once daily.       metoprolol succinate XL 50 mg 24 hr tablet  Commonly known as: Toprol-XL  Start taking on: April 17, 2024      Take 1 tablet (50 mg) by mouth once daily. Do not crush or chew.              CONTINUE taking these medications        Instructions Last Dose Given Next Dose Due   atenolol 50 mg tablet  Commonly known as: Tenormin                  STOP taking these medications      predniSONE 10 mg tablet  Commonly known as: Deltasone        traMADol 50 mg tablet  Commonly known as: Ultram                  Where to Get Your Medications        Information about where to get these medications is not yet available    Ask your nurse or doctor about these medications  aspirin 81 mg chewable tablet  atorvastatin 40 mg tablet  bacitracin 500 unit/gram ointment  cephalexin 500 mg capsule  clopidogrel 75 mg tablet  metoprolol succinate XL 50 mg 24 hr tablet         Test Results Pending At Discharge  Pending Labs       Order  Current Status    Extra Urine Gray Tube Collected (04/11/24 0200)    Urinalysis with Reflex Culture and Microscopic In process    Blood Culture Preliminary result    Blood Culture Preliminary result            Hospital Course  78 year old female patient with history of lumbar radiculopathy s/p L2-L5 fusion, CKD3, hx of collitis, HTN, s/p aortic valve replacement who presented with generalized weakness, worsening confusion with hallucinations, inability to care for herself, and frequent falls at home. Workup showing ROSE on CKD3 and elevated troponins. CXR showing mild pulmonary edema.  Urinalysis showed a UTI.  She was admitted for further workup. Started on IV antibiotics. Neurology and cardiology were consulted.  An MRI of the lumbar spine showed severe spinal canal stenosis.  MRI brain showed small acute cortical infarct involving the left occipital lobe, chronic small vessel ischemic disease, and moderate to advanced brain atrophy. Patient was started on aspirin, plavix, and lipitor per stroke protocol. Echo showed EF 60% without evidence of PFO. Carotid US wnl. Hospital course complicated by fever, tachycardia, and new leukocytosis concerning for sepsis. Blood cultures obtained. Started on broad spectrum antibiotics and ID was consulted.  Blood cultures were negative.  A urine culture was positive for E. coli.  Palliative medicine was consulted to discuss goals of care.  The patient remained full code.  She had a swallow evaluation on 4/15 and was placed on a regular diet with thin liquids.  She was clinically and hemodynamically stable.  Arrangements were made for discharge to skilled nursing facility.  She is discharged on 4/16 to [facility].  She will complete a course of cephalexin on 4/17.  The time spent arranging and completing discharge was 35 minutes      Outpatient Follow-Up  Future Appointments   Date Time Provider Department Center   5/3/2024 10:00 AM Sergo Landry MD [id number]   5/21/2024 11:30 AM Kaleb Friedman MD [id number]   7/10/2024  2:45 PM Shaheed Martin MD [address]         Coleen Santos MD

## 2024-04-16 NOTE — CARE PLAN
The patient's goals for the shift include feel better    The clinical goals for the shift include remain free of falls and injuries      Problem: Skin  Goal: Prevent/manage excess moisture  Outcome: Progressing  Flowsheets (Taken 4/16/2024 1259)  Prevent/manage excess moisture:   Cleanse incontinence/protect with barrier cream   Follow provider orders for dressing changes  Goal: Prevent/minimize sheer/friction injuries  Outcome: Progressing  Flowsheets (Taken 4/16/2024 1259)  Prevent/minimize sheer/friction injuries:   Use pull sheet   HOB 30 degrees or less   Turn/reposition every 2 hours/use positioning/transfer devices  Goal: Promote/optimize nutrition  Outcome: Progressing  Flowsheets (Taken 4/16/2024 1259)  Promote/optimize nutrition: Offer water/supplements/favorite foods  Goal: Promote skin healing  Outcome: Progressing  Flowsheets (Taken 4/16/2024 1259)  Promote skin healing: Turn/reposition every 2 hours/use positioning/transfer devices

## 2024-04-16 NOTE — PROGRESS NOTES
Subjective Data:      Overnight Events:       Objective Data:  Last Recorded Vitals:  Vitals:    04/15/24 2359 04/16/24 0453 04/16/24 0710 04/16/24 1045   BP: 125/68 140/68 146/65 160/71   BP Location: Left arm Right arm Left arm Left arm   Patient Position: Lying Lying Lying Lying   Pulse: 108 100 105 72   Resp: 16 16  21   Temp: 37.1 °C (98.8 °F) 36.8 °C (98.2 °F) 36.7 °C (98.1 °F)    TempSrc: Oral Oral Oral Oral   SpO2: 95% 95% 96% 98%   Weight:  66.5 kg (146 lb 9.7 oz)     Height:           Last Labs:  CBC - 4/14/2024:  5:18 AM  10.1 8.8 174    25.9      CMP - 4/16/2024:  4:28 AM  7.8 5.9 17 --- 0.4   _ 3.2 10 71      PTT - No results in last year.  _   _ _     HGBA1C   Date/Time Value Ref Range Status   04/12/2024 07:46 AM 5.2 See below % Final   05/16/2023 03:49 PM 5.9 % Final     Comment:          Diagnosis of Diabetes-Adults   Non-Diabetic: < or = 5.6%   Increased risk for developing diabetes: 5.7-6.4%   Diagnostic of diabetes: > or = 6.5%  .       Monitoring of Diabetes                Age (y)     Therapeutic Goal (%)   Adults:          >18           <7.0   Pediatrics:    13-18           <7.5                   7-12           <8.0                   0- 6            7.5-8.5   American Diabetes Association. Diabetes Care 33(S1), Jan 2010.     04/26/2022 12:56 PM 6.0 % Final     Comment:          Diagnosis of Diabetes-Adults   Non-Diabetic: < or = 5.6%   Increased risk for developing diabetes: 5.7-6.4%   Diagnostic of diabetes: > or = 6.5%  .       Monitoring of Diabetes                Age (y)     Therapeutic Goal (%)   Adults:          >18           <7.0   Pediatrics:    13-18           <7.5                   7-12           <8.0                   0- 6            7.5-8.5   American Diabetes Association. Diabetes Care 33(S1), Jan 2010.       LDLCALC   Date/Time Value Ref Range Status   04/12/2024 07:46  65 - 130 mg/dL Final     VLDL   Date/Time Value Ref Range Status   02/03/2021 11:42 AM 45 0 - 40 mg/dL  "Final      Last I/O:  I/O last 3 completed shifts:  In: 410 (6.2 mL/kg) [IV Piggyback:410]  Out: 100 (1.5 mL/kg) [Urine:100 (0 mL/kg/hr)]  Weight: 66.5 kg     Past Cardiology Tests (Last 3 Years):  EKG:  ECG 12 lead 04/10/2024    Echo:  Transthoracic Echo (TTE) Complete 04/12/2024    Ejection Fractions:  No results found for: \"EF\"  Cath:  No results found for this or any previous visit from the past 1095 days.    Stress Test:  No results found for this or any previous visit from the past 1095 days.    Cardiac Imaging:  No results found for this or any previous visit from the past 1095 days.      Inpatient Medications:  Scheduled medications   Medication Dose Route Frequency    aspirin  81 mg oral Daily    atorvastatin  40 mg oral Nightly    bacitracin   Topical TID    calcium carbonate  1,250 mg oral BID with meals    cephalexin  500 mg oral q8h    clopidogrel  75 mg oral Daily    heparin (porcine)  5,000 Units subcutaneous q12h    metoprolol succinate XL  50 mg oral Daily    pantoprazole  40 mg oral Daily before breakfast    Or    pantoprazole  40 mg intravenous Daily before breakfast     PRN medications   Medication    acetaminophen    Or    acetaminophen    Or    acetaminophen    benzocaine-menthol    dextromethorphan-guaifenesin    guaiFENesin    loperamide    metoprolol    ondansetron ODT    Or    ondansetron    polyethylene glycol     Continuous Medications   Medication Dose Last Rate       Physical Exam:       Assessment/Plan     Principal Problem:    UTI (urinary tract infection)  Active Problems:    Gait instability    Back pain    Leg weakness    Acute on chronic kidney failure (CMS-HCC)    Dehydration    Repeated falls    Stroke (Multi)     4/11: The patient is a 78-year-old white female whose past medical history includes hypertension, chronic kidney disease, former alcohol excess, status post bioprosthetic aortic valve replacement utilizing a number 23 mm freestyle bioprosthesis along with replacement " of the ascending aorta and transverse hemiarch using a number 24 mm Gelweave graft plus tricuspid valve repair utilizing number 21 mm Mohini ring plus 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  This patient has done very well since her cardiac surgery with her last surveillance echocardiogram on 9/25/2023 showing an LV ejection fraction of 55-60% 1-2+ mitral valve regurgitation normal-appearing stentless aortic valve bioprosthesis with a peak systolic gradient of 11 mmHg and a prosthetic graft in the aortic root and ascending/arch position.  Patient is currently admitted with failure to thrive inability to provide self-care difficulty walking which appears to be related to progressing dementia.  Initial head CT did not show new CVA.  Urine culture pending to rule out UTI as a cause of delirium and the patient has already been started on empiric IV Rocephin.  The patient's atenolol 50 mg daily will be switched to Toprol-XL 50 mg daily.  The minor elevation in high-sensitivity troponin is nondiagnostic no clinical concern at this point for an acute coronary syndrome.  Physical therapy has been consulted and most likely patient will need skilled nursing home placement unless the delirium substantially improves with antibiotic therapy.     4/12: Patient is somewhat more alert and aware but remains generally confused.  MRI of the brain actually confirms the occurrence of a small acute cortical infarct involving the left occipital lobe.  She has moderate chronic small vessel ischemic disease with possible tiny insults of the deep white matter in the right frontal lobe bilateral basal ganglia and cerebellum plus moderate to advanced brain atrophy.  MRI of the lumbar spine demonstrated revision extension of posterior spinal fusion and decompression with L2-L5 posterior fusion/decompression findings.  There is new adjacent segment of disease at the L1-L2 segment with severe degenerative discogenic change and an  element of epidural lipomatosis contributing to severe spinal canal stenosis.  There was moderate bilateral neuroforaminal narrowing at this level as well.  There were is also left subarticular disc osteophyte protrusions with moderate left neuroforaminal narrowing.  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  The patient's echocardiogram demonstrates a preserved LV ejection fraction at 60% with a normal-appearing stentless bioprosthetic aortic valve replacement with a minimal systolic pressure gradient.  There is evidence of a prior tricuspid valve repair with only mild to moderate tricuspid valve regurgitation.  There is trace to mild mitral valve regurgitation.  Bubble study was negative for any patent foramen ovale or residual VSD which was closed at time of her operative procedure.   is aware that the patient will require skilled nursing facility placement.  The patient currently is n.p.o. and appears to be clinically dry and will begin low-dose replacement IV fluids.     4/13: Patient remains confused, however she did awaken to verbal command, she was able to answer some questions appropriately.  There is no acute distress at time my assessment.  She is chest pain-free.  She does have some trace pedal ankle edema.  Otherwise euvolemic breathing comfortably on room air.  Continues to receive her IV antibiotics for acute lower urinary tract infection.  Otherwise her blood pressure stable albeit mildly hypertensive with most recent of 159/78, she has yet to receive her morning medications.  Creatinine stable at 0.9.  Hemoglobin 9.8.  White blood cell count 11.7.  Generally stable from a cardiac perspective.  No significant events on telemetry, remains in sinus rhythm.  Will follow with you.     4/14: Appears improved from previous day, her mentation does appear improved, she is alert and awake, she is able to hold a conversation  although much of it is confabulation.  Her blood pressure has remained stable, on the telemetry monitor her heart rate has improved initially and she is now trending in 80s in sinus rhythm.  There is no significant ectopy.  Her potassium is mildly low at 3.2.  Will order oral repletion.  Gotten stable at 1.2.  Hemoglobin has mildly decreased to 8.8.  Overall the patient appears to be improving.  She is euvolemic on examination.   pending transfer to rehab.     4/15: Patient lying in bed comfortable without any complaints.  She denies shortness of breath or chest pain.  She was evidently able to take a small amount of pudding but is scheduled to have a more formal swallowing evaluation today.  Will discontinue supplemental IV fluids at this time pending observation of her oral intake.  Stable sinus rhythm at in the 70s per minute with the resumption of oral metoprolol.  Renal parameters stable creatinine 1.1.  Patient continues to require potassium supplementation.  Patient being prepared for transfer to a rehab facility.    4/16: Patient doing much better much more awake alert conversant visiting with friend and also having music therapy.  She is being prepared for transfer to [facility] for further rehab.  Her renal parameters have returned to baseline with a creatinine of 1.0 potassium 3.8.  Patient seen by infectious disease and is on oral cephalexin to be completed on 4/17.  Her urine cultures were positive for E. coli but the blood cultures from 4/13 were negative.  The patient is now on Toprol-XL 50 mg daily rather than the prior atenolol 50 mg daily and she is on dual antiplatelet therapy with both aspirin and Plavix.  The patient was seen by speech therapy swallow evaluation performed and recommendations is regular food and thin liquids no restriction.  Will have patient return for an outpatient cardiology visit in 3 to 4 weeks after released from rehab.           Peripheral IV 04/11/24 20 G  Left  Antecubital (Active)   Site Assessment Clean;Dry;Intact 04/16/24 0900   Dressing Status Clean;Dry 04/16/24 0900   Number of days: 5       Peripheral IV 04/14/24 20 G Right;Posterior Wrist (Active)   Site Assessment Clean;Dry;Intact 04/16/24 0900   Dressing Status Clean;Dry 04/16/24 0900   Number of days: 2       Code Status:  Full Code    I spent 20 minutes in the professional and overall care of this patient.        Shaheed Martin MD

## 2024-04-16 NOTE — PROGRESS NOTES
"Music Therapy Note    Rebecca Samaniego was referred by     Therapy Session  Referral Type: New referral this admission  Visit Type: New visit  Session Start Time: 1050  Session End Time: 1102  Intervention Delivery: In-person  Conflict of Service: None  Number of family members present: 1  Family Present for Session: Child  Family Participation: Interactive     Pre-assessment  Unable to Assess Reason: Outcomes not assessed  Mood/Affect: Angry/irritable, Cooperative, Participative  Verbalized Emotional State: Frustration         Treatment/Interventions  Areas of Focus: Coping, Normalization  Music Therapy Interventions: Assessment, Active music engagement, Recorded music listening  Interruption: No  Patient Fell Asleep at End of Session: No    Post-assessment  Unable to Assess Reason: Outcomes not evaluated  Mood/Affect: Calm, Appropriate  Verbalized Emotional State: Happiness, Enjoyment, Gratitude  Continue Visiting: Yes  Total Session Time (min): 12 minutes    Narrative  Assessment Detail: Pt was found sitting in bed with daughter present. Pt instantly verbally frusturated about the quality of her breakfast. MT introduced services and pt was welcoming to services. Pt stated liking disco music such as the BeeGees.  Plan: To improve normalization and coping through music intervention  Intervention: MT played recorded versions of multiple BeeGee songs and encouraged pt to shake different shakers.  Evaluation: Pt mood quickly became positive once services were introduced. Pt maintained positive affect throughout session and was participative throughout.  Follow-up: Pt anticipating discharge. WIll follow up pt is still admitted  Patient Comments: \"I can't wait to tell my  about this!\" \"You tell everyone you made my day!\"    Education Documentation  No documentation found.          "

## 2024-04-16 NOTE — CARE PLAN
The clinical goals for the shift include Continue Q2 turns and get adequate sleep    Over the shift, the patient did make progress toward the following goals.      Problem: Pain  Goal: My pain/discomfort is manageable  Outcome: Progressing     Problem: Safety  Goal: I will remain free of falls  Outcome: Progressing     Problem: Daily Care  Goal: Daily care needs are met  Outcome: Progressing     Problem: Skin  Goal: Prevent/manage excess moisture  Outcome: Progressing  Flowsheets (Taken 4/16/2024 0437)  Prevent/manage excess moisture:   Cleanse incontinence/protect with barrier cream   Follow provider orders for dressing changes   Monitor for/manage infection if present   Moisturize dry skin  Goal: Prevent/minimize sheer/friction injuries  Outcome: Progressing  Flowsheets (Taken 4/16/2024 0437)  Prevent/minimize sheer/friction injuries:   Complete micro-shifts as needed if patient unable. Adjust patient position to relieve pressure points, not a full turn   HOB 30 degrees or less   Turn/reposition every 2 hours/use positioning/transfer devices   Increase activity/out of bed for meals   Use pull sheet  Goal: Promote/optimize nutrition  Outcome: Progressing  Flowsheets (Taken 4/16/2024 0437)  Promote/optimize nutrition:   Assist with feeding   Consume > 50% meals/supplements   Monitor/record intake including meals   Offer water/supplements/favorite foods  Goal: Promote skin healing  Outcome: Progressing  Flowsheets (Taken 4/16/2024 0437)  Promote skin healing:   Assess skin/pad under line(s)/device(s)   Ensure correct size (line/device) and apply per  instructions   Rotate device position/do not position patient on device   Protective dressings over bony prominences   Turn/reposition every 2 hours/use positioning/transfer devices

## 2024-04-16 NOTE — NURSING NOTE
Assumed care of pt, pt is awake lying in bed, HR is 86 SR on tele, pt denies pain, bedside shift report given by BECK Beck, bed locked and lowered, call light w/in reach.

## 2024-04-16 NOTE — PROGRESS NOTES
INFECTIOUS DISEASES PROGRESS NOTE    Consulted / following patient for:  Fever/E. coli UTI    Subjective   Interval History:   No specific complaints (except the food is cold), says she is otherwise doing well  Tolerating oral medications  Objective   PHYSICAL EXAMINATION  Vital signs:  Visit Vitals  /65 (BP Location: Left arm, Patient Position: Lying)   Pulse 105   Temp 36.7 °C (98.1 °F) (Oral)   Resp 16      General: Alert and responsive, not toxic  Lungs:  Clear to auscultation  Heart:  S1, S2 normal,  Abdomen:  Soft, nontender.   Extremities:  No cords, phlebitis, cellulitis.  Multiple ecchymoses      Results from last 72 hours   Lab Units 04/16/24  0428   CREATININE mg/dL 1.00   ANION GAP mmol/L 11   EGFR mL/min/1.73m*2 57*     Microbiology:  Blood (4/13): Negative X2  Urine (4/11): Pan-susceptible E. coli    Imaging:  CXR images personally reviewed: No pneumonia      ASSESSMENT:  Fever/E. coli UTI  Afebrile for the last 72 hours.  Feels well.  Exam unremarkable.  Blood cultures sterile.  E. coli is pan-susceptible    PLANS:  -   Change antibiotic to oral cephalexin through 4/17    WILL SIGN OFF.  PLEASE RE-CONSULT PRN.  THANK YOU.    Doni Morales MD  [facility]  Office:  [phone]

## 2024-04-16 NOTE — PROGRESS NOTES
"Rebecca Samaniego is a 78 y.o. female on day 5 of admission presenting with frequent falls. She was also found to have an acute left occipital lobe infarct on MRI of the brain.     Subjective   She is tolerating a diet, she has no acute complaints. Oriented x 3, aware that she is in hospital. She said she has a good appetite but she does not like the food in the hospital.       Objective     Physical Exam  General: awake, alert, oriented, responsive  Cardiovascular: regular, normal S1 and S2  Lungs: good air entry bilaterally, clear to auscultation  Abdomen: soft, nontender, bowel sounds present, normoactive  Extremities: no peripheral cyanosis, no pedal edema  Neuro: alert, oriented x 3, no focal weakness    Last Recorded Vitals  Blood pressure 146/65, pulse 105, temperature 36.7 °C (98.1 °F), temperature source Oral, resp. rate 16, height 1.651 m (5' 5\"), weight 66.5 kg (146 lb 9.7 oz), SpO2 96%.  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 coli  Blood cultures from 4/13 negative  Now on oral cephalexin to complete on 4/17    Repeated falls  PT/OT    Lumbar radiculopathy  Follows with orthopedic surgeon as outpatient    Acute kidney injury on " chronic kidney disease  Resolved, stable    Elevated troponin  Suspect secondary to ROSE and demand ischemia  Low suspicion for acute coronary syndrome    Hypokalemia  Replace as needed    Plan  She is medically stable for discharge to a SNF. She will be going to [facility]  Called her , Kaleb and left a message. Discussed with her daughter, Iliana at the bedside.                Coleen Santos MD

## 2024-04-17 LAB
BACTERIA BLD CULT: NORMAL
BACTERIA BLD CULT: NORMAL

## 2024-04-23 ENCOUNTER — LAB REQUISITION (OUTPATIENT)
Dept: LAB | Facility: HOSPITAL | Age: 79
End: 2024-04-23
Payer: MEDICARE

## 2024-04-23 DIAGNOSIS — R53.1 WEAKNESS: ICD-10-CM

## 2024-04-23 LAB
ANION GAP SERPL CALC-SCNC: 15 MMOL/L (ref 10–20)
BUN SERPL-MCNC: 37 MG/DL (ref 6–23)
CALCIUM SERPL-MCNC: 7.9 MG/DL (ref 8.6–10.3)
CHLORIDE SERPL-SCNC: 104 MMOL/L (ref 98–107)
CO2 SERPL-SCNC: 22 MMOL/L (ref 21–32)
CREAT SERPL-MCNC: 2.09 MG/DL (ref 0.5–1.05)
EGFRCR SERPLBLD CKD-EPI 2021: 24 ML/MIN/1.73M*2
ERYTHROCYTE [DISTWIDTH] IN BLOOD BY AUTOMATED COUNT: 13.2 % (ref 11.5–14.5)
GLUCOSE SERPL-MCNC: 68 MG/DL (ref 74–99)
HCT VFR BLD AUTO: 28.2 % (ref 36–46)
HGB BLD-MCNC: 8.9 G/DL (ref 12–16)
MCH RBC QN AUTO: 32.8 PG (ref 26–34)
MCHC RBC AUTO-ENTMCNC: 31.6 G/DL (ref 32–36)
MCV RBC AUTO: 104 FL (ref 80–100)
NRBC BLD-RTO: 0 /100 WBCS (ref 0–0)
PLATELET # BLD AUTO: 369 X10*3/UL (ref 150–450)
POTASSIUM SERPL-SCNC: 4.7 MMOL/L (ref 3.5–5.3)
RBC # BLD AUTO: 2.71 X10*6/UL (ref 4–5.2)
SODIUM SERPL-SCNC: 136 MMOL/L (ref 136–145)
WBC # BLD AUTO: 10.2 X10*3/UL (ref 4.4–11.3)

## 2024-04-23 PROCEDURE — 80048 BASIC METABOLIC PNL TOTAL CA: CPT

## 2024-04-23 PROCEDURE — 85027 COMPLETE CBC AUTOMATED: CPT

## 2024-04-29 ENCOUNTER — LAB REQUISITION (OUTPATIENT)
Dept: LAB | Facility: HOSPITAL | Age: 79
End: 2024-04-29
Payer: MEDICARE

## 2024-04-29 DIAGNOSIS — R41.82 ALTERED MENTAL STATUS, UNSPECIFIED: ICD-10-CM

## 2024-04-29 LAB
ERYTHROCYTE [DISTWIDTH] IN BLOOD BY AUTOMATED COUNT: 13.1 % (ref 11.5–14.5)
HCT VFR BLD AUTO: 29.2 % (ref 36–46)
HGB BLD-MCNC: 9.3 G/DL (ref 12–16)
MCH RBC QN AUTO: 32.6 PG (ref 26–34)
MCHC RBC AUTO-ENTMCNC: 31.8 G/DL (ref 32–36)
MCV RBC AUTO: 103 FL (ref 80–100)
NRBC BLD-RTO: 0 /100 WBCS (ref 0–0)
PLATELET # BLD AUTO: 298 X10*3/UL (ref 150–450)
RBC # BLD AUTO: 2.85 X10*6/UL (ref 4–5.2)
WBC # BLD AUTO: 11.9 X10*3/UL (ref 4.4–11.3)

## 2024-04-29 PROCEDURE — 85027 COMPLETE CBC AUTOMATED: CPT

## 2024-05-03 ENCOUNTER — APPOINTMENT (OUTPATIENT)
Dept: PAIN MEDICINE | Facility: CLINIC | Age: 79
End: 2024-05-03
Payer: MEDICARE

## 2024-05-09 ENCOUNTER — APPOINTMENT (OUTPATIENT)
Dept: CARDIOLOGY | Facility: HOSPITAL | Age: 79
DRG: 640 | End: 2024-05-09
Payer: MEDICARE

## 2024-05-09 ENCOUNTER — APPOINTMENT (OUTPATIENT)
Dept: RADIOLOGY | Facility: HOSPITAL | Age: 79
DRG: 640 | End: 2024-05-09
Payer: MEDICARE

## 2024-05-09 ENCOUNTER — HOSPITAL ENCOUNTER (INPATIENT)
Facility: HOSPITAL | Age: 79
LOS: 15 days | Discharge: NURSING, REHAB OR LONG-TERM CARE | DRG: 640 | End: 2024-05-24
Attending: STUDENT IN AN ORGANIZED HEALTH CARE EDUCATION/TRAINING PROGRAM | Admitting: INTERNAL MEDICINE
Payer: MEDICARE

## 2024-05-09 DIAGNOSIS — D50.9 IRON DEFICIENCY ANEMIA, UNSPECIFIED IRON DEFICIENCY ANEMIA TYPE: ICD-10-CM

## 2024-05-09 DIAGNOSIS — D64.9 ANEMIA, UNSPECIFIED TYPE: ICD-10-CM

## 2024-05-09 DIAGNOSIS — E43 EDEMA DUE TO MALNUTRITION, DUE TO UNSPECIFIED MALNUTRITION TYPE (MULTI): ICD-10-CM

## 2024-05-09 DIAGNOSIS — E55.9 VITAMIN D DEFICIENCY: ICD-10-CM

## 2024-05-09 DIAGNOSIS — R62.7 FAILURE TO THRIVE IN ADULT: ICD-10-CM

## 2024-05-09 DIAGNOSIS — E87.0 HYPERNATREMIA: ICD-10-CM

## 2024-05-09 DIAGNOSIS — I10 HYPERTENSION, UNSPECIFIED TYPE: ICD-10-CM

## 2024-05-09 DIAGNOSIS — R11.0 NAUSEA: ICD-10-CM

## 2024-05-09 DIAGNOSIS — E87.6 HYPOKALEMIA: ICD-10-CM

## 2024-05-09 DIAGNOSIS — Z79.02 ANTIPLATELET OR ANTITHROMBOTIC LONG-TERM USE: ICD-10-CM

## 2024-05-09 DIAGNOSIS — K59.00 CONSTIPATION, UNSPECIFIED CONSTIPATION TYPE: ICD-10-CM

## 2024-05-09 DIAGNOSIS — R53.1 GENERAL WEAKNESS: Primary | ICD-10-CM

## 2024-05-09 DIAGNOSIS — A04.72 C. DIFFICILE DIARRHEA: ICD-10-CM

## 2024-05-09 DIAGNOSIS — E86.0 DEHYDRATION: ICD-10-CM

## 2024-05-09 DIAGNOSIS — R52 GENERALIZED BODY ACHES: ICD-10-CM

## 2024-05-09 DIAGNOSIS — R52 GENERALIZED PAIN: ICD-10-CM

## 2024-05-09 DIAGNOSIS — I63.9 CEREBROVASCULAR ACCIDENT (CVA), UNSPECIFIED MECHANISM (MULTI): ICD-10-CM

## 2024-05-09 DIAGNOSIS — R60.0 EDEMA OF BOTH UPPER EXTREMITIES: ICD-10-CM

## 2024-05-09 DIAGNOSIS — K29.70 GASTRITIS DETERMINED BY ENDOSCOPY: ICD-10-CM

## 2024-05-09 LAB
ALBUMIN SERPL-MCNC: 2.9 G/DL (ref 3.5–5)
ALP BLD-CCNC: 143 U/L (ref 35–125)
ALT SERPL-CCNC: 5 U/L (ref 5–40)
ANION GAP SERPL CALC-SCNC: 13 MMOL/L
APPEARANCE UR: ABNORMAL
AST SERPL-CCNC: 10 U/L (ref 5–40)
BACTERIA #/AREA URNS AUTO: ABNORMAL /HPF
BASOPHILS # BLD AUTO: 0.06 X10*3/UL (ref 0–0.1)
BASOPHILS NFR BLD AUTO: 0.4 %
BILIRUB SERPL-MCNC: 0.4 MG/DL (ref 0.1–1.2)
BILIRUB UR STRIP.AUTO-MCNC: NEGATIVE MG/DL
BUN SERPL-MCNC: 22 MG/DL (ref 8–25)
CALCIUM SERPL-MCNC: 8.4 MG/DL (ref 8.5–10.4)
CHLORIDE SERPL-SCNC: 117 MMOL/L (ref 97–107)
CO2 SERPL-SCNC: 25 MMOL/L (ref 24–31)
COLOR UR: ABNORMAL
CREAT SERPL-MCNC: 0.8 MG/DL (ref 0.4–1.6)
CREAT UR-MCNC: 75.8 MG/DL
EGFRCR SERPLBLD CKD-EPI 2021: 75 ML/MIN/1.73M*2
EOSINOPHIL # BLD AUTO: 0.27 X10*3/UL (ref 0–0.4)
EOSINOPHIL NFR BLD AUTO: 1.6 %
ERYTHROCYTE [DISTWIDTH] IN BLOOD BY AUTOMATED COUNT: 13.8 % (ref 11.5–14.5)
FLUAV RNA RESP QL NAA+PROBE: NOT DETECTED
FLUBV RNA RESP QL NAA+PROBE: NOT DETECTED
GLUCOSE SERPL-MCNC: 138 MG/DL (ref 65–99)
GLUCOSE UR STRIP.AUTO-MCNC: NORMAL MG/DL
HCT VFR BLD AUTO: 31.7 % (ref 36–46)
HGB BLD-MCNC: 9.7 G/DL (ref 12–16)
IMM GRANULOCYTES # BLD AUTO: 0.21 X10*3/UL (ref 0–0.5)
IMM GRANULOCYTES NFR BLD AUTO: 1.3 % (ref 0–0.9)
KETONES UR STRIP.AUTO-MCNC: NEGATIVE MG/DL
LEUKOCYTE ESTERASE UR QL STRIP.AUTO: ABNORMAL
LIPASE SERPL-CCNC: 66 U/L (ref 16–63)
LYMPHOCYTES # BLD AUTO: 2 X10*3/UL (ref 0.8–3)
LYMPHOCYTES NFR BLD AUTO: 12.1 %
MCH RBC QN AUTO: 31.7 PG (ref 26–34)
MCHC RBC AUTO-ENTMCNC: 30.6 G/DL (ref 32–36)
MCV RBC AUTO: 104 FL (ref 80–100)
MONOCYTES # BLD AUTO: 1.23 X10*3/UL (ref 0.05–0.8)
MONOCYTES NFR BLD AUTO: 7.4 %
MUCOUS THREADS #/AREA URNS AUTO: ABNORMAL /LPF
NEUTROPHILS # BLD AUTO: 12.77 X10*3/UL (ref 1.6–5.5)
NEUTROPHILS NFR BLD AUTO: 77.2 %
NITRITE UR QL STRIP.AUTO: ABNORMAL
NRBC BLD-RTO: 0 /100 WBCS (ref 0–0)
NT-PROBNP SERPL-MCNC: 2220 PG/ML (ref 0–624)
OSMOLALITY UR: 512 MOSM/KG (ref 200–1200)
PH UR STRIP.AUTO: 6 [PH]
PLATELET # BLD AUTO: 233 X10*3/UL (ref 150–450)
POTASSIUM SERPL-SCNC: 2.8 MMOL/L (ref 3.4–5.1)
PROT SERPL-MCNC: 6.2 G/DL (ref 5.9–7.9)
PROT UR STRIP.AUTO-MCNC: ABNORMAL MG/DL
RBC # BLD AUTO: 3.06 X10*6/UL (ref 4–5.2)
RBC # UR STRIP.AUTO: ABNORMAL /UL
RBC #/AREA URNS AUTO: >20 /HPF
RBC MORPH BLD: NORMAL
SARS-COV-2 RNA RESP QL NAA+PROBE: NOT DETECTED
SODIUM SERPL-SCNC: 155 MMOL/L (ref 133–145)
SODIUM UR-SCNC: 89 MMOL/L
SODIUM/CREAT UR-RTO: 117 MMOL/G CREAT
SP GR UR STRIP.AUTO: 1.01
SQUAMOUS #/AREA URNS AUTO: ABNORMAL /HPF
TROPONIN T SERPL-MCNC: 65 NG/L
TROPONIN T SERPL-MCNC: 69 NG/L
TROPONIN T SERPL-MCNC: 74 NG/L
UROBILINOGEN UR STRIP.AUTO-MCNC: NORMAL MG/DL
WBC # BLD AUTO: 16.5 X10*3/UL (ref 4.4–11.3)
WBC #/AREA URNS AUTO: >50 /HPF
WBC CLUMPS #/AREA URNS AUTO: ABNORMAL /HPF

## 2024-05-09 PROCEDURE — 84075 ASSAY ALKALINE PHOSPHATASE: CPT | Performed by: EMERGENCY MEDICINE

## 2024-05-09 PROCEDURE — 96365 THER/PROPH/DIAG IV INF INIT: CPT

## 2024-05-09 PROCEDURE — 84484 ASSAY OF TROPONIN QUANT: CPT | Performed by: EMERGENCY MEDICINE

## 2024-05-09 PROCEDURE — 36415 COLL VENOUS BLD VENIPUNCTURE: CPT | Performed by: EMERGENCY MEDICINE

## 2024-05-09 PROCEDURE — 71045 X-RAY EXAM CHEST 1 VIEW: CPT | Performed by: RADIOLOGY

## 2024-05-09 PROCEDURE — 83935 ASSAY OF URINE OSMOLALITY: CPT | Mod: WESLAB | Performed by: STUDENT IN AN ORGANIZED HEALTH CARE EDUCATION/TRAINING PROGRAM

## 2024-05-09 PROCEDURE — 81001 URINALYSIS AUTO W/SCOPE: CPT | Performed by: EMERGENCY MEDICINE

## 2024-05-09 PROCEDURE — P9612 CATHETERIZE FOR URINE SPEC: HCPCS

## 2024-05-09 PROCEDURE — 83690 ASSAY OF LIPASE: CPT | Performed by: EMERGENCY MEDICINE

## 2024-05-09 PROCEDURE — 1210000001 HC SEMI-PRIVATE ROOM DAILY

## 2024-05-09 PROCEDURE — 2500000004 HC RX 250 GENERAL PHARMACY W/ HCPCS (ALT 636 FOR OP/ED): Performed by: EMERGENCY MEDICINE

## 2024-05-09 PROCEDURE — 2500000001 HC RX 250 WO HCPCS SELF ADMINISTERED DRUGS (ALT 637 FOR MEDICARE OP): Performed by: STUDENT IN AN ORGANIZED HEALTH CARE EDUCATION/TRAINING PROGRAM

## 2024-05-09 PROCEDURE — 82570 ASSAY OF URINE CREATININE: CPT | Performed by: STUDENT IN AN ORGANIZED HEALTH CARE EDUCATION/TRAINING PROGRAM

## 2024-05-09 PROCEDURE — 87086 URINE CULTURE/COLONY COUNT: CPT | Mod: WESLAB | Performed by: EMERGENCY MEDICINE

## 2024-05-09 PROCEDURE — 83930 ASSAY OF BLOOD OSMOLALITY: CPT | Mod: WESLAB | Performed by: STUDENT IN AN ORGANIZED HEALTH CARE EDUCATION/TRAINING PROGRAM

## 2024-05-09 PROCEDURE — 96367 TX/PROPH/DG ADDL SEQ IV INF: CPT

## 2024-05-09 PROCEDURE — 2500000004 HC RX 250 GENERAL PHARMACY W/ HCPCS (ALT 636 FOR OP/ED): Performed by: STUDENT IN AN ORGANIZED HEALTH CARE EDUCATION/TRAINING PROGRAM

## 2024-05-09 PROCEDURE — 87636 SARSCOV2 & INF A&B AMP PRB: CPT | Performed by: EMERGENCY MEDICINE

## 2024-05-09 PROCEDURE — 96366 THER/PROPH/DIAG IV INF ADDON: CPT

## 2024-05-09 PROCEDURE — 99291 CRITICAL CARE FIRST HOUR: CPT | Performed by: STUDENT IN AN ORGANIZED HEALTH CARE EDUCATION/TRAINING PROGRAM

## 2024-05-09 PROCEDURE — 93005 ELECTROCARDIOGRAM TRACING: CPT

## 2024-05-09 PROCEDURE — 85025 COMPLETE CBC W/AUTO DIFF WBC: CPT | Performed by: EMERGENCY MEDICINE

## 2024-05-09 PROCEDURE — 71045 X-RAY EXAM CHEST 1 VIEW: CPT

## 2024-05-09 PROCEDURE — 96361 HYDRATE IV INFUSION ADD-ON: CPT

## 2024-05-09 PROCEDURE — 83880 ASSAY OF NATRIURETIC PEPTIDE: CPT | Performed by: EMERGENCY MEDICINE

## 2024-05-09 RX ORDER — IBUPROFEN 200 MG
1 TABLET ORAL 2 TIMES DAILY
Status: ON HOLD | COMMUNITY
End: 2024-05-22

## 2024-05-09 RX ORDER — FERROUS SULFATE 325(65) MG
65 TABLET, DELAYED RELEASE (ENTERIC COATED) ORAL
COMMUNITY
End: 2024-05-24 | Stop reason: HOSPADM

## 2024-05-09 RX ORDER — ACETAMINOPHEN 650 MG/1
650 SUPPOSITORY RECTAL EVERY 4 HOURS PRN
Status: ON HOLD | COMMUNITY
Start: 2024-04-29 | End: 2024-05-23

## 2024-05-09 RX ORDER — VANCOMYCIN HYDROCHLORIDE 125 MG/1
125 CAPSULE ORAL EVERY 6 HOURS
Status: ON HOLD | COMMUNITY
End: 2024-05-22

## 2024-05-09 RX ORDER — ONDANSETRON 4 MG/1
4 TABLET, FILM COATED ORAL EVERY 6 HOURS PRN
Status: ON HOLD | COMMUNITY
End: 2024-05-22

## 2024-05-09 RX ORDER — ACETAMINOPHEN 325 MG/1
650 TABLET ORAL EVERY 8 HOURS PRN
Status: ON HOLD | COMMUNITY
Start: 2024-05-07 | End: 2024-05-22

## 2024-05-09 RX ORDER — FLUCONAZOLE 100 MG/1
100 TABLET ORAL DAILY
COMMUNITY
End: 2024-05-24 | Stop reason: HOSPADM

## 2024-05-09 RX ORDER — POTASSIUM CHLORIDE 14.9 MG/ML
20 INJECTION INTRAVENOUS
Qty: 200 ML | Refills: 0 | Status: COMPLETED | OUTPATIENT
Start: 2024-05-09 | End: 2024-05-09

## 2024-05-09 RX ORDER — CEFTRIAXONE 1 G/50ML
1 INJECTION, SOLUTION INTRAVENOUS ONCE
Status: COMPLETED | OUTPATIENT
Start: 2024-05-09 | End: 2024-05-09

## 2024-05-09 RX ORDER — NAPROXEN SODIUM 220 MG/1
324 TABLET, FILM COATED ORAL ONCE
Status: COMPLETED | OUTPATIENT
Start: 2024-05-09 | End: 2024-05-09

## 2024-05-09 RX ORDER — CEPHALEXIN 500 MG/1
500 CAPSULE ORAL 3 TIMES DAILY
COMMUNITY
End: 2024-05-24 | Stop reason: HOSPADM

## 2024-05-09 RX ORDER — ACETAMINOPHEN 325 MG/1
650 TABLET ORAL EVERY 8 HOURS PRN
COMMUNITY
End: 2024-05-24 | Stop reason: HOSPADM

## 2024-05-09 RX ADMIN — CEFTRIAXONE SODIUM 1 G: 1 INJECTION, SOLUTION INTRAVENOUS at 14:58

## 2024-05-09 RX ADMIN — POTASSIUM CHLORIDE 20 MEQ: 14.9 INJECTION, SOLUTION INTRAVENOUS at 15:30

## 2024-05-09 RX ADMIN — POTASSIUM CHLORIDE 20 MEQ: 14.9 INJECTION, SOLUTION INTRAVENOUS at 17:45

## 2024-05-09 RX ADMIN — SODIUM CHLORIDE 500 ML: 900 INJECTION, SOLUTION INTRAVENOUS at 13:57

## 2024-05-09 RX ADMIN — ASPIRIN 81 MG CHEWABLE TABLET 324 MG: 81 TABLET CHEWABLE at 16:39

## 2024-05-09 ASSESSMENT — COLUMBIA-SUICIDE SEVERITY RATING SCALE - C-SSRS
6. HAVE YOU EVER DONE ANYTHING, STARTED TO DO ANYTHING, OR PREPARED TO DO ANYTHING TO END YOUR LIFE?: NO
2. HAVE YOU ACTUALLY HAD ANY THOUGHTS OF KILLING YOURSELF?: NO
1. IN THE PAST MONTH, HAVE YOU WISHED YOU WERE DEAD OR WISHED YOU COULD GO TO SLEEP AND NOT WAKE UP?: NO

## 2024-05-09 ASSESSMENT — LIFESTYLE VARIABLES
HAVE YOU EVER FELT YOU SHOULD CUT DOWN ON YOUR DRINKING: NO
EVER HAD A DRINK FIRST THING IN THE MORNING TO STEADY YOUR NERVES TO GET RID OF A HANGOVER: NO
HAVE PEOPLE ANNOYED YOU BY CRITICIZING YOUR DRINKING: NO
EVER FELT BAD OR GUILTY ABOUT YOUR DRINKING: NO
TOTAL SCORE: 0

## 2024-05-09 ASSESSMENT — PAIN SCALES - GENERAL
PAINLEVEL_OUTOF10: 0 - NO PAIN

## 2024-05-09 ASSESSMENT — PAIN - FUNCTIONAL ASSESSMENT: PAIN_FUNCTIONAL_ASSESSMENT: 0-10

## 2024-05-09 NOTE — ED PROCEDURE NOTE
Procedure  Critical Care    Performed by: Gaurav Robles DO  Authorized by: Gaurav Robles DO    Critical care provider statement:     Critical care time (minutes):  34    Critical care time was exclusive of:  Separately billable procedures and treating other patients    Critical care was necessary to treat or prevent imminent or life-threatening deterioration of the following conditions:  Endocrine crisis    Critical care was time spent personally by me on the following activities:  Ordering and review of laboratory studies, ordering and review of radiographic studies, ordering and performing treatments and interventions, discussions with primary provider, development of treatment plan with patient or surrogate and review of old charts    Care discussed with: admitting provider                 Gaurav Robles DO  05/09/24 [id number]

## 2024-05-09 NOTE — ED PROVIDER NOTES
HPI   Chief Complaint   Patient presents with    Failure To Thrive     Pt to ED for failure to thrive. Upon arrival pt has no complaints. A&O X 3       This is a 78-year-old female with a past medical history of hyperlipidemia, CKD, CVA sent to the emergency department from her nursing facility for evaluation of worsening generalized weakness, decreased oral intake, inability to walk.  She reportedly has not been eating or drinking much at the facility and has declined significantly because of this.  She denies any complaints now and states that she wants to go back to the facility.  Her son who is her medical power of  is at bedside.  Of note she was recently treated for C. difficile with oral vancomycin, she is not currently having any vomiting or diarrhea, any abdominal pain, any chest pain or any complaints acutely other than general weakness.      History provided by:  Patient   used: No                        Drummond Coma Scale Score: 15                     Patient History   No past medical history on file.  Past Surgical History:   Procedure Laterality Date    AORTIC VALVE REPLACEMENT  02/11/2014    Aortic Valve Replacement    BREAST LUMPECTOMY  02/11/2014    Breast Surgery Lumpectomy    CERVICAL DISCECTOMY  02/11/2014    Spinal Diskectomy Cervical    HYSTERECTOMY  02/11/2014    Hysterectomy    OTHER SURGICAL HISTORY  02/11/2014    Aortic Coarctation Repair     No family history on file.  Social History     Tobacco Use    Smoking status: Former     Types: Cigarettes    Smokeless tobacco: Never   Vaping Use    Vaping status: Never Used   Substance Use Topics    Alcohol use: Yes    Drug use: Never       Physical Exam   ED Triage Vitals [05/09/24 1313]   Temperature Heart Rate Respirations BP   36.5 °C (97.7 °F) 73 18 174/78      Pulse Ox Temp Source Heart Rate Source Patient Position   95 % Oral Monitor Lying      BP Location FiO2 (%)     Right arm --       Physical Exam  General:  well developed, thin elderly female who is awake and alert, in no apparent distress  Eyes: sclera clear bilaterally, PERRL, EOMI  HENT: normocephalic, atraumatic. Pharynx without erythema or exudates, uvula midline.  Mucous membranes are dry.  CV: regular rate and rhythm, no murmur, no gallops, or rubs.   Resp: clear to ascultation bilaterally, no wheezes, rales, or rhonchi  GI: abdomen soft, nontender without rigidity or guarding, no peritoneal signs, abdomen is nondistended, no masses palpated  MSK: strength +5/5 to upper and lower extremities bilaterally, no swelling of the extremities.  Psych: appropriate mood and affect, cooperative with exam  Skin: warm, dry.  Right heel with a pressure ulcer present, no surrounding cellulitis or drainage.    ED Course & MDM   ED Course as of 05/09/24 1639   Thu May 09, 2024   1408 EKG on my interpretation shows normal sinus rhythm, rate of 74 beats minute.  Left axis deviation.  QTc is 517 ms, CA interval 158.  No ST elevation or depression, no acute ischemic pattern.  No STEMI.  Voltage criteria for LVH is present. [NT]      ED Course User Index  [NT] Gaurav MI DO Travis         Diagnoses as of 05/09/24 1639   General weakness   Dehydration   Hypernatremia   Hypokalemia       Medical Decision Making  She is in no acute distress in the emergency department, vitals are normal.  Her mucous membranes are very dry and she appears dehydrated.  A broad medical workup was ordered to assess for any acute cause for her worsening general weakness.  She was found to have elevated sodium and chloride levels consistent with dehydration, creatinine is still within normal limits.  I suspect this is due to lack of oral intake.  She was given IV fluids and IV potassium.  Troponin was also elevated as well as BNP, there are no clinical signs of fluid overload, I personally reviewed her chest x-ray which is normal, there is no pulmonary edema.   her EKG does not show any evidence of acute  ischemia or any acute abnormality.  Troponin was elevated so I did give full dose aspirin in the ED.  She is not having any chest pain or other symptoms concerning for acute coronary ischemia, no acute ischemic changes were found on EKG.    She was also have a UTI with nitrate positive and 4+ bacteria.  IV Rocephin ordered to be given.    The patient was sent in by Dr. Uribe who cares for her at the nursing facility requesting that she be admitted to Dr. Denise.  The patient's son who is her medical power of  at bedside does want her to be admitted to manage these acute issues.  She was admitted in stable condition.    Labs Reviewed   N-TERMINAL PROBNP - Abnormal       Result Value    PROBNP 2,220 (*)     Narrative:     Reference ranges are based on clinical submission data. These ranges represent the 95th percentile of normal cut-off points. As NT Pro- BNP values approach 1000 pg/ml, clinical symptoms are more likely associated with CHF.   CBC WITH AUTO DIFFERENTIAL - Abnormal    WBC 16.5 (*)     nRBC 0.0      RBC 3.06 (*)     Hemoglobin 9.7 (*)     Hematocrit 31.7 (*)      (*)     MCH 31.7      MCHC 30.6 (*)     RDW 13.8      Platelets 233      Neutrophils % 77.2      Immature Granulocytes %, Automated 1.3 (*)     Lymphocytes % 12.1      Monocytes % 7.4      Eosinophils % 1.6      Basophils % 0.4      Neutrophils Absolute 12.77 (*)     Immature Granulocytes Absolute, Automated 0.21      Lymphocytes Absolute 2.00      Monocytes Absolute 1.23 (*)     Eosinophils Absolute 0.27      Basophils Absolute 0.06     COMPREHENSIVE METABOLIC PANEL - Abnormal    Glucose 138 (*)     Sodium 155 (*)     Potassium 2.8 (*)     Chloride 117 (*)     Bicarbonate 25      Urea Nitrogen 22      Creatinine 0.80      eGFR 75      Calcium 8.4 (*)     Albumin 2.9 (*)     Alkaline Phosphatase 143 (*)     Total Protein 6.2      AST 10      Bilirubin, Total 0.4      ALT 5      Anion Gap 13     LIPASE - Abnormal    Lipase 66 (*)     SERIAL TROPONIN, INITIAL ([facility]) - Abnormal    Troponin T, High Sensitivity 74 (*)    URINALYSIS WITH REFLEX CULTURE AND MICROSCOPIC - Abnormal    Color, Urine Light-Orange (*)     Appearance, Urine Ex.Turbid (*)     Specific Gravity, Urine 1.015      pH, Urine 6.0      Protein, Urine 50 (1+) (*)     Glucose, Urine Normal      Blood, Urine 0.1 (1+) (*)     Ketones, Urine NEGATIVE      Bilirubin, Urine NEGATIVE      Urobilinogen, Urine Normal      Nitrite, Urine 2+ (*)     Leukocyte Esterase, Urine 500 Jeremy/µL (*)    SERIAL TROPONIN,  2 HOUR ([facility]) - Abnormal    Troponin T, High Sensitivity 69 (*)    MICROSCOPIC ONLY, URINE - Abnormal    WBC, Urine >50 (*)     WBC Clumps, Urine MANY      RBC, Urine >20 (*)     Squamous Epithelial Cells, Urine 1-9 (SPARSE)      Bacteria, Urine 4+ (*)     Mucus, Urine 1+     SARS-COV-2 PCR - Normal    Coronavirus 2019, PCR Not Detected      Narrative:     This assay has received FDA Emergency Use Authorization (EUA) and is only authorized for the duration of time that circumstances exist to justify the authorization of the emergency use of in vitro diagnostic tests for the detection of SARS-CoV-2 virus and/or diagnosis of COVID-19 infection under section 564(b)(1) of the Act, 21 U.S.C. 360bbb-3(b)(1). This assay is an in vitro diagnostic nucleic acid amplification test for the qualitative detection of SARS-CoV-2 from nasopharyngeal specimens and has been validated for use at [facility]. Negative results do not preclude COVID-19 infections and should not be used as the sole basis for diagnosis, treatment, or other management decisions.     INFLUENZA A AND B PCR - Normal    Flu A Result Not Detected      Flu B Result Not Detected      Narrative:     This assay is an in vitro diagnostic multiplex nucleic acid amplification test for the detection and discrimination of Influenza A & B from nasopharyngeal specimens, and has been validated for use at [place]  [facility]. Negative results do not preclude Influenza A/B infections, and should not be used as the sole basis for diagnosis, treatment, or other management decisions. If Influenza A/B and RSV PCR results are negative, testing for Parainfluenza virus, Adenovirus and Metapneumovirus is routinely performed [place] [facility] [place], and is available on other patients by placing an add-on request.   URINE CULTURE   TROPONIN T SERIES, HIGH SENSITIVITY (0, 2 HR, 6 HR)    Narrative:     The following orders were created for panel order Troponin T Series, High Sensitivity (0, 2HR, 6HR).  Procedure                               Abnormality         Status                     ---------                               -----------         ------                     Serial Troponin, Initial...[[id number]]  Abnormal            Final result               Serial Troponin, 2 Hour ...[[id number]]  Abnormal            Final result                 Please view results for these tests on the individual orders.   SODIUM, URINE RANDOM    Sodium, Urine Random 89      Creatinine, Urine Random 75.8      Sodium/Creatinine Ratio 117     URINALYSIS WITH REFLEX CULTURE AND MICROSCOPIC    Narrative:     The following orders were created for panel order Urinalysis with Reflex Culture and Microscopic.  Procedure                               Abnormality         Status                     ---------                               -----------         ------                     Urinalysis with Reflex C...[[id number]]  Abnormal            Final result               Extra Urine Gray Tube[[id number]]                            In process                   Please view results for these tests on the individual orders.   EXTRA URINE GRAY TUBE   OSMOLALITY, URINE   MORPHOLOGY    RBC Morphology No significant RBC morphology present       XR chest 1 view   Final Result   1.  No active cardiopulmonary disease.  There has not  been   significant interval change from the prior exam.        Signed by: Ronnie Tsai 5/9/2024 1:49 PM   Dictation workstation:   [id number]              Procedure  Procedures     Gaurav Robles,   05/09/24 1638       Gaurav Robles,   05/09/24 1639

## 2024-05-10 LAB
ALBUMIN SERPL-MCNC: 2.9 G/DL (ref 3.5–5)
ALP BLD-CCNC: 139 U/L (ref 35–125)
ALT SERPL-CCNC: 5 U/L (ref 5–40)
ANION GAP SERPL CALC-SCNC: 13 MMOL/L
ANION GAP SERPL CALC-SCNC: 16 MMOL/L
AST SERPL-CCNC: 9 U/L (ref 5–40)
ATRIAL RATE: 74 BPM
BACTERIA UR CULT: ABNORMAL
BASOPHILS # BLD AUTO: 0.05 X10*3/UL (ref 0–0.1)
BASOPHILS NFR BLD AUTO: 0.3 %
BILIRUB SERPL-MCNC: 0.4 MG/DL (ref 0.1–1.2)
BUN SERPL-MCNC: 18 MG/DL (ref 8–25)
BUN SERPL-MCNC: 19 MG/DL (ref 8–25)
CALCIUM SERPL-MCNC: 8.1 MG/DL (ref 8.5–10.4)
CALCIUM SERPL-MCNC: 8.3 MG/DL (ref 8.5–10.4)
CHLORIDE SERPL-SCNC: 116 MMOL/L (ref 97–107)
CHLORIDE SERPL-SCNC: 117 MMOL/L (ref 97–107)
CO2 SERPL-SCNC: 21 MMOL/L (ref 24–31)
CO2 SERPL-SCNC: 23 MMOL/L (ref 24–31)
CREAT SERPL-MCNC: 0.6 MG/DL (ref 0.4–1.6)
CREAT SERPL-MCNC: 0.6 MG/DL (ref 0.4–1.6)
EGFRCR SERPLBLD CKD-EPI 2021: >90 ML/MIN/1.73M*2
EGFRCR SERPLBLD CKD-EPI 2021: >90 ML/MIN/1.73M*2
EOSINOPHIL # BLD AUTO: 0.15 X10*3/UL (ref 0–0.4)
EOSINOPHIL NFR BLD AUTO: 0.9 %
ERYTHROCYTE [DISTWIDTH] IN BLOOD BY AUTOMATED COUNT: 13.6 % (ref 11.5–14.5)
GLUCOSE SERPL-MCNC: 112 MG/DL (ref 65–99)
GLUCOSE SERPL-MCNC: 99 MG/DL (ref 65–99)
HCT VFR BLD AUTO: 32.3 % (ref 36–46)
HGB BLD-MCNC: 10.2 G/DL (ref 12–16)
HOLD SPECIMEN: NORMAL
IMM GRANULOCYTES # BLD AUTO: 0.23 X10*3/UL (ref 0–0.5)
IMM GRANULOCYTES NFR BLD AUTO: 1.3 % (ref 0–0.9)
LYMPHOCYTES # BLD AUTO: 1.93 X10*3/UL (ref 0.8–3)
LYMPHOCYTES NFR BLD AUTO: 11.2 %
MCH RBC QN AUTO: 30.8 PG (ref 26–34)
MCHC RBC AUTO-ENTMCNC: 31.6 G/DL (ref 32–36)
MCV RBC AUTO: 98 FL (ref 80–100)
MONOCYTES # BLD AUTO: 1.07 X10*3/UL (ref 0.05–0.8)
MONOCYTES NFR BLD AUTO: 6.2 %
NEUTROPHILS # BLD AUTO: 13.73 X10*3/UL (ref 1.6–5.5)
NEUTROPHILS NFR BLD AUTO: 80.1 %
NRBC BLD-RTO: 0 /100 WBCS (ref 0–0)
OSMOLALITY SERPL: 321 MOSM/KG (ref 280–300)
P AXIS: 35 DEGREES
P OFFSET: 181 MS
P ONSET: 134 MS
PLATELET # BLD AUTO: 240 X10*3/UL (ref 150–450)
POTASSIUM SERPL-SCNC: 3.3 MMOL/L (ref 3.4–5.1)
POTASSIUM SERPL-SCNC: 3.3 MMOL/L (ref 3.4–5.1)
PR INTERVAL: 158 MS
PROT SERPL-MCNC: 6.3 G/DL (ref 5.9–7.9)
Q ONSET: 213 MS
QRS COUNT: 12 BEATS
QRS DURATION: 92 MS
QT INTERVAL: 466 MS
QTC CALCULATION(BAZETT): 517 MS
QTC FREDERICIA: 500 MS
R AXIS: -37 DEGREES
RBC # BLD AUTO: 3.31 X10*6/UL (ref 4–5.2)
SODIUM SERPL-SCNC: 152 MMOL/L (ref 133–145)
SODIUM SERPL-SCNC: 154 MMOL/L (ref 133–145)
T AXIS: 32 DEGREES
T OFFSET: 446 MS
VENTRICULAR RATE: 74 BPM
WBC # BLD AUTO: 17.2 X10*3/UL (ref 4.4–11.3)

## 2024-05-10 PROCEDURE — 80048 BASIC METABOLIC PNL TOTAL CA: CPT | Mod: CCI | Performed by: INTERNAL MEDICINE

## 2024-05-10 PROCEDURE — 36415 COLL VENOUS BLD VENIPUNCTURE: CPT | Performed by: INTERNAL MEDICINE

## 2024-05-10 PROCEDURE — 84075 ASSAY ALKALINE PHOSPHATASE: CPT | Performed by: INTERNAL MEDICINE

## 2024-05-10 PROCEDURE — 1200000002 HC GENERAL ROOM WITH TELEMETRY DAILY

## 2024-05-10 PROCEDURE — 97166 OT EVAL MOD COMPLEX 45 MIN: CPT | Mod: GO

## 2024-05-10 PROCEDURE — 97530 THERAPEUTIC ACTIVITIES: CPT | Mod: GP

## 2024-05-10 PROCEDURE — 97162 PT EVAL MOD COMPLEX 30 MIN: CPT | Mod: GP

## 2024-05-10 PROCEDURE — 2500000005 HC RX 250 GENERAL PHARMACY W/O HCPCS: Performed by: INTERNAL MEDICINE

## 2024-05-10 PROCEDURE — 85025 COMPLETE CBC W/AUTO DIFF WBC: CPT | Performed by: INTERNAL MEDICINE

## 2024-05-10 PROCEDURE — 99222 1ST HOSP IP/OBS MODERATE 55: CPT | Performed by: INTERNAL MEDICINE

## 2024-05-10 PROCEDURE — 2500000004 HC RX 250 GENERAL PHARMACY W/ HCPCS (ALT 636 FOR OP/ED): Performed by: INTERNAL MEDICINE

## 2024-05-10 PROCEDURE — 92610 EVALUATE SWALLOWING FUNCTION: CPT | Mod: GN | Performed by: SPEECH-LANGUAGE PATHOLOGIST

## 2024-05-10 PROCEDURE — 2500000001 HC RX 250 WO HCPCS SELF ADMINISTERED DRUGS (ALT 637 FOR MEDICARE OP): Performed by: INTERNAL MEDICINE

## 2024-05-10 RX ORDER — NAPROXEN SODIUM 220 MG/1
81 TABLET, FILM COATED ORAL DAILY
Status: DISCONTINUED | OUTPATIENT
Start: 2024-05-10 | End: 2024-05-24 | Stop reason: HOSPADM

## 2024-05-10 RX ORDER — GUAIFENESIN 600 MG/1
600 TABLET, EXTENDED RELEASE ORAL EVERY 12 HOURS PRN
Status: DISCONTINUED | OUTPATIENT
Start: 2024-05-10 | End: 2024-05-24 | Stop reason: HOSPADM

## 2024-05-10 RX ORDER — ACETAMINOPHEN 650 MG/1
650 SUPPOSITORY RECTAL EVERY 4 HOURS PRN
Status: DISCONTINUED | OUTPATIENT
Start: 2024-05-10 | End: 2024-05-24 | Stop reason: HOSPADM

## 2024-05-10 RX ORDER — ATENOLOL 50 MG/1
50 TABLET ORAL
Status: DISCONTINUED | OUTPATIENT
Start: 2024-05-10 | End: 2024-05-10

## 2024-05-10 RX ORDER — VANCOMYCIN HYDROCHLORIDE 125 MG/1
125 CAPSULE ORAL EVERY 6 HOURS
Status: DISCONTINUED | OUTPATIENT
Start: 2024-05-10 | End: 2024-05-15

## 2024-05-10 RX ORDER — ACETAMINOPHEN 325 MG/1
650 TABLET ORAL EVERY 8 HOURS PRN
Status: DISCONTINUED | OUTPATIENT
Start: 2024-05-10 | End: 2024-05-10

## 2024-05-10 RX ORDER — CLOPIDOGREL BISULFATE 75 MG/1
75 TABLET ORAL DAILY
Status: DISCONTINUED | OUTPATIENT
Start: 2024-05-10 | End: 2024-05-13

## 2024-05-10 RX ORDER — ACETAMINOPHEN 325 MG/1
650 TABLET ORAL EVERY 4 HOURS PRN
Status: DISCONTINUED | OUTPATIENT
Start: 2024-05-10 | End: 2024-05-24 | Stop reason: HOSPADM

## 2024-05-10 RX ORDER — FERROUS SULFATE 325(65) MG
65 TABLET ORAL
Status: DISCONTINUED | OUTPATIENT
Start: 2024-05-10 | End: 2024-05-24 | Stop reason: HOSPADM

## 2024-05-10 RX ORDER — ATORVASTATIN CALCIUM 40 MG/1
40 TABLET, FILM COATED ORAL NIGHTLY
Status: DISCONTINUED | OUTPATIENT
Start: 2024-05-10 | End: 2024-05-24 | Stop reason: HOSPADM

## 2024-05-10 RX ORDER — METOPROLOL TARTRATE 1 MG/ML
10 INJECTION, SOLUTION INTRAVENOUS EVERY 6 HOURS PRN
Status: DISCONTINUED | OUTPATIENT
Start: 2024-05-10 | End: 2024-05-15

## 2024-05-10 RX ORDER — BACITRACIN 500 [USP'U]/G
OINTMENT TOPICAL 3 TIMES DAILY
Status: DISCONTINUED | OUTPATIENT
Start: 2024-05-10 | End: 2024-05-10

## 2024-05-10 RX ORDER — L. ACIDOPHILUS/L.BULGARICUS 1MM CELL
1 TABLET ORAL
Status: DISCONTINUED | OUTPATIENT
Start: 2024-05-10 | End: 2024-05-24 | Stop reason: HOSPADM

## 2024-05-10 RX ORDER — POTASSIUM CHLORIDE, DEXTROSE MONOHYDRATE 150; 5 MG/100ML; G/100ML
60 INJECTION, SOLUTION INTRAVENOUS CONTINUOUS
Status: DISCONTINUED | OUTPATIENT
Start: 2024-05-10 | End: 2024-05-13

## 2024-05-10 RX ORDER — FLUCONAZOLE 100 MG/1
100 TABLET ORAL DAILY
Status: DISPENSED | OUTPATIENT
Start: 2024-05-10 | End: 2024-05-17

## 2024-05-10 RX ORDER — ONDANSETRON 4 MG/1
4 TABLET, ORALLY DISINTEGRATING ORAL EVERY 6 HOURS PRN
Status: DISCONTINUED | OUTPATIENT
Start: 2024-05-10 | End: 2024-05-24 | Stop reason: HOSPADM

## 2024-05-10 RX ORDER — METOPROLOL SUCCINATE 50 MG/1
50 TABLET, EXTENDED RELEASE ORAL DAILY
Status: DISCONTINUED | OUTPATIENT
Start: 2024-05-10 | End: 2024-05-11

## 2024-05-10 RX ORDER — ACETAMINOPHEN 160 MG/5ML
650 SOLUTION ORAL EVERY 4 HOURS PRN
Status: DISCONTINUED | OUTPATIENT
Start: 2024-05-10 | End: 2024-05-24 | Stop reason: HOSPADM

## 2024-05-10 RX ORDER — GUAIFENESIN/DEXTROMETHORPHAN 100-10MG/5
5 SYRUP ORAL EVERY 4 HOURS PRN
Status: DISCONTINUED | OUTPATIENT
Start: 2024-05-10 | End: 2024-05-24 | Stop reason: HOSPADM

## 2024-05-10 RX ORDER — POLYETHYLENE GLYCOL 3350 17 G/17G
17 POWDER, FOR SOLUTION ORAL DAILY PRN
Status: DISCONTINUED | OUTPATIENT
Start: 2024-05-10 | End: 2024-05-24 | Stop reason: HOSPADM

## 2024-05-10 RX ORDER — HYDRALAZINE HYDROCHLORIDE 20 MG/ML
10 INJECTION INTRAMUSCULAR; INTRAVENOUS EVERY 4 HOURS PRN
Status: DISCONTINUED | OUTPATIENT
Start: 2024-05-10 | End: 2024-05-24 | Stop reason: HOSPADM

## 2024-05-10 RX ORDER — HEPARIN SODIUM 5000 [USP'U]/ML
5000 INJECTION, SOLUTION INTRAVENOUS; SUBCUTANEOUS EVERY 8 HOURS SCHEDULED
Status: DISCONTINUED | OUTPATIENT
Start: 2024-05-10 | End: 2024-05-24 | Stop reason: HOSPADM

## 2024-05-10 RX ADMIN — DEXTROSE AND POTASSIUM CHLORIDE 100 ML/HR: 5; .15 SOLUTION INTRAVENOUS at 01:35

## 2024-05-10 RX ADMIN — HEPARIN SODIUM 5000 UNITS: 5000 INJECTION, SOLUTION INTRAVENOUS; SUBCUTANEOUS at 05:10

## 2024-05-10 RX ADMIN — HYDRALAZINE HYDROCHLORIDE 10 MG: 20 INJECTION INTRAMUSCULAR; INTRAVENOUS at 20:54

## 2024-05-10 RX ADMIN — HYDRALAZINE HYDROCHLORIDE 10 MG: 20 INJECTION INTRAMUSCULAR; INTRAVENOUS at 03:17

## 2024-05-10 RX ADMIN — METOPROLOL SUCCINATE 50 MG: 50 TABLET, EXTENDED RELEASE ORAL at 20:07

## 2024-05-10 RX ADMIN — HEPARIN SODIUM 5000 UNITS: 5000 INJECTION, SOLUTION INTRAVENOUS; SUBCUTANEOUS at 20:54

## 2024-05-10 RX ADMIN — METOPROLOL TARTRATE 10 MG: 5 INJECTION INTRAVENOUS at 21:59

## 2024-05-10 SDOH — HEALTH STABILITY: MENTAL HEALTH: EXPERIENCED ANY OF THE FOLLOWING LIFE EVENTS: DEATH OF FAMILY/FRIEND

## 2024-05-10 SDOH — SOCIAL STABILITY: SOCIAL INSECURITY: DOES ANYONE TRY TO KEEP YOU FROM HAVING/CONTACTING OTHER FRIENDS OR DOING THINGS OUTSIDE YOUR HOME?: NO

## 2024-05-10 SDOH — SOCIAL STABILITY: SOCIAL INSECURITY: ARE YOU OR HAVE YOU BEEN THREATENED OR ABUSED PHYSICALLY, EMOTIONALLY, OR SEXUALLY BY ANYONE?: NO

## 2024-05-10 SDOH — SOCIAL STABILITY: SOCIAL INSECURITY: POSSIBLE ABUSE REPORTED TO:: ADVOCATE

## 2024-05-10 SDOH — SOCIAL STABILITY: SOCIAL INSECURITY: DO YOU FEEL ANYONE HAS EXPLOITED OR TAKEN ADVANTAGE OF YOU FINANCIALLY OR OF YOUR PERSONAL PROPERTY?: NO

## 2024-05-10 SDOH — SOCIAL STABILITY: SOCIAL INSECURITY: DO YOU FEEL UNSAFE GOING BACK TO THE PLACE WHERE YOU ARE LIVING?: NO

## 2024-05-10 SDOH — SOCIAL STABILITY: SOCIAL INSECURITY: ABUSE: ADULT

## 2024-05-10 SDOH — SOCIAL STABILITY: SOCIAL INSECURITY: HAS ANYONE EVER THREATENED TO HURT YOUR FAMILY OR YOUR PETS?: NO

## 2024-05-10 SDOH — SOCIAL STABILITY: SOCIAL INSECURITY: WERE YOU ABLE TO COMPLETE ALL THE BEHAVIORAL HEALTH SCREENINGS?: YES

## 2024-05-10 SDOH — SOCIAL STABILITY: SOCIAL INSECURITY: HAVE YOU HAD ANY THOUGHTS OF HARMING ANYONE ELSE?: NO

## 2024-05-10 SDOH — SOCIAL STABILITY: SOCIAL INSECURITY: HAVE YOU HAD THOUGHTS OF HARMING ANYONE ELSE?: NO

## 2024-05-10 ASSESSMENT — COGNITIVE AND FUNCTIONAL STATUS - GENERAL
MOVING FROM LYING ON BACK TO SITTING ON SIDE OF FLAT BED WITH BEDRAILS: A LOT
WALKING IN HOSPITAL ROOM: TOTAL
MOBILITY SCORE: 7
HELP NEEDED FOR BATHING: TOTAL
STANDING UP FROM CHAIR USING ARMS: TOTAL
HELP NEEDED FOR BATHING: TOTAL
TOILETING: TOTAL
DAILY ACTIVITIY SCORE: 9
DAILY ACTIVITIY SCORE: 9
DRESSING REGULAR LOWER BODY CLOTHING: TOTAL
MOVING FROM LYING ON BACK TO SITTING ON SIDE OF FLAT BED WITH BEDRAILS: A LOT
PERSONAL GROOMING: A LOT
EATING MEALS: A LOT
DRESSING REGULAR LOWER BODY CLOTHING: TOTAL
MOVING FROM LYING ON BACK TO SITTING ON SIDE OF FLAT BED WITH BEDRAILS: A LOT
TOILETING: TOTAL
TURNING FROM BACK TO SIDE WHILE IN FLAT BAD: TOTAL
EATING MEALS: A LOT
TOILETING: TOTAL
WALKING IN HOSPITAL ROOM: TOTAL
STANDING UP FROM CHAIR USING ARMS: TOTAL
TURNING FROM BACK TO SIDE WHILE IN FLAT BAD: TOTAL
CLIMB 3 TO 5 STEPS WITH RAILING: TOTAL
MOBILITY SCORE: 7
DRESSING REGULAR UPPER BODY CLOTHING: A LOT
MOVING TO AND FROM BED TO CHAIR: TOTAL
PATIENT BASELINE BEDBOUND: YES
PERSONAL GROOMING: A LOT
MOVING TO AND FROM BED TO CHAIR: TOTAL
TURNING FROM BACK TO SIDE WHILE IN FLAT BAD: TOTAL
MOBILITY SCORE: 7
DRESSING REGULAR LOWER BODY CLOTHING: TOTAL
EATING MEALS: A LOT
PERSONAL GROOMING: A LOT
DRESSING REGULAR UPPER BODY CLOTHING: TOTAL
CLIMB 3 TO 5 STEPS WITH RAILING: TOTAL
WALKING IN HOSPITAL ROOM: TOTAL
HELP NEEDED FOR BATHING: TOTAL
DAILY ACTIVITIY SCORE: 8
CLIMB 3 TO 5 STEPS WITH RAILING: TOTAL
STANDING UP FROM CHAIR USING ARMS: TOTAL
MOVING TO AND FROM BED TO CHAIR: TOTAL
DRESSING REGULAR UPPER BODY CLOTHING: A LOT

## 2024-05-10 ASSESSMENT — ENCOUNTER SYMPTOMS
MYALGIAS: 0
ACTIVITY CHANGE: 1
DIARRHEA: 0
VOMITING: 0
TREMORS: 0
FEVER: 0
PHOTOPHOBIA: 0
CONFUSION: 0
WOUND: 0
FATIGUE: 1
NUMBNESS: 0
HEADACHES: 0
RECTAL PAIN: 0
SINUS PRESSURE: 0
NAUSEA: 0
PALPITATIONS: 0
APPETITE CHANGE: 1
COUGH: 0
FREQUENCY: 0
BRUISES/BLEEDS EASILY: 0
ARTHRALGIAS: 0
SHORTNESS OF BREATH: 0
BLOOD IN STOOL: 0
SORE THROAT: 0
POLYPHAGIA: 0
WHEEZING: 0
WEAKNESS: 0
SPEECH DIFFICULTY: 0
COLOR CHANGE: 0
APNEA: 0
HALLUCINATIONS: 0
BACK PAIN: 0
DYSURIA: 0
ABDOMINAL PAIN: 0
DIZZINESS: 0
SLEEP DISTURBANCE: 0
HEMATURIA: 0
POLYDIPSIA: 0
CONSTIPATION: 0
NECK PAIN: 0
ADENOPATHY: 0
SEIZURES: 0
JOINT SWELLING: 0
UNEXPECTED WEIGHT CHANGE: 1
LIGHT-HEADEDNESS: 0
CHILLS: 0

## 2024-05-10 ASSESSMENT — ACTIVITIES OF DAILY LIVING (ADL)
GROOMING: DEPENDENT
HEARING - RIGHT EAR: FUNCTIONAL
ASSISTIVE_DEVICE: EYEGLASSES
WALKS IN HOME: DEPENDENT
HEARING - LEFT EAR: FUNCTIONAL
ADL_ASSISTANCE: NEEDS ASSISTANCE
JUDGMENT_ADEQUATE_SAFELY_COMPLETE_DAILY_ACTIVITIES: NO
BATHING_ASSISTANCE: MAXIMAL
LACK_OF_TRANSPORTATION: NO
BATHING: DEPENDENT
TOILETING: DEPENDENT
FEEDING YOURSELF: DEPENDENT
PATIENT'S MEMORY ADEQUATE TO SAFELY COMPLETE DAILY ACTIVITIES?: NO
ADEQUATE_TO_COMPLETE_ADL: YES
DRESSING YOURSELF: DEPENDENT
ADLS_ADDRESSED: NO

## 2024-05-10 ASSESSMENT — LIFESTYLE VARIABLES
HOW OFTEN DURING THE LAST YEAR HAVE YOU HAD A FEELING OF GUILT OR REMORSE AFTER DRINKING: NEVER
HOW MANY STANDARD DRINKS CONTAINING ALCOHOL DO YOU HAVE ON A TYPICAL DAY: 1 OR 2
HAS A RELATIVE, FRIEND, DOCTOR, OR ANOTHER HEALTH PROFESSIONAL EXPRESSED CONCERN ABOUT YOUR DRINKING OR SUGGESTED YOU CUT DOWN: NO
HOW OFTEN DO YOU HAVE A DRINK CONTAINING ALCOHOL: 4 OR MORE TIMES A WEEK
SUBSTANCE_ABUSE_PAST_12_MONTHS: NO
HOW OFTEN DURING THE LAST YEAR HAVE YOU BEEN UNABLE TO REMEMBER WHAT HAPPENED THE NIGHT BEFORE BECAUSE YOU HAD BEEN DRINKING: NEVER
SKIP TO QUESTIONS 9-10: 1
HOW OFTEN DURING THE LAST YEAR HAVE YOU FAILED TO DO WHAT WAS NORMALLY EXPECTED FROM YOU BECAUSE OF DRINKING: NEVER
PRESCIPTION_ABUSE_PAST_12_MONTHS: NO
HOW OFTEN DURING THE LAST YEAR HAVE YOU FOUND THAT YOU WERE NOT ABLE TO STOP DRINKING ONCE YOU HAD STARTED: NEVER
AUDIT-C TOTAL SCORE: 4
HOW OFTEN DURING THE LAST YEAR HAVE YOU NEEDED AN ALCOHOLIC DRINK FIRST THING IN THE MORNING TO GET YOURSELF GOING AFTER A NIGHT OF HEAVY DRINKING: NEVER
AUDIT-C TOTAL SCORE: 4
AUDIT TOTAL SCORE: 0
HOW OFTEN DO YOU HAVE 6 OR MORE DRINKS ON ONE OCCASION: NEVER
HAVE YOU OR SOMEONE ELSE BEEN INJURED AS A RESULT OF YOUR DRINKING: NO
AUDIT TOTAL SCORE: 4

## 2024-05-10 ASSESSMENT — PATIENT HEALTH QUESTIONNAIRE - PHQ9
2. FEELING DOWN, DEPRESSED OR HOPELESS: NOT AT ALL
SUM OF ALL RESPONSES TO PHQ9 QUESTIONS 1 & 2: 0
1. LITTLE INTEREST OR PLEASURE IN DOING THINGS: NOT AT ALL

## 2024-05-10 ASSESSMENT — PAIN SCALES - GENERAL
PAINLEVEL_OUTOF10: 0 - NO PAIN
PAINLEVEL_OUTOF10: 8
PAINLEVEL_OUTOF10: 0 - NO PAIN

## 2024-05-10 ASSESSMENT — PAIN DESCRIPTION - DESCRIPTORS: DESCRIPTORS: SHARP

## 2024-05-10 NOTE — PROGRESS NOTES
.Reason For Consult  Hypernatremia and hypokalemia    History Of Present Illness  Rebecca Samaniego is a 78 y.o. female who is known to have a history of cerebrovascular accident, aortic valve replacement, recent urinary tract infection, and also history of colitis apparently the patient was sent from the [facility] care facility because of failure to thrive generalized weakness not able to eat the did a initial swallowing evaluation in the emergency room which she failed she is usually ANO x 2 the patient's oral intake has been extremely poor upon evaluation in the emergency room she was found to have hyponatremia with a sodium level of 154 with a potassium level of 3.2 and that is why renal consultation is obtained the patient is extremely weak however she is able to tell me her name and the year and location she could not remember her birthdate she denies any abdominal pain she denies any nausea vomiting or diarrhea no chest pain or shortness of breath no fever or chills.     Review of Systems  Review of Systems   Constitutional:  Positive for activity change, appetite change, fatigue and unexpected weight change. Negative for chills and fever.   HENT:  Negative for sinus pressure, sore throat and tinnitus.    Eyes:  Negative for photophobia and visual disturbance.   Respiratory:  Negative for apnea, cough, shortness of breath and wheezing.    Cardiovascular:  Negative for chest pain, palpitations and leg swelling.   Gastrointestinal:  Negative for abdominal pain, blood in stool, constipation, diarrhea, nausea, rectal pain and vomiting.        Fairly poor oral intake   Endocrine: Negative for cold intolerance, heat intolerance, polydipsia, polyphagia and polyuria.   Genitourinary:  Negative for decreased urine volume, dysuria, frequency, hematuria and urgency.   Musculoskeletal:  Negative for arthralgias, back pain, joint swelling, myalgias and neck pain.   Skin:  Negative for color change, pallor, rash and wound.    Neurological:  Negative for dizziness, tremors, seizures, syncope, speech difficulty, weakness, light-headedness, numbness and headaches.   Hematological:  Negative for adenopathy. Does not bruise/bleed easily.   Psychiatric/Behavioral:  Negative for confusion, hallucinations, sleep disturbance and suicidal ideas.         Past Medical History  She has a past medical history of Hypertension.    Surgical History  She has a past surgical history that includes Aortic valve replacement (02/11/2014); Other surgical history (02/11/2014); Cervical discectomy (02/11/2014); Breast lumpectomy (02/11/2014); and Hysterectomy (02/11/2014).     Social History  She reports that she has quit smoking. Her smoking use included cigarettes. She has never used smokeless tobacco. She reports current alcohol use. She reports that she does not use drugs.    Family History  No family history on file.     Current Facility-Administered Medications:     acetaminophen (Tylenol) tablet 650 mg, 650 mg, oral, q4h PRN **OR** acetaminophen (Tylenol) oral liquid 650 mg, 650 mg, oral, q4h PRN **OR** acetaminophen (Tylenol) suppository 650 mg, 650 mg, rectal, q4h PRN, Kobi Denise MD    acetaminophen (Tylenol) suppository 650 mg, 650 mg, rectal, q4h PRN, Kobi Denise MD    aspirin chewable tablet 81 mg, 81 mg, oral, Daily, Kobi Denise MD    atorvastatin (Lipitor) tablet 40 mg, 40 mg, oral, Nightly, Kobi Denise MD    benzocaine-menthol (Cepastat Sore Throat) lozenge 1 lozenge, 1 lozenge, Mouth/Throat, q2h PRN, Kobi Denise MD    clopidogrel (Plavix) tablet 75 mg, 75 mg, oral, Daily, Kobi Denise MD    dextromethorphan-guaifenesin (Robitussin DM)  mg/5 mL oral liquid 5 mL, 5 mL, oral, q4h PRN, Kobi Denise MD    dextrose 5 % with KCl 20 mEq/L infusion, 100 mL/hr, intravenous, Continuous, Kobi Denise MD, Last Rate: 100 mL/hr at 05/10/24 0135, 100 mL/hr at 05/10/24 0135    ferrous sulfate  (325 mg ferrous sulfate) tablet 1 tablet, 65 mg of iron, oral, BID with meals, Kobi Denise MD    fluconazole (Diflucan) tablet 100 mg, 100 mg, oral, Daily, Kobi Denise MD    guaiFENesin (Mucinex) 12 hr tablet 600 mg, 600 mg, oral, q12h PRN, Kobi Denise MD    heparin (porcine) injection 5,000 Units, 5,000 Units, subcutaneous, q8h JACQUELYN, Kobi Denise MD, 5,000 Units at 05/10/24 0510    hydrALAZINE (Apresoline) injection 10 mg, 10 mg, intravenous, q4h PRN, Kobi Denise MD, 10 mg at 05/10/24 0317    lactobacillus acidophilus tablet 1 tablet, 1 tablet, oral, BID with meals, Kobi Denise MD    metoprolol succinate XL (Toprol-XL) 24 hr tablet 50 mg, 50 mg, oral, Daily, Kobi Denise MD    ondansetron ODT (Zofran-ODT) disintegrating tablet 4 mg, 4 mg, oral, q6h PRN, Kobi Denise MD    polyethylene glycol (Glycolax, Miralax) packet 17 g, 17 g, oral, Daily PRN, Kobi Denise MD    vancomycin (Vancocin) capsule 125 mg, 125 mg, oral, q6h, Kobi Denise MD    Current Outpatient Medications:     acetaminophen (Tylenol) 325 mg tablet, Take 2 tablets (650 mg) by mouth every 8 hours if needed for fever (temp greater than 38.0 C)., Disp: , Rfl:     acetaminophen (Tylenol) 650 mg suppository, Insert 1 suppository (650 mg) into the rectum every 4 hours if needed for fever (temp greater than 38.0 C)., Disp: , Rfl:     acetaminophen (Tylenol) 325 mg tablet, Take 2 tablets (650 mg) by mouth every 8 hours if needed for mild pain (1 - 3)., Disp: , Rfl:     aspirin 81 mg chewable tablet, Chew 1 tablet (81 mg) once daily., Disp: , Rfl:     atenolol (Tenormin) 50 mg tablet, Take 1 tablet (50 mg) by mouth once daily., Disp: , Rfl:     atorvastatin (Lipitor) 40 mg tablet, Take 1 tablet (40 mg) by mouth once daily at bedtime., Disp: , Rfl:     bacitracin 500 unit/gram ointment, Apply topically 3 times a day. Apply to legs when she has excoriations (Patient not taking: Reported on  5/9/2024), Disp: , Rfl:     cephalexin (Keflex) 500 mg capsule, Take 1 capsule (500 mg) by mouth 3 times a day., Disp: , Rfl:     clopidogrel (Plavix) 75 mg tablet, Take 1 tablet (75 mg) by mouth once daily., Disp: , Rfl:     ferrous sulfate 325 (65 Fe) MG EC tablet, Take 65 mg by mouth 3 times a day with meals. Do not crush, chew, or split., Disp: , Rfl:     fluconazole (Diflucan) 100 mg tablet, Take 1 tablet (100 mg) by mouth once daily., Disp: , Rfl:     lactobacillus acidophilus (Acidophilus) capsule, Take 1 capsule by mouth 2 times a day., Disp: , Rfl:     metoprolol succinate XL (Toprol-XL) 50 mg 24 hr tablet, Take 1 tablet (50 mg) by mouth once daily. Do not crush or chew., Disp: , Rfl:     ondansetron (Zofran) 4 mg tablet, Take 1 tablet (4 mg) by mouth every 6 hours if needed for nausea or vomiting., Disp: , Rfl:     vancomycin (Vancocin) 125 mg capsule, Take 1 capsule (125 mg) by mouth every 6 hours., Disp: , Rfl:    Allergies  Sulfa (sulfonamide antibiotics), Sulfamethoxazole-trimethoprim, Ciprofloxacin, and Nitrofurantoin         Physical Exam  Physical Exam  Constitutional:       General: She is not in acute distress.     Appearance: She is not toxic-appearing.   HENT:      Head: Normocephalic and atraumatic.      Mouth/Throat:      Mouth: Mucous membranes are dry.   Eyes:      Extraocular Movements: Extraocular movements intact.      Pupils: Pupils are equal, round, and reactive to light.   Neck:      Vascular: No carotid bruit.   Cardiovascular:      Rate and Rhythm: Regular rhythm. Tachycardia present.   Pulmonary:      Effort: No respiratory distress.      Breath sounds: No stridor. No wheezing, rhonchi or rales.   Chest:      Chest wall: No tenderness.   Abdominal:      General: There is no distension.      Palpations: There is no mass.      Tenderness: There is no abdominal tenderness. There is no right CVA tenderness, left CVA tenderness or guarding.      Hernia: No hernia is present.  "  Musculoskeletal:         General: No swelling or tenderness.      Cervical back: No rigidity.      Right lower leg: No edema.      Left lower leg: No edema.   Lymphadenopathy:      Cervical: No cervical adenopathy.   Skin:     General: Skin is warm and dry.      Coloration: Skin is not jaundiced or pale.      Findings: No bruising or erythema.      Comments: Poor skin turgor   Neurological:      General: No focal deficit present.      Mental Status: She is alert and oriented to person, place, and time.   Psychiatric:         Mood and Affect: Mood normal.         Behavior: Behavior normal.              I&O 24HR    Intake/Output Summary (Last 24 hours) at 5/10/2024 1039  Last data filed at 5/9/2024 1950  Gross per 24 hour   Intake 750 ml   Output --   Net 750 ml       Vitals 24HR  Heart Rate:  []   Temperature:  [36.5 °C (97.7 °F)-36.6 °C (97.9 °F)]   Respirations:  [17-22]   BP: (148-184)/(64-83)   Height:  [165.1 cm (5' 5\")]   Weight:  [63.6 kg (140 lb 3.4 oz)]   Pulse Ox:  [95 %-98 %]     Relevant Results        Results for orders placed or performed during the hospital encounter of 05/09/24 (from the past 96 hour(s))   NT Pro-BNP   Result Value Ref Range    PROBNP 2,220 (H) 0 - 624 pg/mL   CBC and Auto Differential   Result Value Ref Range    WBC 16.5 (H) 4.4 - 11.3 x10*3/uL    nRBC 0.0 0.0 - 0.0 /100 WBCs    RBC 3.06 (L) 4.00 - 5.20 x10*6/uL    Hemoglobin 9.7 (L) 12.0 - 16.0 g/dL    Hematocrit 31.7 (L) 36.0 - 46.0 %     (H) 80 - 100 fL    MCH 31.7 26.0 - 34.0 pg    MCHC 30.6 (L) 32.0 - 36.0 g/dL    RDW 13.8 11.5 - 14.5 %    Platelets 233 150 - 450 x10*3/uL    Neutrophils % 77.2 40.0 - 80.0 %    Immature Granulocytes %, Automated 1.3 (H) 0.0 - 0.9 %    Lymphocytes % 12.1 13.0 - 44.0 %    Monocytes % 7.4 2.0 - 10.0 %    Eosinophils % 1.6 0.0 - 6.0 %    Basophils % 0.4 0.0 - 2.0 %    Neutrophils Absolute 12.77 (H) 1.60 - 5.50 x10*3/uL    Immature Granulocytes Absolute, Automated 0.21 0.00 - 0.50 " x10*3/uL    Lymphocytes Absolute 2.00 0.80 - 3.00 x10*3/uL    Monocytes Absolute 1.23 (H) 0.05 - 0.80 x10*3/uL    Eosinophils Absolute 0.27 0.00 - 0.40 x10*3/uL    Basophils Absolute 0.06 0.00 - 0.10 x10*3/uL   Comprehensive metabolic panel   Result Value Ref Range    Glucose 138 (H) 65 - 99 mg/dL    Sodium 155 (H) 133 - 145 mmol/L    Potassium 2.8 (LL) 3.4 - 5.1 mmol/L    Chloride 117 (H) 97 - 107 mmol/L    Bicarbonate 25 24 - 31 mmol/L    Urea Nitrogen 22 8 - 25 mg/dL    Creatinine 0.80 0.40 - 1.60 mg/dL    eGFR 75 >60 mL/min/1.73m*2    Calcium 8.4 (L) 8.5 - 10.4 mg/dL    Albumin 2.9 (L) 3.5 - 5.0 g/dL    Alkaline Phosphatase 143 (H) 35 - 125 U/L    Total Protein 6.2 5.9 - 7.9 g/dL    AST 10 5 - 40 U/L    Bilirubin, Total 0.4 0.1 - 1.2 mg/dL    ALT 5 5 - 40 U/L    Anion Gap 13 <=19 mmol/L   Lipase   Result Value Ref Range    Lipase 66 (H) 16 - 63 U/L   Sars-CoV-2 PCR   Result Value Ref Range    Coronavirus 2019, PCR Not Detected Not Detected   Influenza A, and B PCR   Result Value Ref Range    Flu A Result Not Detected Not Detected    Flu B Result Not Detected Not Detected   Serial Troponin, Initial ([facility])   Result Value Ref Range    Troponin T, High Sensitivity 74 (HH) <=14 ng/L   Morphology   Result Value Ref Range    RBC Morphology No significant RBC morphology present    ECG 12 lead   Result Value Ref Range    Ventricular Rate 74 BPM    Atrial Rate 74 BPM    IN Interval 158 ms    QRS Duration 92 ms    QT Interval 466 ms    QTC Calculation(Bazett) 517 ms    P Axis 35 degrees    R Axis -37 degrees    T Axis 32 degrees    QRS Count 12 beats    Q Onset 213 ms    P Onset 134 ms    P Offset 181 ms    T Offset 446 ms    QTC Fredericia 500 ms   Urinalysis with Reflex Culture and Microscopic   Result Value Ref Range    Color, Urine Light-Orange (N) Light-Yellow, Yellow, Dark-Yellow    Appearance, Urine Ex.Turbid (N) Clear    Specific Gravity, Urine 1.015 1.005 - 1.035    pH, Urine 6.0 5.0, 5.5, 6.0, 6.5, 7.0, 7.5, 8.0     Protein, Urine 50 (1+) (A) NEGATIVE, 10 (TRACE), 20 (TRACE) mg/dL    Glucose, Urine Normal Normal mg/dL    Blood, Urine 0.1 (1+) (A) NEGATIVE    Ketones, Urine NEGATIVE NEGATIVE mg/dL    Bilirubin, Urine NEGATIVE NEGATIVE    Urobilinogen, Urine Normal Normal mg/dL    Nitrite, Urine 2+ (A) NEGATIVE    Leukocyte Esterase, Urine 500 Jeremy/µL (A) NEGATIVE   Extra Urine Gray Tube   Result Value Ref Range    Extra Tube Hold for add-ons.    Microscopic Only, Urine   Result Value Ref Range    WBC, Urine >50 (A) 1-5, NONE /HPF    WBC Clumps, Urine MANY Reference range not established. /HPF    RBC, Urine >20 (A) NONE, 1-2, 3-5 /HPF    Squamous Epithelial Cells, Urine 1-9 (SPARSE) Reference range not established. /HPF    Bacteria, Urine 4+ (A) NONE SEEN /HPF    Mucus, Urine 1+ Reference range not established. /LPF   Sodium, urine, random   Result Value Ref Range    Sodium, Urine Random 89 NOT ESTABLISHED mmol/L    Creatinine, Urine Random 75.8 NOT ESTABLISHED mg/dL    Sodium/Creatinine Ratio 117 Not established. mmol/g Creat   Osmolality, urine   Result Value Ref Range    Osmolality, Urine Random 512 200 - 1,200 mOsm/kg   Serial Troponin, 2 Hour ([facility])   Result Value Ref Range    Troponin T, High Sensitivity 69 (HH) <=14 ng/L   Osmolality   Result Value Ref Range    Osmolality, Serum 321 (H) 280 - 300 mOsm/kg   Serial Troponin, 6 Hour ([facility])   Result Value Ref Range    Troponin T, High Sensitivity 65 (HH) <=14 ng/L   Comprehensive metabolic panel   Result Value Ref Range    Glucose 99 65 - 99 mg/dL    Sodium 154 (H) 133 - 145 mmol/L    Potassium 3.3 (L) 3.4 - 5.1 mmol/L    Chloride 117 (H) 97 - 107 mmol/L    Bicarbonate 21 (L) 24 - 31 mmol/L    Urea Nitrogen 19 8 - 25 mg/dL    Creatinine 0.60 0.40 - 1.60 mg/dL    eGFR >90 >60 mL/min/1.73m*2    Calcium 8.3 (L) 8.5 - 10.4 mg/dL    Albumin 2.9 (L) 3.5 - 5.0 g/dL    Alkaline Phosphatase 139 (H) 35 - 125 U/L    Total Protein 6.3 5.9 - 7.9 g/dL    AST 9 5 - 40 U/L    Bilirubin,  Total 0.4 0.1 - 1.2 mg/dL    ALT 5 5 - 40 U/L    Anion Gap 16 <=19 mmol/L   CBC and Auto Differential   Result Value Ref Range    WBC 17.2 (H) 4.4 - 11.3 x10*3/uL    nRBC 0.0 0.0 - 0.0 /100 WBCs    RBC 3.31 (L) 4.00 - 5.20 x10*6/uL    Hemoglobin 10.2 (L) 12.0 - 16.0 g/dL    Hematocrit 32.3 (L) 36.0 - 46.0 %    MCV 98 80 - 100 fL    MCH 30.8 26.0 - 34.0 pg    MCHC 31.6 (L) 32.0 - 36.0 g/dL    RDW 13.6 11.5 - 14.5 %    Platelets 240 150 - 450 x10*3/uL    Neutrophils % 80.1 40.0 - 80.0 %    Immature Granulocytes %, Automated 1.3 (H) 0.0 - 0.9 %    Lymphocytes % 11.2 13.0 - 44.0 %    Monocytes % 6.2 2.0 - 10.0 %    Eosinophils % 0.9 0.0 - 6.0 %    Basophils % 0.3 0.0 - 2.0 %    Neutrophils Absolute 13.73 (H) 1.60 - 5.50 x10*3/uL    Immature Granulocytes Absolute, Automated 0.23 0.00 - 0.50 x10*3/uL    Lymphocytes Absolute 1.93 0.80 - 3.00 x10*3/uL    Monocytes Absolute 1.07 (H) 0.05 - 0.80 x10*3/uL    Eosinophils Absolute 0.15 0.00 - 0.40 x10*3/uL    Basophils Absolute 0.05 0.00 - 0.10 x10*3/uL          Assessment/Plan     ECG 12 lead    Result Date: 5/9/2024  Normal sinus rhythm Left axis deviation Moderate voltage criteria for LVH, may be normal variant ( R in aVL , Sha product ) Nonspecific ST and T wave abnormality Abnormal ECG When compared with ECG of 10-APR-2024 22:30, Significant changes have occurred    XR chest 1 view    Result Date: 5/9/2024  Interpreted By:  Ronnie Tsai, STUDY: XR CHEST 1 VIEW;  5/9/2024 1:23 pm   INDICATION: Signs/Symptoms:failure to thrive.   COMPARISON: 04/13/2024   ACCESSION NUMBER(S): [id number]   ORDERING CLINICIAN: COREY KING   FINDINGS: CARDIOMEDIASTINAL SILHOUETTE AND VASCULATURE:   Cardiac size:  Within normal limits. Status post median sternotomy with mitral valve replacement. This is similar to the prior exam. Aortic shadow:  Within normal limits.   Mediastinal contours: Within normal limits.   Pulmonary vasculature:  The central vasculature is unremarkable   LUNGS: Lungs  are clear.   ABDOMEN AND OTHER FINDINGS: No remarkable upper abdominal findings.   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       1.  No active cardiopulmonary disease.  There has not been significant interval change from the prior exam.   Signed by: Ronnie Tsai 5/9/2024 1:49 PM Dictation workstation:   [id number]     Impression:  Hypernatremia secondary to dehydration and oral intake  Hypokalemia secondary to poor oral intake  Failure to thrive  History of CVA  Recent colitis    Recommendations:  Start IV fluids with D5W calculated water deficit about 3.7 L  Monitor sodium every 4 hours  Swallowing evaluation and possible GI consult  Avoid nephrotoxic medications  Stool for C. difficile toxins  Replace potassium    Thank you for your consultation          I spent 50 minutes in the professional and overall care of this patient.      Ezequiel Saleem MD

## 2024-05-10 NOTE — CARE PLAN
Pt is unable to answer questions; phoned pts  Kaleb at [phone], no answer; phoned pts dtr Iliana Samaniego at [phone], no answer-did not leave a message.  Pt is here for general weakness, hyponatremia and hypokalemia and UTI

## 2024-05-10 NOTE — CONSULTS
Consults  History Of Present Illness:    Rebecca Samaniego is a 78 y.o. female presenting with altered mental status.  Next    The patient's past medical history includes the followin. Status post aortic valve replacement utilizing #23 mm Freestyle bioprosthesis along with replacement of the ascending aorta and transverse hemiarch utilizing a #24 mm Gelweave graft plus tricuspid valve repair utilizing a #21 mm [place] ring plus occlusion of small membranous VSD, 2008, . This patient is doing extremely well from the cardiac standpoint. The patient denies any unusual complaints other than frequent urinary tract infections. She did have a repeat surveillance echocardiogram performed today which again demonstrates a preserved left ventricular ejection fraction of approximately 55% with mild to moderate hypokinesis of the anteroseptal wall due to previous thoracotomy. The bioprosthetic aortic valve replacement has a normal appearance and function. There is mild to moderate left atrial enlargement. Repeat echo done 2019 showed EF 55-60%, stentless AV bioprothesis, AV gradient is 9.5 mm Hg. The patient will return in 6 months for routine visit. Echo done 2023 was essentially unchanged with the addition of some mild to moderate MR.  2. Hypertension. Blood pressure is well within normal range today. The patient will continue on atenolol 100 mg daily unchanged.   3. Remote repair of coarctation of the aorta.  4. Status post lumpectomy and radiation therapy for left-sided breast carcinoma.  5. Status post C3 diskectomy and fusion, C5 corpectomy and anterior plate placement from C3 to C6, 2012, Dr. Burt, [facility]. Patient had laminectomy and facetectomy 2017 with Dr. Jessica Burt. Had another surgery on 2019.  6. Abdominal hysterectomy.  7. Former smoking.  8. Former alcohol excess.  9. Pedal edema. At baseline.  10. CKD. See Dr Thomas.   11. Venous insufficiency.      The  patient presented to the [facility] emergency room on 4/10/2024 because of increasing weakness and several falls.  Patient had been unable to provide self-care and when was barely able to walk according to the report from the .  She had occasional delusions and delirium as well.  The initial evaluation in the emergency room thus far is included lumbosacral spine back films showing a satisfactory appearance of the L2-L5 fusion progressive degenerative change at L1 and L2.  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  CBC included hematocrit 30.6 WBC of 7700.  The comprehensive metabolic panel included creatinine 1.9 potassium of 4.5.   The high-sensitivity troponins were 69, 66, and 57.  EKG demonstrated sinus rhythm left axis deviation poor R wave progression and a nonspecific ST-T abnormality.  The patient was thought to have failure to thrive inability to provide self-care and difficulty walking related to progressive dementia.  Head CT was negative for new CVA.  She was started on empiric IV Rocephin for possible urinary tract infection.  Atenolol 50 mg daily was switched to Toprol-XL 50 mg daily.  The minor elevation in the high-sensitivity troponin was thought to be nondiagnostic and there was no clinical concern for an acute coronary syndrome.  Physical therapy was consulted and it was felt that the patient would likely need a skilled nursing home placement.  Subsequent MRI of the brain actually did confirm the occurrence of a small acute cortical infarct within the left occipital lobe.  In addition there was moderate chronic small vessel ischemic disease with possible tiny insults of the deep white matter in the right frontal lobe bilateral basal ganglia and cerebellum plus moderate to advanced brain atrophy.  MRI of the lumbosacral spine demonstrated a prior posterior spinal fusion and decompression at L2-L5 with posterior fusion decompression  findings.  There was no adjacent segment of disease at the L1-L2 segment with severe degenerative discogenic change and an element of epidural lipomatosis contributing to severe spinal canal stenosis.  There was moderate bilateral neuroforaminal narrowing as well.  There was also left subarticular disc osteophyte protrusion with moderate left neural foraminal narrowings.  The patient had been complaining of paresthesias in the feet that was thought to be related to lumbosacral spinal DJD.  The patient had a repeat echocardiogram that demonstrated a normal LV ejection fraction at 60% with a normal-appearing stentless bioprosthetic aortic valve replacement and a minimal systolic pressure gradient.  There was evidence of prior tricuspid valve repair with only mild to moderate tricuspid valve regurgitation.  There was trace to mild mitral valve regurgitation.  Bubble study was negative for patent foramen ovale or residual VSD that was closed at the time of her operative procedure.  The patient's  at that point was aware that the patient would require skilled nursing facility placement.  The patient's systolic blood pressures remained fairly well controlled renal parameters were stable creatinine is 0.9.  Telemetry monitor showed sinus rhythm in the 80s per minute with low-grade premature ectopic activity.  The urine cultures were positive for E. coli but blood cultures negative.  Patient was continued on prior dual antiplatelet therapy with aspirin and Plavix.  Patient was seen by speech therapy swallow evaluation performed and there was no restriction in her intake of solids or thin liquids.    The patient was sent to a skilled nursing facility where she evidently continued to progressively decline with very limited eating and drinking with progressive generalized weakness and inability to walk.  She evidently was identified as having C. difficile colitis and was placed on oral vancomycin without any significant  abdominal pain nausea vomiting or diarrhea.  On the initial evaluation in the emergency room the patient did appear to be clinically dehydrated.  Initial labs included a CBC with a WBC of 16,500 hematocrit 31.7.  The comprehensive metabolic panel included creatinine is 0.8 potassium normally 2.8 sodium 155 chloride 117.  Lipase was 66.  High-sensitivity troponin 74 repeated at 69.  Nasal swab was negative RSV PCR negative.  Chest x-ray showed no active cardiopulmonary disease.  The patient has been started on IV D5W with potassium replacement.  Repeat electrolyte panel notable for serum sodium still 154 chloride 117 with a creatinine of 0.6.  CBC includes WBC of 17,200 hematocrit 32.3.        Last Recorded Vitals:  Vitals:    05/10/24 0840 05/10/24 1244 05/10/24 1425 05/10/24 1533   BP: 157/73 165/84 161/82 161/87   BP Location: Left arm      Patient Position: Lying      Pulse: (!) 104 100 (!) 113 94   Resp: 20 18 18 18   Temp:       TempSrc:       SpO2: 97% 96% 96% 99%   Weight:       Height:           Last Labs:  CBC - 5/10/2024:  7:02 AM  17.2 10.2 240    32.3      CMP - 5/10/2024: 11:48 AM  8.1 6.3 9 --- 0.4   _ 2.9 5 139      PTT - No results in last year.  _   _ _     Hemoglobin A1C   Date/Time Value Ref Range Status   04/12/2024 07:46 AM 5.2 See below % Final   05/16/2023 03:49 PM 5.9 (A) % Final     Comment:          Diagnosis of Diabetes-Adults   Non-Diabetic: < or = 5.6%   Increased risk for developing diabetes: 5.7-6.4%   Diagnostic of diabetes: > or = 6.5%  .       Monitoring of Diabetes                Age (y)     Therapeutic Goal (%)   Adults:          >18           <7.0   Pediatrics:    13-18           <7.5                   7-12           <8.0                   0- 6            7.5-8.5   American Diabetes Association. Diabetes Care 33(S1), Jan 2010.     04/26/2022 12:56 PM 6.0 (A) % Final     Comment:          Diagnosis of Diabetes-Adults   Non-Diabetic: < or = 5.6%   Increased risk for developing  "diabetes: 5.7-6.4%   Diagnostic of diabetes: > or = 6.5%  .       Monitoring of Diabetes                Age (y)     Therapeutic Goal (%)   Adults:          >18           <7.0   Pediatrics:    13-18           <7.5                   7-12           <8.0                   0- 6            7.5-8.5   American Diabetes Association. Diabetes Care 33(S1), Jan 2010.       LDL Calculated   Date/Time Value Ref Range Status   04/12/2024 07:46  65 - 130 mg/dL Final     VLDL   Date/Time Value Ref Range Status   02/03/2021 11:42 AM 45 (H) 0 - 40 mg/dL Final      Last I/O:  I/O last 3 completed shifts:  In: 750 (11.8 mL/kg) [IV Piggyback:750]  Out: - (0 mL/kg)   Weight: 63.6 kg     Past Cardiology Tests (Last 3 Years):  EKG:  ECG 12 lead 05/09/2024      ECG 12 lead 04/10/2024    Echo:  Transthoracic Echo (TTE) Complete 04/12/2024    Ejection Fractions:  No results found for: \"EF\"  Cath:  No results found for this or any previous visit from the past 1095 days.    Stress Test:  No results found for this or any previous visit from the past 1095 days.    Cardiac Imaging:  No results found for this or any previous visit from the past 1095 days.      Past Medical History:  She has a past medical history of Hypertension.    Past Surgical History:  She has a past surgical history that includes Aortic valve replacement (02/11/2014); Other surgical history (02/11/2014); Cervical discectomy (02/11/2014); Breast lumpectomy (02/11/2014); and Hysterectomy (02/11/2014).      Social History:  She reports that she has quit smoking. Her smoking use included cigarettes. She has never used smokeless tobacco. She reports current alcohol use. She reports that she does not use drugs.    Family History:  No family history on file.     Allergies:  Sulfa (sulfonamide antibiotics), Sulfamethoxazole-trimethoprim, Ciprofloxacin, and Nitrofurantoin    Inpatient Medications:  Scheduled medications   Medication Dose Route Frequency    aspirin  81 mg oral " Daily    atorvastatin  40 mg oral Nightly    clopidogrel  75 mg oral Daily    ferrous sulfate (325 mg ferrous sulfate)  65 mg of iron oral BID with meals    fluconazole  100 mg oral Daily    heparin (porcine)  5,000 Units subcutaneous q8h JACQUELYN    lactobacillus acidophilus  1 tablet oral BID with meals    metoprolol succinate XL  50 mg oral Daily    vancomycin  125 mg oral q6h     PRN medications   Medication    acetaminophen    Or    acetaminophen    Or    acetaminophen    acetaminophen    benzocaine-menthol    dextromethorphan-guaifenesin    guaiFENesin    hydrALAZINE    ondansetron ODT    polyethylene glycol     Continuous Medications   Medication Dose Last Rate    potassium chloride in 5 % dex  100 mL/hr 100 mL/hr (05/10/24 0957)     Outpatient Medications:  Current Outpatient Medications   Medication Instructions    acetaminophen (TYLENOL) 650 mg, rectal, Every 4 hours PRN    acetaminophen (TYLENOL) 650 mg, oral, Every 8 hours PRN    acetaminophen (TYLENOL) 650 mg, oral, Every 8 hours PRN    aspirin 81 mg, oral, Daily    atenolol (TENORMIN) 50 mg, oral, Daily RT    atorvastatin (LIPITOR) 40 mg, oral, Nightly    bacitracin 500 unit/gram ointment Topical, 3 times daily, Apply to legs when she has excoriations    cephalexin (KEFLEX) 500 mg, oral, 3 times daily    clopidogrel (PLAVIX) 75 mg, oral, Daily    ferrous sulfate 65 mg, oral, 3 times daily with meals, Do not crush, chew, or split.    fluconazole (DIFLUCAN) 100 mg, oral, Daily    lactobacillus acidophilus (Acidophilus) capsule 1 capsule, oral, 2 times daily    metoprolol succinate XL (TOPROL-XL) 50 mg, oral, Daily, Do not crush or chew.    ondansetron (ZOFRAN) 4 mg, oral, Every 6 hours PRN    vancomycin (VANCOCIN) 125 mg, oral, Every 6 hours       Physical Exam:  The patient is an elderly nonobese white female lying in bed.  She is awake alert conversant but with confusion  JVP not elevated carotid and pulses are 2+ no bruit  Chest fair air movement breath  sounds are clear  The cardiac rhythm is regular no premature beats S1-S2 normal no gallop murmur rub or click.  Well-healed sternotomy incision scar  Abdomen is soft and benign  There is no peripheral edema.  There are multiple ecchymoses over the upper and lower extremities     Assessment/Plan     5/10:The patient is a 78-year-old white female whose past medical history includes hypertension, chronic kidney disease, former alcohol excess, status post bioprosthetic aortic valve replacement utilizing a number 23 mm freestyle bioprosthesis along with replacement of the ascending aorta and transverse hemiarch using a number 24 mm Gelweave graft plus tricuspid valve repair utilizing number 21 mm [place] ring plus 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 This patient has done very well since her cardiac surgery with her last surveillance echocardiogram on 9/25/2023 showing an LV ejection fraction of 55-60% 1-2+ mitral valve regurgitation normal-appearing stentless aortic valve bioprosthesis with a peak systolic gradient of 11 mmHg and a prosthetic graft in the aortic root and ascending/arch position. Patient was admitted on 4/10/2024 with failure to thrive inability to provide self-care difficulty walking which appears to be related to progressing dementia.  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  She had a repeat echocardiogram that showed a preserved LV ejection fraction 60% normal-appearing stentless bioprosthetic aortic valve replacement minimal systolic pressure gradient with prior tricuspid valve repair and only mild to moderate tricuspid valve regurgitation.  There is trace to mild mitral valve  regurgitation bubble study negative for residual VSD that was closed at the time of her aortic valve replacement.  Patient was treated with for an E. coli urinary tract infection but her mental status appeared to be significantly impaired from dementia.  She was continued on dual antiplatelet therapy with aspirin and Plavix along with Toprol-XL 50 mg daily.  Patient readmitted with failure to thrive with reduced oral intake and significant dehydration with hypernatremia hyperchloremia and hypokalemia for which the patient is receiving IV D5W and potassium supplementation.  From a cardiac standpoint the patient is stable but peers to have significantly advancing senile dementia versus multi-infarct dementia.  For now would continue usual cardiac therapy with the dual antiplatelet therapy and atorvastatin plus metoprolol.    Peripheral IV 05/09/24 20 G Right Antecubital (Active)   Site Assessment Clean;Dry;Intact 05/10/24 0900   Dressing Status Clean;Dry 05/10/24 0900   Number of days: 1       Code Status:  Full Code    I spent 30 minutes in the professional and overall care of this patient.        Shaheed Martin MD

## 2024-05-10 NOTE — PROGRESS NOTES
Physical Therapy    Physical Therapy Evaluation & Treatment    Patient Name: Rebecca Samaniego  MRN: [id number]  Today's Date: 5/10/2024   Time Calculation  Start Time: 0840  Stop Time: 0902  Time Calculation (min): 22 min    Assessment/Plan   PT Assessment  PT Assessment Results: Decreased strength, Decreased range of motion, Decreased endurance, Impaired balance, Decreased mobility, Decreased coordination, Decreased cognition, Impaired judgement, Decreased safety awareness, Impaired tone, Decreased skin integrity  Rehab Prognosis: Poor  Evaluation/Treatment Tolerance: Patient limited by pain, Patient limited by fatigue, Treatment limited secondary to agitation (pt not wanting to mobilize any further)  Medical Staff Made Aware: Yes  Strengths: Rehab experience  Barriers to Participation: Ability to acquire knowledge, Comorbidities, Insight into problems  End of Session Communication: Bedside nurse  Assessment Comment: pt with impaired functional mobility, LE ROM/strength, tolerance to activity, skin integrity, balance, and safety awareness. pt is reluctant to mobilize during evaluation and has required increased assistance at the SNF. Will attempt further mobilization with skilled therapy services pending progress and participation.  End of Session Patient Position: On cart (B railings up, call button in reach.)   IP OR SWING BED PT PLAN  Inpatient or Swing Bed: Inpatient  PT Plan  Treatment/Interventions: Bed mobility, Transfer training, Gait training, Balance training, Strengthening, Endurance training, Range of motion, Therapeutic exercise, Therapeutic activity  PT Plan: Skilled PT (trial pending progress and participation)  PT Frequency: 3 times per week  PT Discharge Recommendations: Moderate intensity level of continued care  PT Recommended Transfer Status: Assist x2, Total assist  PT - OK to Discharge: Yes    Subjective   General Visit Information:  General  Reason for Referral: Impaired Mobility  Referred By:  Kobi Denise MD  Past Medical History Relevant to Rehab: HLD, CKD, CVA, AVR, C diff, Breast lumpectomy, hysterectomy, cervical discectomy  Family/Caregiver Present: No  Co-Treatment: OT  Co-Treatment Reason: patient/caregiver safety and optimization of patient outcomes with a medically complex initial evaluation in the ED.  Prior to Session Communication: Bedside nurse  Patient Position Received: On cart, Alarm off, not on at start of session (ED cart with 2 rails up)  General Comment: 78 y.o. female admitted from SNF with c/o generalized weakness, decreased oral intake, and inability to walk, failure to thrive.  Home Living:  Home Living  Type of Home: Skilled Nursing facility ([facility])  Lives With: Other (Comment) (care staff)  Home Adaptive Equipment: Walker rolling or standard, Cane  Home Layout: One level  Home Access: Level entry  Home Living Comments: Pt with recent hospitalization 4/10-4/16 where she was d/c to [facility]. Per chart, prior to that hospitalizaton pt was home with spouse  Prior Level of Function:  Prior Function Per Pt/Caregiver Report  Level of [place]: Needs assistance with ADLs, Needs assistance with homemaking, Needs assistance with functional transfers  Receives Help From: Family (care staff)  ADL Assistance:  (assist for bathing, dressing, eating)  Homemaking Assistance:  (dependent for all homemaking)  Ambulatory Assistance:  (needs assistance for all bed mobiltiy, transfers)  Vocational: Retired  Hand Dominance: Right  Prior Function Comments: Pt is a poor historian. Pt reports limited mobility at SNF and assist for ADLs, unclear prior level of function; Pt with recent hospitalization 4/10-4/16 where she was d/c to [facility]. Per chart, prior to that hospitalizaton pt was home with spouse and required assist for ADLs/IADLs and functional mobility with use of cane  Precautions:  Precautions  Hearing/Visual Limitations: wears glasses  Medical  Precautions: Fall precautions, Swallowing precautions, Other (comment) (decubitus)  Vital Signs:  Vital Signs  Heart Rate: (!) 104 (up to 120 bpm with activity)  Heart Rate Source: Monitor  Respirations: 20  Pulse Ox: 97 %  BP: 157/73  MAP (mmHg): 97  BP Location: Left arm  BP Method: Automatic  Patient Position: Lying    Objective   Pain:  Pain Assessment  Pain Assessment: FLACC (Face, Legs, Activity, Cry, Consolability)  Pain Score: 8  Pain Type: Acute pain  Pain Location: Leg  Pain Orientation: Right  Pain Descriptors: Sharp  Pain Frequency:  (during ROM assessment)  Clinical Progression:  (dissipates with rest)  Patient's Stated Pain Goal: No pain  Pain Interventions: Repositioned  Response to Interventions: pain increases with movement, subsides at rest  Cognition:  Cognition  Overall Cognitive Status: Impaired  Arousal/Alertness: Delayed responses to stimuli  Orientation Level: Disoriented to time, Disoriented to situation  Following Commands: Follows one step commands with increased time (and repetition 50% of the time)  Cognition Comments: pt with delay in response to questions, poor eye contact    General Assessments:  General Observation  General Observation: decreased skin turgor, B LEs with areas of bruising, ecchymosis, and abrasions. right heel wiht large, red blistered area, left heel breadown.    Activity Tolerance  Endurance: Tolerates less than 10 min exercise with changes in vital signs  Activity Tolerance Comments: poor  Rate of Perceived Exertion (RPE): 4    Sensation  Sensation Comment: pt denies paresthesias, R LE hypersensitive to touch    Strength  Strength Comments: pt minimally assisting with B LE movement assessment  Strength  Strength Comments: pt minimally assisting with B LE movement assessment    Perception  Initiation: Cues to initiate tasks (delay in response to movement of B LEs, tactile cues needed)    Coordination  Movements are Fluid and Coordinated: No  Coordination Comment:  decreased rate and accuracy of movement    Postural Control  Posture Comment: rounded shoulders, forward head posture, thoracic kyphosis    Static Sitting Balance  Static Sitting-Level of Assistance: Dependent  Static Sitting-Comment/Number of Minutes: long sitting in the bed x 30 seconds, little to no assistance provided by the patient    Functional Assessments:  ADL  ADL's Addressed: No    Bed Mobility  Bed Mobility: Yes  Bed Mobility 1  Bed Mobility 1: Rolling left  Level of Assistance 1: Maximum assistance, Maximum verbal cues, Maximum tactile cues  Bed Mobility Comments 1: assist to flex left knee, manual guidance of left hand to the railing, max A to turn trunk and roll onto right side. pt able to  the railing however, max A needed to maintain sidelying.  Bed Mobility 2  Bed Mobility  2: Long sit  Level of Assistance 2: Dependent  Bed Mobility Comments 2: use of draw sheet to help elevate trunk, pt not cooperating with placing hands on the railings to assist with trunk support.    Transfers  Transfer: No    Ambulation/Gait Training  Ambulation/Gait Training Performed: No    Stairs  Stairs: No  Extremity/Trunk Assessments:  RLE   RLE :  (limited available ROM hip/knee, crepitus, increased pain with movement, pt no assisting or resisting movement. no pain with hip abd/add, ankle lacks full neutrality.)  LLE   LLE :  (ROM grossly WFL except ankle, no pain with movement, slight assistance felt with ROM assessment. poor muscle tone.)  Treatments:  Therapeutic Activity  Therapeutic Activity Performed:  (see balance assessments and bed mobility for details. Attempted supine ther ex/ROM, pt not cooperative. B PRAFOs placed correctly on feet to assist with offloading heels.)  Outcome Measures:  [facility] Basic Mobility  Turning from your back to your side while in a flat bed without using bedrails: A lot  Moving from lying on your back to sitting on the side of a flat bed without using bedrails: Total  Moving to and  from bed to chair (including a wheelchair): Total  Standing up from a chair using your arms (e.g. wheelchair or bedside chair): Total  To walk in hospital room: Total  Climbing 3-5 steps with railing: Total  Basic Mobility - Total Score: 7    Encounter Problems       Encounter Problems (Active)       Balance       STG - Maintains static sitting balance with upper extremity support x 10 minutes with close supervision to prepare for transfer training (Progressing)       Start:  05/10/24    Expected End:  06/09/24               PT Transfers       STG - Transfer from bed to chair with mod A (Not Progressing)       Start:  05/10/24    Expected End:  06/09/24            STG - Patient to transfer to and from sit to supine with mod A (Progressing)       Start:  05/10/24    Expected End:  06/09/24            STG - Patient will roll from side to side with mod A to assist with pressure relief and hygiene. (Progressing)       Start:  05/10/24    Expected End:  06/09/24            STG - Patient will transfer sit to and from stand with mod A and safe hand placement. (Not Progressing)       Start:  05/10/24    Expected End:  06/09/24                   Education Documentation  Precautions, taught by Merissa Lopez PT at 5/10/2024 11:47 AM.  Learner: Patient  Readiness: Nonacceptance  Method: Explanation  Response: No Evidence of Learning, Needs Reinforcement    Body Mechanics, taught by Merissa Lopez PT at 5/10/2024 11:47 AM.  Learner: Patient  Readiness: Nonacceptance  Method: Explanation  Response: No Evidence of Learning, Needs Reinforcement    Mobility Training, taught by Merissa Lopez PT at 5/10/2024 11:47 AM.  Learner: Patient  Readiness: Nonacceptance  Method: Explanation  Response: No Evidence of Learning, Needs Reinforcement    Education Comments  No comments found.

## 2024-05-10 NOTE — PROGRESS NOTES
Occupational Therapy    Evaluation  Patient Name: Rebecca Samaniego  MRN: [id number]  : 1945  Today's Date: 05/10/24  Time Calculation  Start Time: 839  Stop Time: 901  Time Calculation (min): 22 min       Assessment:  OT Assessment: Pt would benefit from acute OT services  End of Session Communication: Bedside nurse  End of Session Patient Position: On cart (ED cart with 2 rails up, all needs in reach)  OT Assessment Results: Decreased ADL status, Decreased upper extremity range of motion, Decreased upper extremity strength, Decreased safe judgment during ADL, Decreased cognition, Decreased functional mobility, Decreased gross motor control, Decreased endurance  Plan:  Treatment Interventions: ADL retraining, Functional transfer training, UE strengthening/ROM, Endurance training, Equipment evaluation/education, Patient/family training  OT Frequency: 3 times per week  OT Discharge Recommendations: Moderate intensity level of continued care  OT - OK to Discharge: Yes  Treatment Interventions: ADL retraining, Functional transfer training, UE strengthening/ROM, Endurance training, Equipment evaluation/education, Patient/family training    Subjective     General:   OT Received On: 05/10/24  General  Reason for Referral: Pt admitted from SNF with c/o generalized weakness, decreased oral intake, and inability to walk  Past Medical History Relevant to Rehab: HLD, CKD, CVA  Family/Caregiver Present: No  Co-Treatment: PT  Prior to Session Communication: Bedside nurse  Patient Position Received: On cart (on ED cart with 2 rails up)  General Comment: Cleared by nursing. Pt agreeable to limited therapy with encouragement  Precautions:  Hearing/Visual Limitations: glasses  Medical Precautions: Fall precautions  Vital Signs:  Heart Rate: (!) 109  Pulse Ox: 98 % (on RA)  BP: 157/73  MAP (mmHg): 97  Pain:  Pain Assessment  Pain Assessment: 0-10  Pain Score:  (pt reported 0/10 at rest; FLACC 5 of RLE and lower back with  "movement)    Objective   Cognition:  Overall Cognitive Status: Impaired (Pt able to state \"hospital\" and \"24\" for place and year; pt unable to state month; increased time to provide answers)           Home Living:  Type of Home: Skilled Nursing facility ([facility])  Lives With:  (care staff)  Home Adaptive Equipment: Walker rolling or standard, Cane  Home Layout: One level  Home Access: Level entry  Home Living Comments: Pt with recent hospitalization 4/10-4/16 where she was d/c to [facility]. Per chart, prior to that hospitalizaton pt was home with spouse  Prior Function:  Level of [place]: Needs assistance with ADLs, Needs assistance with homemaking, Needs assistance with functional transfers  Receives Help From: Family (care staff)  ADL Assistance: Needs assistance  Homemaking Assistance: Needs assistance  Ambulatory Assistance: Needs assistance  Hand Dominance: Right  Prior Function Comments: Pt is a poor historian. Pt reports limited mobility at SNF and assist for ADLs, unclear prior level of function; Pt with recent hospitalization 4/10-4/16 where she was d/c to [facility]. Per chart, prior to that hospitalizaton pt was home with spouse and required assist for ADLs/IADLs and functional mobility with use of cane       ADL:  Eating Assistance: Moderate  Grooming Assistance: Moderate  Bathing Assistance: Maximal  UE Dressing Assistance: Moderate  LE Dressing Assistance: Total  Toileting Assistance with Device: Total       Activity Tolerance:  Endurance: Decreased tolerance for upright activites       Bed Mobility/Transfers: Bed Mobility  Bed Mobility:  (max A x2 for trunk controll and BLEs for rolling; max A x2 for trunk control supine<>long sitting)    Transfers  Transfer: No (pt declined further mobility this date)    Functional Mobility:  Functional Mobility  Functional Mobility Performed: No  Sitting Balance:  Static Sitting Balance  Static Sitting-Level of Assistance: " Maximum assistance  Static Sitting-Comment/Number of Minutes:  (max A to maintain trunk upright during longsitting in bed, pt requesting to return to supine after <1 mintue)    Sensation:  Sensation Comment: pt denied numbness/paresthesia BUE  Strength:  Strength Comments: grossly 1/5 shoulders; 2/5 distally    Hand Function:  Hand Function  Gross Grasp: Impaired  Coordination: Impaired  Extremities: RUE   RUE :  (Shoulder 0 degrees AROM, WFL AAROM; WFL distally however increased time and effort to perform) and LUE   LUE:  (Shoulder 0 degrees AROM, WFL AAROM; WFL distally however increased time and effort to perform)    Outcome Measures: [facility] Daily Activity  Putting on and taking off regular lower body clothing: Total  Bathing (including washing, rinsing, drying): Total  Putting on and taking off regular upper body clothing: A lot  Toileting, which includes using toilet, bedpan or urinal: Total  Taking care of personal grooming such as brushing teeth: A lot  Eating Meals: A lot  Daily Activity - Total Score: 9    Education Documentation  ADL Training, taught by Tiffani Ferrari OT at 5/10/2024 11:43 AM.  Learner: Patient  Readiness: Acceptance  Method: Demonstration, Explanation  Response: Needs Reinforcement    Education Comments  No comments found.      Goals:  Encounter Problems       Encounter Problems (Active)       ADLs       Pt will complete ADL tasks at min Awith use of AE prn  (Progressing)       Start:  05/10/24    Expected End:  06/01/24               Functional Balance       Pt will maintain static sitting balance with upper extremity support at min A  (Progressing)       Start:  05/10/24    Expected End:  06/01/24               OT Transfers       Pt will perform functional transfers at min A (Progressing)       Start:  05/10/24    Expected End:  06/01/24               Therapeutic Exercise       Pt will perform AAROM/AROM BUE exercises to improve strength and independence with functional  transfers/ADL tasks.   (Progressing)       Start:  05/10/24    Expected End:  06/01/24

## 2024-05-10 NOTE — PROGRESS NOTES
Rebecca Samaniego is a 78 y.o. female on day 1 of admission presenting with General weakness.    Subjective   Patient seen and examined.  Resting on cart in the ED room 18 in no acute distress.  Awake alert oriented x 2-3.  Confused.  No complaints.  Review of systems is limited.    Spoke with nursing, no new issues.    Objective     Physical Exam  Constitutional:       General: She is not in acute distress.     Appearance: Normal appearance. She is normal weight. She is ill-appearing. She is not toxic-appearing or diaphoretic.   HENT:      Head: Normocephalic and atraumatic.      Right Ear: Tympanic membrane normal.      Left Ear: Tympanic membrane normal.      Nose: Nose normal.      Mouth/Throat:      Mouth: Mucous membranes are dry.      Pharynx: Oropharynx is clear.      Comments: Mouth dry, tongue coated.  Eyes:      Extraocular Movements: Extraocular movements intact.      Conjunctiva/sclera: Conjunctivae normal.      Pupils: Pupils are equal, round, and reactive to light.   Cardiovascular:      Rate and Rhythm: Regular rhythm. Tachycardia present.      Pulses: Normal pulses.      Heart sounds: Normal heart sounds. No murmur heard.  Pulmonary:      Effort: Pulmonary effort is normal. No respiratory distress.      Breath sounds: Normal breath sounds. No wheezing, rhonchi or rales.   Abdominal:      General: Bowel sounds are normal. There is no distension.      Palpations: Abdomen is soft.      Tenderness: There is no abdominal tenderness.   Genitourinary:     Comments: Deferred.  Musculoskeletal:         General: No swelling or tenderness. Normal range of motion.      Cervical back: Normal range of motion and neck supple.   Skin:     General: Skin is warm and dry.      Capillary Refill: Capillary refill takes less than 2 seconds.      Findings: Bruising present.   Neurological:      General: No focal deficit present.      Mental Status: She is alert. She is disoriented.      Comments: Awake alert and oriented x  "2-3.  Speech clear and coherent.  Follows all commands.  Generalized weakness.  No focal weakness.  Gait deferred.  Cranial nerves II through XII grossly intact.   Psychiatric:         Mood and Affect: Mood normal.         Behavior: Behavior normal.       Last Recorded Vitals  Blood pressure 157/73, pulse (!) 109, temperature 36.6 °C (97.9 °F), temperature source Oral, resp. rate (!) 21, height 1.651 m (5' 5\"), weight 63.6 kg (140 lb 3.4 oz), SpO2 98%.    Intake/Output last 3 Shifts:  I/O last 3 completed shifts:  In: 750 (11.8 mL/kg) [IV Piggyback:750]  Out: - (0 mL/kg)   Weight: 63.6 kg     Telemetry sinu tachycardia rate 100's    Relevant Results  Results for orders placed or performed during the hospital encounter of 05/09/24 (from the past 24 hour(s))   NT Pro-BNP   Result Value Ref Range    PROBNP 2,220 (H) 0 - 624 pg/mL   CBC and Auto Differential   Result Value Ref Range    WBC 16.5 (H) 4.4 - 11.3 x10*3/uL    nRBC 0.0 0.0 - 0.0 /100 WBCs    RBC 3.06 (L) 4.00 - 5.20 x10*6/uL    Hemoglobin 9.7 (L) 12.0 - 16.0 g/dL    Hematocrit 31.7 (L) 36.0 - 46.0 %     (H) 80 - 100 fL    MCH 31.7 26.0 - 34.0 pg    MCHC 30.6 (L) 32.0 - 36.0 g/dL    RDW 13.8 11.5 - 14.5 %    Platelets 233 150 - 450 x10*3/uL    Neutrophils % 77.2 40.0 - 80.0 %    Immature Granulocytes %, Automated 1.3 (H) 0.0 - 0.9 %    Lymphocytes % 12.1 13.0 - 44.0 %    Monocytes % 7.4 2.0 - 10.0 %    Eosinophils % 1.6 0.0 - 6.0 %    Basophils % 0.4 0.0 - 2.0 %    Neutrophils Absolute 12.77 (H) 1.60 - 5.50 x10*3/uL    Immature Granulocytes Absolute, Automated 0.21 0.00 - 0.50 x10*3/uL    Lymphocytes Absolute 2.00 0.80 - 3.00 x10*3/uL    Monocytes Absolute 1.23 (H) 0.05 - 0.80 x10*3/uL    Eosinophils Absolute 0.27 0.00 - 0.40 x10*3/uL    Basophils Absolute 0.06 0.00 - 0.10 x10*3/uL   Comprehensive metabolic panel   Result Value Ref Range    Glucose 138 (H) 65 - 99 mg/dL    Sodium 155 (H) 133 - 145 mmol/L    Potassium 2.8 (LL) 3.4 - 5.1 mmol/L    " Chloride 117 (H) 97 - 107 mmol/L    Bicarbonate 25 24 - 31 mmol/L    Urea Nitrogen 22 8 - 25 mg/dL    Creatinine 0.80 0.40 - 1.60 mg/dL    eGFR 75 >60 mL/min/1.73m*2    Calcium 8.4 (L) 8.5 - 10.4 mg/dL    Albumin 2.9 (L) 3.5 - 5.0 g/dL    Alkaline Phosphatase 143 (H) 35 - 125 U/L    Total Protein 6.2 5.9 - 7.9 g/dL    AST 10 5 - 40 U/L    Bilirubin, Total 0.4 0.1 - 1.2 mg/dL    ALT 5 5 - 40 U/L    Anion Gap 13 <=19 mmol/L   Lipase   Result Value Ref Range    Lipase 66 (H) 16 - 63 U/L   Sars-CoV-2 PCR   Result Value Ref Range    Coronavirus 2019, PCR Not Detected Not Detected   Influenza A, and B PCR   Result Value Ref Range    Flu A Result Not Detected Not Detected    Flu B Result Not Detected Not Detected   Serial Troponin, Initial ([facility])   Result Value Ref Range    Troponin T, High Sensitivity 74 (HH) <=14 ng/L   Morphology   Result Value Ref Range    RBC Morphology No significant RBC morphology present    ECG 12 lead   Result Value Ref Range    Ventricular Rate 74 BPM    Atrial Rate 74 BPM    TN Interval 158 ms    QRS Duration 92 ms    QT Interval 466 ms    QTC Calculation(Bazett) 517 ms    P Axis 35 degrees    R Axis -37 degrees    T Axis 32 degrees    QRS Count 12 beats    Q Onset 213 ms    P Onset 134 ms    P Offset 181 ms    T Offset 446 ms    QTC Fredericia 500 ms   Urinalysis with Reflex Culture and Microscopic   Result Value Ref Range    Color, Urine Light-Orange (N) Light-Yellow, Yellow, Dark-Yellow    Appearance, Urine Ex.Turbid (N) Clear    Specific Gravity, Urine 1.015 1.005 - 1.035    pH, Urine 6.0 5.0, 5.5, 6.0, 6.5, 7.0, 7.5, 8.0    Protein, Urine 50 (1+) (A) NEGATIVE, 10 (TRACE), 20 (TRACE) mg/dL    Glucose, Urine Normal Normal mg/dL    Blood, Urine 0.1 (1+) (A) NEGATIVE    Ketones, Urine NEGATIVE NEGATIVE mg/dL    Bilirubin, Urine NEGATIVE NEGATIVE    Urobilinogen, Urine Normal Normal mg/dL    Nitrite, Urine 2+ (A) NEGATIVE    Leukocyte Esterase, Urine 500 Jeremy/µL (A) NEGATIVE   Extra Urine Mccormick  Tube   Result Value Ref Range    Extra Tube Hold for add-ons.    Microscopic Only, Urine   Result Value Ref Range    WBC, Urine >50 (A) 1-5, NONE /HPF    WBC Clumps, Urine MANY Reference range not established. /HPF    RBC, Urine >20 (A) NONE, 1-2, 3-5 /HPF    Squamous Epithelial Cells, Urine 1-9 (SPARSE) Reference range not established. /HPF    Bacteria, Urine 4+ (A) NONE SEEN /HPF    Mucus, Urine 1+ Reference range not established. /LPF   Sodium, urine, random   Result Value Ref Range    Sodium, Urine Random 89 NOT ESTABLISHED mmol/L    Creatinine, Urine Random 75.8 NOT ESTABLISHED mg/dL    Sodium/Creatinine Ratio 117 Not established. mmol/g Creat   Osmolality, urine   Result Value Ref Range    Osmolality, Urine Random 512 200 - 1,200 mOsm/kg   Serial Troponin, 2 Hour ([facility])   Result Value Ref Range    Troponin T, High Sensitivity 69 (HH) <=14 ng/L   Osmolality   Result Value Ref Range    Osmolality, Serum 321 (H) 280 - 300 mOsm/kg   Serial Troponin, 6 Hour ([facility])   Result Value Ref Range    Troponin T, High Sensitivity 65 (HH) <=14 ng/L   Comprehensive metabolic panel   Result Value Ref Range    Glucose 99 65 - 99 mg/dL    Sodium 154 (H) 133 - 145 mmol/L    Potassium 3.3 (L) 3.4 - 5.1 mmol/L    Chloride 117 (H) 97 - 107 mmol/L    Bicarbonate 21 (L) 24 - 31 mmol/L    Urea Nitrogen 19 8 - 25 mg/dL    Creatinine 0.60 0.40 - 1.60 mg/dL    eGFR >90 >60 mL/min/1.73m*2    Calcium 8.3 (L) 8.5 - 10.4 mg/dL    Albumin 2.9 (L) 3.5 - 5.0 g/dL    Alkaline Phosphatase 139 (H) 35 - 125 U/L    Total Protein 6.3 5.9 - 7.9 g/dL    AST 9 5 - 40 U/L    Bilirubin, Total 0.4 0.1 - 1.2 mg/dL    ALT 5 5 - 40 U/L    Anion Gap 16 <=19 mmol/L   CBC and Auto Differential   Result Value Ref Range    WBC 17.2 (H) 4.4 - 11.3 x10*3/uL    nRBC 0.0 0.0 - 0.0 /100 WBCs    RBC 3.31 (L) 4.00 - 5.20 x10*6/uL    Hemoglobin 10.2 (L) 12.0 - 16.0 g/dL    Hematocrit 32.3 (L) 36.0 - 46.0 %    MCV 98 80 - 100 fL    MCH 30.8 26.0 - 34.0 pg    MCHC 31.6 (L)  32.0 - 36.0 g/dL    RDW 13.6 11.5 - 14.5 %    Platelets 240 150 - 450 x10*3/uL    Neutrophils % 80.1 40.0 - 80.0 %    Immature Granulocytes %, Automated 1.3 (H) 0.0 - 0.9 %    Lymphocytes % 11.2 13.0 - 44.0 %    Monocytes % 6.2 2.0 - 10.0 %    Eosinophils % 0.9 0.0 - 6.0 %    Basophils % 0.3 0.0 - 2.0 %    Neutrophils Absolute 13.73 (H) 1.60 - 5.50 x10*3/uL    Immature Granulocytes Absolute, Automated 0.23 0.00 - 0.50 x10*3/uL    Lymphocytes Absolute 1.93 0.80 - 3.00 x10*3/uL    Monocytes Absolute 1.07 (H) 0.05 - 0.80 x10*3/uL    Eosinophils Absolute 0.15 0.00 - 0.40 x10*3/uL    Basophils Absolute 0.05 0.00 - 0.10 x10*3/uL     Susceptibility data from last 90 days.  Collected Specimen Info Organism Ampicillin Cefazolin Cefazolin (uncomplicated UTIs only) Ciprofloxacin Gentamicin Nitrofurantoin Piperacillin/Tazobactam Trimethoprim/Sulfamethoxazole   04/11/24 Urine from Straight Catheter Escherichia coli S S S S S S S S     ECG 12 lead    Result Date: 5/9/2024  Normal sinus rhythm Left axis deviation Moderate voltage criteria for LVH, may be normal variant ( R in aVL , [place] product ) Nonspecific ST and T wave abnormality Abnormal ECG When compared with ECG of 10-APR-2024 22:30, Significant changes have occurred    XR chest 1 view    Result Date: 5/9/2024  Interpreted By:  Ronnie Tsai, STUDY: XR CHEST 1 VIEW;  5/9/2024 1:23 pm   INDICATION: Signs/Symptoms:failure to thrive.   COMPARISON: 04/13/2024   ACCESSION NUMBER(S): [id number]   ORDERING CLINICIAN: COREY KING   FINDINGS: CARDIOMEDIASTINAL SILHOUETTE AND VASCULATURE:   Cardiac size:  Within normal limits. Status post median sternotomy with mitral valve replacement. This is similar to the prior exam. Aortic shadow:  Within normal limits.   Mediastinal contours: Within normal limits.   Pulmonary vasculature:  The central vasculature is unremarkable   LUNGS: Lungs are clear.   ABDOMEN AND OTHER FINDINGS: No remarkable upper abdominal findings.   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       1.  No active cardiopulmonary disease.  There has not been significant interval change from the prior exam.   Signed by: Ronnie Tsai 5/9/2024 1:49 PM Dictation workstation:   [id number]     Scheduled medications  aspirin, 81 mg, oral, Daily  atorvastatin, 40 mg, oral, Nightly  clopidogrel, 75 mg, oral, Daily  ferrous sulfate (325 mg ferrous sulfate), 65 mg of iron, oral, BID with meals  fluconazole, 100 mg, oral, Daily  heparin (porcine), 5,000 Units, subcutaneous, q8h JACQUELYN  lactobacillus acidophilus, 1 tablet, oral, BID with meals  metoprolol succinate XL, 50 mg, oral, Daily  vancomycin, 125 mg, oral, q6h      Continuous medications  potassium chloride in 5 % dex, 100 mL/hr, Last Rate: 100 mL/hr (05/10/24 0135)      PRN medications  PRN medications: acetaminophen **OR** acetaminophen **OR** acetaminophen, acetaminophen, benzocaine-menthol, dextromethorphan-guaifenesin, guaiFENesin, hydrALAZINE, ondansetron ODT, polyethylene glycol      ASSESSMENT:  Generalized weakness  Adult failure to thrive  Poor oral intake  Dysphagia  Dehydration  Hypernatremia  Hypokalemia  Urinary tract infection  Leukocytosis  History of C. Difficile  Hypoalbuminemia   Normocytic anemia  History of CVA    PLAN:  Urine culture pending no growth.  IV Ceftriaxone.  Follow-up urine culture results.  Monitor for diarrhea.  5/9/2024 labs reviewed.  Continue IV fluids D5 water with potassium chloride.  BMP pending.  Follow-up results.  P.o.  Speech therapy evaluation.  Mouth care.  Review and update facility medication list.  Continue as prescribed as appropriate.  PT/OT.  Fall precautions.  Up with assistance only.  Bed and chair alarm at all times.  DVT prophylaxis.  Heparin subcutaneous. Supportive care.  Patient reassured.  Case management consultation for discharge planning.  Discussed with Dr. Denise.     ADDENDUM:  Labs reviewed.  Continue IV fluids D5 water with potassium  and chloride.  Nephrology consultation.  Follow-up.      Belen Martin, BILLY-CNP

## 2024-05-10 NOTE — PROGRESS NOTES
Speech-Language Pathology    Speech-Language Pathology Clinical Swallow Evaluation    Patient Name: Rebecca Samaniego  MRN: [id number]  : 1945  Today's Date: 05/10/24  Start time: Start Time: 1600  Stop time: Stop Time: 1630  Time calculation (min) : Time Calculation (min): 30 min      ASSESSMENT  Impressions:  Impaired oral  phase and no suspected pharyngeal phase dysphagia based on clinical swallow evaluation. Minimal PO   Prognosis: Good    PLAN  Recommendations:  MBSS recommended: No; no pharyngeal dysphagia suspected.  Solid consistency: Pureed (IDDSI level 4)  Liquid consistency: Thin (IDDSI 0)  Medication administration: Crushed, in puree  Compensatory swallow strategies: Upright positioning for all PO intake, Slow rate of intake, Small bites, Small sips, and Total assist for feeding    Recommended frequency/duration:  Skilled SLP services recommended: Yes  Frequency: 3x/week  Duration: 2 weeks  Discharge recommendation: Recommend MODERATE intensity ST upon DC in order to ensure safety with least restrictive diet.  Treatment/Interventions: Assess diet tolerance, Patient/family education  Strengths: Family/caregiver support  Barriers to participation in tx: Cognition and Motivation    Goals (start date 5/10/24. Anticipated time frame for goal attainment: 2 weeks):  Pt will consume prescribed diet (current diet is puree, thin liqiuds) without overt s/sx aspiration/penetration in 95% of observed trials.  Pt will consume trials of upgraded textures without overt s/sx aspiration in order for consideration of diet texture upgrade.  Pt will demonstrate follow-through of trained compensatory strategies during a meal/snack with 90% acc independently.     SUBJECTIVE    PMHx relevant to rehab: h/o CVA  Chief complaint: Pt was admitted on 24 due to  failure to thrive.Her mucous membranes are very dry and she appears dehydrated.    Relevant imaging results:XR chest 1 view     Result Date: 2024  Interpreted  By:  Ronnie Tsai, STUDY: XR CHEST 1 VIEW;  5/9/2024 1:23 pm   INDICATION: Signs/Symptoms:failure to thrive.   COMPARISON: 04/13/2024   ACCESSION NUMBER(S): [id number]   ORDERING CLINICIAN: COREY KING   FINDINGS: CARDIOMEDIASTINAL SILHOUETTE AND VASCULATURE:   Cardiac size:  Within normal limits. Status post median sternotomy with mitral valve replacement. This is similar to the prior exam. Aortic shadow:  Within normal limits.   Mediastinal contours: Within normal limits.   Pulmonary vasculature:  The central vasculature is unremarkable   LUNGS: Lungs are clear.       General Visit Information:      Patient Class: Inpatient  Living Environment: Nursing home (skilled/long-term)   Patient unable to swallow anything x ice chips.  Chokes on water, crushed medication and applesauce,  Reason for Referral:Patient unable to swallow anything x ice chips.  Chokes on water, crushed medication and applesauce,   Prior to Session Communication: Bedside nurse     RN cleared pt to participate in session and reported pt alert, no oral care since last night    Pt reported no difficulty with swallowing, spouse at bedside endorses this., Pt had CSE     Date of Onset: 05/09/24  Date of Order: 05/09/24  [employer] Diet: regular and thin liquids  Current Diet : NPO    Pain Assessment  Pain Assessment: 0-10  Pain Score: 0 - No pain    Pt was alert, pleasantly confused, and inattentive for session.  Orientation: Oriented to self, Oriented to hospital but not name of facility, and Oriented to month  Ability to follow functional commands: Follows complex commands after some simplification/repetition  Nutritional status: Signs of possible malnutrition    Respiratory status: Room air  Baseline Vocal Quality: Weak        Patient positioning: Upright in bed      OBJECTIVE  Clinical swallow evaluation completed and consisted of interview, oral motor assessment, and PO trials (ice chips x 2, 3 oz thin liquids via tsp and straw).  ORAL PHASE:  Natural dentition in poor condition. Oral mucosa was dry , dried oral secretions, tongue appeared blackish in color, comprehensive oral care was provided using toothettes and yankuer to suction secretions/water, pt tolerated well.  Lingual strength and ROM were decreased/weak. Labial strength/ROM were weak with inadequate labial seal oral escapage with thin liquids. Oral holding, v/c to iniaite swallow, slow oral manipulation, pt only accepted 2 tsp applesauce, and refused solid trials. .  PHARYNGEAL PHASE: Laryngeal elevation was visualized or palpated with all trials, however adequacy of hyolaryngeal elevation/excursion cannot be determined at bedside. No immediate or delayed s/sx aspiration/penetration were observed with any consistencies.    Was 3oz challenge administered: No, due to pt unable to follow instructions for task.      Treatment/Education:  Results and recommendations were relayed to: Patient, Family, and Bedside nurse  Education provided: Yes   Learner: Patient and Significant other   Barriers to learning: Acuteness of illness barrier and Cognitive limitations barrier   Method of teaching: Verbal   Topic: role of ST, results of assessment, risk for aspiration, recommended diet modifications, and recommended safe swallow strategies   Outcome of teaching: Caregiver demonstrated good understanding and Pt unable to demonstrate understanding at this time  Treatment provided: No  Next Treatment Priority: diet tolerance, bolus trials, compensatory strategies

## 2024-05-11 LAB
ANION GAP SERPL CALC-SCNC: 12 MMOL/L
BUN SERPL-MCNC: 14 MG/DL (ref 8–25)
C DIF TOX TCDA+TCDB STL QL NAA+PROBE: DETECTED
CALCIUM SERPL-MCNC: 8 MG/DL (ref 8.5–10.4)
CHLORIDE SERPL-SCNC: 113 MMOL/L (ref 97–107)
CO2 SERPL-SCNC: 22 MMOL/L (ref 24–31)
CREAT SERPL-MCNC: 0.6 MG/DL (ref 0.4–1.6)
EGFRCR SERPLBLD CKD-EPI 2021: >90 ML/MIN/1.73M*2
ERYTHROCYTE [DISTWIDTH] IN BLOOD BY AUTOMATED COUNT: 14 % (ref 11.5–14.5)
GLUCOSE SERPL-MCNC: 143 MG/DL (ref 65–99)
HCT VFR BLD AUTO: 31 % (ref 36–46)
HGB BLD-MCNC: 9.4 G/DL (ref 12–16)
MCH RBC QN AUTO: 30.7 PG (ref 26–34)
MCHC RBC AUTO-ENTMCNC: 30.3 G/DL (ref 32–36)
MCV RBC AUTO: 101 FL (ref 80–100)
NRBC BLD-RTO: 0 /100 WBCS (ref 0–0)
PLATELET # BLD AUTO: 235 X10*3/UL (ref 150–450)
POTASSIUM SERPL-SCNC: 3.2 MMOL/L (ref 3.4–5.1)
RBC # BLD AUTO: 3.06 X10*6/UL (ref 4–5.2)
SODIUM SERPL-SCNC: 147 MMOL/L (ref 133–145)
WBC # BLD AUTO: 16.8 X10*3/UL (ref 4.4–11.3)

## 2024-05-11 PROCEDURE — 2500000005 HC RX 250 GENERAL PHARMACY W/O HCPCS: Performed by: INTERNAL MEDICINE

## 2024-05-11 PROCEDURE — 36415 COLL VENOUS BLD VENIPUNCTURE: CPT | Performed by: NURSE PRACTITIONER

## 2024-05-11 PROCEDURE — 99232 SBSQ HOSP IP/OBS MODERATE 35: CPT | Performed by: INTERNAL MEDICINE

## 2024-05-11 PROCEDURE — 2500000004 HC RX 250 GENERAL PHARMACY W/ HCPCS (ALT 636 FOR OP/ED): Performed by: INTERNAL MEDICINE

## 2024-05-11 PROCEDURE — 87493 C DIFF AMPLIFIED PROBE: CPT | Performed by: INTERNAL MEDICINE

## 2024-05-11 PROCEDURE — 1200000002 HC GENERAL ROOM WITH TELEMETRY DAILY

## 2024-05-11 PROCEDURE — 80048 BASIC METABOLIC PNL TOTAL CA: CPT | Performed by: INTERNAL MEDICINE

## 2024-05-11 PROCEDURE — 85027 COMPLETE CBC AUTOMATED: CPT | Performed by: NURSE PRACTITIONER

## 2024-05-11 PROCEDURE — 2500000001 HC RX 250 WO HCPCS SELF ADMINISTERED DRUGS (ALT 637 FOR MEDICARE OP): Performed by: INTERNAL MEDICINE

## 2024-05-11 PROCEDURE — 87324 CLOSTRIDIUM AG IA: CPT | Mod: WESLAB | Performed by: INTERNAL MEDICINE

## 2024-05-11 RX ORDER — METOPROLOL SUCCINATE 100 MG/1
100 TABLET, EXTENDED RELEASE ORAL DAILY
Status: DISCONTINUED | OUTPATIENT
Start: 2024-05-12 | End: 2024-05-15

## 2024-05-11 RX ORDER — POTASSIUM CHLORIDE 14.9 MG/ML
20 INJECTION INTRAVENOUS ONCE
Status: COMPLETED | OUTPATIENT
Start: 2024-05-11 | End: 2024-05-11

## 2024-05-11 RX ORDER — METRONIDAZOLE 500 MG/100ML
500 INJECTION, SOLUTION INTRAVENOUS EVERY 8 HOURS
Status: DISCONTINUED | OUTPATIENT
Start: 2024-05-11 | End: 2024-05-15

## 2024-05-11 RX ORDER — POTASSIUM CHLORIDE 750 MG/1
10 TABLET, FILM COATED, EXTENDED RELEASE ORAL ONCE
Status: DISCONTINUED | OUTPATIENT
Start: 2024-05-11 | End: 2024-05-11

## 2024-05-11 RX ORDER — POTASSIUM CHLORIDE 1.5 G/1.58G
20 POWDER, FOR SOLUTION ORAL
Status: DISCONTINUED | OUTPATIENT
Start: 2024-05-11 | End: 2024-05-15

## 2024-05-11 RX ORDER — POTASSIUM CHLORIDE 1.5 G/1.58G
40 POWDER, FOR SOLUTION ORAL ONCE
Status: DISCONTINUED | OUTPATIENT
Start: 2024-05-11 | End: 2024-05-11

## 2024-05-11 RX ADMIN — HEPARIN SODIUM 5000 UNITS: 5000 INJECTION, SOLUTION INTRAVENOUS; SUBCUTANEOUS at 21:01

## 2024-05-11 RX ADMIN — DEXTROSE AND POTASSIUM CHLORIDE 100 ML/HR: 5; .15 SOLUTION INTRAVENOUS at 01:54

## 2024-05-11 RX ADMIN — METRONIDAZOLE 500 MG: 5 INJECTION, SOLUTION INTRAVENOUS at 15:20

## 2024-05-11 RX ADMIN — HEPARIN SODIUM 5000 UNITS: 5000 INJECTION, SOLUTION INTRAVENOUS; SUBCUTANEOUS at 15:20

## 2024-05-11 RX ADMIN — DEXTROSE AND POTASSIUM CHLORIDE 100 ML/HR: 5; .15 SOLUTION INTRAVENOUS at 15:19

## 2024-05-11 RX ADMIN — METRONIDAZOLE 500 MG: 5 INJECTION, SOLUTION INTRAVENOUS at 21:00

## 2024-05-11 RX ADMIN — HEPARIN SODIUM 5000 UNITS: 5000 INJECTION, SOLUTION INTRAVENOUS; SUBCUTANEOUS at 06:36

## 2024-05-11 RX ADMIN — POTASSIUM CHLORIDE 20 MEQ: 14.9 INJECTION, SOLUTION INTRAVENOUS at 15:19

## 2024-05-11 RX ADMIN — METOPROLOL TARTRATE 10 MG: 5 INJECTION INTRAVENOUS at 10:41

## 2024-05-11 ASSESSMENT — COGNITIVE AND FUNCTIONAL STATUS - GENERAL
STANDING UP FROM CHAIR USING ARMS: TOTAL
DRESSING REGULAR LOWER BODY CLOTHING: TOTAL
MOVING TO AND FROM BED TO CHAIR: TOTAL
HELP NEEDED FOR BATHING: TOTAL
PERSONAL GROOMING: A LOT
TURNING FROM BACK TO SIDE WHILE IN FLAT BAD: TOTAL
DAILY ACTIVITIY SCORE: 8
PERSONAL GROOMING: A LOT
DRESSING REGULAR UPPER BODY CLOTHING: TOTAL
EATING MEALS: A LOT
TOILETING: TOTAL
DAILY ACTIVITIY SCORE: 8
MOVING FROM LYING ON BACK TO SITTING ON SIDE OF FLAT BED WITH BEDRAILS: A LOT
DRESSING REGULAR LOWER BODY CLOTHING: TOTAL
CLIMB 3 TO 5 STEPS WITH RAILING: TOTAL
MOBILITY SCORE: 7
MOBILITY SCORE: 7
HELP NEEDED FOR BATHING: TOTAL
EATING MEALS: A LOT
MOVING FROM LYING ON BACK TO SITTING ON SIDE OF FLAT BED WITH BEDRAILS: A LOT
CLIMB 3 TO 5 STEPS WITH RAILING: TOTAL
MOVING TO AND FROM BED TO CHAIR: TOTAL
WALKING IN HOSPITAL ROOM: TOTAL
STANDING UP FROM CHAIR USING ARMS: TOTAL
DRESSING REGULAR UPPER BODY CLOTHING: TOTAL
TURNING FROM BACK TO SIDE WHILE IN FLAT BAD: TOTAL
TOILETING: TOTAL
WALKING IN HOSPITAL ROOM: TOTAL

## 2024-05-11 ASSESSMENT — PAIN SCALES - GENERAL
PAINLEVEL_OUTOF10: 0 - NO PAIN
PAINLEVEL_OUTOF10: 0 - NO PAIN

## 2024-05-11 NOTE — H&P
History Of Present Illness  Rebecca Samaniego is a 78 y.o. female presenting with complaint of generalized weakness, functional decline and worsening blood work.  In the emergency room initial workup was done.  Patient was found to have hyponatremia and severe dehydration.  Patient was started on the IV fluid.  Patient has been admitted to the floor for further management.  Patient is a resident of [facility] and has a history of hyperlipidemia, chronic kidney disease, CVA.  Patient has inability to walk.  Patient has very poor oral oral intake.  As per record patient not eating or drinking much in the facility and declined significantly.  Patient denies any headache, dizziness, nausea or vomiting.  Patient denies any diarrhea, dysuria, hematuria frequency.  Patient has a history of colitis.  Patient was recently found to have C. difficile and was treated with oral vancomycin.  In the emergency room patient was found to have elevated proBNP of 2220, leukocytosis and a UTI.  Patient has anemia with hemoglobin of 9.7, white cell count 16.5, hematocrit 31.7 and platelets 233.  Her sodium was 155, potassium 2.8, collide 117, bicarb 25 and creatinine 0.8.  Her lipase was 66 and troponin was 74.  Flu and COVID was negative..     Past Medical History  Past Medical History:   Diagnosis Date    Hypertension        Surgical History  Past Surgical History:   Procedure Laterality Date    AORTIC VALVE REPLACEMENT  02/11/2014    Aortic Valve Replacement    BREAST LUMPECTOMY  02/11/2014    Breast Surgery Lumpectomy    CERVICAL DISCECTOMY  02/11/2014    Spinal Diskectomy Cervical    HYSTERECTOMY  02/11/2014    Hysterectomy    OTHER SURGICAL HISTORY  02/11/2014    Aortic Coarctation Repair        Social History  She reports that she has quit smoking. Her smoking use included cigarettes. She has never used smokeless tobacco. She reports current alcohol use. She reports that she does not use drugs.    Family History  No  family history on file.     Allergies  Sulfa (sulfonamide antibiotics), Sulfamethoxazole-trimethoprim, Ciprofloxacin, and Nitrofurantoin    Review of Systems  I reviewed all systems reviewed as above otherwise is negative.  Physical Exam  General examination: Patient cachectic, lying in the bed, did not look in acute distress.  HEENT:  Head externally atraumatic, no pallor, no icterus, extraocular movements intact, pupils reactive to light, oral mucosa dry and throat clear.  Neck:  Supple, no JVP, no palpable adenopathy or thyromegaly.  No carotid bruit.  Chest:  Clear to auscultation and resonant.  Heart:  Regular rate and rhythm, no murmur or gallop could be appreciated.  Abdomen:  Soft, nontender, bowel sounds present, normoactive, no palpable hepatosplenomegaly.  Extremities:  No edema, pulses present, no cyanosis or clubbing.  CNS:  Patient alert, oriented to time, place and person.  Power 5/5 all over and deep tendon reflexes symmetrical, cranial nerves 2-12 grossly intact.  Skin:  No active rash.  Musculoskeletal:  No joint swelling or erythema, range of movement normal.  Last Recorded Vitals  Heart Rate:  []   Temp:  [37 °C (98.6 °F)-37.4 °C (99.3 °F)]   Resp:  [18]   BP: (145-167)/(63-87)   SpO2:  [95 %-99 %]       Relevant Results        Results for orders placed or performed during the hospital encounter of 05/09/24 (from the past 24 hour(s))   C. difficile, PCR    Specimen: Stool   Result Value Ref Range    C. difficile, PCR Detected (A) Not Detected   Basic Metabolic Panel   Result Value Ref Range    Glucose 143 (H) 65 - 99 mg/dL    Sodium 147 (H) 133 - 145 mmol/L    Potassium 3.2 (L) 3.4 - 5.1 mmol/L    Chloride 113 (H) 97 - 107 mmol/L    Bicarbonate 22 (L) 24 - 31 mmol/L    Urea Nitrogen 14 8 - 25 mg/dL    Creatinine 0.60 0.40 - 1.60 mg/dL    eGFR >90 >60 mL/min/1.73m*2    Calcium 8.0 (L) 8.5 - 10.4 mg/dL    Anion Gap 12 <=19 mmol/L   CBC   Result Value Ref Range    WBC 16.8 (H) 4.4 - 11.3  x10*3/uL    nRBC 0.0 0.0 - 0.0 /100 WBCs    RBC 3.06 (L) 4.00 - 5.20 x10*6/uL    Hemoglobin 9.4 (L) 12.0 - 16.0 g/dL    Hematocrit 31.0 (L) 36.0 - 46.0 %     (H) 80 - 100 fL    MCH 30.7 26.0 - 34.0 pg    MCHC 30.3 (L) 32.0 - 36.0 g/dL    RDW 14.0 11.5 - 14.5 %    Platelets 235 150 - 450 x10*3/uL     Prior to Admission medications    Medication Sig Start Date End Date Taking? Authorizing Provider   acetaminophen (Tylenol) 325 mg tablet Take 2 tablets (650 mg) by mouth every 8 hours if needed for fever (temp greater than 38.0 C). 5/7/24  Yes Historical Provider, MD   acetaminophen (Tylenol) 650 mg suppository Insert 1 suppository (650 mg) into the rectum every 4 hours if needed for fever (temp greater than 38.0 C). 4/29/24  Yes Historical Provider, MD   acetaminophen (Tylenol) 325 mg tablet Take 2 tablets (650 mg) by mouth every 8 hours if needed for mild pain (1 - 3).    Historical Provider, MD   aspirin 81 mg chewable tablet Chew 1 tablet (81 mg) once daily. 4/17/24   Coleen Santos MD   atenolol (Tenormin) 50 mg tablet Take 1 tablet (50 mg) by mouth once daily.    Historical Provider, MD   atorvastatin (Lipitor) 40 mg tablet Take 1 tablet (40 mg) by mouth once daily at bedtime. 4/16/24   Coleen Santos MD   bacitracin 500 unit/gram ointment Apply topically 3 times a day. Apply to legs when she has excoriations  Patient not taking: Reported on 5/9/2024 4/16/24   Coleen Santos MD   cephalexin (Keflex) 500 mg capsule Take 1 capsule (500 mg) by mouth 3 times a day.    Historical Provider, MD   clopidogrel (Plavix) 75 mg tablet Take 1 tablet (75 mg) by mouth once daily. 4/17/24   Coleen Santos MD   ferrous sulfate 325 (65 Fe) MG EC tablet Take 65 mg by mouth 3 times a day with meals. Do not crush, chew, or split.    Historical Provider, MD   fluconazole (Diflucan) 100 mg tablet Take 1 tablet (100 mg) by mouth once daily.    Historical Provider, MD   lactobacillus acidophilus  (Acidophilus) capsule Take 1 capsule by mouth 2 times a day.    Historical Provider, MD   metoprolol succinate XL (Toprol-XL) 50 mg 24 hr tablet Take 1 tablet (50 mg) by mouth once daily. Do not crush or chew. 4/17/24   Coleen Santos MD   ondansetron (Zofran) 4 mg tablet Take 1 tablet (4 mg) by mouth every 6 hours if needed for nausea or vomiting.    Historical Provider, MD   vancomycin (Vancocin) 125 mg capsule Take 1 capsule (125 mg) by mouth every 6 hours.    Historical Provider, MD       Current Facility-Administered Medications:     acetaminophen (Tylenol) tablet 650 mg, 650 mg, oral, q4h PRN **OR** acetaminophen (Tylenol) oral liquid 650 mg, 650 mg, oral, q4h PRN **OR** acetaminophen (Tylenol) suppository 650 mg, 650 mg, rectal, q4h PRN, Kobi Denise MD    acetaminophen (Tylenol) suppository 650 mg, 650 mg, rectal, q4h PRN, Kobi Denise MD    aspirin chewable tablet 81 mg, 81 mg, oral, Daily, Kobi Denise MD    atorvastatin (Lipitor) tablet 40 mg, 40 mg, oral, Nightly, Kobi Denise MD    benzocaine-menthol (Cepastat Sore Throat) lozenge 1 lozenge, 1 lozenge, Mouth/Throat, q2h PRN, Kobi Denise MD    clopidogrel (Plavix) tablet 75 mg, 75 mg, oral, Daily, Kobi Denise MD    dextromethorphan-guaifenesin (Robitussin DM)  mg/5 mL oral liquid 5 mL, 5 mL, oral, q4h PRN, Kobi Denise MD    dextrose 5 % with KCl 20 mEq/L infusion, 100 mL/hr, intravenous, Continuous, Kobi Denise MD, Last Rate: 100 mL/hr at 05/11/24 1029, 100 mL/hr at 05/11/24 1029    ferrous sulfate (325 mg ferrous sulfate) tablet 1 tablet, 65 mg of iron, oral, BID with meals, Kobi Denise MD    fluconazole (Diflucan) tablet 100 mg, 100 mg, oral, Daily, Kobi Denise MD    guaiFENesin (Mucinex) 12 hr tablet 600 mg, 600 mg, oral, q12h PRN, Kobi Denise MD    heparin (porcine) injection 5,000 Units, 5,000 Units, subcutaneous, q8h JACQUELYN, Kobi Denise MD, 5,000  Units at 05/11/24 0636    hydrALAZINE (Apresoline) injection 10 mg, 10 mg, intravenous, q4h PRN, Kobi Denise MD, 10 mg at 05/10/24 2054    lactobacillus acidophilus tablet 1 tablet, 1 tablet, oral, BID with meals, Kobi Denise MD    [START ON 5/12/2024] metoprolol succinate XL (Toprol-XL) 24 hr tablet 100 mg, 100 mg, oral, Daily, [place]ley [place] DO    metoprolol tartrate (Lopressor) injection 10 mg, 10 mg, intravenous, q6h PRN, Kobi Denise MD, 10 mg at 05/11/24 1041    ondansetron ODT (Zofran-ODT) disintegrating tablet 4 mg, 4 mg, oral, q6h PRN, Kobi Denise MD    polyethylene glycol (Glycolax, Miralax) packet 17 g, 17 g, oral, Daily PRN, Kobi Denise MD    potassium chloride (Klor-Con) packet 20 mEq, 20 mEq, oral, BID with meals, Presley [place] DO, 20 mEq at 05/11/24 1034    vancomycin (Vancocin) capsule 125 mg, 125 mg, oral, q6h, Kobi Denise MD  ECG 12 lead    Result Date: 5/10/2024  Normal sinus rhythm Left axis deviation Moderate voltage criteria for LVH, may be normal variant ( R in aVL , [place] product ) Nonspecific ST and T wave abnormality Abnormal ECG When compared with ECG of 10-APR-2024 22:30, Significant changes have occurred Confirmed by Davian Callaway ([id number]) on 5/10/2024 3:33:53 PM    XR chest 1 view    Result Date: 5/9/2024  Interpreted By:  Ronnie Tsai, STUDY: XR CHEST 1 VIEW;  5/9/2024 1:23 pm   INDICATION: Signs/Symptoms:failure to thrive.   COMPARISON: 04/13/2024   ACCESSION NUMBER(S): [id number]   ORDERING CLINICIAN: COREY KING   FINDINGS: CARDIOMEDIASTINAL SILHOUETTE AND VASCULATURE:   Cardiac size:  Within normal limits. Status post median sternotomy with mitral valve replacement. This is similar to the prior exam. Aortic shadow:  Within normal limits.   Mediastinal contours: Within normal limits.   Pulmonary vasculature:  The central vasculature is unremarkable   LUNGS: Lungs are clear.   ABDOMEN AND OTHER FINDINGS: No remarkable upper abdominal  findings.   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       1.  No active cardiopulmonary disease.  There has not been significant interval change from the prior exam.   Signed by: Ronnie Tsai 5/9/2024 1:49 PM Dictation workstation:   [id number]    Carotid duplex bilateral    Result Date: 4/17/2024           [place] [id number]            Phone [phone]  Vascular Lab Report  [facility] US CAROTID ARTERY DUPLEX BILATERAL Patient Name:      BETTY CELESTIN     Reading Physician:  [id number] Maximiliano Leong MD Study Date:        4/12/2024            Ordering Provider:  [id number] SHILPA TEJADA MRN/PID:           [id number]             Fellow: Accession#:        [id number]         Technologist:       Zarina Riggins RVT Date of Birth/Age: 1945 / 78 years Technologist 2: Gender:            F                    Encounter#:         [id number] Admission Status:  Inpatient            Location Performed: [facility]  Diagnosis/ICD: Syncope and collapse-R55 CPT Codes:     52834 Cerebrovascular Carotid Duplex scan complete  CONCLUSIONS: Right Carotid: Findings are consistent with less than 50% stenosis of the right proximal internal carotid artery. Laminar flow seen by color Doppler. There are elevated velocities in the right ECA that are suggestive of disease. The right vertebral artery is patent with antegrade flow. No evidence of hemodynamically significant stenosis in the right subclavian artery. Left Carotid: Findings are consistent with less than 50% stenosis of the left proximal internal carotid artery. The internal carotid artery appears tortuous. Left external carotid artery appears patent with no evidence of stenosis. The left vertebral artery is patent with antegrade flow. No evidence of hemodynamically significant stenosis in the left subclavian artery. Additional Findings: Technically difficult exam due to patient's positioning.   Imaging & Doppler Findings: Right Plaque Morph: The proximal right external carotid artery demonstrates heterogenous plaque. Left Plaque Morph: The proximal left external carotid artery demonstrates heterogenous and homogenous plaque. The distal left common carotid artery demonstrates heterogenous and homogenous plaque.   Right                        Left   PSV      EDV                PSV      EDV 65 cm/s            CCA P    68 cm/s 62 cm/s            CCA D    57 cm/s 52 cm/s  8 cm/s    ICA P    75 cm/s  15 cm/s 55 cm/s  13 cm/s   ICA M    68 cm/s  12 cm/s 89 cm/s  15 cm/s   ICA D    77 cm/s  17 cm/s 223 cm/s            ECA     75 cm/s 48 cm/s  9 cm/s  Vertebral  59 cm/s  15 cm/s 109 cm/s         Subclavian 130 cm/s                Right Left ICA/CCA Ratio  0.8  1.3   [id number] Maximiliano Leong MD Electronically signed by [id number] Maximiliano Leong MD on 4/17/2024 at 7:43:30 AM  ** Final **     XR chest 1 view    Result Date: 4/15/2024  Interpreted By:  Brianna De La Torre, STUDY: XR CHEST 1 VIEW 4/13/2024 7:49 pm   INDICATION: Signs/Symptoms:fever   COMPARISON: 04/11/2024   ACCESSION NUMBER(S): [id number]   ORDERING CLINICIAN: NAZARIO OTLENTINO   TECHNIQUE: AP erect view of the chest   FINDINGS: There is postoperative change from median sternotomy and cardiac valve replacement/repair.   There is atherosclerosis of the thoracic aorta. The cardiac size is at the upper range of normal.   Mild elevation of the left hemidiaphragm is seen.   There is some mild increased density at right lung base silhouetting the lateral aspect of the right hemidiaphragm suggesting a very small right pleural effusion versus minimal right basilar infiltrate. Pulmonary vascularity is unremarkable.   Degenerative changes of both shoulders are seen. There is postoperative change from anterior cervical spinal fusion and posterior lumbar fusion with surgical clips in the right axillary region.       Postoperative changes as outlined above with mild elevation of left  hemidiaphragm unchanged.   There is partial silhouetting of the right hemidiaphragm laterally suggesting small right pleural effusion or some minimal infiltrate at this location..   Signed by: Brianna De La Torre 4/15/2024 11:31 AM Dictation workstation:   [id number]    Transthoracic Echo (TTE) Complete    Result Date: 4/12/2024           [place] [address]            Phone [phone] TRANSTHORACIC ECHOCARDIOGRAM REPORT  Patient Name:     BETTY CELESTIN     Reading Physician:  [id number] Shaheed Martin MD Study Date:       4/12/2024            Ordering Provider:  [id number] SHILPA TEJADA MRN/PID:          [id number]             Fellow: Accession#:       [id number]         Nurse: Date of           1945 / 78 years Sonographer:        Homa Romano RDCS Birth/Age: Gender:           F                    Additional Staff: Height:           165.10 cm            Admit Date:         4/12/2024 Weight:           59.88 kg             Admission Status:   Inpatient - Routine BSA / BMI:        1.66 m2 / 21.97      Department          [facility]                   kg/m2                Location: Blood Pressure: 168 /65 mmHg Study Type:    TRANSTHORACIC ECHO (TTE) COMPLETE Diagnosis/ICD: Cerebral Infarction, unspecified-I63.9 Indication:    Cerebrovascular Accident CPT Codes:     Echo Complete w Full Doppler-55535 Patient History: Valve Disorders: Aortic Valve Replacement. Study Detail: The following Echo studies were performed: 2D, M-Mode, Doppler and               color flow. Agitated saline used as a contrast agent for               intraseptal flow evaluation.  PHYSICIAN INTERPRETATION: Left Ventricle: Left ventricular systolic function is normal, with an estimated ejection fraction of 60%. The calculated ejection fraction is normal at 59 % using the Zamora's Bi-plane MOD calculation. There are no regional wall motion  abnormalities. The left ventricular cavity size is normal. There is borderline concentric left ventricular hypertrophy. Abnormal (paradoxical) septal motion consistent with post-operative status. Spectral Doppler shows an impaired relaxation pattern of left ventricular diastolic filling. There is no ventricular septal defect. Left Atrium: The left atrium is mildly dilated. There is no evidence of a patent foramen ovale. A bubble study using agitated saline was performed. Bubble study is negative. Right Ventricle: The right ventricle is normal in size. There is normal right ventriclar wall thickness. There is normal right ventricular global systolic function. Right Atrium: The right atrium is normal in size. Aortic Valve: The aortic valve appears abnormal. There is stentless aortic valve bioprosthesis. There is no evidence of aortic valve regurgitation. The peak instantaneous gradient of the aortic valve is 9.7 mmHg. The mean gradient of the aortic valve is 6.0 mmHg. Mitral Valve: The mitral valve is normal in structure. There is normal mitral valve leaflet mobility. There is trace to mild mitral valve regurgitation. Tricuspid Valve: The tricuspid valve is structurally normal. There is normal tricuspid valve leaflet mobility. Status post triscuspid annular ring repair. There is mild to moderate tricuspid regurgitation. The Doppler estimated RVSP is within normal limits at 34.1 mmHg. There is a tricuspid annular ring repair. Pulmonic Valve: The pulmonic valve is structurally normal. There is no indication of pulmonic valve regurgitation. Pericardium: There is no pericardial effusion noted. There is a pericardial fat pad present. Aorta: The aortic root is normal. There is no dilatation of the aortic arch. There is no dilatation of the ascending aorta. There is no dilatation of the aortic root. There is a prosthetic graft seen in the position of the ascending and transverse aorta. Pulmonary Artery: The pulmonary artery  is normal in size. The tricuspid regurgitant velocity is 2.79 m/s, and with an estimated right atrial pressure of 3 mmHg, the estimated pulmonary artery pressure is normal with the RVSP at 34.1 mmHg. The estimated PASP is 34 mmHg.  CONCLUSIONS:  1. Left ventricular systolic function is normal with a 60% estimated ejection fraction.  2. Abnormal septal motion consistent with post-operative status.  3. Spectral Doppler shows an impaired relaxation pattern of left ventricular diastolic filling.  4. Trace to mild mitral valve regurgitation.  5. Mild to moderate tricuspid regurgitation.  6. RVSP within normal limits.  7. There is a tricuspid annular ring repair.  8. Aortic valve appears abnormal.  9. There is a stentless aortic valve bioprosthesis. 10. Prosthetic graft in the ascending and transverse aorta position. 11. The estimated PASP is 34 mmHg. 12. There is no evidence of a patent foramen ovale. 13. The peak instantaneous gradient of the aortic valve is 9.7 mmHg. QUANTITATIVE DATA SUMMARY: 2D MEASUREMENTS:                          Normal Ranges: IVSd:          0.90 cm   (0.6-1.1cm) LVPWd:         0.84 cm   (0.6-1.1cm) LVIDd:         4.48 cm   (3.9-5.9cm) LVIDs:         2.95 cm LV Mass Index: 75.9 g/m2 LV % FS        34.2 % LA VOLUME:                               Normal Ranges: LA Vol A4C:        36.1 ml    (22+/-6mL/m2) LA Vol A2C:        25.4 ml LA Vol BP:         32.9 ml LA Vol Index A4C:  21.8ml/m2 LA Vol Index A2C:  15.3 ml/m2 LA Vol Index BP:   19.8 ml/m2 LA Area A4C:       13.2 cm2 LA Area A2C:       10.2 cm2 LA Major Axis A4C: 4.1 cm LA Major Axis A2C: 3.5 cm LA Volume Index:   17.9 ml/m2 LA Vol A4C:        30.4 ml LA Vol A2C:        24.9 ml RA VOLUME BY A/L METHOD:                               Normal Ranges: RA Vol A4C:        24.6 ml    (8.3-19.5ml) RA Vol Index A4C:  14.8 ml/m2 RA Area A4C:       11.3 cm2 RA Major Axis A4C: 4.4 cm AORTA MEASUREMENTS:                    Normal Ranges: Asc Ao, d: 2.57 cm  (2.1-3.4cm) LV SYSTOLIC FUNCTION BY 2D PLANIMETRY (MOD):                     Normal Ranges: EF-A4C View: 64.0 % (>=55%) EF-A2C View: 53.9 % EF-Biplane:  59.1 % LV DIASTOLIC FUNCTION:                            Normal Ranges: MV Peak E:      0.36 m/s   (0.7-1.2 m/s) MV Peak A:      1.42 m/s   (0.42-0.7 m/s) E/A Ratio:      0.26       (1.0-2.2) MV e'           0.11 m/s   (>8.0) MV lateral e'   0.14 m/s MV medial e'    0.07 m/s E/e' Ratio:     3.47       (<8.0) PulmV Sys Santy:  47.60 cm/s PulmV Harris Santy: 58.30 cm/s PulmV S/D Santy:  0.80 MITRAL VALVE:                 Normal Ranges: MV DT: 121 msec (150-240msec) AORTIC VALVE:                                   Normal Ranges: AoV Vmax:                1.56 m/s (<=1.7m/s) AoV Peak P.7 mmHg (<20mmHg) AoV Mean P.0 mmHg (1.7-11.5mmHg) LVOT Max Santy:            1.00 m/s (<=1.1m/s) AoV VTI:                 30.10 cm (18-25cm) LVOT VTI:                19.70 cm AoV Dimensionless Index: 0.65  RIGHT VENTRICLE: RV Basal 2.73 cm RV Mid   3.55 cm RV Major 6.6 cm TAPSE:   10.4 mm RV s'    0.14 m/s TRICUSPID VALVE/RVSP:                             Normal Ranges: Peak TR Velocity: 2.79 m/s RV Syst Pressure: 34.1 mmHg (< 30mmHg) Pulmonary Veins: PulmV Harris Santy: 58.30 cm/s PulmV S/D Santy:  0.80 PulmV Sys Santy:  47.60 cm/s  [id number] Shaheed Martin MD Electronically signed on 2024 at 4:16:02 PM  ** Final **     MR brain wo IV contrast    Addendum Date: 2024    Interpreted By:  Kaleb Barboza, ADDENDUM: Kaleb Barboza discussed the significance and urgency of this critical finding by secure chat with  ARNOLDO MCGEE on 2024 at 9:43 am.  (**-RCF-**) Findings:  See findings.     Signed by: Kaleb Barboza 2024 9:51 AM   -------- ORIGINAL REPORT -------- Dictation workstation:   [id number]    Result Date: 2024  Interpreted By:  Kaleb Barboza, STUDY: MR BRAIN WO IV CONTRAST;  2024 9:07 pm   INDICATION: Signs/Symptoms:pt with gait atxia.r/o  cva.   COMPARISON: CT head dated 04/10/2024.   ACCESSION NUMBER(S): [id number]   ORDERING CLINICIAN: ARNOLDO MCGEE   TECHNIQUE: Standard multiplanar multisequence MR imaging was performed through the brain without intravenous contrast. Axial T2, FLAIR, DWI, gradient echo T2 and sagittal and coronal T1 weighted images of brain were acquired.   FINDINGS: Parenchyma: There is a small focus of acute diffusion restriction signal abnormality involving the cortex of the left occipital lobe. There is corresponding T2/FLAIR hyperintensity within this region. There is no mass effect or hemorrhagic transformation. Moderate chronic small vessel ischemic disease with tiny remote bilateral basal ganglia lacunar infarcts. Additional remote ischemic insults within the deep white matter of the right frontal lobe. Tiny remote cerebellar infarcts bilaterally.   CSF Spaces: There is moderate to advanced brain atrophy. Basilar cisterns are patent.   Extra-axial spaces: No extra-axial fluid collection.   Paranasal Sinuses: Visualized paranasal sinuses are clear.   Mastoids: Clear.   Orbits: Normal.   Calvarium: No suspicious osseous marrow signal.       Small acute cortical infarct involving the left occipital lobe. No mass effect or hemorrhagic transformation.   Additional moderate chronic small vessel ischemic disease with tiny remote ischemic insults involving the deep white matter of the right frontal lobe, bilateral basal ganglia, and cerebellum.   Moderate to advanced brain atrophy.   MACRO: None   Signed by: Kaleb Barboza 4/12/2024 9:43 AM Dictation workstation:   [id number]    MR lumbar spine wo IV contrast    Result Date: 4/12/2024  Interpreted By:  Kaelb Barboza, STUDY: MR LUMBAR SPINE WO IV CONTRAST performed 4/11/2024 9:13 pm   INDICATION: Signs/Symptoms:pt with multiple falls.r/o lumbar radiculopathy.   COMPARISON: Lumbar spine radiographs dated 03/26/2024. MRI lumbar spine dated 07/30/2019.   ACCESSION NUMBER(S):  [id number]   ORDERING CLINICIAN: ARNOLDO MCGEE   TECHNIQUE: Multiplanar multisequence MR imaging of the lumbar spine performed without intravenous contrast. Sagittal T1, T2, STIR, axial T1 and T2 weighted images of the lumbar spine were acquired.   FINDINGS: Segmentation: Normal.   Conus: The lower thoracic cord appears unremarkable. The conus terminates at the L1-L2 interspace level. Cauda equina are unremarkable.   Epidural fluid: None.   Hardware: Postoperative change of L2-L5 posterior spinal fusion with bilateral posterior pedicular screws and stabilization rods. Interbody fixation at L4-L5. Laminectomy/posterior decompression involving the fusion levels.   Alignment: Grade 1 anterolisthesis of L4 on L5.   Vertebral bodies: Vertebral body heights are grossly maintained.   Marrow signal: Trace type 1 Modic endplate signal change suggested at L1-L2. Otherwise no suspicious STIR hyperintensity or focal marrow replacing lesion.   Intervertebral discs: Moderate degenerative disc height loss at L1-L2 with disc desiccation, significantly worsened from previous MRI. Additional moderate multilevel degenerative disc height loss.     Degenerative change:   T12-L1: Unremarkable.   L1-2: Mild facet arthropathy. Moderate to marked ligamentum flavum hypertrophy. There is moderate disc bulge with superimposed broad-based central disc extrusion with elements of cranial and caudal migration. Mildly prominent dorsal epidural fat. There is severe spinal canal stenosis with the thecal sac measuring less than 5 mm in anterior-posterior dimension. Lateral recess narrowing/effacement is also evident. Moderate bilateral neural foraminal narrowing.   L2-3: Mild facet arthropathy and ligamentum flavum hypertrophy. Mild disc bulge and endplate spurring. Mild narrowing of the lateral recesses with no additional spinal canal stenosis. Mild bilateral neural foraminal narrowing.   L3-4: Facet arthropathy and mild endplate spurring. No  spinal canal stenosis. Minimal/mild neural foraminal narrowing secondary to residual endplate and facet spurring.   L4-5: Disc uncovering with endplate spurring. There is lateral recess narrowing with no additional spinal canal stenosis. Mild-to-moderate right and mild left neural foraminal narrowing suggested secondary to disc uncovering and endplate spurring with hardware artifact slightly degrading assessment.   L5-S1: Mild facet arthropathy. Disc bulge and hypertrophic endplate spurring with left subarticular disc osteophyte protrusion component. Left lateral recess narrowing with probable mass effect on the S1 nerve root. Moderate left and mild right neural foraminal narrowing.   Soft tissues: Within the deep posterior paraspinal soft tissues overlying the laminectomy bed there is a 5.8 x 4.9 x 1.3 cm fluid collection with a few internal septation suggested and otherwise relative simple appearance, likely representing a postoperative seroma. There is additional scarring and fatty atrophy involving the inferior posterior paraspinal musculature. Small cortical renal cysts suggested bilaterally. Dependent sludge versus stones within the partially visualized gallbladder.       In comparison with prior MRI (07/03/2019) there has been revision/extension of posterior spinal fusion and decompression with L2-L5 posterior fusion/decompression evident. There is development of adjacent segment disease at L1-L2 with severe degenerative discogenic change and element of epidural lipomatosis contributing to severe spinal canal stenosis and lateral recess effacement. There is moderate bilateral neural foraminal narrowing at this level as well.   At L5-S1 there is a left subarticular disc osteophyte protrusion component contributing to left lateral recess stenosis and suspected moderate left neural foraminal narrowing.   MACRO: None   Signed by: Kaleb Barboza 4/12/2024 9:35 AM Dictation workstation:    [id number]    Susceptibility data from last 90 days.  Collected Specimen Info Organism Ampicillin Cefazolin Cefazolin (uncomplicated UTIs only) Ciprofloxacin Gentamicin Nitrofurantoin Piperacillin/Tazobactam Trimethoprim/Sulfamethoxazole   04/11/24 Urine from Straight Catheter Escherichia coli S S S S S S S S        Assessment/Plan   Principal Problem:    General weakness  Dehydration  Hyponatremia secondary to dehydration   Hypokalemia  Adult failure to thrive   history of CVA  History of C. difficile      Plan: Continue current medication.  Start the patient IV fluid.  Monitor input output with daily weight.  Watch for signs of diarrhea.  If diarrhea present check for C. difficile.  Give supportive care.  Monitor electrolytes.  Encourage p.o. intake.  Overall prognosis poor.  Will discuss with the patient and family about the CODE STATUS.  Give physical therapy and Occupational Therapy.  We will take DVT, fall, aspiration, decubitus and DVT precaution during her stay in the hospital.  This has been discussed with the patient and is agreeable to it.    Kobi Denise MD

## 2024-05-11 NOTE — PROGRESS NOTES
"Rebecca Samaniego is a 78 y.o. female on day 2 of admission presenting with General weakness.    Subjective   Patient was seen yesterday for pulm natremia and hypokalemia and dehydration she is positive for C. difficile colitis she looks better with IV hydration more awake and responsive family by the bedside no diarrhea today       Objective     Physical Exam  Neck:      Vascular: No carotid bruit.   Cardiovascular:      Rate and Rhythm: Normal rate and regular rhythm.      Heart sounds: No murmur heard.     No friction rub. No gallop.   Pulmonary:      Breath sounds: No wheezing, rhonchi or rales.   Chest:      Chest wall: No tenderness.   Abdominal:      General: There is no distension.      Tenderness: There is no abdominal tenderness. There is no guarding or rebound.   Musculoskeletal:         General: No swelling or tenderness.      Cervical back: Neck supple.      Right lower leg: No edema.      Left lower leg: No edema.   Lymphadenopathy:      Cervical: No cervical adenopathy.         Last Recorded Vitals  Blood pressure 155/72, pulse 101, temperature 37 °C (98.6 °F), temperature source Oral, resp. rate 18, height 1.651 m (5' 5\"), weight 63.6 kg (140 lb 3.4 oz), SpO2 96%.    Intake/Output last 3 Shifts:  I/O last 3 completed shifts:  In: 600 (9.4 mL/kg) [IV Piggyback:600]  Out: - (0 mL/kg)   Weight: 63.6 kg     Current Facility-Administered Medications:     acetaminophen (Tylenol) tablet 650 mg, 650 mg, oral, q4h PRN **OR** acetaminophen (Tylenol) oral liquid 650 mg, 650 mg, oral, q4h PRN **OR** acetaminophen (Tylenol) suppository 650 mg, 650 mg, rectal, q4h PRN, Kobi Denise MD    acetaminophen (Tylenol) suppository 650 mg, 650 mg, rectal, q4h PRN, Kobi Denise MD    aspirin chewable tablet 81 mg, 81 mg, oral, Daily, Kobi Denise MD    atorvastatin (Lipitor) tablet 40 mg, 40 mg, oral, Nightly, Kobi Denise MD    benzocaine-menthol (Cepastat Sore Throat) lozenge 1 lozenge, 1 " lozenge, Mouth/Throat, q2h PRN, Kobi Denise MD    clopidogrel (Plavix) tablet 75 mg, 75 mg, oral, Daily, Kobi Denise MD    dextromethorphan-guaifenesin (Robitussin DM)  mg/5 mL oral liquid 5 mL, 5 mL, oral, q4h PRN, Kobi Denise MD    dextrose 5 % with KCl 20 mEq/L infusion, 100 mL/hr, intravenous, Continuous, Kobi Denise MD, Last Rate: 100 mL/hr at 05/11/24 1029, 100 mL/hr at 05/11/24 1029    ferrous sulfate (325 mg ferrous sulfate) tablet 1 tablet, 65 mg of iron, oral, BID with meals, Kobi Denise MD    fluconazole (Diflucan) tablet 100 mg, 100 mg, oral, Daily, Kobi Denise MD    guaiFENesin (Mucinex) 12 hr tablet 600 mg, 600 mg, oral, q12h PRN, Kobi Denise MD    heparin (porcine) injection 5,000 Units, 5,000 Units, subcutaneous, q8h JACQUELYN, Kobi Denise MD, 5,000 Units at 05/11/24 0636    hydrALAZINE (Apresoline) injection 10 mg, 10 mg, intravenous, q4h PRN, Kobi Denise MD, 10 mg at 05/10/24 2054    lactobacillus acidophilus tablet 1 tablet, 1 tablet, oral, BID with meals, Kobi Denise MD    [START ON 5/12/2024] metoprolol succinate XL (Toprol-XL) 24 hr tablet 100 mg, 100 mg, oral, Daily, Presley Lake DO    metoprolol tartrate (Lopressor) injection 10 mg, 10 mg, intravenous, q6h PRN, Kobi Denise MD, 10 mg at 05/11/24 1041    metroNIDAZOLE (Flagyl) 500 mg in NaCl (iso-os) 100 mL, 500 mg, intravenous, q8h, Kobi Denise MD    ondansetron ODT (Zofran-ODT) disintegrating tablet 4 mg, 4 mg, oral, q6h PRN, Kobi Denise MD    polyethylene glycol (Glycolax, Miralax) packet 17 g, 17 g, oral, Daily PRN, Kobi Denise MD    potassium chloride (Klor-Con) packet 20 mEq, 20 mEq, oral, BID with meals, Presley Lake DO, 20 mEq at 05/11/24 1034    vancomycin (Vancocin) capsule 125 mg, 125 mg, oral, q6h, Kobi Denise MD   Relevant Results    Results for orders placed or performed during the hospital encounter of  05/09/24 (from the past 96 hour(s))   NT Pro-BNP   Result Value Ref Range    PROBNP 2,220 (H) 0 - 624 pg/mL   CBC and Auto Differential   Result Value Ref Range    WBC 16.5 (H) 4.4 - 11.3 x10*3/uL    nRBC 0.0 0.0 - 0.0 /100 WBCs    RBC 3.06 (L) 4.00 - 5.20 x10*6/uL    Hemoglobin 9.7 (L) 12.0 - 16.0 g/dL    Hematocrit 31.7 (L) 36.0 - 46.0 %     (H) 80 - 100 fL    MCH 31.7 26.0 - 34.0 pg    MCHC 30.6 (L) 32.0 - 36.0 g/dL    RDW 13.8 11.5 - 14.5 %    Platelets 233 150 - 450 x10*3/uL    Neutrophils % 77.2 40.0 - 80.0 %    Immature Granulocytes %, Automated 1.3 (H) 0.0 - 0.9 %    Lymphocytes % 12.1 13.0 - 44.0 %    Monocytes % 7.4 2.0 - 10.0 %    Eosinophils % 1.6 0.0 - 6.0 %    Basophils % 0.4 0.0 - 2.0 %    Neutrophils Absolute 12.77 (H) 1.60 - 5.50 x10*3/uL    Immature Granulocytes Absolute, Automated 0.21 0.00 - 0.50 x10*3/uL    Lymphocytes Absolute 2.00 0.80 - 3.00 x10*3/uL    Monocytes Absolute 1.23 (H) 0.05 - 0.80 x10*3/uL    Eosinophils Absolute 0.27 0.00 - 0.40 x10*3/uL    Basophils Absolute 0.06 0.00 - 0.10 x10*3/uL   Comprehensive metabolic panel   Result Value Ref Range    Glucose 138 (H) 65 - 99 mg/dL    Sodium 155 (H) 133 - 145 mmol/L    Potassium 2.8 (LL) 3.4 - 5.1 mmol/L    Chloride 117 (H) 97 - 107 mmol/L    Bicarbonate 25 24 - 31 mmol/L    Urea Nitrogen 22 8 - 25 mg/dL    Creatinine 0.80 0.40 - 1.60 mg/dL    eGFR 75 >60 mL/min/1.73m*2    Calcium 8.4 (L) 8.5 - 10.4 mg/dL    Albumin 2.9 (L) 3.5 - 5.0 g/dL    Alkaline Phosphatase 143 (H) 35 - 125 U/L    Total Protein 6.2 5.9 - 7.9 g/dL    AST 10 5 - 40 U/L    Bilirubin, Total 0.4 0.1 - 1.2 mg/dL    ALT 5 5 - 40 U/L    Anion Gap 13 <=19 mmol/L   Lipase   Result Value Ref Range    Lipase 66 (H) 16 - 63 U/L   Sars-CoV-2 PCR   Result Value Ref Range    Coronavirus 2019, PCR Not Detected Not Detected   Influenza A, and B PCR   Result Value Ref Range    Flu A Result Not Detected Not Detected    Flu B Result Not Detected Not Detected   Serial Troponin,  Initial ([facility])   Result Value Ref Range    Troponin T, High Sensitivity 74 (HH) <=14 ng/L   Morphology   Result Value Ref Range    RBC Morphology No significant RBC morphology present    ECG 12 lead   Result Value Ref Range    Ventricular Rate 74 BPM    Atrial Rate 74 BPM    WA Interval 158 ms    QRS Duration 92 ms    QT Interval 466 ms    QTC Calculation(Bazett) 517 ms    P Axis 35 degrees    R Axis -37 degrees    T Axis 32 degrees    QRS Count 12 beats    Q Onset 213 ms    P Onset 134 ms    P Offset 181 ms    T Offset 446 ms    QTC Fredericia 500 ms   Urinalysis with Reflex Culture and Microscopic   Result Value Ref Range    Color, Urine Light-Orange (N) Light-Yellow, Yellow, Dark-Yellow    Appearance, Urine Ex.Turbid (N) Clear    Specific Gravity, Urine 1.015 1.005 - 1.035    pH, Urine 6.0 5.0, 5.5, 6.0, 6.5, 7.0, 7.5, 8.0    Protein, Urine 50 (1+) (A) NEGATIVE, 10 (TRACE), 20 (TRACE) mg/dL    Glucose, Urine Normal Normal mg/dL    Blood, Urine 0.1 (1+) (A) NEGATIVE    Ketones, Urine NEGATIVE NEGATIVE mg/dL    Bilirubin, Urine NEGATIVE NEGATIVE    Urobilinogen, Urine Normal Normal mg/dL    Nitrite, Urine 2+ (A) NEGATIVE    Leukocyte Esterase, Urine 500 Jeremy/µL (A) NEGATIVE   Extra Urine Gray Tube   Result Value Ref Range    Extra Tube Hold for add-ons.    Microscopic Only, Urine   Result Value Ref Range    WBC, Urine >50 (A) 1-5, NONE /HPF    WBC Clumps, Urine MANY Reference range not established. /HPF    RBC, Urine >20 (A) NONE, 1-2, 3-5 /HPF    Squamous Epithelial Cells, Urine 1-9 (SPARSE) Reference range not established. /HPF    Bacteria, Urine 4+ (A) NONE SEEN /HPF    Mucus, Urine 1+ Reference range not established. /LPF   Urine Culture    Specimen: Straight Catheter; Urine   Result Value Ref Range    Urine Culture (A)      Multiple organisms present, probable contamination. Repeat culture if clinically indicated.   Sodium, urine, random   Result Value Ref Range    Sodium, Urine Random 89 NOT ESTABLISHED mmol/L     Creatinine, Urine Random 75.8 NOT ESTABLISHED mg/dL    Sodium/Creatinine Ratio 117 Not established. mmol/g Creat   Osmolality, urine   Result Value Ref Range    Osmolality, Urine Random 512 200 - 1,200 mOsm/kg   Serial Troponin, 2 Hour ([facility])   Result Value Ref Range    Troponin T, High Sensitivity 69 (HH) <=14 ng/L   Osmolality   Result Value Ref Range    Osmolality, Serum 321 (H) 280 - 300 mOsm/kg   Serial Troponin, 6 Hour ([facility])   Result Value Ref Range    Troponin T, High Sensitivity 65 (HH) <=14 ng/L   Comprehensive metabolic panel   Result Value Ref Range    Glucose 99 65 - 99 mg/dL    Sodium 154 (H) 133 - 145 mmol/L    Potassium 3.3 (L) 3.4 - 5.1 mmol/L    Chloride 117 (H) 97 - 107 mmol/L    Bicarbonate 21 (L) 24 - 31 mmol/L    Urea Nitrogen 19 8 - 25 mg/dL    Creatinine 0.60 0.40 - 1.60 mg/dL    eGFR >90 >60 mL/min/1.73m*2    Calcium 8.3 (L) 8.5 - 10.4 mg/dL    Albumin 2.9 (L) 3.5 - 5.0 g/dL    Alkaline Phosphatase 139 (H) 35 - 125 U/L    Total Protein 6.3 5.9 - 7.9 g/dL    AST 9 5 - 40 U/L    Bilirubin, Total 0.4 0.1 - 1.2 mg/dL    ALT 5 5 - 40 U/L    Anion Gap 16 <=19 mmol/L   CBC and Auto Differential   Result Value Ref Range    WBC 17.2 (H) 4.4 - 11.3 x10*3/uL    nRBC 0.0 0.0 - 0.0 /100 WBCs    RBC 3.31 (L) 4.00 - 5.20 x10*6/uL    Hemoglobin 10.2 (L) 12.0 - 16.0 g/dL    Hematocrit 32.3 (L) 36.0 - 46.0 %    MCV 98 80 - 100 fL    MCH 30.8 26.0 - 34.0 pg    MCHC 31.6 (L) 32.0 - 36.0 g/dL    RDW 13.6 11.5 - 14.5 %    Platelets 240 150 - 450 x10*3/uL    Neutrophils % 80.1 40.0 - 80.0 %    Immature Granulocytes %, Automated 1.3 (H) 0.0 - 0.9 %    Lymphocytes % 11.2 13.0 - 44.0 %    Monocytes % 6.2 2.0 - 10.0 %    Eosinophils % 0.9 0.0 - 6.0 %    Basophils % 0.3 0.0 - 2.0 %    Neutrophils Absolute 13.73 (H) 1.60 - 5.50 x10*3/uL    Immature Granulocytes Absolute, Automated 0.23 0.00 - 0.50 x10*3/uL    Lymphocytes Absolute 1.93 0.80 - 3.00 x10*3/uL    Monocytes Absolute 1.07 (H) 0.05 - 0.80 x10*3/uL     Eosinophils Absolute 0.15 0.00 - 0.40 x10*3/uL    Basophils Absolute 0.05 0.00 - 0.10 x10*3/uL   Basic metabolic panel   Result Value Ref Range    Glucose 112 (H) 65 - 99 mg/dL    Sodium 152 (H) 133 - 145 mmol/L    Potassium 3.3 (L) 3.4 - 5.1 mmol/L    Chloride 116 (H) 97 - 107 mmol/L    Bicarbonate 23 (L) 24 - 31 mmol/L    Urea Nitrogen 18 8 - 25 mg/dL    Creatinine 0.60 0.40 - 1.60 mg/dL    eGFR >90 >60 mL/min/1.73m*2    Calcium 8.1 (L) 8.5 - 10.4 mg/dL    Anion Gap 13 <=19 mmol/L   C. difficile, PCR    Specimen: Stool   Result Value Ref Range    C. difficile, PCR Detected (A) Not Detected   Basic Metabolic Panel   Result Value Ref Range    Glucose 143 (H) 65 - 99 mg/dL    Sodium 147 (H) 133 - 145 mmol/L    Potassium 3.2 (L) 3.4 - 5.1 mmol/L    Chloride 113 (H) 97 - 107 mmol/L    Bicarbonate 22 (L) 24 - 31 mmol/L    Urea Nitrogen 14 8 - 25 mg/dL    Creatinine 0.60 0.40 - 1.60 mg/dL    eGFR >90 >60 mL/min/1.73m*2    Calcium 8.0 (L) 8.5 - 10.4 mg/dL    Anion Gap 12 <=19 mmol/L   CBC   Result Value Ref Range    WBC 16.8 (H) 4.4 - 11.3 x10*3/uL    nRBC 0.0 0.0 - 0.0 /100 WBCs    RBC 3.06 (L) 4.00 - 5.20 x10*6/uL    Hemoglobin 9.4 (L) 12.0 - 16.0 g/dL    Hematocrit 31.0 (L) 36.0 - 46.0 %     (H) 80 - 100 fL    MCH 30.7 26.0 - 34.0 pg    MCHC 30.3 (L) 32.0 - 36.0 g/dL    RDW 14.0 11.5 - 14.5 %    Platelets 235 150 - 450 x10*3/uL       Assessment/Plan     Hypernatremia secondary to dehydration and oral intake I will continue IV D5W  Hypokalemia secondary to poor oral intake continue to replace potassium  Failure to thrive  History of CVA  C. difficile colitis with oral vancomycin               Ezequiel Saleem MD

## 2024-05-11 NOTE — NURSING NOTE
"Patient is NOT taking any oral medication at this time. She spits them out even when they are crushed in applesauce. Per patient \"medication taste nasty\". RN educated patient on the importance of her medication. Pt still refusing   "

## 2024-05-11 NOTE — CARE PLAN
The patient's goals for the shift include improved movement    The clinical goals for the shift include patient will remain hemodynamically stable throughout      Problem: ADLs  Goal: Pt will complete ADL tasks at min Awith use of AE prn   Outcome: Progressing     Problem: Pain  Goal: My pain/discomfort is manageable  Outcome: Progressing     Problem: Safety  Goal: Patient will be injury free during hospitalization  Outcome: Progressing  Goal: I will remain free of falls  Outcome: Progressing     Problem: Daily Care  Goal: Daily care needs are met  Outcome: Progressing     Problem: Psychosocial Needs  Goal: Demonstrates ability to cope with hospitalization/illness  Outcome: Progressing  Goal: Collaborate with me, my family, and caregiver to identify my specific goals  Outcome: Progressing     Problem: Discharge Barriers  Goal: My discharge needs are met  Outcome: Progressing     Problem: Skin  Goal: Decreased wound size/increased tissue granulation at next dressing change  Outcome: Progressing  Flowsheets (Taken 5/11/2024 1027)  Decreased wound size/increased tissue granulation at next dressing change: Promote sleep for wound healing  Goal: Participates in plan/prevention/treatment measures  Outcome: Progressing  Flowsheets (Taken 5/11/2024 1027)  Participates in plan/prevention/treatment measures: Discuss with provider PT/OT consult  Goal: Prevent/manage excess moisture  Outcome: Progressing  Flowsheets (Taken 5/11/2024 1027)  Prevent/manage excess moisture: Monitor for/manage infection if present  Goal: Prevent/minimize sheer/friction injuries  Outcome: Progressing  Flowsheets (Taken 5/11/2024 1027)  Prevent/minimize sheer/friction injuries: Use pull sheet  Goal: Promote/optimize nutrition  Outcome: Progressing  Flowsheets (Taken 5/11/2024 1027)  Promote/optimize nutrition: Offer water/supplements/favorite foods  Goal: Promote skin healing  Outcome: Progressing  Flowsheets (Taken 5/11/2024 1027)  Promote skin  healing: Protective dressings over bony prominences

## 2024-05-11 NOTE — PROGRESS NOTES
Spiritual Care Visit    Clinical Encounter Type  Visited With: Patient not available     Neal Das

## 2024-05-11 NOTE — NURSING NOTE
Sepsis alert rounding completed at this time. Bedside RN made aware. Concerns with nutrition and patient not taking adequate PO intake. Patient day 2 LOS for gen. weakness. IVABX given - vanco PO. IV access adequate.     Alert, disoriented per baseline. GCS13. Lethargic.     TMAX 37.4, 167/83, 18 on RA, 96%  (documented episode of SVT yesterday with Dr Denise at bedside) Medicated with IV metoprolol per bedside RN x1 today for HR >100  WBC 16.8 this AM     Will continue to follow up and round on this patient.

## 2024-05-11 NOTE — PROGRESS NOTES
"Rebecca Samaniego is a 78 y.o. female on day 2 of admission presenting with General weakness.    Subjective   Patient with very flat affect answering simple questions with yes and no responses.  Denies chest pain or shortness of breath.       Objective     Physical Exam  General: Awake but somewhat somnolent  Neck: Normal jugular venous pressures  Heart: Regular rate and rhythm  Lungs: Clear anteriorly  Abdomen: Soft  Extremities: Trace edema  Last Recorded Vitals  Blood pressure 155/72, pulse 101, temperature 37 °C (98.6 °F), temperature source Oral, resp. rate 18, height 1.651 m (5' 5\"), weight 63.6 kg (140 lb 3.4 oz), SpO2 96%.  Intake/Output last 3 Shifts:  I/O last 3 completed shifts:  In: 600 (9.4 mL/kg) [IV Piggyback:600]  Out: - (0 mL/kg)   Weight: 63.6 kg     Relevant Results                Results for orders placed or performed during the hospital encounter of 05/09/24 (from the past 24 hour(s))   Basic metabolic panel   Result Value Ref Range    Glucose 112 (H) 65 - 99 mg/dL    Sodium 152 (H) 133 - 145 mmol/L    Potassium 3.3 (L) 3.4 - 5.1 mmol/L    Chloride 116 (H) 97 - 107 mmol/L    Bicarbonate 23 (L) 24 - 31 mmol/L    Urea Nitrogen 18 8 - 25 mg/dL    Creatinine 0.60 0.40 - 1.60 mg/dL    eGFR >90 >60 mL/min/1.73m*2    Calcium 8.1 (L) 8.5 - 10.4 mg/dL    Anion Gap 13 <=19 mmol/L   C. difficile, PCR    Specimen: Stool   Result Value Ref Range    C. difficile, PCR Detected (A) Not Detected   Basic Metabolic Panel   Result Value Ref Range    Glucose 143 (H) 65 - 99 mg/dL    Sodium 147 (H) 133 - 145 mmol/L    Potassium 3.2 (L) 3.4 - 5.1 mmol/L    Chloride 113 (H) 97 - 107 mmol/L    Bicarbonate 22 (L) 24 - 31 mmol/L    Urea Nitrogen 14 8 - 25 mg/dL    Creatinine 0.60 0.40 - 1.60 mg/dL    eGFR >90 >60 mL/min/1.73m*2    Calcium 8.0 (L) 8.5 - 10.4 mg/dL    Anion Gap 12 <=19 mmol/L   CBC   Result Value Ref Range    WBC 16.8 (H) 4.4 - 11.3 x10*3/uL    nRBC 0.0 0.0 - 0.0 /100 WBCs    RBC 3.06 (L) 4.00 - 5.20 " x10*6/uL    Hemoglobin 9.4 (L) 12.0 - 16.0 g/dL    Hematocrit 31.0 (L) 36.0 - 46.0 %     (H) 80 - 100 fL    MCH 30.7 26.0 - 34.0 pg    MCHC 30.3 (L) 32.0 - 36.0 g/dL    RDW 14.0 11.5 - 14.5 %    Platelets 235 150 - 450 x10*3/uL                 Assessment/Plan   Principal Problem:    General weakness    5/10:The patient is a 78-year-old white female whose past medical history includes hypertension, chronic kidney disease, former alcohol excess, status post bioprosthetic aortic valve replacement utilizing a number 23 mm freestyle bioprosthesis along with replacement of the ascending aorta and transverse hemiarch using a number 24 mm Gelweave graft plus tricuspid valve repair utilizing number 21 mm Mohini ring plus 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 This patient has done very well since her cardiac surgery with her last surveillance echocardiogram on 9/25/2023 showing an LV ejection fraction of 55-60% 1-2+ mitral valve regurgitation normal-appearing stentless aortic valve bioprosthesis with a peak systolic gradient of 11 mmHg and a prosthetic graft in the aortic root and ascending/arch position. Patient was admitted on 4/10/2024 with failure to thrive inability to provide self-care difficulty walking which appears to be related to progressing dementia.  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  She had a repeat echocardiogram that showed a preserved LV ejection fraction 60% normal-appearing stentless bioprosthetic aortic valve replacement minimal systolic pressure gradient with prior tricuspid valve repair and only mild to moderate tricuspid valve regurgitation.  There is trace to mild mitral valve  regurgitation bubble study negative for residual VSD that was closed at the time of her aortic valve replacement.  Patient was treated with for an E. coli urinary tract infection but her mental status appeared to be significantly impaired from dementia.  She was continued on dual antiplatelet therapy with aspirin and Plavix along with Toprol-XL 50 mg daily.  Patient readmitted with failure to thrive with reduced oral intake and significant dehydration with hypernatremia hyperchloremia and hypokalemia for which the patient is receiving IV D5W and potassium supplementation.  From a cardiac standpoint the patient is stable but peers to have significantly advancing senile dementia versus multi-infarct dementia.  For now would continue usual cardiac therapy with the dual antiplatelet therapy and atorvastatin plus metoprolol.    5/11 Aleksandra Martin: Patient admitted essentially with a failure to thrive.  As noted above bioprosthetic aortic valve appears to be functioning normally and left ventricular systolic function preserved.  Serum sodium gradually decreasing with fluid hydration.  Patient does remain hypokalemic and will provide oral supplementation in addition to the potassium in the IV fluids.  Blood pressure mildly elevated and will titrate up the dose of the metoprolol succinate.          I spent 30 minutes in the professional and overall care of this patient.      Presley Lake, DO

## 2024-05-11 NOTE — PROGRESS NOTES
Rebecca Samaniego is a 78 y.o. female on day 2 of admission presenting with General weakness.    Subjective   The patient was seen and examined.  Lying in the bed.  Comfortable.  Did not look in acute distress.  Patient denies any headache or dizziness.  Patient is very lethargic and sleepy.  Patient response to the questions.  Patient is not eating or drinking much.  Last night patient went into SVT.  Patient's heart rate was in the 130s and 140s.  Patient started on IV metoprolol.  Patient is not taking her oral medications.  Patient C. difficile is also positive.       Objective     Physical Exam  HEENT:  Head externally atraumatic,  extraocular movements intact, oral dry moist  Neck:  Supple, no JVP, no palpable adenopathy or thyromegaly.  No carotid bruit.  Chest:  Clear to auscultation and resonant.  Heart:  Regular rate and rhythm, no murmur or gallop could be appreciated.  Tachycardia present  Abdomen:  Soft, nontender, bowel sounds present, normoactive, no palpable hepatosplenomegaly.  Extremities:  No edema, pulses present, no cyanosis or clubbing.  CNS:  Patient alert, oriented to time, place and person.    No new deficit.  Cranial nerves 2-12 grossly intact  Skin:  No active rash.  Musculoskeletal:  No  apparent joint swelling or erythema, range of movement normal.  Last Recorded Vitals  Heart Rate:  []   Temp:  [37 °C (98.6 °F)-37.4 °C (99.3 °F)]   Resp:  [18]   BP: (145-167)/(63-87)   SpO2:  [95 %-99 %]     Intake/Output last 3 Shifts:  I/O last 3 completed shifts:  In: 600 (9.4 mL/kg) [IV Piggyback:600]  Out: - (0 mL/kg)   Weight: 63.6 kg     Relevant Results  Susceptibility data from last 90 days.  Collected Specimen Info Organism Ampicillin Cefazolin Cefazolin (uncomplicated UTIs only) Ciprofloxacin Gentamicin Nitrofurantoin Piperacillin/Tazobactam Trimethoprim/Sulfamethoxazole   04/11/24 Urine from Straight Catheter Escherichia coli S S S S S S S S     Results for orders placed or performed  during the hospital encounter of 05/09/24 (from the past 24 hour(s))   C. difficile, PCR    Specimen: Stool   Result Value Ref Range    C. difficile, PCR Detected (A) Not Detected   Basic Metabolic Panel   Result Value Ref Range    Glucose 143 (H) 65 - 99 mg/dL    Sodium 147 (H) 133 - 145 mmol/L    Potassium 3.2 (L) 3.4 - 5.1 mmol/L    Chloride 113 (H) 97 - 107 mmol/L    Bicarbonate 22 (L) 24 - 31 mmol/L    Urea Nitrogen 14 8 - 25 mg/dL    Creatinine 0.60 0.40 - 1.60 mg/dL    eGFR >90 >60 mL/min/1.73m*2    Calcium 8.0 (L) 8.5 - 10.4 mg/dL    Anion Gap 12 <=19 mmol/L   CBC   Result Value Ref Range    WBC 16.8 (H) 4.4 - 11.3 x10*3/uL    nRBC 0.0 0.0 - 0.0 /100 WBCs    RBC 3.06 (L) 4.00 - 5.20 x10*6/uL    Hemoglobin 9.4 (L) 12.0 - 16.0 g/dL    Hematocrit 31.0 (L) 36.0 - 46.0 %     (H) 80 - 100 fL    MCH 30.7 26.0 - 34.0 pg    MCHC 30.3 (L) 32.0 - 36.0 g/dL    RDW 14.0 11.5 - 14.5 %    Platelets 235 150 - 450 x10*3/uL        Current Facility-Administered Medications:     acetaminophen (Tylenol) tablet 650 mg, 650 mg, oral, q4h PRN **OR** acetaminophen (Tylenol) oral liquid 650 mg, 650 mg, oral, q4h PRN **OR** acetaminophen (Tylenol) suppository 650 mg, 650 mg, rectal, q4h PRN, Kobi Denise MD    acetaminophen (Tylenol) suppository 650 mg, 650 mg, rectal, q4h PRN, Kobi Denise MD    aspirin chewable tablet 81 mg, 81 mg, oral, Daily, Kobi Denise MD    atorvastatin (Lipitor) tablet 40 mg, 40 mg, oral, Nightly, Kobi Denise MD    benzocaine-menthol (Cepastat Sore Throat) lozenge 1 lozenge, 1 lozenge, Mouth/Throat, q2h PRN, Kobi Denise MD    clopidogrel (Plavix) tablet 75 mg, 75 mg, oral, Daily, Kobi Denise MD    dextromethorphan-guaifenesin (Robitussin DM)  mg/5 mL oral liquid 5 mL, 5 mL, oral, q4h PRN, Kobi Denise MD    dextrose 5 % with KCl 20 mEq/L infusion, 100 mL/hr, intravenous, Continuous, Kobi Denise MD, Last Rate: 100 mL/hr at 05/11/24 1029,  100 mL/hr at 05/11/24 1029    ferrous sulfate (325 mg ferrous sulfate) tablet 1 tablet, 65 mg of iron, oral, BID with meals, Kobi Denise MD    fluconazole (Diflucan) tablet 100 mg, 100 mg, oral, Daily, Kobi Denise MD    guaiFENesin (Mucinex) 12 hr tablet 600 mg, 600 mg, oral, q12h PRN, Kobi Denise MD    heparin (porcine) injection 5,000 Units, 5,000 Units, subcutaneous, q8h JACQUELYN, Kobi Denise MD, 5,000 Units at 05/11/24 0636    hydrALAZINE (Apresoline) injection 10 mg, 10 mg, intravenous, q4h PRN, Kobi Denise MD, 10 mg at 05/10/24 2054    lactobacillus acidophilus tablet 1 tablet, 1 tablet, oral, BID with meals, Kobi Denise MD    [START ON 5/12/2024] metoprolol succinate XL (Toprol-XL) 24 hr tablet 100 mg, 100 mg, oral, Daily, Presley Lake DO    metoprolol tartrate (Lopressor) injection 10 mg, 10 mg, intravenous, q6h PRN, Kobi Denise MD, 10 mg at 05/11/24 1041    ondansetron ODT (Zofran-ODT) disintegrating tablet 4 mg, 4 mg, oral, q6h PRN, Kobi Denise MD    polyethylene glycol (Glycolax, Miralax) packet 17 g, 17 g, oral, Daily PRN, Kobi Denise MD    potassium chloride (Klor-Con) packet 20 mEq, 20 mEq, oral, BID with meals, Presley Lake DO, 20 mEq at 05/11/24 1034    vancomycin (Vancocin) capsule 125 mg, 125 mg, oral, q6h, Kobi Denise MD   Assessment/Plan   Principal Problem:    General weakness  Hypokalemia  Hyponatremia  Severe dehydration  Adult failure to thrive  Poor oral intake  Supraventricular tachycardia    Plan: Continue current medication.  Supportive care.  Give physical therapy and Occupational Therapy.  Patient has been started on the IV Lopressor to control heart rate and blood pressure.  Patient is not taking her oral medications.  Patient is C. difficile positive.  Patient will be started on the IV Flagyl.  Will consult ID and gastroenterology.  CODE STATUS was discussed with the  of the patient.   wants the  patient to be full code.  Alternative method of feeding was discussed with the .  Patient  wants the patient to have a PEG tube in case patient needs it because patient has not been eating or drinking well for few weeks and has been getting weaker.  Consult gastroenterology for PEG tube placement.  Overall prognosis is guarded.  The patient still has hyponatremia and hypokalemia.  Continue to replete potassium and continue with IV fluids.  Monitor electrolytes.  Kobi Denise MD

## 2024-05-11 NOTE — CARE PLAN
Problem: ADLs  Goal: Pt will complete ADL tasks at min Awith use of AE prn   5/11/2024 0041 by Roxi Barrett RN  Outcome: Progressing  5/11/2024 0041 by Roxi Barrett RN  Outcome: Progressing     Problem: Pain  Goal: My pain/discomfort is manageable  5/11/2024 0041 by Roxi Barrett RN  Outcome: Progressing  5/11/2024 0041 by Roxi Barrett RN  Outcome: Progressing     Problem: Safety  Goal: Patient will be injury free during hospitalization  5/11/2024 0041 by Roxi Barrett RN  Outcome: Progressing  5/11/2024 0041 by Roxi Barrett RN  Outcome: Progressing  Goal: I will remain free of falls  5/11/2024 0041 by Roxi Barrett RN  Outcome: Progressing  5/11/2024 0041 by Roxi Barrett RN  Outcome: Progressing     Problem: Daily Care  Goal: Daily care needs are met  5/11/2024 0041 by Roxi Barrett RN  Outcome: Progressing  5/11/2024 0041 by Roxi Barrett RN  Outcome: Progressing     Problem: Psychosocial Needs  Goal: Demonstrates ability to cope with hospitalization/illness  5/11/2024 0041 by Roxi Barrett RN  Outcome: Progressing  5/11/2024 0041 by Roxi Barrett RN  Outcome: Progressing  Goal: Collaborate with me, my family, and caregiver to identify my specific goals  5/11/2024 0041 by Roxi Barrett RN  Outcome: Progressing  5/11/2024 0041 by Roxi Barrett RN  Outcome: Progressing     Problem: Discharge Barriers  Goal: My discharge needs are met  5/11/2024 0041 by Roxi Barrett RN  Outcome: Progressing  5/11/2024 0041 by Roxi Barrett RN  Outcome: Progressing     Problem: Skin  Goal: Decreased wound size/increased tissue granulation at next dressing change  Outcome: Progressing  Flowsheets (Taken 5/11/2024 0041)  Decreased wound size/increased tissue  granulation at next dressing change:   Promote sleep for wound healing   Protective dressings over bony prominences   Utilize specialty bed per algorithm  Goal: Participates in plan/prevention/treatment measures  Outcome: Progressing  Flowsheets (Taken 5/11/2024 0041)  Participates in plan/prevention/treatment measures:   Discuss with provider PT/OT consult   Elevate heels   Increase activity/out of bed for meals  Goal: Prevent/manage excess moisture  Outcome: Progressing  Flowsheets (Taken 5/11/2024 0041)  Prevent/manage excess moisture:   Cleanse incontinence/protect with barrier cream   Follow provider orders for dressing changes   Moisturize dry skin   Monitor for/manage infection if present   Use wicking fabric (obtain order)  Goal: Prevent/minimize sheer/friction injuries  Outcome: Progressing  Flowsheets (Taken 5/11/2024 0041)  Prevent/minimize sheer/friction injuries:   Complete micro-shifts as needed if patient unable. Adjust patient position to relieve pressure points, not a full turn   HOB 30 degrees or less   Increase activity/out of bed for meals   Turn/reposition every 2 hours/use positioning/transfer devices   Use pull sheet   Utilize specialty bed per algorithm  Goal: Promote/optimize nutrition  Outcome: Progressing  Flowsheets (Taken 5/11/2024 0041)  Promote/optimize nutrition:   Assist with feeding   Consume > 50% meals/supplements   Discuss with provider if NPO > 2 days   Monitor/record intake including meals   Offer water/supplements/favorite foods   Reassess MST if dietician not consulted  Goal: Promote skin healing  Outcome: Progressing  Flowsheets (Taken 5/11/2024 0041)  Promote skin healing:   Assess skin/pad under line(s)/device(s)   Protective dressings over bony prominences   Ensure correct size (line/device) and apply per  instructions   Rotate device position/do not position patient on device   Turn/reposition every 2 hours/use positioning/transfer devices   The patient's  goals for the shift include improved movement    The clinical goals for the shift include incourage eating    Over the shift, the patient did not make progress toward the following goals. Barriers to progression include . Recommendations to address these barriers include .

## 2024-05-12 LAB
ALBUMIN SERPL-MCNC: 2.4 G/DL (ref 3.5–5)
ALP BLD-CCNC: 103 U/L (ref 35–125)
ALT SERPL-CCNC: <5 U/L (ref 5–40)
ANION GAP SERPL CALC-SCNC: 10 MMOL/L
AST SERPL-CCNC: 8 U/L (ref 5–40)
BASOPHILS # BLD AUTO: 0.02 X10*3/UL (ref 0–0.1)
BASOPHILS NFR BLD AUTO: 0.1 %
BILIRUB SERPL-MCNC: 0.3 MG/DL (ref 0.1–1.2)
BUN SERPL-MCNC: 15 MG/DL (ref 8–25)
C DIFF TOX A+B STL QL IA: NEGATIVE
CALCIUM SERPL-MCNC: 8 MG/DL (ref 8.5–10.4)
CHLORIDE SERPL-SCNC: 111 MMOL/L (ref 97–107)
CO2 SERPL-SCNC: 22 MMOL/L (ref 24–31)
CREAT SERPL-MCNC: 0.7 MG/DL (ref 0.4–1.6)
EGFRCR SERPLBLD CKD-EPI 2021: 88 ML/MIN/1.73M*2
EOSINOPHIL # BLD AUTO: 0.08 X10*3/UL (ref 0–0.4)
EOSINOPHIL NFR BLD AUTO: 0.6 %
ERYTHROCYTE [DISTWIDTH] IN BLOOD BY AUTOMATED COUNT: 13.9 % (ref 11.5–14.5)
GLUCOSE SERPL-MCNC: 125 MG/DL (ref 65–99)
HCT VFR BLD AUTO: 27.8 % (ref 36–46)
HGB BLD-MCNC: 8.5 G/DL (ref 12–16)
IMM GRANULOCYTES # BLD AUTO: 0.18 X10*3/UL (ref 0–0.5)
IMM GRANULOCYTES NFR BLD AUTO: 1.3 % (ref 0–0.9)
LYMPHOCYTES # BLD AUTO: 1.94 X10*3/UL (ref 0.8–3)
LYMPHOCYTES NFR BLD AUTO: 13.8 %
MCH RBC QN AUTO: 31 PG (ref 26–34)
MCHC RBC AUTO-ENTMCNC: 30.6 G/DL (ref 32–36)
MCV RBC AUTO: 102 FL (ref 80–100)
MONOCYTES # BLD AUTO: 1.64 X10*3/UL (ref 0.05–0.8)
MONOCYTES NFR BLD AUTO: 11.7 %
NEUTROPHILS # BLD AUTO: 10.18 X10*3/UL (ref 1.6–5.5)
NEUTROPHILS NFR BLD AUTO: 72.5 %
NRBC BLD-RTO: 0 /100 WBCS (ref 0–0)
PLATELET # BLD AUTO: 219 X10*3/UL (ref 150–450)
POTASSIUM SERPL-SCNC: 3.9 MMOL/L (ref 3.4–5.1)
PROT SERPL-MCNC: 5.5 G/DL (ref 5.9–7.9)
RBC # BLD AUTO: 2.74 X10*6/UL (ref 4–5.2)
SODIUM SERPL-SCNC: 143 MMOL/L (ref 133–145)
WBC # BLD AUTO: 14 X10*3/UL (ref 4.4–11.3)

## 2024-05-12 PROCEDURE — 36415 COLL VENOUS BLD VENIPUNCTURE: CPT | Performed by: INTERNAL MEDICINE

## 2024-05-12 PROCEDURE — 80053 COMPREHEN METABOLIC PANEL: CPT | Performed by: INTERNAL MEDICINE

## 2024-05-12 PROCEDURE — 85025 COMPLETE CBC W/AUTO DIFF WBC: CPT | Performed by: INTERNAL MEDICINE

## 2024-05-12 PROCEDURE — 2500000004 HC RX 250 GENERAL PHARMACY W/ HCPCS (ALT 636 FOR OP/ED): Performed by: INTERNAL MEDICINE

## 2024-05-12 PROCEDURE — 1200000002 HC GENERAL ROOM WITH TELEMETRY DAILY

## 2024-05-12 PROCEDURE — 99232 SBSQ HOSP IP/OBS MODERATE 35: CPT | Performed by: INTERNAL MEDICINE

## 2024-05-12 RX ORDER — MIRTAZAPINE 15 MG/1
7.5 TABLET, FILM COATED ORAL NIGHTLY
Status: DISCONTINUED | OUTPATIENT
Start: 2024-05-12 | End: 2024-05-18

## 2024-05-12 RX ADMIN — METRONIDAZOLE 500 MG: 5 INJECTION, SOLUTION INTRAVENOUS at 21:42

## 2024-05-12 RX ADMIN — HEPARIN SODIUM 5000 UNITS: 5000 INJECTION, SOLUTION INTRAVENOUS; SUBCUTANEOUS at 21:42

## 2024-05-12 RX ADMIN — DEXTROSE AND POTASSIUM CHLORIDE 100 ML/HR: 5; .15 SOLUTION INTRAVENOUS at 10:38

## 2024-05-12 RX ADMIN — HEPARIN SODIUM 5000 UNITS: 5000 INJECTION, SOLUTION INTRAVENOUS; SUBCUTANEOUS at 05:27

## 2024-05-12 RX ADMIN — HEPARIN SODIUM 5000 UNITS: 5000 INJECTION, SOLUTION INTRAVENOUS; SUBCUTANEOUS at 13:26

## 2024-05-12 RX ADMIN — METRONIDAZOLE 500 MG: 5 INJECTION, SOLUTION INTRAVENOUS at 13:26

## 2024-05-12 RX ADMIN — METRONIDAZOLE 500 MG: 5 INJECTION, SOLUTION INTRAVENOUS at 05:27

## 2024-05-12 ASSESSMENT — COGNITIVE AND FUNCTIONAL STATUS - GENERAL
STANDING UP FROM CHAIR USING ARMS: TOTAL
HELP NEEDED FOR BATHING: TOTAL
CLIMB 3 TO 5 STEPS WITH RAILING: TOTAL
TOILETING: TOTAL
DAILY ACTIVITIY SCORE: 8
PERSONAL GROOMING: A LOT
TURNING FROM BACK TO SIDE WHILE IN FLAT BAD: TOTAL
MOVING FROM LYING ON BACK TO SITTING ON SIDE OF FLAT BED WITH BEDRAILS: A LOT
CLIMB 3 TO 5 STEPS WITH RAILING: TOTAL
DRESSING REGULAR UPPER BODY CLOTHING: TOTAL
MOVING FROM LYING ON BACK TO SITTING ON SIDE OF FLAT BED WITH BEDRAILS: TOTAL
EATING MEALS: TOTAL
MOBILITY SCORE: 7
DRESSING REGULAR LOWER BODY CLOTHING: TOTAL
MOVING TO AND FROM BED TO CHAIR: TOTAL
PERSONAL GROOMING: TOTAL
WALKING IN HOSPITAL ROOM: TOTAL
EATING MEALS: A LOT
DAILY ACTIVITIY SCORE: 6
HELP NEEDED FOR BATHING: TOTAL
TOILETING: TOTAL
WALKING IN HOSPITAL ROOM: TOTAL
TURNING FROM BACK TO SIDE WHILE IN FLAT BAD: TOTAL
DRESSING REGULAR LOWER BODY CLOTHING: TOTAL
STANDING UP FROM CHAIR USING ARMS: TOTAL
MOBILITY SCORE: 6
DRESSING REGULAR UPPER BODY CLOTHING: TOTAL
MOVING TO AND FROM BED TO CHAIR: TOTAL

## 2024-05-12 ASSESSMENT — PAIN - FUNCTIONAL ASSESSMENT: PAIN_FUNCTIONAL_ASSESSMENT: 0-10

## 2024-05-12 ASSESSMENT — PAIN SCALES - GENERAL
PAINLEVEL_OUTOF10: 0 - NO PAIN
PAINLEVEL_OUTOF10: 0 - NO PAIN

## 2024-05-12 NOTE — PROGRESS NOTES
"Rebecca Samaniego is a 78 y.o. female on day 3 of admission presenting with General weakness.    Subjective   Patient is seen for hyponatremia and hypokalemia and hypokalemia and dehydration patient continues to have extremely poor oral intake        Objective     Physical Exam  Neck:      Vascular: No carotid bruit.   Cardiovascular:      Rate and Rhythm: Normal rate and regular rhythm.      Heart sounds: No murmur heard.     No friction rub. No gallop.   Pulmonary:      Breath sounds: No wheezing, rhonchi or rales.   Chest:      Chest wall: No tenderness.   Abdominal:      General: There is no distension.      Tenderness: There is no abdominal tenderness. There is no guarding or rebound.   Musculoskeletal:         General: No swelling or tenderness.      Cervical back: Neck supple.      Right lower leg: No edema.      Left lower leg: No edema.   Lymphadenopathy:      Cervical: No cervical adenopathy.         Last Recorded Vitals  Blood pressure 147/65, pulse 90, temperature 36.6 °C (97.9 °F), temperature source Axillary, resp. rate 18, height 1.651 m (5' 5\"), weight 63.6 kg (140 lb 3.4 oz), SpO2 98%.    Intake/Output last 3 Shifts:  I/O last 3 completed shifts:  In: 2000 (31.4 mL/kg) [I.V.:2000 (31.4 mL/kg)]  Out: - (0 mL/kg)   Weight: 63.6 kg     Current Facility-Administered Medications:     acetaminophen (Tylenol) tablet 650 mg, 650 mg, oral, q4h PRN **OR** acetaminophen (Tylenol) oral liquid 650 mg, 650 mg, oral, q4h PRN **OR** acetaminophen (Tylenol) suppository 650 mg, 650 mg, rectal, q4h PRN, Kobi Denise MD    acetaminophen (Tylenol) suppository 650 mg, 650 mg, rectal, q4h PRN, Kobi Denise MD    aspirin chewable tablet 81 mg, 81 mg, oral, Daily, oKbi Denise MD    atorvastatin (Lipitor) tablet 40 mg, 40 mg, oral, Nightly, Kobi Denise MD    benzocaine-menthol (Cepastat Sore Throat) lozenge 1 lozenge, 1 lozenge, Mouth/Throat, q2h PRN, Kobi Denise MD    clopidogrel " (Plavix) tablet 75 mg, 75 mg, oral, Daily, Kobi Denise MD    dextromethorphan-guaifenesin (Robitussin DM)  mg/5 mL oral liquid 5 mL, 5 mL, oral, q4h PRN, Kobi Denise MD    dextrose 5 % with KCl 20 mEq/L infusion, 50 mL/hr, intravenous, Continuous, Ezequiel Saleem MD, Last Rate: 100 mL/hr at 05/12/24 1038, 100 mL/hr at 05/12/24 1038    ferrous sulfate (325 mg ferrous sulfate) tablet 1 tablet, 65 mg of iron, oral, BID with meals, Kobi Denise MD    fluconazole (Diflucan) tablet 100 mg, 100 mg, oral, Daily, Kobi Denise MD    guaiFENesin (Mucinex) 12 hr tablet 600 mg, 600 mg, oral, q12h PRN, Kobi Denise MD    heparin (porcine) injection 5,000 Units, 5,000 Units, subcutaneous, q8h JACQUELYN, Kobi Denise MD, 5,000 Units at 05/12/24 0527    hydrALAZINE (Apresoline) injection 10 mg, 10 mg, intravenous, q4h PRN, Kobi Denise MD, 10 mg at 05/10/24 2054    lactobacillus acidophilus tablet 1 tablet, 1 tablet, oral, BID with meals, Kobi Denise MD    metoprolol succinate XL (Toprol-XL) 24 hr tablet 100 mg, 100 mg, oral, Daily, Presley Lake DO    metoprolol tartrate (Lopressor) injection 10 mg, 10 mg, intravenous, q6h PRN, Kobi Denise MD, 10 mg at 05/11/24 1041    metroNIDAZOLE (Flagyl) 500 mg in NaCl (iso-os) 100 mL, 500 mg, intravenous, q8h, Kobi Denise MD, Stopped at 05/12/24 0627    ondansetron ODT (Zofran-ODT) disintegrating tablet 4 mg, 4 mg, oral, q6h PRN, Kobi Denise MD    polyethylene glycol (Glycolax, Miralax) packet 17 g, 17 g, oral, Daily PRN, Kobi Denise MD    potassium chloride (Klor-Con) packet 20 mEq, 20 mEq, oral, BID with meals, Presley Lake DO    vancomycin (Vancocin) capsule 125 mg, 125 mg, oral, q6h, Kobi Denise MD   Relevant Results    Results for orders placed or performed during the hospital encounter of 05/09/24 (from the past 96 hour(s))   NT Pro-BNP   Result Value Ref Range    PROBNP 2,220 (H) 0 - 624  pg/mL   CBC and Auto Differential   Result Value Ref Range    WBC 16.5 (H) 4.4 - 11.3 x10*3/uL    nRBC 0.0 0.0 - 0.0 /100 WBCs    RBC 3.06 (L) 4.00 - 5.20 x10*6/uL    Hemoglobin 9.7 (L) 12.0 - 16.0 g/dL    Hematocrit 31.7 (L) 36.0 - 46.0 %     (H) 80 - 100 fL    MCH 31.7 26.0 - 34.0 pg    MCHC 30.6 (L) 32.0 - 36.0 g/dL    RDW 13.8 11.5 - 14.5 %    Platelets 233 150 - 450 x10*3/uL    Neutrophils % 77.2 40.0 - 80.0 %    Immature Granulocytes %, Automated 1.3 (H) 0.0 - 0.9 %    Lymphocytes % 12.1 13.0 - 44.0 %    Monocytes % 7.4 2.0 - 10.0 %    Eosinophils % 1.6 0.0 - 6.0 %    Basophils % 0.4 0.0 - 2.0 %    Neutrophils Absolute 12.77 (H) 1.60 - 5.50 x10*3/uL    Immature Granulocytes Absolute, Automated 0.21 0.00 - 0.50 x10*3/uL    Lymphocytes Absolute 2.00 0.80 - 3.00 x10*3/uL    Monocytes Absolute 1.23 (H) 0.05 - 0.80 x10*3/uL    Eosinophils Absolute 0.27 0.00 - 0.40 x10*3/uL    Basophils Absolute 0.06 0.00 - 0.10 x10*3/uL   Comprehensive metabolic panel   Result Value Ref Range    Glucose 138 (H) 65 - 99 mg/dL    Sodium 155 (H) 133 - 145 mmol/L    Potassium 2.8 (LL) 3.4 - 5.1 mmol/L    Chloride 117 (H) 97 - 107 mmol/L    Bicarbonate 25 24 - 31 mmol/L    Urea Nitrogen 22 8 - 25 mg/dL    Creatinine 0.80 0.40 - 1.60 mg/dL    eGFR 75 >60 mL/min/1.73m*2    Calcium 8.4 (L) 8.5 - 10.4 mg/dL    Albumin 2.9 (L) 3.5 - 5.0 g/dL    Alkaline Phosphatase 143 (H) 35 - 125 U/L    Total Protein 6.2 5.9 - 7.9 g/dL    AST 10 5 - 40 U/L    Bilirubin, Total 0.4 0.1 - 1.2 mg/dL    ALT 5 5 - 40 U/L    Anion Gap 13 <=19 mmol/L   Lipase   Result Value Ref Range    Lipase 66 (H) 16 - 63 U/L   Sars-CoV-2 PCR   Result Value Ref Range    Coronavirus 2019, PCR Not Detected Not Detected   Influenza A, and B PCR   Result Value Ref Range    Flu A Result Not Detected Not Detected    Flu B Result Not Detected Not Detected   Serial Troponin, Initial ([facility])   Result Value Ref Range    Troponin T, High Sensitivity 74 (HH) <=14 ng/L   Morphology    Result Value Ref Range    RBC Morphology No significant RBC morphology present    ECG 12 lead   Result Value Ref Range    Ventricular Rate 74 BPM    Atrial Rate 74 BPM    FL Interval 158 ms    QRS Duration 92 ms    QT Interval 466 ms    QTC Calculation(Bazett) 517 ms    P Axis 35 degrees    R Axis -37 degrees    T Axis 32 degrees    QRS Count 12 beats    Q Onset 213 ms    P Onset 134 ms    P Offset 181 ms    T Offset 446 ms    QTC Fredericia 500 ms   Urinalysis with Reflex Culture and Microscopic   Result Value Ref Range    Color, Urine Light-Orange (N) Light-Yellow, Yellow, Dark-Yellow    Appearance, Urine Ex.Turbid (N) Clear    Specific Gravity, Urine 1.015 1.005 - 1.035    pH, Urine 6.0 5.0, 5.5, 6.0, 6.5, 7.0, 7.5, 8.0    Protein, Urine 50 (1+) (A) NEGATIVE, 10 (TRACE), 20 (TRACE) mg/dL    Glucose, Urine Normal Normal mg/dL    Blood, Urine 0.1 (1+) (A) NEGATIVE    Ketones, Urine NEGATIVE NEGATIVE mg/dL    Bilirubin, Urine NEGATIVE NEGATIVE    Urobilinogen, Urine Normal Normal mg/dL    Nitrite, Urine 2+ (A) NEGATIVE    Leukocyte Esterase, Urine 500 Jeremy/µL (A) NEGATIVE   Extra Urine Gray Tube   Result Value Ref Range    Extra Tube Hold for add-ons.    Microscopic Only, Urine   Result Value Ref Range    WBC, Urine >50 (A) 1-5, NONE /HPF    WBC Clumps, Urine MANY Reference range not established. /HPF    RBC, Urine >20 (A) NONE, 1-2, 3-5 /HPF    Squamous Epithelial Cells, Urine 1-9 (SPARSE) Reference range not established. /HPF    Bacteria, Urine 4+ (A) NONE SEEN /HPF    Mucus, Urine 1+ Reference range not established. /LPF   Urine Culture    Specimen: Straight Catheter; Urine   Result Value Ref Range    Urine Culture (A)      Multiple organisms present, probable contamination. Repeat culture if clinically indicated.   Sodium, urine, random   Result Value Ref Range    Sodium, Urine Random 89 NOT ESTABLISHED mmol/L    Creatinine, Urine Random 75.8 NOT ESTABLISHED mg/dL    Sodium/Creatinine Ratio 117 Not established.  mmol/g Creat   Osmolality, urine   Result Value Ref Range    Osmolality, Urine Random 512 200 - 1,200 mOsm/kg   Serial Troponin, 2 Hour ([facility])   Result Value Ref Range    Troponin T, High Sensitivity 69 (HH) <=14 ng/L   Osmolality   Result Value Ref Range    Osmolality, Serum 321 (H) 280 - 300 mOsm/kg   Serial Troponin, 6 Hour ([facility])   Result Value Ref Range    Troponin T, High Sensitivity 65 (HH) <=14 ng/L   Comprehensive metabolic panel   Result Value Ref Range    Glucose 99 65 - 99 mg/dL    Sodium 154 (H) 133 - 145 mmol/L    Potassium 3.3 (L) 3.4 - 5.1 mmol/L    Chloride 117 (H) 97 - 107 mmol/L    Bicarbonate 21 (L) 24 - 31 mmol/L    Urea Nitrogen 19 8 - 25 mg/dL    Creatinine 0.60 0.40 - 1.60 mg/dL    eGFR >90 >60 mL/min/1.73m*2    Calcium 8.3 (L) 8.5 - 10.4 mg/dL    Albumin 2.9 (L) 3.5 - 5.0 g/dL    Alkaline Phosphatase 139 (H) 35 - 125 U/L    Total Protein 6.3 5.9 - 7.9 g/dL    AST 9 5 - 40 U/L    Bilirubin, Total 0.4 0.1 - 1.2 mg/dL    ALT 5 5 - 40 U/L    Anion Gap 16 <=19 mmol/L   CBC and Auto Differential   Result Value Ref Range    WBC 17.2 (H) 4.4 - 11.3 x10*3/uL    nRBC 0.0 0.0 - 0.0 /100 WBCs    RBC 3.31 (L) 4.00 - 5.20 x10*6/uL    Hemoglobin 10.2 (L) 12.0 - 16.0 g/dL    Hematocrit 32.3 (L) 36.0 - 46.0 %    MCV 98 80 - 100 fL    MCH 30.8 26.0 - 34.0 pg    MCHC 31.6 (L) 32.0 - 36.0 g/dL    RDW 13.6 11.5 - 14.5 %    Platelets 240 150 - 450 x10*3/uL    Neutrophils % 80.1 40.0 - 80.0 %    Immature Granulocytes %, Automated 1.3 (H) 0.0 - 0.9 %    Lymphocytes % 11.2 13.0 - 44.0 %    Monocytes % 6.2 2.0 - 10.0 %    Eosinophils % 0.9 0.0 - 6.0 %    Basophils % 0.3 0.0 - 2.0 %    Neutrophils Absolute 13.73 (H) 1.60 - 5.50 x10*3/uL    Immature Granulocytes Absolute, Automated 0.23 0.00 - 0.50 x10*3/uL    Lymphocytes Absolute 1.93 0.80 - 3.00 x10*3/uL    Monocytes Absolute 1.07 (H) 0.05 - 0.80 x10*3/uL    Eosinophils Absolute 0.15 0.00 - 0.40 x10*3/uL    Basophils Absolute 0.05 0.00 - 0.10 x10*3/uL   Basic  metabolic panel   Result Value Ref Range    Glucose 112 (H) 65 - 99 mg/dL    Sodium 152 (H) 133 - 145 mmol/L    Potassium 3.3 (L) 3.4 - 5.1 mmol/L    Chloride 116 (H) 97 - 107 mmol/L    Bicarbonate 23 (L) 24 - 31 mmol/L    Urea Nitrogen 18 8 - 25 mg/dL    Creatinine 0.60 0.40 - 1.60 mg/dL    eGFR >90 >60 mL/min/1.73m*2    Calcium 8.1 (L) 8.5 - 10.4 mg/dL    Anion Gap 13 <=19 mmol/L   C. difficile, PCR    Specimen: Stool   Result Value Ref Range    C. difficile, PCR Detected (A) Not Detected   Clostridium Difficile EIA    Specimen: Stool   Result Value Ref Range    C. difficile (Toxin A/B) Negative Negative, Indeterminate   Basic Metabolic Panel   Result Value Ref Range    Glucose 143 (H) 65 - 99 mg/dL    Sodium 147 (H) 133 - 145 mmol/L    Potassium 3.2 (L) 3.4 - 5.1 mmol/L    Chloride 113 (H) 97 - 107 mmol/L    Bicarbonate 22 (L) 24 - 31 mmol/L    Urea Nitrogen 14 8 - 25 mg/dL    Creatinine 0.60 0.40 - 1.60 mg/dL    eGFR >90 >60 mL/min/1.73m*2    Calcium 8.0 (L) 8.5 - 10.4 mg/dL    Anion Gap 12 <=19 mmol/L   CBC   Result Value Ref Range    WBC 16.8 (H) 4.4 - 11.3 x10*3/uL    nRBC 0.0 0.0 - 0.0 /100 WBCs    RBC 3.06 (L) 4.00 - 5.20 x10*6/uL    Hemoglobin 9.4 (L) 12.0 - 16.0 g/dL    Hematocrit 31.0 (L) 36.0 - 46.0 %     (H) 80 - 100 fL    MCH 30.7 26.0 - 34.0 pg    MCHC 30.3 (L) 32.0 - 36.0 g/dL    RDW 14.0 11.5 - 14.5 %    Platelets 235 150 - 450 x10*3/uL   CBC and Auto Differential   Result Value Ref Range    WBC 14.0 (H) 4.4 - 11.3 x10*3/uL    nRBC 0.0 0.0 - 0.0 /100 WBCs    RBC 2.74 (L) 4.00 - 5.20 x10*6/uL    Hemoglobin 8.5 (L) 12.0 - 16.0 g/dL    Hematocrit 27.8 (L) 36.0 - 46.0 %     (H) 80 - 100 fL    MCH 31.0 26.0 - 34.0 pg    MCHC 30.6 (L) 32.0 - 36.0 g/dL    RDW 13.9 11.5 - 14.5 %    Platelets 219 150 - 450 x10*3/uL    Neutrophils % 72.5 40.0 - 80.0 %    Immature Granulocytes %, Automated 1.3 (H) 0.0 - 0.9 %    Lymphocytes % 13.8 13.0 - 44.0 %    Monocytes % 11.7 2.0 - 10.0 %    Eosinophils %  0.6 0.0 - 6.0 %    Basophils % 0.1 0.0 - 2.0 %    Neutrophils Absolute 10.18 (H) 1.60 - 5.50 x10*3/uL    Immature Granulocytes Absolute, Automated 0.18 0.00 - 0.50 x10*3/uL    Lymphocytes Absolute 1.94 0.80 - 3.00 x10*3/uL    Monocytes Absolute 1.64 (H) 0.05 - 0.80 x10*3/uL    Eosinophils Absolute 0.08 0.00 - 0.40 x10*3/uL    Basophils Absolute 0.02 0.00 - 0.10 x10*3/uL   Comprehensive Metabolic Panel   Result Value Ref Range    Glucose 125 (H) 65 - 99 mg/dL    Sodium 143 133 - 145 mmol/L    Potassium 3.9 3.4 - 5.1 mmol/L    Chloride 111 (H) 97 - 107 mmol/L    Bicarbonate 22 (L) 24 - 31 mmol/L    Urea Nitrogen 15 8 - 25 mg/dL    Creatinine 0.70 0.40 - 1.60 mg/dL    eGFR 88 >60 mL/min/1.73m*2    Calcium 8.0 (L) 8.5 - 10.4 mg/dL    Albumin 2.4 (L) 3.5 - 5.0 g/dL    Alkaline Phosphatase 103 35 - 125 U/L    Total Protein 5.5 (L) 5.9 - 7.9 g/dL    AST 8 5 - 40 U/L    Bilirubin, Total 0.3 0.1 - 1.2 mg/dL    ALT <5 (L) 5 - 40 U/L    Anion Gap 10 <=19 mmol/L       Assessment/Plan     Hypernatremia it is resolved since she has poor oral intake I will continue D5W but reduce the rate to 50 mL an hour  Hypokalemia is resolved  Failure to thrive  History of CVA  C. difficile colitis with oral vancomycin  Overall prognosis is poor                 Ezequiel Saleem MD

## 2024-05-12 NOTE — CONSULTS
Reason For Consult  Failure to thrive, potential PEG    History Of Present Illness  Rebecca Samaniego is a 78 y.o. female presenting with generalized weakness, failure to thrive, and dehydration.  She has a history of C. difficile in the past x2. she is a long-term care resident.  She was admitted on 5/9 and started on IV fluids, she has been seeing nephrology and cardiology as well. Called and spoke with  who feels patient has been slowly deteriorating over the last month or so.  He states she is lost anywhere from 10 to 20 pounds and her appetite has been nonexistent.    She is somewhat of a poor historian and cannot really elaborate on her symptoms.  She does tell me that her appetite is poor and she just does not feel like eating.  She denies any dysphagia or pain with swallowing.  She was seen by speech therapy who recommended modified barium swallow, this has not been done yet.  When asked if she would be willing to have a feeding tube placed patient is adamantly refusing, she also seems to think she may have had 1 in the past.  She currently denies any abdominal pain, heartburn, blood in stool, dysphagia, melena.  She is unable to tell me if she is lost weight    Labs show WBC count of 16.8, macrocytic anemia with hemoglobin of 9.4 and hematocrit of 31, .  Her BMP shows mild hypokalemia with potassium of 3.2 and borderline hyper natremia with sodium of 147.  Albumin was 2.9 consistent with failure to thrive/malnutrition, alk phos borderline elevated at 139.  Of note she had a C. difficile that was repeated due to her history, it was positive by PCR but negative for EIA.  Not currently having any diarrhea    She is unsure when her last colonoscopy or EGD was    Denies any alcohol, tobacco, marijuana, or NSAIDs.     Past Medical History  She has a past medical history of Hypertension.    Surgical History  She has a past surgical history that includes Aortic valve replacement (02/11/2014); Other  "surgical history (02/11/2014); Cervical discectomy (02/11/2014); Breast lumpectomy (02/11/2014); and Hysterectomy (02/11/2014).     Social History  She reports that she has quit smoking. Her smoking use included cigarettes. She has never used smokeless tobacco. She reports current alcohol use. She reports that she does not use drugs.    Family History  No family history on file.     Allergies  Sulfa (sulfonamide antibiotics), Sulfamethoxazole-trimethoprim, Ciprofloxacin, and Nitrofurantoin    Review of Systems  See HPI     Physical Exam  Neuro: AOX2-3, appropriate affect, symmetric facial movements  Cardiac: S1/S2 equal, RRR, no clicks/gallops/murmurs  Resp: Lung sounds clear, no adventigous breath sounds auscultated  GI: abdomen is soft, nontender, no guarding or rebound tenderness  : no flank pain, no hematuria, or dysuria  M/S: normal strength, active range of motion  Skin: normal skin tone and color, no ertyhema       Last Recorded Vitals  Blood pressure 147/65, pulse 90, temperature 36.6 °C (97.9 °F), temperature source Axillary, resp. rate 18, height 1.651 m (5' 5\"), weight 63.6 kg (140 lb 3.4 oz), SpO2 98%.    Relevant Results  Results for orders placed or performed during the hospital encounter of 05/09/24 (from the past 24 hour(s))   CBC and Auto Differential   Result Value Ref Range    WBC 14.0 (H) 4.4 - 11.3 x10*3/uL    nRBC 0.0 0.0 - 0.0 /100 WBCs    RBC 2.74 (L) 4.00 - 5.20 x10*6/uL    Hemoglobin 8.5 (L) 12.0 - 16.0 g/dL    Hematocrit 27.8 (L) 36.0 - 46.0 %     (H) 80 - 100 fL    MCH 31.0 26.0 - 34.0 pg    MCHC 30.6 (L) 32.0 - 36.0 g/dL    RDW 13.9 11.5 - 14.5 %    Platelets 219 150 - 450 x10*3/uL    Neutrophils % 72.5 40.0 - 80.0 %    Immature Granulocytes %, Automated 1.3 (H) 0.0 - 0.9 %    Lymphocytes % 13.8 13.0 - 44.0 %    Monocytes % 11.7 2.0 - 10.0 %    Eosinophils % 0.6 0.0 - 6.0 %    Basophils % 0.1 0.0 - 2.0 %    Neutrophils Absolute 10.18 (H) 1.60 - 5.50 x10*3/uL    Immature " Granulocytes Absolute, Automated 0.18 0.00 - 0.50 x10*3/uL    Lymphocytes Absolute 1.94 0.80 - 3.00 x10*3/uL    Monocytes Absolute 1.64 (H) 0.05 - 0.80 x10*3/uL    Eosinophils Absolute 0.08 0.00 - 0.40 x10*3/uL    Basophils Absolute 0.02 0.00 - 0.10 x10*3/uL   Comprehensive Metabolic Panel   Result Value Ref Range    Glucose 125 (H) 65 - 99 mg/dL    Sodium 143 133 - 145 mmol/L    Potassium 3.9 3.4 - 5.1 mmol/L    Chloride 111 (H) 97 - 107 mmol/L    Bicarbonate 22 (L) 24 - 31 mmol/L    Urea Nitrogen 15 8 - 25 mg/dL    Creatinine 0.70 0.40 - 1.60 mg/dL    eGFR 88 >60 mL/min/1.73m*2    Calcium 8.0 (L) 8.5 - 10.4 mg/dL    Albumin 2.4 (L) 3.5 - 5.0 g/dL    Alkaline Phosphatase 103 35 - 125 U/L    Total Protein 5.5 (L) 5.9 - 7.9 g/dL    AST 8 5 - 40 U/L    Bilirubin, Total 0.3 0.1 - 1.2 mg/dL    ALT <5 (L) 5 - 40 U/L    Anion Gap 10 <=19 mmol/L         Assessment/Plan     Failure to thrive/PEG tube evaluation   - Discussed with /POA via telephone, would want patient to have PEG tube placed. He doesn't feel his wife is capable of making decision at this time. Will tentatively plan for EGD with PEG placement on Tuesday. Would highly recommend palliative consult as well in the meantime.    - Discussed possibly starting low dose Remeron at night to stimulate appetite.  would be okay with this.      Lion Marques, APRN-CNP

## 2024-05-12 NOTE — PROGRESS NOTES
"Rebecca Samaniego is a 78 y.o. female on day 3 of admission presenting with General weakness.    Subjective   Patient is awake and will answer questions appropriately.  No chest pain or shortness of breath.       Objective     Physical Exam  General: Awake and will answer simple questions  Neck: Normal jugular venous pressure  Heart: Regular rate and rhythm  Lungs: Diminished breath sounds in the bases  Abdomen: Soft bowel sounds positive  Extremities: Trace to 1+ edema  Last Recorded Vitals  Blood pressure 147/65, pulse 90, temperature 36.6 °C (97.9 °F), temperature source Axillary, resp. rate 18, height 1.651 m (5' 5\"), weight 63.6 kg (140 lb 3.4 oz), SpO2 98%.  Intake/Output last 3 Shifts:  I/O last 3 completed shifts:  In: 2000 (31.4 mL/kg) [I.V.:2000 (31.4 mL/kg)]  Out: - (0 mL/kg)   Weight: 63.6 kg     Relevant Results                             Assessment/Plan   Principal Problem:    General weakness    General weakness     5/10:The patient is a 78-year-old white female whose past medical history includes hypertension, chronic kidney disease, former alcohol excess, status post bioprosthetic aortic valve replacement utilizing a number 23 mm freestyle bioprosthesis along with replacement of the ascending aorta and transverse hemiarch using a number 24 mm Gelweave graft plus tricuspid valve repair utilizing number 21 mm New [place] ring plus 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 This patient has done very well since her cardiac surgery with her last surveillance echocardiogram on 9/25/2023 showing an LV ejection fraction of 55-60% 1-2+ mitral valve regurgitation normal-appearing stentless aortic valve bioprosthesis with a peak systolic gradient of 11 mmHg and a prosthetic graft in the aortic root and ascending/arch position. Patient was admitted on 4/10/2024 with failure to thrive inability to provide self-care difficulty walking which appears to be related to progressing dementia.  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  She had a repeat echocardiogram that showed a preserved LV ejection fraction 60% normal-appearing stentless bioprosthetic aortic valve replacement minimal systolic pressure gradient with prior tricuspid valve repair and only mild to moderate tricuspid valve regurgitation.  There is trace to mild mitral valve regurgitation bubble study negative for residual VSD that was closed at the time of her aortic valve replacement.  Patient was treated with for an E. coli urinary tract infection but her mental status appeared to be significantly impaired from dementia.  She was continued on dual antiplatelet therapy with aspirin and Plavix along with Toprol-XL 50 mg daily.  Patient readmitted with failure to thrive with reduced oral intake and significant dehydration with hypernatremia hyperchloremia and hypokalemia for which the patient is receiving IV D5W and potassium supplementation.  From a cardiac standpoint the patient is stable but peers to have significantly advancing senile dementia versus multi-infarct dementia.  For now would continue usual cardiac therapy with the dual antiplatelet therapy and atorvastatin plus metoprolol.     5/11 [facility] for [facility]: Patient admitted essentially with a failure to thrive.  As noted above bioprosthetic aortic valve appears to be functioning normally and left ventricular systolic function preserved.  Serum sodium gradually decreasing with fluid hydration.  Patient does remain hypokalemic and will provide oral supplementation in addition to the potassium in the IV fluids.  Blood pressure mildly elevated and will titrate up the dose of the metoprolol succinate.    5/12:   [facility] for [facility]: Essentially no change over the last 24 hours.  The patient has been refusing oral medications.  Hemodynamically the patient appears stable.  I think at this point we should consider palliative care and possibly even hospice care in this individual.  She does not appear to be agreeable to a PEG tube at this time.  Thus a comfort care model may be a better option.             I spent 20 minutes in the professional and overall care of this patient.      [place]ley [place], DO

## 2024-05-12 NOTE — CARE PLAN
The patient's goals for the shift include improved movement    The clinical goals for the shift include remain safe      Problem: ADLs  Goal: Pt will complete ADL tasks at min Awith use of AE prn   Outcome: Progressing     Problem: Pain  Goal: My pain/discomfort is manageable  Outcome: Progressing     Problem: Safety  Goal: Patient will be injury free during hospitalization  Outcome: Progressing  Goal: I will remain free of falls  Outcome: Progressing     Problem: Daily Care  Goal: Daily care needs are met  Outcome: Progressing     Problem: Psychosocial Needs  Goal: Demonstrates ability to cope with hospitalization/illness  Outcome: Progressing  Goal: Collaborate with me, my family, and caregiver to identify my specific goals  Outcome: Progressing     Problem: Discharge Barriers  Goal: My discharge needs are met  Outcome: Progressing     Problem: Skin  Goal: Decreased wound size/increased tissue granulation at next dressing change  Outcome: Progressing  Flowsheets (Taken 5/12/2024 1027)  Decreased wound size/increased tissue granulation at next dressing change: Promote sleep for wound healing  Goal: Participates in plan/prevention/treatment measures  Outcome: Progressing  Flowsheets (Taken 5/12/2024 1027)  Participates in plan/prevention/treatment measures: Discuss with provider PT/OT consult  Goal: Prevent/manage excess moisture  Outcome: Progressing  Flowsheets (Taken 5/12/2024 1027)  Prevent/manage excess moisture: Cleanse incontinence/protect with barrier cream  Goal: Prevent/minimize sheer/friction injuries  Outcome: Progressing  Flowsheets (Taken 5/12/2024 1027)  Prevent/minimize sheer/friction injuries: Use pull sheet  Goal: Promote/optimize nutrition  Outcome: Progressing  Flowsheets (Taken 5/12/2024 1027)  Promote/optimize nutrition: Assist with feeding  Goal: Promote skin healing  Outcome: Progressing  Flowsheets (Taken 5/12/2024 1027)  Promote skin healing: Assess skin/pad under line(s)/device(s)      Problem: Pain - Adult  Goal: Verbalizes/displays adequate comfort level or baseline comfort level  Outcome: Progressing     Problem: Safety - Adult  Goal: Free from fall injury  Outcome: Progressing     Problem: Discharge Planning  Goal: Discharge to home or other facility with appropriate resources  Outcome: Progressing     Problem: Chronic Conditions and Co-morbidities  Goal: Patient's chronic conditions and co-morbidity symptoms are monitored and maintained or improved  Outcome: Progressing     Problem: Nutrition  Goal: Less than 5 days NPO/clear liquids  Outcome: Progressing  Goal: Oral intake greater than 50%  Outcome: Progressing  Goal: Oral intake greater 75%  Outcome: Progressing  Goal: Consume prescribed supplement  Outcome: Progressing  Goal: Adequate PO fluid intake  Outcome: Progressing  Goal: Nutrition support goals are met within 48 hrs  Outcome: Progressing  Goal: Nutrition support is meeting 75% of nutrient needs  Outcome: Progressing  Goal: Tube feed tolerance  Outcome: Progressing  Goal: BG  mg/dL  Outcome: Progressing  Goal: Lab values WNL  Outcome: Progressing  Goal: Electrolytes WNL  Outcome: Progressing  Goal: Promote healing  Outcome: Progressing  Goal: Maintain stable weight  Outcome: Progressing  Goal: Reduce weight from edema/fluid  Outcome: Progressing  Goal: Gradual weight gain  Outcome: Progressing  Goal: Improve ostomy output  Outcome: Progressing

## 2024-05-12 NOTE — CONSULTS
INFECTIOUS DISEASES CONSULT NOTE    Referring Physician: Kobi Denise  Reason For Consult: C.diff +  Date of Consult: 5/12/24 at 0728    History Of Present Illness  Patient is a 78-year-old female nursing home resident who was sent to the emergency room on 5/9 due to generalized weakness and functional decline.  Patient recently had been treated for C. difficile colitis at the nursing facility with a course of oral vancomycin.  It is unclear when she completed her treatment.  On arrival to the ER at [facility] she was afebrile with a white blood cell count of 16.5.  Patient denies any active diarrhea at this time.  She also denies abdominal pain.  Stool specimen was sent for C. difficile yesterday and returned PCR positive but toxin EIA negative.  Patient was started on IV Flagyl as she is refusing to take oral medications and ID has been consulted.  Patient continues to deny any significant diarrhea at this time.     Past Medical History  She has a past medical history of Hypertension.  Recent C. difficile colitis    Surgical History  She has a past surgical history that includes Aortic valve replacement (02/11/2014); Other surgical history (02/11/2014); Cervical discectomy (02/11/2014); Breast lumpectomy (02/11/2014); and Hysterectomy (02/11/2014).     Social History  Resides in a nursing facility.  Denies smoking alcohol or drug use.    Family History  No family exposure to known communicable illnesses  No family history of tuberculosis    Allergies  Sulfa (sulfonamide antibiotics), Sulfamethoxazole-trimethoprim, Ciprofloxacin, and Nitrofurantoin     Medications  IV Flagyl D1  See Epic for remaining medications.    Review of Systems  Detailed review of systems completed.  No significant additional positives beyond what is mentioned above    Physical Exam  Vital signs:  Visit Vitals  /69 (BP Location: Left arm, Patient Position: Lying)   Pulse 101   Temp 36.5 °C (97.7 °F) (Temporal)   Resp 18       General: Elderly female resting comfortably no distress  HEENT:  No scleral icterus or conjunctival suffusion, throat clear, neck supple, no cervical LAD  Lungs:  Clear to auscultation bilaterally  Heart:  RRR, S1, S2 normal, no extra sounds or murmurs.  Abdomen:  Normoactive BS, soft, NT/ND. No palpable organs or masses.  No peritoneal signs.  Extremities:  No erythema/rash    Relevant Lab Results  Results from last 72 hours   Lab Units 05/12/24  0550   WBC AUTO x10*3/uL 14.0*   HEMOGLOBIN g/dL 8.5*   HEMATOCRIT % 27.8*   PLATELETS AUTO x10*3/uL 219     Results from last 72 hours   Lab Units 05/12/24  0550   CREATININE mg/dL 0.70   EGFR mL/min/1.73m*2 88     Results from last 72 hours   Lab Units 05/12/24  0550   AST U/L 8   ALT U/L <5*   ALK PHOS U/L 103   BILIRUBIN TOTAL mg/dL 0.3       Cultures  Urine culture (5/9): Mixed millicent  COVID/flu PCR (5/9): Negative  C. difficile PCR/toxin EIA (5/11): Positive/negative    Imaging  Chest x-ray (5/9): No infiltrates    Impression:  1.  C. difficile infection versus colonization  -- Patient recently completed a course of oral vancomycin at her nursing facility for C. difficile colitis.  It is not clear when therapy ended.  -- Patient now is C. difficile PCR positive but toxin EIA negative.  She denies any active diarrhea.  Her abdomen is benign.  This certainly is suspicious for C. difficile colonization    Plan:  1.  Will continue IV Flagyl for now.  2.  Monitor stool output.  If patient truly is not having active diarrhea may be able to stop antibiotics soon.  3.  Will follow.  Dr. Morales will assume care on 5/13/2024.      William Neri MD  [facility]  [phone]

## 2024-05-12 NOTE — PROGRESS NOTES
Rebecca Samaniego is a 78 y.o. female on day 3 of admission presenting with General weakness.    Subjective   Patient was seen and examined.  Lying in the bed.  Comfortable.   at bedside.       Objective     Physical Exam  HEENT:  Head externally atraumatic,  extraocular movements intact, oral mucosa dry.  Poor oral hygiene and dentition.    Neck:  Supple, no JVP, no palpable adenopathy or thyromegaly.  No carotid bruit.  Chest:  Clear to auscultation and resonant.  Heart:  Regular rate and rhythm, no murmur or gallop could be appreciated.  Abdomen:  Soft, nontender, bowel sounds present, normoactive, no palpable hepatosplenomegaly.  Extremities:  No edema, pulses present, no cyanosis or clubbing.  CNS:  Patient alert, oriented to time, place and person.    No new deficit.  Cranial nerves 2-12 grossly intact  Skin:  No active rash.  Musculoskeletal:  No  apparent joint swelling or erythema, range of movement normal.  Last Recorded Vitals  Heart Rate:  []   Temp:  [36.5 °C (97.7 °F)-36.6 °C (97.9 °F)]   Resp:  [18]   BP: (147)/(65-69)   SpO2:  [97 %-98 %]     Intake/Output last 3 Shifts:  I/O last 3 completed shifts:  In: 2000 (31.4 mL/kg) [I.V.:2000 (31.4 mL/kg)]  Out: - (0 mL/kg)   Weight: 63.6 kg     Relevant Results  Susceptibility data from last 90 days.  Collected Specimen Info Organism Ampicillin Cefazolin Cefazolin (uncomplicated UTIs only) Ciprofloxacin Gentamicin Nitrofurantoin Piperacillin/Tazobactam Trimethoprim/Sulfamethoxazole   04/11/24 Urine from Straight Catheter Escherichia coli S S S S S S S S     Results for orders placed or performed during the hospital encounter of 05/09/24 (from the past 24 hour(s))   CBC and Auto Differential   Result Value Ref Range    WBC 14.0 (H) 4.4 - 11.3 x10*3/uL    nRBC 0.0 0.0 - 0.0 /100 WBCs    RBC 2.74 (L) 4.00 - 5.20 x10*6/uL    Hemoglobin 8.5 (L) 12.0 - 16.0 g/dL    Hematocrit 27.8 (L) 36.0 - 46.0 %     (H) 80 - 100 fL    MCH 31.0 26.0 - 34.0 pg     MCHC 30.6 (L) 32.0 - 36.0 g/dL    RDW 13.9 11.5 - 14.5 %    Platelets 219 150 - 450 x10*3/uL    Neutrophils % 72.5 40.0 - 80.0 %    Immature Granulocytes %, Automated 1.3 (H) 0.0 - 0.9 %    Lymphocytes % 13.8 13.0 - 44.0 %    Monocytes % 11.7 2.0 - 10.0 %    Eosinophils % 0.6 0.0 - 6.0 %    Basophils % 0.1 0.0 - 2.0 %    Neutrophils Absolute 10.18 (H) 1.60 - 5.50 x10*3/uL    Immature Granulocytes Absolute, Automated 0.18 0.00 - 0.50 x10*3/uL    Lymphocytes Absolute 1.94 0.80 - 3.00 x10*3/uL    Monocytes Absolute 1.64 (H) 0.05 - 0.80 x10*3/uL    Eosinophils Absolute 0.08 0.00 - 0.40 x10*3/uL    Basophils Absolute 0.02 0.00 - 0.10 x10*3/uL   Comprehensive Metabolic Panel   Result Value Ref Range    Glucose 125 (H) 65 - 99 mg/dL    Sodium 143 133 - 145 mmol/L    Potassium 3.9 3.4 - 5.1 mmol/L    Chloride 111 (H) 97 - 107 mmol/L    Bicarbonate 22 (L) 24 - 31 mmol/L    Urea Nitrogen 15 8 - 25 mg/dL    Creatinine 0.70 0.40 - 1.60 mg/dL    eGFR 88 >60 mL/min/1.73m*2    Calcium 8.0 (L) 8.5 - 10.4 mg/dL    Albumin 2.4 (L) 3.5 - 5.0 g/dL    Alkaline Phosphatase 103 35 - 125 U/L    Total Protein 5.5 (L) 5.9 - 7.9 g/dL    AST 8 5 - 40 U/L    Bilirubin, Total 0.3 0.1 - 1.2 mg/dL    ALT <5 (L) 5 - 40 U/L    Anion Gap 10 <=19 mmol/L        Current Facility-Administered Medications:     acetaminophen (Tylenol) tablet 650 mg, 650 mg, oral, q4h PRN **OR** acetaminophen (Tylenol) oral liquid 650 mg, 650 mg, oral, q4h PRN **OR** acetaminophen (Tylenol) suppository 650 mg, 650 mg, rectal, q4h PRN, Kobi Denise MD    acetaminophen (Tylenol) suppository 650 mg, 650 mg, rectal, q4h PRN, Kobi Denise MD    aspirin chewable tablet 81 mg, 81 mg, oral, Daily, Kobi Denise MD    atorvastatin (Lipitor) tablet 40 mg, 40 mg, oral, Nightly, Kobi Denise MD    benzocaine-menthol (Cepastat Sore Throat) lozenge 1 lozenge, 1 lozenge, Mouth/Throat, q2h PRN, Kobi Denise MD    clopidogrel (Plavix) tablet 75 mg, 75  mg, oral, Daily, Kobi Denise MD    dextromethorphan-guaifenesin (Robitussin DM)  mg/5 mL oral liquid 5 mL, 5 mL, oral, q4h PRN, Kobi Denise MD    dextrose 5 % with KCl 20 mEq/L infusion, 50 mL/hr, intravenous, Continuous, Ezequiel Saleem MD, Last Rate: 50 mL/hr at 05/12/24 1503, 50 mL/hr at 05/12/24 1503    ferrous sulfate (325 mg ferrous sulfate) tablet 1 tablet, 65 mg of iron, oral, BID with meals, Kobi Denise MD    fluconazole (Diflucan) tablet 100 mg, 100 mg, oral, Daily, Kobi Denise MD    guaiFENesin (Mucinex) 12 hr tablet 600 mg, 600 mg, oral, q12h PRN, Kobi Denise MD    heparin (porcine) injection 5,000 Units, 5,000 Units, subcutaneous, q8h JACQUELYN, Kobi Denise MD, 5,000 Units at 05/12/24 1326    hydrALAZINE (Apresoline) injection 10 mg, 10 mg, intravenous, q4h PRN, Kobi Denise MD, 10 mg at 05/10/24 2054    lactobacillus acidophilus tablet 1 tablet, 1 tablet, oral, BID with meals, Kobi Denise MD    metoprolol succinate XL (Toprol-XL) 24 hr tablet 100 mg, 100 mg, oral, Daily, Presley Lake DO    metoprolol tartrate (Lopressor) injection 10 mg, 10 mg, intravenous, q6h PRN, Kobi Denise MD, 10 mg at 05/11/24 1041    metroNIDAZOLE (Flagyl) 500 mg in NaCl (iso-os) 100 mL, 500 mg, intravenous, q8h, Kobi Denise MD, Stopped at 05/12/24 1426    mirtazapine (Remeron) tablet 7.5 mg, 7.5 mg, oral, Nightly, Lion Marques, APRN-CNP    ondansetron ODT (Zofran-ODT) disintegrating tablet 4 mg, 4 mg, oral, q6h PRN, Kobi Denise MD    polyethylene glycol (Glycolax, Miralax) packet 17 g, 17 g, oral, Daily PRN, Kobi Denise MD    potassium chloride (Klor-Con) packet 20 mEq, 20 mEq, oral, BID with meals, Presley Lake DO    vancomycin (Vancocin) capsule 125 mg, 125 mg, oral, q6h, Kobi Denise MD   Assessment/Plan   Principal Problem:    General weakness  Severe constipation  Hypokalemia  Hyponatremia  Dehydration  Adult failure to  thrive  Poor oral intake.  Symptomatic tachycardia    Continue current medication.  Supportive care.  Physical therapy Occupational Therapy metathesis and BMP.  Increase activity.  Patient still not eating or drinking much.  Gastroenterology on consult.  Patient will have a PEG tube placed Tuesday.  Will discuss with cardiology.  Nephrology and ID input appreciated.  Overall prognosis guarded.  Discussed with the hospital.  He wants the patient to be full code.    Kobi Denise MD

## 2024-05-13 LAB
ANION GAP SERPL CALC-SCNC: 11 MMOL/L
BASOPHILS # BLD AUTO: 0.04 X10*3/UL (ref 0–0.1)
BASOPHILS NFR BLD AUTO: 0.3 %
BUN SERPL-MCNC: 15 MG/DL (ref 8–25)
CALCIUM SERPL-MCNC: 7.8 MG/DL (ref 8.5–10.4)
CHLORIDE SERPL-SCNC: 109 MMOL/L (ref 97–107)
CO2 SERPL-SCNC: 19 MMOL/L (ref 24–31)
CREAT SERPL-MCNC: 0.7 MG/DL (ref 0.4–1.6)
EGFRCR SERPLBLD CKD-EPI 2021: 88 ML/MIN/1.73M*2
EOSINOPHIL # BLD AUTO: 0.06 X10*3/UL (ref 0–0.4)
EOSINOPHIL NFR BLD AUTO: 0.4 %
ERYTHROCYTE [DISTWIDTH] IN BLOOD BY AUTOMATED COUNT: 14 % (ref 11.5–14.5)
GLUCOSE SERPL-MCNC: 110 MG/DL (ref 65–99)
HCT VFR BLD AUTO: 26.7 % (ref 36–46)
HGB BLD-MCNC: 8.2 G/DL (ref 12–16)
IMM GRANULOCYTES # BLD AUTO: 0.18 X10*3/UL (ref 0–0.5)
IMM GRANULOCYTES NFR BLD AUTO: 1.2 % (ref 0–0.9)
LYMPHOCYTES # BLD AUTO: 2.07 X10*3/UL (ref 0.8–3)
LYMPHOCYTES NFR BLD AUTO: 14.3 %
MCH RBC QN AUTO: 31.3 PG (ref 26–34)
MCHC RBC AUTO-ENTMCNC: 30.7 G/DL (ref 32–36)
MCV RBC AUTO: 102 FL (ref 80–100)
MONOCYTES # BLD AUTO: 1.86 X10*3/UL (ref 0.05–0.8)
MONOCYTES NFR BLD AUTO: 12.8 %
NEUTROPHILS # BLD AUTO: 10.31 X10*3/UL (ref 1.6–5.5)
NEUTROPHILS NFR BLD AUTO: 71 %
NRBC BLD-RTO: 0 /100 WBCS (ref 0–0)
PLATELET # BLD AUTO: 205 X10*3/UL (ref 150–450)
POTASSIUM SERPL-SCNC: 4.1 MMOL/L (ref 3.4–5.1)
RBC # BLD AUTO: 2.62 X10*6/UL (ref 4–5.2)
SODIUM SERPL-SCNC: 139 MMOL/L (ref 133–145)
WBC # BLD AUTO: 14.5 X10*3/UL (ref 4.4–11.3)

## 2024-05-13 PROCEDURE — 97530 THERAPEUTIC ACTIVITIES: CPT | Mod: GP,CQ

## 2024-05-13 PROCEDURE — 85025 COMPLETE CBC W/AUTO DIFF WBC: CPT | Performed by: INTERNAL MEDICINE

## 2024-05-13 PROCEDURE — 2500000004 HC RX 250 GENERAL PHARMACY W/ HCPCS (ALT 636 FOR OP/ED): Performed by: INTERNAL MEDICINE

## 2024-05-13 PROCEDURE — 80048 BASIC METABOLIC PNL TOTAL CA: CPT | Performed by: INTERNAL MEDICINE

## 2024-05-13 PROCEDURE — 97110 THERAPEUTIC EXERCISES: CPT | Mod: GO,CO

## 2024-05-13 PROCEDURE — 99232 SBSQ HOSP IP/OBS MODERATE 35: CPT | Performed by: INTERNAL MEDICINE

## 2024-05-13 PROCEDURE — 1200000002 HC GENERAL ROOM WITH TELEMETRY DAILY

## 2024-05-13 PROCEDURE — 99221 1ST HOSP IP/OBS SF/LOW 40: CPT | Performed by: NURSE PRACTITIONER

## 2024-05-13 PROCEDURE — 2500000004 HC RX 250 GENERAL PHARMACY W/ HCPCS (ALT 636 FOR OP/ED)

## 2024-05-13 PROCEDURE — 36415 COLL VENOUS BLD VENIPUNCTURE: CPT | Performed by: INTERNAL MEDICINE

## 2024-05-13 PROCEDURE — 97535 SELF CARE MNGMENT TRAINING: CPT | Mod: GO,CO

## 2024-05-13 RX ORDER — DEXTROSE MONOHYDRATE AND SODIUM CHLORIDE 5; .45 G/100ML; G/100ML
60 INJECTION, SOLUTION INTRAVENOUS CONTINUOUS
Status: DISCONTINUED | OUTPATIENT
Start: 2024-05-13 | End: 2024-05-13

## 2024-05-13 RX ADMIN — METRONIDAZOLE 500 MG: 5 INJECTION, SOLUTION INTRAVENOUS at 06:10

## 2024-05-13 RX ADMIN — HEPARIN SODIUM 5000 UNITS: 5000 INJECTION, SOLUTION INTRAVENOUS; SUBCUTANEOUS at 06:09

## 2024-05-13 RX ADMIN — SODIUM BICARBONATE 60 ML/HR: 84 INJECTION, SOLUTION INTRAVENOUS at 11:44

## 2024-05-13 RX ADMIN — DEXTROSE AND POTASSIUM CHLORIDE 50 ML/HR: 5; .15 SOLUTION INTRAVENOUS at 06:10

## 2024-05-13 RX ADMIN — SODIUM BICARBONATE 60 ML/HR: 84 INJECTION, SOLUTION INTRAVENOUS at 13:03

## 2024-05-13 RX ADMIN — METRONIDAZOLE 500 MG: 5 INJECTION, SOLUTION INTRAVENOUS at 13:03

## 2024-05-13 RX ADMIN — METRONIDAZOLE 500 MG: 5 INJECTION, SOLUTION INTRAVENOUS at 21:35

## 2024-05-13 RX ADMIN — HEPARIN SODIUM 5000 UNITS: 5000 INJECTION, SOLUTION INTRAVENOUS; SUBCUTANEOUS at 13:03

## 2024-05-13 ASSESSMENT — ENCOUNTER SYMPTOMS
DIFFICULTY URINATING: 0
CHEST TIGHTNESS: 0
COUGH: 0
NAUSEA: 0
ABDOMINAL DISTENTION: 0
ALLERGIC/IMMUNOLOGIC NEGATIVE: 1
NEUROLOGICAL NEGATIVE: 1
HEMATOLOGIC/LYMPHATIC NEGATIVE: 1
SHORTNESS OF BREATH: 0
ENDOCRINE NEGATIVE: 1
PSYCHIATRIC NEGATIVE: 1
DIARRHEA: 1
CHILLS: 0
FEVER: 0
CONSTIPATION: 0
MUSCULOSKELETAL NEGATIVE: 1
EYES NEGATIVE: 1
VOMITING: 0
ABDOMINAL PAIN: 0

## 2024-05-13 ASSESSMENT — COGNITIVE AND FUNCTIONAL STATUS - GENERAL
HELP NEEDED FOR BATHING: TOTAL
MOVING TO AND FROM BED TO CHAIR: TOTAL
TURNING FROM BACK TO SIDE WHILE IN FLAT BAD: TOTAL
WALKING IN HOSPITAL ROOM: TOTAL
DAILY ACTIVITIY SCORE: 9
WALKING IN HOSPITAL ROOM: TOTAL
HELP NEEDED FOR BATHING: TOTAL
CLIMB 3 TO 5 STEPS WITH RAILING: TOTAL
MOBILITY SCORE: 7
DRESSING REGULAR UPPER BODY CLOTHING: A LOT
EATING MEALS: TOTAL
HELP NEEDED FOR BATHING: A LOT
MOVING TO AND FROM BED TO CHAIR: TOTAL
PERSONAL GROOMING: TOTAL
TURNING FROM BACK TO SIDE WHILE IN FLAT BAD: TOTAL
MOVING FROM LYING ON BACK TO SITTING ON SIDE OF FLAT BED WITH BEDRAILS: TOTAL
DRESSING REGULAR UPPER BODY CLOTHING: A LOT
TOILETING: TOTAL
MOVING FROM LYING ON BACK TO SITTING ON SIDE OF FLAT BED WITH BEDRAILS: TOTAL
STANDING UP FROM CHAIR USING ARMS: TOTAL
TOILETING: TOTAL
MOVING TO AND FROM BED TO CHAIR: TOTAL
PERSONAL GROOMING: TOTAL
STANDING UP FROM CHAIR USING ARMS: TOTAL
CLIMB 3 TO 5 STEPS WITH RAILING: TOTAL
DRESSING REGULAR LOWER BODY CLOTHING: TOTAL
DAILY ACTIVITIY SCORE: 6
WALKING IN HOSPITAL ROOM: TOTAL
TOILETING: TOTAL
MOBILITY SCORE: 6
EATING MEALS: TOTAL
CLIMB 3 TO 5 STEPS WITH RAILING: TOTAL
MOBILITY SCORE: 6
DRESSING REGULAR LOWER BODY CLOTHING: TOTAL
MOVING FROM LYING ON BACK TO SITTING ON SIDE OF FLAT BED WITH BEDRAILS: A LOT
STANDING UP FROM CHAIR USING ARMS: TOTAL
DRESSING REGULAR LOWER BODY CLOTHING: A LOT
DAILY ACTIVITIY SCORE: 7
DRESSING REGULAR UPPER BODY CLOTHING: TOTAL
EATING MEALS: TOTAL
TURNING FROM BACK TO SIDE WHILE IN FLAT BAD: TOTAL
PERSONAL GROOMING: TOTAL

## 2024-05-13 ASSESSMENT — ACTIVITIES OF DAILY LIVING (ADL): HOME_MANAGEMENT_TIME_ENTRY: 12

## 2024-05-13 ASSESSMENT — PAIN SCALES - GENERAL
PAINLEVEL_OUTOF10: 0 - NO PAIN
PAINLEVEL_OUTOF10: 3
PAINLEVEL_OUTOF10: 0 - NO PAIN

## 2024-05-13 ASSESSMENT — PAIN - FUNCTIONAL ASSESSMENT
PAIN_FUNCTIONAL_ASSESSMENT: 0-10
PAIN_FUNCTIONAL_ASSESSMENT: 0-10
PAIN_FUNCTIONAL_ASSESSMENT: WONG-BAKER FACES
PAIN_FUNCTIONAL_ASSESSMENT: 0-10

## 2024-05-13 ASSESSMENT — PAIN SCALES - WONG BAKER: WONGBAKER_NUMERICALRESPONSE: HURTS EVEN MORE

## 2024-05-13 NOTE — PROGRESS NOTES
Subjective Data:      Overnight Events:         Objective Data:  Last Recorded Vitals:  Vitals:    05/12/24 0020 05/12/24 0819 05/12/24 1656 05/13/24 0000   BP: 147/69 147/65 120/55 121/58   BP Location: Left arm Left arm Left arm Left arm   Patient Position: Lying Lying Lying Lying   Pulse: 101 90 107 105   Resp: 18 18 17 17   Temp: 36.5 °C (97.7 °F) 36.6 °C (97.9 °F) 36.5 °C (97.7 °F) 36.1 °C (97 °F)   TempSrc: Temporal Axillary Axillary Temporal   SpO2: 97% 98% 97% 98%   Weight:       Height:           Last Labs:  CBC - 5/13/2024:  6:32 AM  14.5 8.2 205    26.7      CMP - 5/13/2024:  6:32 AM  7.8 5.5 8 --- 0.3   _ 2.4 <5 103      PTT - No results in last year.  _   _ _     HGBA1C   Date/Time Value Ref Range Status   04/12/2024 07:46 AM 5.2 See below % Final   05/16/2023 03:49 PM 5.9 % Final     Comment:          Diagnosis of Diabetes-Adults   Non-Diabetic: < or = 5.6%   Increased risk for developing diabetes: 5.7-6.4%   Diagnostic of diabetes: > or = 6.5%  .       Monitoring of Diabetes                Age (y)     Therapeutic Goal (%)   Adults:          >18           <7.0   Pediatrics:    13-18           <7.5                   7-12           <8.0                   0- 6            7.5-8.5   American Diabetes Association. Diabetes Care 33(S1), Jan 2010.     04/26/2022 12:56 PM 6.0 % Final     Comment:          Diagnosis of Diabetes-Adults   Non-Diabetic: < or = 5.6%   Increased risk for developing diabetes: 5.7-6.4%   Diagnostic of diabetes: > or = 6.5%  .       Monitoring of Diabetes                Age (y)     Therapeutic Goal (%)   Adults:          >18           <7.0   Pediatrics:    13-18           <7.5                   7-12           <8.0                   0- 6            7.5-8.5   American Diabetes Association. Diabetes Care 33(S1), Jan 2010.       LDLCALC   Date/Time Value Ref Range Status   04/12/2024 07:46  65 - 130 mg/dL Final     VLDL   Date/Time Value Ref Range Status   02/03/2021 11:42 AM 45 0 -  "40 mg/dL Final      Last I/O:  I/O last 3 completed shifts:  In: 1728.3 (27.2 mL/kg) [I.V.:1728.3 (27.2 mL/kg)]  Out: 0 (0 mL/kg)   Weight: 63.6 kg     Past Cardiology Tests (Last 3 Years):  EKG:  ECG 12 lead 05/09/2024      ECG 12 lead 04/10/2024    Echo:  Transthoracic Echo (TTE) Complete 04/12/2024    Ejection Fractions:  No results found for: \"EF\"  Cath:  No results found for this or any previous visit from the past 1095 days.    Stress Test:  No results found for this or any previous visit from the past 1095 days.    Cardiac Imaging:  No results found for this or any previous visit from the past 1095 days.      Inpatient Medications:  Scheduled medications   Medication Dose Route Frequency    aspirin  81 mg oral Daily    atorvastatin  40 mg oral Nightly    clopidogrel  75 mg oral Daily    ferrous sulfate (325 mg ferrous sulfate)  65 mg of iron oral BID with meals    fluconazole  100 mg oral Daily    heparin (porcine)  5,000 Units subcutaneous q8h JACQUELYN    lactobacillus acidophilus  1 tablet oral BID with meals    metoprolol succinate XL  100 mg oral Daily    metroNIDAZOLE  500 mg intravenous q8h    mirtazapine  7.5 mg oral Nightly    potassium chloride  20 mEq oral BID with meals    vancomycin  125 mg oral q6h     PRN medications   Medication    acetaminophen    Or    acetaminophen    Or    acetaminophen    acetaminophen    benzocaine-menthol    dextromethorphan-guaifenesin    guaiFENesin    hydrALAZINE    metoprolol    ondansetron ODT    polyethylene glycol     Continuous Medications   Medication Dose Last Rate    potassium chloride in 5 % dex  50 mL/hr 50 mL/hr (05/13/24 0610)       Physical Exam:       Assessment/Plan     Principal Problem:    General weakness     General weakness     5/10:The patient is a 78-year-old white female whose past medical history includes hypertension, chronic kidney disease, former alcohol excess, status post bioprosthetic aortic valve replacement utilizing a number 23 mm freestyle " bioprosthesis along with replacement of the ascending aorta and transverse hemiarch using a number 24 mm Gelweave graft plus tricuspid valve repair utilizing number 21 mm Zion ring plus 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 This patient has done very well since her cardiac surgery with her last surveillance echocardiogram on 9/25/2023 showing an LV ejection fraction of 55-60% 1-2+ mitral valve regurgitation normal-appearing stentless aortic valve bioprosthesis with a peak systolic gradient of 11 mmHg and a prosthetic graft in the aortic root and ascending/arch position. Patient was admitted on 4/10/2024 with failure to thrive inability to provide self-care difficulty walking which appears to be related to progressing dementia.  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  She had a repeat echocardiogram that showed a preserved LV ejection fraction 60% normal-appearing stentless bioprosthetic aortic valve replacement minimal systolic pressure gradient with prior tricuspid valve repair and only mild to moderate tricuspid valve regurgitation.  There is trace to mild mitral valve regurgitation bubble study negative for residual VSD that was closed at the time of her aortic valve replacement.  Patient was treated with for an E. coli urinary tract infection but her mental status appeared to be significantly impaired from dementia.  She was continued on dual antiplatelet therapy with aspirin and Plavix along with Toprol-XL 50 mg daily.  Patient readmitted with failure to thrive with reduced oral intake and significant dehydration with hypernatremia hyperchloremia and hypokalemia for which the patient is receiving  IV D5W and potassium supplementation.  From a cardiac standpoint the patient is stable but peers to have significantly advancing senile dementia versus multi-infarct dementia.  For now would continue usual cardiac therapy with the dual antiplatelet therapy and atorvastatin plus metoprolol.     [facility] Patient admitted essentially with a failure to thrive.  As noted above bioprosthetic aortic valve appears to be functioning normally and left ventricular systolic function preserved.  Serum sodium gradually decreasing with fluid hydration.  Patient does remain hypokalemic and will provide oral supplementation in addition to the potassium in the IV fluids.  Blood pressure mildly elevated and will titrate up the dose of the metoprolol succinate.     5/12:  [facility]: Essentially no change over the last 24 hours.  The patient has been refusing oral medications.  Hemodynamically the patient appears stable.  I think at this point we should consider palliative care and possibly even hospice care in this individual.  She does not appear to be agreeable to a PEG tube at this time.  Thus a comfort care model may be a better option.    5/13: Patient lying quietly in bed she is awake and able to answer simple questions.  Hyper natremia and hyperchloremia have resolved with the IV D5W with potassium supplementation serum sodium 139 chloride 109 potassium 4.1 with a creatinine of 0.70.  CBC notable for hematocrit of 26.7 WBC 14,500.  The patient's PCR for C. difficile is positive.  Stools for C. difficile toxin are negative.  Patient is on contact precautions.  Patient has been started on oral vancomycin. Patient seen by infectious disease uncertain as to whether patient has C. difficile infection versus colonization.  She had evidently recently completed a course of oral vancomycin at the skilled nursing facility for C. difficile colitis.  It is suspected that the patient does have more likely C. difficile  colonization and is currently on IV Flagyl.  The patient has been tentatively scheduled for an EGD with PEG tube placement tomorrow but also palliative care consultation has been recommended the patient.  Patient is on dual antiplatelet therapy after having a small cortical CVA recently but will discontinue Plavix for now to reduce risk of bleeding from PEG tube insertion.  Peripheral IV 05/09/24 20 G Right Antecubital (Active)   Site Assessment Clean;Dry;Intact 05/13/24 0747   Dressing Type Transparent 05/13/24 0747   Line Status Infusing 05/13/24 0747   Dressing Status Clean;Dry;Occlusive 05/13/24 0747   Number of days: 4       Code Status:  Full Code    I spent  minutes in the professional and overall care of this patient.        Shaheed Martin MD

## 2024-05-13 NOTE — PROGRESS NOTES
Patient: Jaydon Lemus  Age: 62 year old Sex: male  MRN: [id number]  Encounter Date: 5/26/2021      History     Chief Complaint   Patient presents with   • Blurred Vision   • Dizziness       HPI  This is a 62 year old male who presented to the ED for evaluation of dizziness and vision changes    Diana states about 8 o'clock this morning he had an onset of blurry vision with dizziness. He describes his vision as fuzzy in the peripherals. He quoted \"the numbers on my computer just weren't as sharp as normal.\" He states the dizziness is secondary to the vision, that the dizziness occurs with positional changes, specifically, both sitting and standing but worsens with movement upright. Denies vertiginous symptoms and gait abnormalities. He notes he ate some salted pretzels to try to raise his blood pressure in hopes to alleviate symptoms, which was successful. No persistent visual changes at this time, occurred in both eyes, no visual loss or pain. Also notes some mild rectal discomfort yesterday that resolved and is not abnormal for him to have.     Patient denies fevers, chills, malaise, rash, diaphoresis, headaches, rhinorrhea, photophobia, chest pain, palpitations, lower extremity edema, cough, wheezing, dyspnea, nausea, vomiting, constipation, diarrhea, dysuria, hematuria, increased frequency, myalgias, arthralgias, seizures, loss of consciousness, numbness, tingling, focal weakness, or substance abuse. Diana has no other complaints or symptoms at this time.      Allergies  ALLERGIES:  No Known Allergies    Current Medications  Previous Medications    TERBINAFINE (LAMISIL) 250 MG TABLET    Take 1 tablet by mouth daily.       Past Medical History  Past Medical History:   Diagnosis Date   • Herniated lumbar intervertebral disc    • Onychomycosis    • Plantar fasciitis of left foot        Surgical History  Past Surgical History:   Procedure Laterality Date   • Anterior cruciate ligament repair      bilateral   •  Rebecca Samaniego is a 78 y.o. female on day 4 of admission presenting with General weakness.    Spoke with nursing patient unable to tolerate p.o.     Subjective   Patient seen and examined.  Resting in bed in no acute distress.  Awake alert oriented x 2-3.  Forgetful.  Discussed peg tube placement states she does not want placed though on further questioning she tells me that she does not want a tube in her mouth.  She states she will discuss with her spouse.     Objective   Contact plus precautions in place  Physical Exam  Constitutional:       General: She is not in acute distress.     Appearance: Normal appearance. She is normal weight. She is ill-appearing. She is not toxic-appearing or diaphoretic.   HENT:      Head: Normocephalic and atraumatic.      Right Ear: Tympanic membrane normal.      Left Ear: Tympanic membrane normal.      Nose: Nose normal.      Mouth/Throat:      Mouth: Mucous membranes are dry.      Pharynx: Oropharynx is clear.      Comments: Mouth dry, tongue less coated.  Eyes:      Extraocular Movements: Extraocular movements intact.      Conjunctiva/sclera: Conjunctivae normal.      Pupils: Pupils are equal, round, and reactive to light.   Cardiovascular:      Rate and Rhythm: Regular rhythm. Tachycardia present.      Pulses: Normal pulses.      Heart sounds: Normal heart sounds. No murmur heard.  Pulmonary:      Effort: Pulmonary effort is normal. No respiratory distress.      Breath sounds: Normal breath sounds. No wheezing, rhonchi or rales.   Abdominal:      General: Bowel sounds are normal. There is no distension.      Palpations: Abdomen is soft.      Tenderness: There is no abdominal tenderness.   Genitourinary:     Comments: Deferred.  Musculoskeletal:         General: No swelling or tenderness. Normal range of motion.      Cervical back: Normal range of motion and neck supple.   Skin:     General: Skin is warm and dry.      Capillary Refill: Capillary refill takes less than 2  "seconds.      Findings: Bruising present.   Neurological:      General: No focal deficit present.      Mental Status: She is alert. She is disoriented.      Comments: Awake alert and oriented x 2-3.  Looks to board for month/year.  Forgetful.  Speech clear and coherent.  Follows all commands.  Generalized weakness.  No focal weakness.  Gait deferred.  Cranial nerves II through XII grossly intact.   Psychiatric:         Mood and Affect: Mood normal.         Behavior: Behavior normal.       Last Recorded Vitals  Blood pressure (!) 124/46, pulse 84, temperature 36.8 °C (98.2 °F), temperature source Oral, resp. rate 17, height 1.651 m (5' 5\"), weight 63.6 kg (140 lb 3.4 oz), SpO2 98%.    Intake/Output last 3 Shifts:  I/O last 3 completed shifts:  In: 3243.5 (51 mL/kg) [I.V.:2543.5 (40 mL/kg); IV Piggyback:700]  Out: 0 (0 mL/kg)   Weight: 63.6 kg     Relevant Results  Results for orders placed or performed during the hospital encounter of 05/09/24 (from the past 24 hour(s))   Basic Metabolic Panel   Result Value Ref Range    Glucose 110 (H) 65 - 99 mg/dL    Sodium 139 133 - 145 mmol/L    Potassium 4.1 3.4 - 5.1 mmol/L    Chloride 109 (H) 97 - 107 mmol/L    Bicarbonate 19 (L) 24 - 31 mmol/L    Urea Nitrogen 15 8 - 25 mg/dL    Creatinine 0.70 0.40 - 1.60 mg/dL    eGFR 88 >60 mL/min/1.73m*2    Calcium 7.8 (L) 8.5 - 10.4 mg/dL    Anion Gap 11 <=19 mmol/L   CBC and Auto Differential   Result Value Ref Range    WBC 14.5 (H) 4.4 - 11.3 x10*3/uL    nRBC 0.0 0.0 - 0.0 /100 WBCs    RBC 2.62 (L) 4.00 - 5.20 x10*6/uL    Hemoglobin 8.2 (L) 12.0 - 16.0 g/dL    Hematocrit 26.7 (L) 36.0 - 46.0 %     (H) 80 - 100 fL    MCH 31.3 26.0 - 34.0 pg    MCHC 30.7 (L) 32.0 - 36.0 g/dL    RDW 14.0 11.5 - 14.5 %    Platelets 205 150 - 450 x10*3/uL    Neutrophils % 71.0 40.0 - 80.0 %    Immature Granulocytes %, Automated 1.2 (H) 0.0 - 0.9 %    Lymphocytes % 14.3 13.0 - 44.0 %    Monocytes % 12.8 2.0 - 10.0 %    Eosinophils % 0.4 0.0 - 6.0 % " Colonoscopy  11/21/2017   • Laminectomy and microdiscectomy lumbar spine      L4-L5 x3 and another level   • Prostate surgery         Social History  Social History     Tobacco Use   • Smoking status: Never Smoker   • Smokeless tobacco: Never Used   Substance Use Topics   • Alcohol use: Yes     Alcohol/week: 5.0 - 7.0 standard drinks     Types: 5 - 7 Standard drinks or equivalent per week   • Drug use: No        Family History  Family History   Problem Relation Age of Onset   • Cancer Mother         ovarian   • Thyroid Sister    • Thyroid Brother    • * Father    • Diabetes Neg Hx    • High blood pressure Neg Hx    • High cholesterol Neg Hx        Review of Systems  ROS:  Constitutional: No fever, Malaise  Skin: No rash or diaphoresis  HENT: No headaches or Rhinorrhea  Eyes: + vision changes. no photophobia   Cardio: No chest pain, palpitations or leg swelling   Respiratory: No cough, wheezing or SOB  GI:  No nausea, vomiting or stool changes  :  No dysuria, hematuria, or increased frequency  MSK: No muscle aches, or joint pain  Neuro:+dizziness, no seizures, LOC,or focal weakness  Psych: No substance abuse.    Physical Exam     Vitals:    05/26/21 1231   BP: (!) 141/80   BP Location: LU - Left upper extremity   Patient Position: Semi-Morris's   Pulse: (!) 54   Resp: 16   Temp: 97.2 °F (36.2 °C)   TempSrc: Temporal   SpO2: 99%   Weight: 108.7 kg   Height: 6' 1\" (1.854 m)        Constitutional: 62 year old male appears staged age, lying comfortably in bed in NAD, normal color, no cyanosis.     HENT:   Head: Normocephalic and atraumatic.   Mouth/Throat: Oropharynx is clear and moist.   Eyes: EOMI, PERRL, limited rental with no disc blurring, no hemorrhaging present. Visual fields intact.   Cardiovascular: RRR, No murmurs or gallops. Intact distal pulses.  Pulmonary/Chest: BS equal bilaterally. No respiratory distress. No wheezes, rales or chest tenderness.   Abdominal: Abdomen soft, no tenderness, rebound or     Basophils % 0.3 0.0 - 2.0 %    Neutrophils Absolute 10.31 (H) 1.60 - 5.50 x10*3/uL    Immature Granulocytes Absolute, Automated 0.18 0.00 - 0.50 x10*3/uL    Lymphocytes Absolute 2.07 0.80 - 3.00 x10*3/uL    Monocytes Absolute 1.86 (H) 0.05 - 0.80 x10*3/uL    Eosinophils Absolute 0.06 0.00 - 0.40 x10*3/uL    Basophils Absolute 0.04 0.00 - 0.10 x10*3/uL     Susceptibility data from last 90 days.  Collected Specimen Info Organism Ampicillin Cefazolin Cefazolin (uncomplicated UTIs only) Ciprofloxacin Gentamicin Nitrofurantoin Piperacillin/Tazobactam Trimethoprim/Sulfamethoxazole   04/11/24 Urine from Straight Catheter Escherichia coli S S S S S S S S     No results found.    Scheduled medications  aspirin, 81 mg, oral, Daily  atorvastatin, 40 mg, oral, Nightly  ferrous sulfate (325 mg ferrous sulfate), 65 mg of iron, oral, BID with meals  fluconazole, 100 mg, oral, Daily  heparin (porcine), 5,000 Units, subcutaneous, q8h JACQUELYN  lactobacillus acidophilus, 1 tablet, oral, BID with meals  metoprolol succinate XL, 100 mg, oral, Daily  metroNIDAZOLE, 500 mg, intravenous, q8h  mirtazapine, 7.5 mg, oral, Nightly  potassium chloride, 20 mEq, oral, BID with meals  vancomycin, 125 mg, oral, q6h      Continuous medications  dextrose 5%-0.45 % sodium chloride 1,000 mL with sodium bicarbonate 75 mEq infusion, 60 mL/hr, Last Rate: 60 mL/hr (05/13/24 1303)      PRN medications  PRN medications: acetaminophen **OR** acetaminophen **OR** acetaminophen, acetaminophen, benzocaine-menthol, dextromethorphan-guaifenesin, guaiFENesin, hydrALAZINE, metoprolol, ondansetron ODT, polyethylene glycol      ASSESSMENT:  Generalized weakness  Adult failure to thrive  Poor oral intake  Dysphagia  Dehydration  Hypernatremia - resolved  Hypokalemia - resolved  Pyuria - urinary tract infection,  Leukocytosis  History of C. Difficile  C. Difficile  Oropharyngeal candida  Hypoalbuminemia   Normocytic anemia  History of CVA  Supraventricular  guarding.  Back: No midline spinal tenderness, no paraspinal tenderness, no CVA tenderness.   Musculoskeletal: No edema, tenderness or deformity.  Skin: warm and dry. No rash, erythema, pallor or cyanosis  Psychiatric: normal mood and affect. Behavior is normal.   Neuro: Alert and keenly responsive. Facies symmetric. Able to resist upward eyebrow movement, smile, stick out tongue, raise palate symmetrically with midline uvula. Able to shrug shoulders. PERRLA, EOMI, SILT to forehead below eye and at jawline. Strength full and equal bilaterally in upper and lower extremities. Good finger to nose without evidence significant cerebellar dysfunction. Ambulatory, negative Romberg.      ED Course      ED Medication Orders (From admission, onward)    Ordered Start     Status Ordering Provider    05/26/21 1239 05/26/21 1240  sodium chloride (NORMAL SALINE) 0.9 % bolus 1,000 mL  ONCE      Last MAR action: VANITA Caballero          Procedures    Lab Results     Results for orders placed or performed during the hospital encounter of 05/26/21   Troponin I Ultra Sensitive   Result Value    Troponin I, Ultra Sensitive <0.02   CBC with Automated Differential (performable only)   Result Value    WBC 5.2    RBC 5.00    HGB 15.7    HCT 46.1    MCV 92.2    MCH 31.4    MCHC 34.1    RDW-CV 13.1    RDW-SD 44.3        NRBC 0    Neutrophil, Percent 55    Lymphocytes, Percent 32    Mono, Percent 10    Eosinophils, Percent 2    Basophils, Percent 1    Immature Granulocytes 0    Absolute Neutrophils 2.9    Absolute Lymphocytes 1.7    Absolute Monocytes 0.5    Absolute Eosinophils  0.1    Absolute Basophils 0.0    Absolute Immmature Granulocytes 0.0   Electrocardiogram 12-Lead   Result Value    Systolic Blood Pressure 141    Diastolic Blood Pressure 80    Ventricular Rate EKG/Min (BPM) 60    Atrial Rate (BPM) 60    CA-Interval (MSEC) 196    QRS-Interval (MSEC) 98    QT-Interval (MSEC) 456    QTc 456    P Axis (Degrees) 42    R  Axis (Degrees) -34    T Axis (Degrees) 21    REPORT TEXT      Sinus rhythm  with occasional  premature ventricular complexes  Left axis deviation  Abnormal ECG  No previous ECGs available  Confirmed by VANITA ALFREDO MD ([id number]),  Negar Murphy ([id number]) on 5/26/2021 1:23:26 PM     ECG   Result Value    Systolic Blood Pressure 119    Diastolic Blood Pressure 71    Ventricular Rate EKG/Min (BPM) 52    Atrial Rate (BPM) 52    SC-Interval (MSEC) 218    QRS-Interval (MSEC) 102    QT-Interval (MSEC) 458    QTc 426    P Axis (Degrees) 59    R Axis (Degrees) -12    T Axis (Degrees) 45    REPORT TEXT      Sinus bradycardia  with 1st degree AV block  Low voltage QRS  Borderline ECG  When compared with ECG of  26-MAY-2021 12:30,  premature ventricular complexes  are no longer  present  Confirmed by VANITA ALFREDO MD ([id number]),  Negar Murphy ([id number]) on 5/26/2021 1:30:02 PM     ISTAT8 VENOUS  POINT OF CARE   Result Value    BUN - POINT OF CARE 28 (H)    SODIUM - POINT OF CARE 141    POTASSIUM - POINT OF CARE 5.0    CHLORIDE - POINT OF CARE 102    TCO2 - POINT OF CARE 29 (H)    ANION GAP - POINT OF CARE 16    HEMATOCRIT - POINT OF CARE 46.0    HEMOGLOBIN - POINT OF CARE 15.6    GLUCOSE - POINT OF CARE 106 (H)    CALCIUM, IONIZED - POINT OF CARE 1.20    Creatinine 0.80    Glomerular Filtration Rate >90     Comment: eGFR results = or >90 mL/min/1.73m2 = Normal kidney function.        Radiology Results     No orders to display       I personally evaluated imaging studies in addition to radiology read.    Medical Decision Making     Presents for evaluation mild intermittent positional dizziness associated blurred vision.  The vision changes seem to have resolved.  Acuity 20/25 individual eyes, 2020 both eyes.  Normal orthostatics.  Laboratory studies with normal electrolytes normal troponin, no abnormal CBC.  EKG initially with some left axis deviation all the repeat EKG demonstrates normal axis.  Does have sinus  tachycardia    PLAN:  Chart reviewed.  Labs reviewed.  Hypernatremia, hypokalemia resolved.  Nephrology following.  IV fluids per Nephrology.  Urine culture multiple organisms suspected contamination.  + C. Difficile.  Infectious disease following.  Input appreciated.  Continue Diflucan.  Flagyl + Vancomycin.  Probiotic.  Monitor stools.  Contact plus precautions per protocol.  Speech therapy following.  Input appreciated.  Diet per recommendations.  Mouth care.  Gastroenterology, General surgery following.  Input appreciated.  Poor appetite, oral intake, weight loss, refusing oral medications.  Discussed peg tube with patient, she does not appear to fully understand, she states she will discuss with her spouse.  As per previous discussion with team, anticipate plan for peg tube placement per spouse's wishes.  Call to spouse Kaleb Samaniego.  No answer.  Voicemail left with our office and nursing station call back numbers.  Cardiology following, input appreciated.  Management per recommendations.  PT/OT.  Fall precautions.  Up with assistance only.  DVT prophylaxis.  Heparin subcutaneous.  Supportive care.  Patient reassured.  Case management following for discharge planning.  Discharge plan to facility when medically cleared.  Discussed with patient, nursing and Dr. Denise.     FULL CODE.  Overall prognosis is guarded.       Belen Martin, APRN-CNP   bradycardia, first-degree heart block noted on 2nd EKG.  Do not feel bradycardia is causing his symptoms at this time.  Overall low concern intracranial abnormality such as stroke given normal neurologic examination no persisting symptoms.  Recommend eyedrops for possible dry eyes follow-up with primary care doctor for re-evaluation.  Discussed consideration of intracranial imaging I do not feel it is required at this time, patient discharged home without persisting symptoms.    ED Course as of May 26 1343   Wed May 26, 2021   1242 EKG:  Rate 60, sinus rhythm PVC noted.  Left axis deviation.  No prior.  No acute ST changes.      [DM]   1319 Repeat EKG:  Sinus bradycardia, rate 52, first-degree AV block, p.r. interval 218. Normal axis otherwise similar to prior no ischemic changes.      [DM]   1338 Patient was reassessed. He was counseled on today's work-up and findings. Clinical impression and treatment plan were discussed with him. He is safe for discharge home. We discussed symptomatic care and any prescriptions provided during today's visit. Signs and symptoms for emergent reevaluation were discussed. I answered any questions and addressed any concerns that he expressed.        [AC]      ED Course User Index  [AC] Larissa Toure  [DM] Cb Sidhu MD       Clinical Impression     ED Diagnoses        Final diagnoses    Dizziness          Blurred vision                Disposition      Following the above history, physical exam, and studies, the patient was deemed stable and suitable for discharge. The patient was advised to return to the ED for any new or worsening symptoms. Discharge medications, and follow-up instructions were discussed with the patient in detail, who verbalizes understanding. The patient is in agreement and is comfortable with the plan of care.    Discharge 5/26/2021  1:28 PM  Jaydon Lemus discharge to home/self care.        Current Discharge Medication List          Bravo ZUNIGA  MD Chandan  [address] [facility] [address] [facility] [address]  [phone]    Call today  ED follow up    [facility]  [address] [facility] [address] [address]  [phone]    As needed, If symptoms worsen       Patient was instructed to return to the ED immediately if symptoms worsen or any new unusual symptoms arise.    This chart was documented by Kal Winkler, acting as a scribe for Cb Sidhu MD. 5/26/2021, 12:43 PM.      The documentation recorded by the scribe accurately and completely reflects the service(s) I personally performed and the decisions made by me.          Cb Sidhu MD  05/26/21 2832

## 2024-05-13 NOTE — PROGRESS NOTES
Rebecca Samaniego is a 78 y.o. female on day 4 of admission presenting with General weakness.      Subjective   Patient unable to provide history, her bicarbonate is slightly down to 19, creatinine stable, sodium remains normal.       Objective          Vitals 24HR  Heart Rate:  []   Temp:  [36.1 °C (97 °F)-36.8 °C (98.2 °F)]   Resp:  [17]   BP: (120-124)/(46-58)   SpO2:  [97 %-98 %]       Intake/Output last 3 Shifts:    Intake/Output Summary (Last 24 hours) at 5/13/2024 1148  Last data filed at 5/13/2024 1100  Gross per 24 hour   Intake 1893.83 ml   Output --   Net 1893.83 ml       Physical Exam  Constitutional:       General: She is awake. She is not in acute distress.  Cardiovascular:      Rate and Rhythm: Regular rhythm.      Heart sounds:      No friction rub.   Pulmonary:      Effort: No respiratory distress.   Abdominal:      General: Bowel sounds are normal.      Palpations: Abdomen is soft.      Tenderness: There is no guarding or rebound.   Musculoskeletal:      Right lower leg: No edema.      Left lower leg: No edema.         Relevant Results  Results for orders placed or performed during the hospital encounter of 05/09/24 (from the past 24 hour(s))   Basic Metabolic Panel   Result Value Ref Range    Glucose 110 (H) 65 - 99 mg/dL    Sodium 139 133 - 145 mmol/L    Potassium 4.1 3.4 - 5.1 mmol/L    Chloride 109 (H) 97 - 107 mmol/L    Bicarbonate 19 (L) 24 - 31 mmol/L    Urea Nitrogen 15 8 - 25 mg/dL    Creatinine 0.70 0.40 - 1.60 mg/dL    eGFR 88 >60 mL/min/1.73m*2    Calcium 7.8 (L) 8.5 - 10.4 mg/dL    Anion Gap 11 <=19 mmol/L   CBC and Auto Differential   Result Value Ref Range    WBC 14.5 (H) 4.4 - 11.3 x10*3/uL    nRBC 0.0 0.0 - 0.0 /100 WBCs    RBC 2.62 (L) 4.00 - 5.20 x10*6/uL    Hemoglobin 8.2 (L) 12.0 - 16.0 g/dL    Hematocrit 26.7 (L) 36.0 - 46.0 %     (H) 80 - 100 fL    MCH 31.3 26.0 - 34.0 pg    MCHC 30.7 (L) 32.0 - 36.0 g/dL    RDW 14.0 11.5 - 14.5 %    Platelets 205 150 - 450  x10*3/uL    Neutrophils % 71.0 40.0 - 80.0 %    Immature Granulocytes %, Automated 1.2 (H) 0.0 - 0.9 %    Lymphocytes % 14.3 13.0 - 44.0 %    Monocytes % 12.8 2.0 - 10.0 %    Eosinophils % 0.4 0.0 - 6.0 %    Basophils % 0.3 0.0 - 2.0 %    Neutrophils Absolute 10.31 (H) 1.60 - 5.50 x10*3/uL    Immature Granulocytes Absolute, Automated 0.18 0.00 - 0.50 x10*3/uL    Lymphocytes Absolute 2.07 0.80 - 3.00 x10*3/uL    Monocytes Absolute 1.86 (H) 0.05 - 0.80 x10*3/uL    Eosinophils Absolute 0.06 0.00 - 0.40 x10*3/uL    Basophils Absolute 0.04 0.00 - 0.10 x10*3/uL            Assessment/Plan   Hypernatremia, resolved -switch IV fluids to D5 half-normal saline with 75 millequivalents sodium bicarbonate due to mild acidosis  Metabolic Acidosis secondary to diarrhea - IV fluids as above  Hypokalemia is resolved  Failure to thrive  History of CVA  C. difficile colitis with oral vancomycin    Ambar Saleem MD

## 2024-05-13 NOTE — PROGRESS NOTES
Spiritual Care Visit    Clinical Encounter Type  Visited With: Patient  Routine Visit: Introduction  Continue Visiting: Yes         Values/Beliefs  Spiritual Requests During Hospitalization: Anointed today    Sacramental Encounters  Sacrament of Sick-Anointing: Anointed     Neal Das

## 2024-05-13 NOTE — CONSULTS
Wound Care Consult     Visit Date: 5/13/2024      Patient Name: Rebecca Samaniego         MRN: [id number]           YOB: 1945     Reason for Consult: Sacral and bilateral heel wounds        Wound History: Present on admission. Patient with old dry Deep tissue injury (DTI) to Left heel, Dry Unstagable Right heel, DTI and excoriation to Sacrum.    A 78 y.o. year old female admitted for Principal Problem:    General weakness      Past Medical History:   Diagnosis Date    Hypertension       Past Surgical History:   Procedure Laterality Date    AORTIC VALVE REPLACEMENT  02/11/2014    Aortic Valve Replacement    BREAST LUMPECTOMY  02/11/2014    Breast Surgery Lumpectomy    CERVICAL DISCECTOMY  02/11/2014    Spinal Diskectomy Cervical    HYSTERECTOMY  02/11/2014    Hysterectomy    OTHER SURGICAL HISTORY  02/11/2014    Aortic Coarctation Repair       Scheduled medications  aspirin, 81 mg, oral, Daily  atorvastatin, 40 mg, oral, Nightly  ferrous sulfate (325 mg ferrous sulfate), 65 mg of iron, oral, BID with meals  fluconazole, 100 mg, oral, Daily  heparin (porcine), 5,000 Units, subcutaneous, q8h JACQUELYN  lactobacillus acidophilus, 1 tablet, oral, BID with meals  metoprolol succinate XL, 100 mg, oral, Daily  metroNIDAZOLE, 500 mg, intravenous, q8h  mirtazapine, 7.5 mg, oral, Nightly  potassium chloride, 20 mEq, oral, BID with meals  vancomycin, 125 mg, oral, q6h      Continuous medications  dextrose 5%-0.45 % sodium chloride 1,000 mL with sodium bicarbonate 75 mEq infusion, 60 mL/hr, Last Rate: 60 mL/hr (05/13/24 1303)      PRN medications  PRN medications: acetaminophen **OR** acetaminophen **OR** acetaminophen, acetaminophen, benzocaine-menthol, dextromethorphan-guaifenesin, guaiFENesin, hydrALAZINE, metoprolol, ondansetron ODT, polyethylene glycol    Allergies   Allergen Reactions    Sulfa (Sulfonamide Antibiotics) Hives    Sulfamethoxazole-Trimethoprim Hives    Ciprofloxacin Hives    Nitrofurantoin Diarrhea           Pertinent Labs:   Albumin   Date Value Ref Range Status   05/12/2024 2.4 (L) 3.5 - 5.0 g/dL Final   11/12/2018 4.0 3.4 - 5.0 g/dL Final     ALBUMIN (MG/L) IN URINE   Date Value Ref Range Status   11/12/2018 76.3 Not Established mg/L Final       Wound Assessment:  Wound 05/10/24 Pressure Injury Sacrum Medial (Active)   Wound Image   05/10/24 1712   Site Assessment Non-blanchable erythema;Burgundy;Purple 05/13/24 1335   Keli-Wound Assessment Clean;Dry;Excoriated 05/13/24 1335   Pressure Injury Stage DTPI 05/13/24 1335   Drainage Description None 05/13/24 1335   Drainage Amount None 05/13/24 1335   Dressing Foam;Silicone border dressing 05/13/24 1335   Dressing Status Clean;Dry 05/13/24 1335       Wound 05/10/24 Pressure Injury Heel Right (Active)   Wound Image   05/10/24 1707   Site Assessment Dry;Black 05/13/24 1335   Keli-Wound Assessment Clean;Dry;Intact 05/13/24 1335   Pressure Injury Stage U 05/13/24 1335   Drainage Description None 05/13/24 1335   Drainage Amount None 05/13/24 1335   Dressing Open to air 05/13/24 1335       Wound 05/10/24 Pressure Injury Heel Left (Active)   Wound Image   05/10/24 1709   Site Assessment Dry;Hyperpigmentation 05/13/24 1335   Keli-Wound Assessment Clean;Dry;Intact 05/13/24 1335   Pressure Injury Stage DTPI 05/13/24 1335   Drainage Description None 05/13/24 1335   Drainage Amount None 05/13/24 1335   Dressing Open to air 05/13/24 1335     Left Dorsal Foot 5/13/24    Wound Team Summary Assessment:     Exam conducted on day 4 of stay with knowledge of Floor Nurse. Introductions made to patient and spouse, patient groggy, not oriented. On exam patient sitting fowlers in bed, legs elevated. Right arm offloaded on pillows. Patient with edema and ecchymosis to BUE, R>L. Scabbed area to left dorsal foot. Dry calloused DTI to Left heel. Dry black calloused Unstagable Right heel. DTI with excoriation to Sacrum. Continue to offload. Patient is on an Luaa 2 bedframe with Staples  mattress with Waffle mattress overlay. Order placed for Envision E700. Discussion with spouse in regards to PEG placement and who would be responsible for tubefeeds and different type of tubefeeds. Instructed spouse that Nursing would be responsible for tubefeed management if at a facility or Homecare Nursing for education and labs if patient were to go home and type of tubefeed would be determined by Dietitian. Care Coordinator came in at end of visit to discuss placement facilities and options. Rene Madden RN updated, to continue pressure injury prevention interventions, Woundcare, and nursing to continue to follow providers orders. Reconsult Wound RN PRN. Diana LINCOLN RN          Wound Team Plan: Continue to follow Provider orders. Armando Heels- Offload with TruVue boots daily. Sacrum- cleanse with soap and water, apply Triad, gauze and border dressing daily and prn. Continue to offload.      Diana Gilliam RN  5/13/2024  2:24 PM

## 2024-05-13 NOTE — PROGRESS NOTES
"INFECTIOUS DISEASES PROGRESS NOTE    Consulted / following patient for:  C. difficile PCR positive, toxin negative/leukocytosis    Subjective   Interval History:   Patient says she is doing \"fine\" and offers no complaints.  She specifically denies diarrhea.  She refuses to swallow pills saying \"they make me gag.\"     Objective   PHYSICAL EXAMINATION  Vital signs:  Visit Vitals  BP (!) 124/46 (BP Location: Left arm, Patient Position: Lying)   Pulse 84   Temp 36.8 °C (98.2 °F) (Oral)   Resp 17      General: Chronically but not acutely ill  Abdomen:  Soft, nontender. No palpable organs or masses.  No diarrhea    Relevant Results  WBC: 17,200 --> 16,800 ---> 14,000 ---> 14,500    Results from last 72 hours   Lab Units 05/13/24  0632   CREATININE mg/dL 0.70   ANION GAP mmol/L 11   EGFR mL/min/1.73m*2 88     Results from last 72 hours   Lab Units 05/12/24  0550   AST U/L 8   ALT U/L <5*   ALK PHOS U/L 103   BILIRUBIN TOTAL mg/dL 0.3     Microbiology:  C. difficile PCR positive, toxin EIA negative      Impression:  1.  C. difficile infection versus colonization  -- Patient recently completed a course of oral vancomycin at her nursing facility for C. difficile colitis.  It is not clear when therapy ended.  -- Patient now is C. difficile PCR positive but toxin EIA negative.  She denies any active diarrhea.  Her abdomen is benign.  This certainly is suspicious for C. difficile colonization.  My sole concern is she does have unexplained leukocytosis which seems to be improving     Plan:  1.  Will continue IV Flagyl for now.  2.  Monitor stool output.  If patient truly is not having active diarrhea and leukocytosis resolves, may be able to stop antibiotics soon.    Doni Morales MD  [facility]  Office:  [phone]  "

## 2024-05-13 NOTE — PROGRESS NOTES
Occupational Therapy    OT Treatment    Patient Name: Rebecca Samaniego  MRN: [id number]  Today's Date: 5/13/2024  Time Calculation  Start Time: 0931  Stop Time: 0955  Time Calculation (min): 24 min         Assessment:  OT Assessment: Gradual progress made towards OT goals. Continue with current OT POC to increase strength, balance and functional tolerance to maximize safety and independence during ADLs.  Evaluation/Treatment Tolerance: Patient limited by fatigue  End of Session Communication: Bedside nurse  End of Session Patient Position: Bed, 3 rail up, Alarm on (all needs in reach)  OT Assessment Results: Decreased ADL status, Decreased upper extremity range of motion, Decreased upper extremity strength, Decreased safe judgment during ADL, Decreased cognition, Decreased functional mobility, Decreased gross motor control, Decreased endurance  Evaluation/Treatment Tolerance: Patient limited by fatigue  Plan:  Treatment Interventions: ADL retraining, Functional transfer training, UE strengthening/ROM, Endurance training, Equipment evaluation/education, Patient/family training  OT Frequency: 3 times per week  OT Discharge Recommendations: Moderate intensity level of continued care  OT - OK to Discharge: Yes  Treatment Interventions: ADL retraining, Functional transfer training, UE strengthening/ROM, Endurance training, Equipment evaluation/education, Patient/family training    Subjective   Previous Visit Info:  OT Last Visit  OT Received On: 05/13/24  General:  General  Reason for Referral: impaired ADLs, +Cidff, weakness, failure to thrive, UTI  Past Medical History Relevant to Rehab: HLD, CKD, CVA, AVR, C diff, Breast lumpectomy, hysterectomy, cervical discectomy  Co-Treatment: PT  Co-Treatment Reason: patient safety and optimization of patient outcomes with a medically complex patient  Prior to Session Communication: Bedside nurse  Patient Position Received: Bed, 3 rail up, Alarm on  General Comment: Pt cleared for  "therapy session per nursing, agreeable throughout session with encouragement. Observed poor ability to manage secretions, RN made aware.  Precautions:  Hearing/Visual Limitations: wears glasses  Medical Precautions: Fall precautions, Swallowing precautions, Other (comment) (sacral ulcer)  Vital Signs:     Pain:  Pain Assessment  Pain Assessment: Hassan-Baker FACES  Hassan-Baker FACES Pain Rating: Hurts even more  Pain Type: Acute pain  Pain Location: Generalized (\"all over\")  Clinical Progression: Not changed    Objective    Cognition:  Cognition  Overall Cognitive Status: Impaired  Arousal/Alertness: Delayed responses to stimuli  Orientation Level: Disoriented to time, Disoriented to situation  Following Commands: Other (Comment) (pt follows ~50% of single step commands)  Safety/Judgement: Exceptions to WFL  Insight: Moderate  Task Initiation: Initiates with cues  Processing Speed: Delayed  Coordination:  Movements are Fluid and Coordinated: No  Upper Body Coordination: poor BUE motor recruitment  Lower Body Coordination: poor BLE motor recruitment  Activities of Daily Living: Grooming  Grooming Comments: observed poor management of secretions requiring maxA to clean mouth while seated EOB, RN made aware.    Toileting  Toileting Comments: pt incontinent requiring total dependent assist to complete posterior hygiene while side lying in bed  Functional Standing Tolerance:     Bed Mobility/Transfers: Bed Mobility  Bed Mobility: Yes  Bed Mobility 1  Bed Mobility 1: Supine to sitting, Sitting to supine, Rolling right, Rolling left, Scooting  Level of Assistance 1: Dependent (x2)  Bed Mobility Comments 1: all aspects of bed mobility completed with total dependent assist x2    Therapy/Activity: Therapeutic Exercise  Therapeutic Exercise Performed: Yes  Therapeutic Exercise Activity 1: BUE AAROM completed 1x10 in all planes to increase ROM and strength and decrease observed swelling. Verbal instruction and demonstration " provided to ensure proper muscle recruitment.    Other Activity:  Other Activity Performed: Yes  Other Activity 1: Gentle retrograde massage completed for R hand swelling to maximize potential during functional tasks.    Outcome Measures:[facility] Daily Activity  Putting on and taking off regular lower body clothing: Total  Bathing (including washing, rinsing, drying): Total  Putting on and taking off regular upper body clothing: Total  Toileting, which includes using toilet, bedpan or urinal: Total  Taking care of personal grooming such as brushing teeth: Total  Eating Meals: Total  Daily Activity - Total Score: 6        Education Documentation  ADL Training, taught by GABBY Baldwin at 5/13/2024  4:15 PM.  Learner: Patient  Readiness: Acceptance  Method: Explanation, Demonstration  Response: Needs Reinforcement    Education Comments  Education provided on role of OT/POC, safety awareness throughout functional tasks/transfers, importance of activity/ rest routine, EC/WS techniques, and use of call light for assistance. Questions, comments and concerns addressed regarding OT.      Goals:  Encounter Problems       Encounter Problems (Active)       ADLs       Pt will complete ADL tasks at min Awith use of AE prn  (Progressing)       Start:  05/10/24    Expected End:  06/01/24               Functional Balance       Pt will maintain static sitting balance with upper extremity support at min A  (Progressing)       Start:  05/10/24    Expected End:  06/01/24               OT Transfers       Pt will perform functional transfers at min A (Progressing)       Start:  05/10/24    Expected End:  06/01/24               Therapeutic Exercise       Pt will perform AAROM/AROM BUE exercises to improve strength and independence with functional transfers/ADL tasks.   (Progressing)       Start:  05/10/24    Expected End:  06/01/24

## 2024-05-13 NOTE — PROGRESS NOTES
Physical Therapy    Physical Therapy Treatment    Patient Name: Rebecca Samaniego  MRN: [id number]  Today's Date: 5/13/2024  Time Calculation  Start Time: 0932  Stop Time: 1000  Time Calculation (min): 28 min    Assessment/Plan   PT Assessment  End of Session Communication: Bedside nurse  Assessment Comment: No active assist from patient for mobility. Pt drooling and unable to manage secretions, RN informed and it to suction patient.  End of Session Patient Position: Bed, 3 rail up, Alarm on  PT Plan  Inpatient/Swing Bed or Outpatient: Inpatient  PT Plan  Treatment/Interventions: Bed mobility, Transfer training, Gait training, Balance training, Strengthening, Endurance training, Range of motion, Therapeutic exercise, Therapeutic activity  PT Plan: Skilled PT (trial pending progress and participation)  PT Frequency: 3 times per week  PT Discharge Recommendations: Moderate intensity level of continued care  PT Recommended Transfer Status: Assist x2, Total assist  PT - OK to Discharge: Yes      General Visit Information:   PT  Visit  PT Received On: 05/13/24  General  Co-Treatment: OT  Co-Treatment Reason: patient safety and optimization of patient outcomes with a medically complex.  Prior to Session Communication: Bedside nurse  Patient Position Received: Bed, 3 rail up, Alarm on  General Comment: Cleared by nursing to be seen for therapy, pt agreeable with tx, supine in bed upon arrival.    Subjective        Objective   Pain:  Pain Assessment  Pain Assessment: 0-10  Pain Score: 3  Pain Type: Acute pain  Pain Location: Arm  Pain Orientation: Right, Lower     Postural Control:  Static Sitting Balance  Static Sitting-Balance Support: Feet supported, No upper extremity supported  Static Sitting-Level of Assistance: Maximum assistance  Static Sitting-Comment/Number of Minutes: EOB x10 minutes, poor seated static balance, multiplanar LOB requiring max assist to maintain midline.    Treatments:  Therapeutic  Exercise  Therapeutic Exercise Performed: Yes  Therapeutic Exercise Activity 1: Assisted bilateral knee extension x10  Therapeutic Exercise Activity 2: Assisted bilateral hip flexion x10    Bed Mobility  Bed Mobility: Yes  Bed Mobility 1  Bed Mobility 1: Supine to sitting, Sitting to supine  Level of Assistance 1: Dependent  Bed Mobility Comments 1: Dependent assist x2 for trunk/bilateral LE's during supine <> sit, dependent assist to scoot EOB with use of draw sheet.  Bed Mobility 2  Bed Mobility  2: Rolling right, Rolling left  Level of Assistance 2: Dependent    Outcome Measures:  [facility] Basic Mobility  Turning from your back to your side while in a flat bed without using bedrails: Total  Moving from lying on your back to sitting on the side of a flat bed without using bedrails: Total  Moving to and from bed to chair (including a wheelchair): Total  Standing up from a chair using your arms (e.g. wheelchair or bedside chair): Total  To walk in hospital room: Total  Climbing 3-5 steps with railing: Total  Basic Mobility - Total Score: 6         Encounter Problems       Encounter Problems (Active)       Balance       STG - Maintains static sitting balance with upper extremity support x 10 minutes with close supervision to prepare for transfer training (Progressing)       Start:  05/10/24    Expected End:  06/09/24               PT Transfers       STG - Transfer from bed to chair with mod A (Not Progressing)       Start:  05/10/24    Expected End:  06/09/24            STG - Patient to transfer to and from sit to supine with mod A (Not Progressing)       Start:  05/10/24    Expected End:  06/09/24            STG - Patient will roll from side to side with mod A to assist with pressure relief and hygiene. (Progressing)       Start:  05/10/24    Expected End:  06/09/24            STG - Patient will transfer sit to and from stand with mod A and safe hand placement. (Not Progressing)       Start:  05/10/24    Expected End:   06/09/24               Pain - Adult

## 2024-05-13 NOTE — CONSULTS
"Nutrition Assessement Note    Nutrition Assessment    Reason for Assessment: Admission nursing screening (MST 3)    Pt in isolation for c.diff and has been noted to be drowsy. PEG placement scheduled for tomorrow. Will provide Tube feed recommendations.     Reason for Hospital Admission:  Rebecca Samaniego is a 78 y.o. female who is admitted for general weakness.     Past Medical History:   Diagnosis Date    Hypertension       Past Surgical History:   Procedure Laterality Date    AORTIC VALVE REPLACEMENT  02/11/2014    Aortic Valve Replacement    BREAST LUMPECTOMY  02/11/2014    Breast Surgery Lumpectomy    CERVICAL DISCECTOMY  02/11/2014    Spinal Diskectomy Cervical    HYSTERECTOMY  02/11/2014    Hysterectomy    OTHER SURGICAL HISTORY  02/11/2014    Aortic Coarctation Repair       Nutrition History:  Food and Nutrient History: possible PEG tube placement tomorrow  Energy Intake: Poor < 50 %  Food Allergies/Intolerances:  None  GI Symptoms: Diarrhea  Oral Problems: None    Anthropometrics:  Ht: 165.1 cm (5' 5\"), Wt: 63.6 kg (140 lb 3.4 oz), BMI: 23.33  IBW/kg (Dietitian Calculated): 56.82 kg          Weight Change:  Daily Weight  05/09/24 : 63.6 kg (140 lb 3.4 oz)  04/16/24 : 66.5 kg (146 lb 9.7 oz)  03/20/24 : 60.2 kg (132 lb 12.8 oz)  02/20/24 : 67.1 kg (148 lb)  05/16/23 : 67.4 kg (148 lb 8 oz)  04/06/22 : 67.6 kg (149 lb 2 oz)  05/19/21 : 68.9 kg (152 lb)  01/13/21 : 66 kg (145 lb 7 oz)     Weight History / % Weight Change: weight has fluctuated between 132-148# over the past year             Nutrition Focused Physical Exam Findings:             Edema  Edema: +1 trace, +2 mild  Edema Location: +1 BLE, LUE; +2 RUE    Physical Findings (Nutrition Deficiency/Toxicity)  Skin: Positive (Deep tissue injury (DTI) to Left heel, Dry Unstagable Right heel, DTI and excoriation to Sacrum.)    Nutrition Significant Labs:  Lab Results   Component Value Date    WBC 14.5 (H) 05/13/2024    HGB 8.2 (L) 05/13/2024    HCT 26.7 (L) " 05/13/2024     05/13/2024    CHOL 182 04/12/2024    TRIG 171 (H) 04/12/2024    HDL 42.0 (L) 04/12/2024    ALT <5 (L) 05/12/2024    AST 8 05/12/2024     05/13/2024    K 4.1 05/13/2024     (H) 05/13/2024    CREATININE 0.70 05/13/2024    BUN 15 05/13/2024    CO2 19 (L) 05/13/2024    TSH 1.57 04/11/2024    HGBA1C 5.2 04/12/2024     Nutrition Specific Medications:  aspirin, 81 mg, oral, Daily  atorvastatin, 40 mg, oral, Nightly  ferrous sulfate (325 mg ferrous sulfate), 65 mg of iron, oral, BID with meals  fluconazole, 100 mg, oral, Daily  heparin (porcine), 5,000 Units, subcutaneous, q8h JACQUELYN  lactobacillus acidophilus, 1 tablet, oral, BID with meals  metoprolol succinate XL, 100 mg, oral, Daily  metroNIDAZOLE, 500 mg, intravenous, q8h  mirtazapine, 7.5 mg, oral, Nightly  potassium chloride, 20 mEq, oral, BID with meals  vancomycin, 125 mg, oral, q6h      dextrose 5%-0.45 % sodium chloride 1,000 mL with sodium bicarbonate 75 mEq infusion, 60 mL/hr, Last Rate: 60 mL/hr (05/13/24 1303)        Dietary Orders (From admission, onward)       Start     Ordered    05/14/24 0001  NPO Diet; Effective midnight  Diet effective midnight         05/13/24 1411    05/10/24 1451  Adult diet Regular; Pureed 4; Thin 0; 1:1 Feeding  Diet effective now        Comments: Compensatory Swallowing Strategies:                                                                                                                                                                                                                                                           Upright 90 degrees as possible for all oral intake, single sips/bites, slow rate eating/feeding, oral care frequently   Question Answer Comment   Diet type Regular    Texture Pureed 4    Fluid consistency Thin 0    Select tray type: 1:1 Feeding        05/10/24 1452                  Estimated Needs:   Estimated Energy Needs  Total Energy Estimated Needs (kCal):   (6040-8874)  Total Estimated Energy Need per Day (kCal/kg):  (25-30)  Method for Estimating Needs: actual wt    Estimated Protein Needs  Total Protein Estimated Needs (g):  (64-76)  Total Protein Estimated Needs (g/kg):  (1-1.2)  Method for Estimating Needs: actual wt    Estimated Fluid Needs  Total Fluid Estimated Needs (mL):  (0526-7611)  Method for Estimating Needs: 1 mL/kcal        Nutrition Diagnosis   Nutrition Diagnosis:       Nutrition Diagnosis  Patient has Nutrition Diagnosis: Yes  Diagnosis Status (1): New  Nutrition Diagnosis 1: Inadequate energy intake  Related to (1): decreased ability to consume sufficient energy  As Evidenced by (1): poor PO intake       Nutrition Interventions/Recommendations   Nutrition Interventions and Recommendations:    Nutrition Prescription:  Individualized Nutrition Prescription Provided for : 2905-9425 kcals, 64-76 gm protein via enteral nutrition    Nutrition Interventions:   Food and/or Nutrient Delivery Interventions  Interventions: Enteral intake, Medical food supplement  Enteral Intake: Insert enteral feeding tube  Goal: Once PEG is placed, recommend Jevity 1.5 goal rate @ 55 mL/hr to provide 1980 kcals, 84 gm protein, 1003 mL free water. Pt will require an additional 150 mL water flush Q4H to meet hydration needs. Recommend initiating @ 10 mL/hr and increasing by 10 mL Q8H or as toolerated until goal is reached.  Medical Food Supplement: Commercial beverage  Goal: angelika BID to aid in wound healing    Education Documentation  No documentation found.           Nutrition Monitoring and Evaluation   Monitoring/Evaluation:   Food/Nutrient Related History Monitoring  Monitoring and Evaluation Plan: Enteral and parenteral nutrition intake  Enteral and Parenteral Nutrition Intake: Enteral nutrition formula/solution  Criteria: will monitor for tube feed tolerance            Time Spent/Follow-up:   Follow Up  Time Spent (min): 30 minutes  Last Date of Nutrition Visit:  05/13/24  Nutrition Follow-Up Needed?: 3-5 days  Follow up Comment: 5/16/24

## 2024-05-13 NOTE — PROGRESS NOTES
"Rebecca Samaniego is a 78 y.o. female on day 4 of admission presenting with General weakness.    Subjective   Patient drowsy and unable to provide history        Objective     Physical Exam  Constitutional:       Appearance: She is ill-appearing.   HENT:      Head: Normocephalic and atraumatic.      Mouth/Throat:      Mouth: Mucous membranes are moist.   Pulmonary:      Effort: Pulmonary effort is normal.   Abdominal:      General: There is no distension.      Palpations: Abdomen is soft.      Tenderness: There is no abdominal tenderness. There is no guarding.   Musculoskeletal:         General: Normal range of motion.      Cervical back: Normal range of motion.   Skin:     General: Skin is warm and dry.   Neurological:      Mental Status: Mental status is at baseline.      Comments: drowsy   Psychiatric:         Mood and Affect: Mood normal.         Last Recorded Vitals  Blood pressure (!) 124/46, pulse 84, temperature 36.8 °C (98.2 °F), temperature source Oral, resp. rate 17, height 1.651 m (5' 5\"), weight 63.6 kg (140 lb 3.4 oz), SpO2 98%.  Intake/Output last 3 Shifts:  I/O last 3 completed shifts:  In: 1728.3 (27.2 mL/kg) [I.V.:1728.3 (27.2 mL/kg)]  Out: 0 (0 mL/kg)   Weight: 63.6 kg     Relevant Results                Results for orders placed or performed during the hospital encounter of 05/09/24 (from the past 24 hour(s))   Basic Metabolic Panel   Result Value Ref Range    Glucose 110 (H) 65 - 99 mg/dL    Sodium 139 133 - 145 mmol/L    Potassium 4.1 3.4 - 5.1 mmol/L    Chloride 109 (H) 97 - 107 mmol/L    Bicarbonate 19 (L) 24 - 31 mmol/L    Urea Nitrogen 15 8 - 25 mg/dL    Creatinine 0.70 0.40 - 1.60 mg/dL    eGFR 88 >60 mL/min/1.73m*2    Calcium 7.8 (L) 8.5 - 10.4 mg/dL    Anion Gap 11 <=19 mmol/L   CBC and Auto Differential   Result Value Ref Range    WBC 14.5 (H) 4.4 - 11.3 x10*3/uL    nRBC 0.0 0.0 - 0.0 /100 WBCs    RBC 2.62 (L) 4.00 - 5.20 x10*6/uL    Hemoglobin 8.2 (L) 12.0 - 16.0 g/dL    Hematocrit 26.7 " (L) 36.0 - 46.0 %     (H) 80 - 100 fL    MCH 31.3 26.0 - 34.0 pg    MCHC 30.7 (L) 32.0 - 36.0 g/dL    RDW 14.0 11.5 - 14.5 %    Platelets 205 150 - 450 x10*3/uL    Neutrophils % 71.0 40.0 - 80.0 %    Immature Granulocytes %, Automated 1.2 (H) 0.0 - 0.9 %    Lymphocytes % 14.3 13.0 - 44.0 %    Monocytes % 12.8 2.0 - 10.0 %    Eosinophils % 0.4 0.0 - 6.0 %    Basophils % 0.3 0.0 - 2.0 %    Neutrophils Absolute 10.31 (H) 1.60 - 5.50 x10*3/uL    Immature Granulocytes Absolute, Automated 0.18 0.00 - 0.50 x10*3/uL    Lymphocytes Absolute 2.07 0.80 - 3.00 x10*3/uL    Monocytes Absolute 1.86 (H) 0.05 - 0.80 x10*3/uL    Eosinophils Absolute 0.06 0.00 - 0.40 x10*3/uL    Basophils Absolute 0.04 0.00 - 0.10 x10*3/uL     ECG 12 lead    Result Date: 5/10/2024  Normal sinus rhythm Left axis deviation Moderate voltage criteria for LVH, may be normal variant ( R in aVL , [place] product ) Nonspecific ST and T wave abnormality Abnormal ECG When compared with ECG of 10-APR-2024 22:30, Significant changes have occurred Confirmed by Davian Callaway ([id number]) on 5/10/2024 3:33:53 PM    XR chest 1 view    Result Date: 5/9/2024  Interpreted By:  Ronnie Tsai, STUDY: XR CHEST 1 VIEW;  5/9/2024 1:23 pm   INDICATION: Signs/Symptoms:failure to thrive.   COMPARISON: 04/13/2024   ACCESSION NUMBER(S): [id number]   ORDERING CLINICIAN: COREY KING   FINDINGS: CARDIOMEDIASTINAL SILHOUETTE AND VASCULATURE:   Cardiac size:  Within normal limits. Status post median sternotomy with mitral valve replacement. This is similar to the prior exam. Aortic shadow:  Within normal limits.   Mediastinal contours: Within normal limits.   Pulmonary vasculature:  The central vasculature is unremarkable   LUNGS: Lungs are clear.   ABDOMEN AND OTHER FINDINGS: No remarkable upper abdominal findings.   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       1.  No active cardiopulmonary disease.  There has not been significant interval  change from the prior exam.   Signed by: Ronnie Tsai 5/9/2024 1:49 PM Dictation workstation:   [id number]    XR chest 1 view    Result Date: 4/15/2024  Interpreted By:  Brianna De La Torre, STUDY: XR CHEST 1 VIEW 4/13/2024 7:49 pm   INDICATION: Signs/Symptoms:fever   COMPARISON: 04/11/2024   ACCESSION NUMBER(S): [id number]   ORDERING CLINICIAN: NAZARIO TOLENTINO   TECHNIQUE: AP erect view of the chest   FINDINGS: There is postoperative change from median sternotomy and cardiac valve replacement/repair.   There is atherosclerosis of the thoracic aorta. The cardiac size is at the upper range of normal.   Mild elevation of the left hemidiaphragm is seen.   There is some mild increased density at right lung base silhouetting the lateral aspect of the right hemidiaphragm suggesting a very small right pleural effusion versus minimal right basilar infiltrate. Pulmonary vascularity is unremarkable.   Degenerative changes of both shoulders are seen. There is postoperative change from anterior cervical spinal fusion and posterior lumbar fusion with surgical clips in the right axillary region.       Postoperative changes as outlined above with mild elevation of left hemidiaphragm unchanged.   There is partial silhouetting of the right hemidiaphragm laterally suggesting small right pleural effusion or some minimal infiltrate at this location..   Signed by: Brianna De La Torre 4/15/2024 11:31 AM Dictation workstation:   [id number]                Assessment/Plan   Principal Problem:    General weakness    Poor PO Intake, Failure to Thrive    -Patient is nursing home resident. Non ambulatory. Hx CVA, UTI, C Diff. Per records review,  is wanting everything done and feeding tube placement. This is unlikely to change overall prognosis. We can talk with the  and decide regarding feeding tube placement. Possible PEG placement tomorrow        I spent 20 minutes in the professional and overall care of this patient.      Mya KUNZ  BILLY Ruiz-CNP

## 2024-05-13 NOTE — CONSULTS
Assessment/Plan   Plan for PEG scheduling for 5/14/24. Npo after MN. Antbx on call to ENDO suite. Dietician consult for tube feed recs.     Patient seems more agreeable to plan for PEG today. Sergo Kimball MD    Inpatient consult to Acute Care Surgery  Consult performed by: VASQUEZ Gaffney  Consult ordered by: VASQUEZ Gaffney  Reason for consult: failure to thrive, request for PEG        Subjective     This is a 78 year old female, she is in residence care at [facility].  Past hx of stroke, and has recently been declining in function and in her physical condition. She is on a modified diet, and can eat. She has had poor appetite, and does not eat enough to sustain herself. Her family is requesting PEG tube placement.     Per discussion with primary team, the pt was initially refusing feeding tube, as she erroneously thought that the tube would be inserted into her nose. However, the pt's family is aware of the risks of tube placement and wish to proceed.     The pt is currently on C diff precautions.         Review of Systems  Review of Systems   Constitutional:  Negative for chills and fever.   HENT: Negative.     Eyes: Negative.    Respiratory:  Negative for cough, chest tightness and shortness of breath.    Cardiovascular:  Negative for chest pain and leg swelling.   Gastrointestinal:  Positive for diarrhea. Negative for abdominal distention, abdominal pain, constipation, nausea and vomiting.   Endocrine: Negative.    Genitourinary:  Negative for difficulty urinating.   Musculoskeletal: Negative.    Skin: Negative.    Allergic/Immunologic: Negative.    Neurological: Negative.    Hematological: Negative.    Psychiatric/Behavioral: Negative.       Past Medical History  Medical History        Past Medical History:   Diagnosis Date    Hypertension              Surgical History  Surgical History[]Expand by Default         Past Surgical History:   Procedure Laterality Date    AORTIC  VALVE REPLACEMENT   02/11/2014     Aortic Valve Replacement    BREAST LUMPECTOMY   02/11/2014     Breast Surgery Lumpectomy    CERVICAL DISCECTOMY   02/11/2014     Spinal Diskectomy Cervical    HYSTERECTOMY   02/11/2014     Hysterectomy    OTHER SURGICAL HISTORY   02/11/2014     Aortic Coarctation Repair            Social History  She reports that she has quit smoking. Her smoking use included cigarettes. She has never used smokeless tobacco. She reports current alcohol use. She reports that she does not use drugs.     Family History  Family History   No family history on file.        Allergies  Sulfa (sulfonamide antibiotics), Sulfamethoxazole-trimethoprim, Ciprofloxacin, and Nitrofurantoin     Objective     Vital signs for last 24 hours:  Temp:  [36.1 °C (97 °F)-36.8 °C (98.2 °F)] 36.8 °C (98.2 °F)  Heart Rate:  [] 84  Resp:  [17] 17  BP: (120-124)/(46-58) 124/46    Intake/Output this shift:  I/O this shift:  In: 1338.2 [I.V.:638.2; IV Piggyback:700]  Out: -     Physical Exam  Physical Exam  Constitutional:       Comments: Thin and frail   HENT:      Head: Normocephalic and atraumatic.      Right Ear: Tympanic membrane normal.      Nose: Nose normal.      Mouth/Throat:      Mouth: Mucous membranes are moist.   Eyes:      Pupils: Pupils are equal, round, and reactive to light.   Cardiovascular:      Rate and Rhythm: Normal rate and regular rhythm.      Pulses: Normal pulses.   Pulmonary:      Effort: Pulmonary effort is normal.      Breath sounds: Normal breath sounds.   Abdominal:      General: Bowel sounds are normal. There is no distension.      Tenderness: There is no abdominal tenderness. There is no guarding.      Hernia: No hernia is present.   Genitourinary:     Rectum: Normal.   Musculoskeletal:         General: Normal range of motion.   Skin:     General: Skin is warm and dry.      Comments: Well healed scars to lower abdomen   Neurological:      Mental Status: She is alert. She is disoriented.    Psychiatric:         Mood and Affect: Mood normal.         Labs  CBC:   Lab Results   Component Value Date    WBC 14.5 (H) 05/13/2024    RBC 2.62 (L) 05/13/2024     BMP:   Lab Results   Component Value Date    GLUCOSE 110 (H) 05/13/2024    CO2 19 (L) 05/13/2024    BUN 15 05/13/2024    CREATININE 0.70 05/13/2024    CALCIUM 7.8 (L) 05/13/2024

## 2024-05-13 NOTE — PROGRESS NOTES
05/13/24 1408   Discharge Planning   Patient expects to be discharged to: [facility]     Met with patient and spouse at bedside.  Patient in isolation for c-diff.  Contact isolation precautions maintained.  Plan is for PEG tube placement.  Patient was at [facility] for skilled care prior to admission.  Spouse would like a referral sent to [facility].  If [facility] is not able to accept, spouse would like patient to return to [facility]. Patient can return to [facility] when medically ready for discharge.  No precert is needed.  Spouse also provided with a list of skilled facilities per his  request.

## 2024-05-14 ENCOUNTER — APPOINTMENT (OUTPATIENT)
Dept: GASTROENTEROLOGY | Facility: HOSPITAL | Age: 79
DRG: 640 | End: 2024-05-14
Payer: MEDICARE

## 2024-05-14 ENCOUNTER — DOCUMENTATION (OUTPATIENT)
Dept: RESEARCH | Age: 79
End: 2024-05-14

## 2024-05-14 ENCOUNTER — ANESTHESIA EVENT (OUTPATIENT)
Dept: GASTROENTEROLOGY | Facility: HOSPITAL | Age: 79
DRG: 640 | End: 2024-05-14
Payer: MEDICARE

## 2024-05-14 ENCOUNTER — APPOINTMENT (OUTPATIENT)
Dept: CARDIOLOGY | Facility: CLINIC | Age: 79
End: 2024-05-14
Payer: MEDICARE

## 2024-05-14 ENCOUNTER — ANESTHESIA (OUTPATIENT)
Dept: GASTROENTEROLOGY | Facility: HOSPITAL | Age: 79
DRG: 640 | End: 2024-05-14
Payer: MEDICARE

## 2024-05-14 LAB
25(OH)D3 SERPL-MCNC: 6 NG/ML (ref 31–100)
ANION GAP SERPL CALC-SCNC: 10 MMOL/L
BUN SERPL-MCNC: 13 MG/DL (ref 8–25)
CALCIUM SERPL-MCNC: 7.6 MG/DL (ref 8.5–10.4)
CHLORIDE SERPL-SCNC: 107 MMOL/L (ref 97–107)
CO2 SERPL-SCNC: 23 MMOL/L (ref 24–31)
CREAT SERPL-MCNC: 0.6 MG/DL (ref 0.4–1.6)
EGFRCR SERPLBLD CKD-EPI 2021: >90 ML/MIN/1.73M*2
ERYTHROCYTE [DISTWIDTH] IN BLOOD BY AUTOMATED COUNT: 13.7 % (ref 11.5–14.5)
GLUCOSE SERPL-MCNC: 101 MG/DL (ref 65–99)
HCT VFR BLD AUTO: 24.6 % (ref 36–46)
HGB BLD-MCNC: 8 G/DL (ref 12–16)
MCH RBC QN AUTO: 30.8 PG (ref 26–34)
MCHC RBC AUTO-ENTMCNC: 32.5 G/DL (ref 32–36)
MCV RBC AUTO: 95 FL (ref 80–100)
NRBC BLD-RTO: 0 /100 WBCS (ref 0–0)
PLATELET # BLD AUTO: 252 X10*3/UL (ref 150–450)
POTASSIUM SERPL-SCNC: 3.7 MMOL/L (ref 3.4–5.1)
RBC # BLD AUTO: 2.6 X10*6/UL (ref 4–5.2)
SODIUM SERPL-SCNC: 140 MMOL/L (ref 133–145)
WBC # BLD AUTO: 12.3 X10*3/UL (ref 4.4–11.3)

## 2024-05-14 PROCEDURE — 97530 THERAPEUTIC ACTIVITIES: CPT | Mod: GO,CO

## 2024-05-14 PROCEDURE — 3700000002 HC GENERAL ANESTHESIA TIME - EACH INCREMENTAL 1 MINUTE

## 2024-05-14 PROCEDURE — 85027 COMPLETE CBC AUTOMATED: CPT | Performed by: INTERNAL MEDICINE

## 2024-05-14 PROCEDURE — A43239 PR EDG TRANSORAL BIOPSY SINGLE/MULTIPLE: Performed by: ANESTHESIOLOGIST ASSISTANT

## 2024-05-14 PROCEDURE — 3E0G76Z INTRODUCTION OF NUTRITIONAL SUBSTANCE INTO UPPER GI, VIA NATURAL OR ARTIFICIAL OPENING: ICD-10-PCS | Performed by: SURGERY

## 2024-05-14 PROCEDURE — 1200000002 HC GENERAL ROOM WITH TELEMETRY DAILY

## 2024-05-14 PROCEDURE — 2500000004 HC RX 250 GENERAL PHARMACY W/ HCPCS (ALT 636 FOR OP/ED): Performed by: ANESTHESIOLOGIST ASSISTANT

## 2024-05-14 PROCEDURE — 2500000001 HC RX 250 WO HCPCS SELF ADMINISTERED DRUGS (ALT 637 FOR MEDICARE OP)

## 2024-05-14 PROCEDURE — 82306 VITAMIN D 25 HYDROXY: CPT | Performed by: INTERNAL MEDICINE

## 2024-05-14 PROCEDURE — 43246 EGD PLACE GASTROSTOMY TUBE: CPT | Performed by: SURGERY

## 2024-05-14 PROCEDURE — 2500000001 HC RX 250 WO HCPCS SELF ADMINISTERED DRUGS (ALT 637 FOR MEDICARE OP): Performed by: INTERNAL MEDICINE

## 2024-05-14 PROCEDURE — 0DH63UZ INSERTION OF FEEDING DEVICE INTO STOMACH, PERCUTANEOUS APPROACH: ICD-10-PCS | Performed by: SURGERY

## 2024-05-14 PROCEDURE — 82374 ASSAY BLOOD CARBON DIOXIDE: CPT | Performed by: INTERNAL MEDICINE

## 2024-05-14 PROCEDURE — 2500000006 HC RX 250 W HCPCS SELF ADMINISTERED DRUGS (ALT 637 FOR ALL PAYERS): Mod: MUE | Performed by: INTERNAL MEDICINE

## 2024-05-14 PROCEDURE — 99100 ANES PT EXTEME AGE<1 YR&>70: CPT | Performed by: STUDENT IN AN ORGANIZED HEALTH CARE EDUCATION/TRAINING PROGRAM

## 2024-05-14 PROCEDURE — 97535 SELF CARE MNGMENT TRAINING: CPT | Mod: GO,CO

## 2024-05-14 PROCEDURE — 3700000001 HC GENERAL ANESTHESIA TIME - INITIAL BASE CHARGE

## 2024-05-14 PROCEDURE — A43239 PR EDG TRANSORAL BIOPSY SINGLE/MULTIPLE: Performed by: STUDENT IN AN ORGANIZED HEALTH CARE EDUCATION/TRAINING PROGRAM

## 2024-05-14 PROCEDURE — 0DB68ZX EXCISION OF STOMACH, VIA NATURAL OR ARTIFICIAL OPENING ENDOSCOPIC, DIAGNOSTIC: ICD-10-PCS | Performed by: SURGERY

## 2024-05-14 PROCEDURE — 88305 TISSUE EXAM BY PATHOLOGIST: CPT | Performed by: PATHOLOGY

## 2024-05-14 PROCEDURE — 2780000003 HC OR 278 NO HCPCS

## 2024-05-14 PROCEDURE — 36415 COLL VENOUS BLD VENIPUNCTURE: CPT | Performed by: INTERNAL MEDICINE

## 2024-05-14 PROCEDURE — 88305 TISSUE EXAM BY PATHOLOGIST: CPT | Mod: TC | Performed by: SURGERY

## 2024-05-14 PROCEDURE — 2500000005 HC RX 250 GENERAL PHARMACY W/O HCPCS: Performed by: INTERNAL MEDICINE

## 2024-05-14 PROCEDURE — 7100000001 HC RECOVERY ROOM TIME - INITIAL BASE CHARGE

## 2024-05-14 PROCEDURE — 7100000002 HC RECOVERY ROOM TIME - EACH INCREMENTAL 1 MINUTE

## 2024-05-14 PROCEDURE — 2500000004 HC RX 250 GENERAL PHARMACY W/ HCPCS (ALT 636 FOR OP/ED): Performed by: INTERNAL MEDICINE

## 2024-05-14 PROCEDURE — 99232 SBSQ HOSP IP/OBS MODERATE 35: CPT | Performed by: INTERNAL MEDICINE

## 2024-05-14 PROCEDURE — 43239 EGD BIOPSY SINGLE/MULTIPLE: CPT | Performed by: SURGERY

## 2024-05-14 RX ORDER — PROPOFOL 10 MG/ML
INJECTION, EMULSION INTRAVENOUS AS NEEDED
Status: DISCONTINUED | OUTPATIENT
Start: 2024-05-14 | End: 2024-05-14

## 2024-05-14 RX ORDER — SODIUM CHLORIDE, SODIUM LACTATE, POTASSIUM CHLORIDE, CALCIUM CHLORIDE 600; 310; 30; 20 MG/100ML; MG/100ML; MG/100ML; MG/100ML
INJECTION, SOLUTION INTRAVENOUS CONTINUOUS PRN
Status: DISCONTINUED | OUTPATIENT
Start: 2024-05-14 | End: 2024-05-14

## 2024-05-14 RX ORDER — CEFAZOLIN 1 G/1
INJECTION, POWDER, FOR SOLUTION INTRAVENOUS AS NEEDED
Status: DISCONTINUED | OUTPATIENT
Start: 2024-05-14 | End: 2024-05-14

## 2024-05-14 RX ORDER — ASPIRIN 325 MG
50000 TABLET, DELAYED RELEASE (ENTERIC COATED) ORAL
Status: DISCONTINUED | OUTPATIENT
Start: 2024-05-19 | End: 2024-05-24 | Stop reason: HOSPADM

## 2024-05-14 RX ADMIN — PROPOFOL 10 MG: 10 INJECTION, EMULSION INTRAVENOUS at 07:45

## 2024-05-14 RX ADMIN — HEPARIN SODIUM 5000 UNITS: 5000 INJECTION, SOLUTION INTRAVENOUS; SUBCUTANEOUS at 20:16

## 2024-05-14 RX ADMIN — METOPROLOL TARTRATE 10 MG: 5 INJECTION INTRAVENOUS at 20:16

## 2024-05-14 RX ADMIN — VANCOMYCIN HYDROCHLORIDE 125 MG: 125 CAPSULE ORAL at 20:52

## 2024-05-14 RX ADMIN — FERROUS SULFATE TAB 325 MG (65 MG ELEMENTAL FE) 1 TABLET: 325 (65 FE) TAB at 16:22

## 2024-05-14 RX ADMIN — METRONIDAZOLE 500 MG: 5 INJECTION, SOLUTION INTRAVENOUS at 20:17

## 2024-05-14 RX ADMIN — POTASSIUM CHLORIDE 20 MEQ: 1.5 FOR SOLUTION ORAL at 16:22

## 2024-05-14 RX ADMIN — PROPOFOL 30 MG: 10 INJECTION, EMULSION INTRAVENOUS at 07:36

## 2024-05-14 RX ADMIN — CEFAZOLIN 1 G: 1 INJECTION, POWDER, FOR SOLUTION INTRAMUSCULAR; INTRAVENOUS at 07:37

## 2024-05-14 RX ADMIN — HEPARIN SODIUM 5000 UNITS: 5000 INJECTION, SOLUTION INTRAVENOUS; SUBCUTANEOUS at 13:07

## 2024-05-14 RX ADMIN — PROPOFOL 75 MCG/KG/MIN: 10 INJECTION, EMULSION INTRAVENOUS at 07:37

## 2024-05-14 RX ADMIN — Medication 1 TABLET: at 16:22

## 2024-05-14 RX ADMIN — VANCOMYCIN HYDROCHLORIDE 125 MG: 125 CAPSULE ORAL at 16:22

## 2024-05-14 RX ADMIN — MIRTAZAPINE 7.5 MG: 15 TABLET, FILM COATED ORAL at 20:51

## 2024-05-14 RX ADMIN — ACETAMINOPHEN 650 MG: 160 SOLUTION ORAL at 20:54

## 2024-05-14 RX ADMIN — METRONIDAZOLE 500 MG: 5 INJECTION, SOLUTION INTRAVENOUS at 13:07

## 2024-05-14 RX ADMIN — PROPOFOL 10 MG: 10 INJECTION, EMULSION INTRAVENOUS at 07:44

## 2024-05-14 RX ADMIN — SODIUM CHLORIDE, POTASSIUM CHLORIDE, SODIUM LACTATE AND CALCIUM CHLORIDE: 600; 310; 30; 20 INJECTION, SOLUTION INTRAVENOUS at 07:24

## 2024-05-14 RX ADMIN — METRONIDAZOLE 500 MG: 5 INJECTION, SOLUTION INTRAVENOUS at 06:13

## 2024-05-14 RX ADMIN — ATORVASTATIN CALCIUM 40 MG: 40 TABLET, FILM COATED ORAL at 20:51

## 2024-05-14 ASSESSMENT — COGNITIVE AND FUNCTIONAL STATUS - GENERAL
HELP NEEDED FOR BATHING: A LOT
PERSONAL GROOMING: TOTAL
MOVING FROM LYING ON BACK TO SITTING ON SIDE OF FLAT BED WITH BEDRAILS: A LOT
DRESSING REGULAR LOWER BODY CLOTHING: A LOT
DRESSING REGULAR LOWER BODY CLOTHING: TOTAL
DRESSING REGULAR UPPER BODY CLOTHING: TOTAL
DAILY ACTIVITIY SCORE: 7
EATING MEALS: TOTAL
TOILETING: TOTAL
HELP NEEDED FOR BATHING: TOTAL
STANDING UP FROM CHAIR USING ARMS: TOTAL
TOILETING: TOTAL
DRESSING REGULAR UPPER BODY CLOTHING: A LOT
MOVING TO AND FROM BED TO CHAIR: A LOT
WALKING IN HOSPITAL ROOM: TOTAL
CLIMB 3 TO 5 STEPS WITH RAILING: TOTAL
PERSONAL GROOMING: TOTAL
TURNING FROM BACK TO SIDE WHILE IN FLAT BAD: A LOT
EATING MEALS: TOTAL
MOBILITY SCORE: 9
DAILY ACTIVITIY SCORE: 8

## 2024-05-14 ASSESSMENT — PAIN SCALES - GENERAL
PAINLEVEL_OUTOF10: 0 - NO PAIN
PAINLEVEL_OUTOF10: 8
PAINLEVEL_OUTOF10: 0 - NO PAIN

## 2024-05-14 ASSESSMENT — PAIN - FUNCTIONAL ASSESSMENT
PAIN_FUNCTIONAL_ASSESSMENT: 0-10
PAIN_FUNCTIONAL_ASSESSMENT: FLACC (FACE, LEGS, ACTIVITY, CRY, CONSOLABILITY)
PAIN_FUNCTIONAL_ASSESSMENT: 0-10

## 2024-05-14 ASSESSMENT — PAIN DESCRIPTION - ORIENTATION: ORIENTATION: LOWER

## 2024-05-14 ASSESSMENT — PAIN DESCRIPTION - LOCATION: LOCATION: BACK

## 2024-05-14 ASSESSMENT — ACTIVITIES OF DAILY LIVING (ADL): HOME_MANAGEMENT_TIME_ENTRY: 25

## 2024-05-14 NOTE — ANESTHESIA POSTPROCEDURE EVALUATION
Patient: Rebecca Samaniego    Procedure Summary       Date: 05/14/24 Room / Location: [facility]    Anesthesia Start: 0728 Anesthesia Stop: 0807    Procedure: EGD Diagnosis:       Failure to thrive in adult      Cerebrovascular accident (CVA), unspecified mechanism (Multi)      Gastritis determined by endoscopy    Scheduled Providers: Sergo Kimball MD; Kylah Melgar MD; RAYNA Ruvalcaba Responsible Provider: Kylah Melgar MD    Anesthesia Type: MAC ASA Status: 3            Anesthesia Type: MAC    Vitals Value Taken Time   /80 05/14/24 0840   Temp 36 °C (96.8 °F) 05/14/24 0803   Pulse 91 05/14/24 0840   Resp 18 05/14/24 0840   SpO2 97 % 05/14/24 0840       Anesthesia Post Evaluation    Patient location during evaluation: bedside  Patient participation: complete - patient participated  Level of consciousness: awake and alert  Pain management: adequate  Multimodal analgesia pain management approach  Airway patency: patent  Cardiovascular status: acceptable  Respiratory status: acceptable  Hydration status: acceptable  Postoperative Nausea and Vomiting: none        There were no known notable events for this encounter.

## 2024-05-14 NOTE — PROGRESS NOTES
Speech-Language Pathology                 Therapy Communication Note    Patient Name: Rebecca Samaniego  MRN: [id number]  Today's Date: 5/14/2024     Discipline: Speech Language Pathology    Missed Visit Reason:  Pt off floor for PEG placeemnt    Missed Time: Attempt    Comment:

## 2024-05-14 NOTE — PROGRESS NOTES
Occupational Therapy    OT Treatment    Patient Name: Rebecca Samaniego  MRN: [id number]  Today's Date: 5/14/2024  Time Calculation  Start Time: 1140  Stop Time: 1219  Time Calculation (min): 39 min         Assessment:  OT Assessment: Pt resistant to therapy intervention adjusted session d/t am PEG placement. Pt gradual progress with POC. Will continue to address remaining deficits with OT intervention.  Prognosis: Fair  Evaluation/Treatment Tolerance: Patient limited by fatigue, Other (Comment) (Pt limited volition)  Medical Staff Made Aware: Yes  End of Session Communication: Bedside nurse  End of Session Patient Position: Bed, 3 rail up, Alarm on (call light in reach with Pt demonstrate use all needs met spouse/faughter present)  OT Assessment Results: Decreased ADL status, Decreased upper extremity range of motion, Decreased upper extremity strength, Decreased safe judgment during ADL, Decreased cognition, Decreased functional mobility, Decreased gross motor control, Decreased endurance  Prognosis: Fair  Evaluation/Treatment Tolerance: Patient limited by fatigue, Other (Comment) (Pt limited volition)  Medical Staff Made Aware: Yes  Strengths: Support of extended family/friends  Barriers to Participation: Attitude of self, Comorbidities  Plan:  Treatment Interventions: ADL retraining, UE strengthening/ROM, Patient/family training, Compensatory technique education  OT Frequency: 3 times per week  OT Discharge Recommendations: Moderate intensity level of continued care  OT - OK to Discharge: Yes  Treatment Interventions: ADL retraining, UE strengthening/ROM, Patient/family training, Compensatory technique education    Subjective   Previous Visit Info:  OT Last Visit  OT Received On: 05/14/24  General:  General  Reason for Referral: impaired ADLs, +Cidff, weakness, failure to thrive, UTI  Referred By: Kobi Denise MD  Past Medical History Relevant to Rehab: HLD, CKD, CVA, AVR, C diff, Breast lumpectomy,  hysterectomy, cervical discectomy  Missed Visit: No  Family/Caregiver Present: Yes  Caregiver Feedback: spouse and daughter  Prior to Session Communication: Bedside nurse  Patient Position Received: Bed, 3 rail up, Alarm on  General Comment: Cleared by nursing to be seen for therapy, adjusted d/t PEG placement this date. Pt agreeable with tx, supine in bed upon arrival.  Precautions:  Hearing/Visual Limitations: wears glasses  Medical Precautions: Fall precautions, Swallowing precautions, Other (comment)  Post-Surgical Precautions: Other (comment) (sacral ulcers, B heals skin wounds)  Precautions Comment: PEG Tube placement  this date  Pain:  Pain Assessment  Pain Assessment: 0-10  Pain Score: 0 - No pain    Objective    Cognition:  Cognition  Overall Cognitive Status: Impaired  Arousal/Alertness: Delayed responses to stimuli  Orientation Level: Disoriented to place, Disoriented to time, Disoriented to situation  Processing Speed: Delayed (Pt having PEG placement AM)  Coordination:  Movements are Fluid and Coordinated: No  Upper Body Coordination: poor BUE motor recruitment  Lower Body Coordination: poor BLE motor recruitment  Coordination Comment: decreased rate and accuracy of movement  Activities of Daily Living: Feeding  Feeding Adaptive Equipment:  (NPO)  Feeding Comments: NPO Peg placement this date    Grooming  Grooming Level of Assistance: Maximum assistance  Grooming Where Assessed: Bed level  Grooming Comments: oral care asssist swabs, Pt partial wash face hand pover hand assist    UE Dressing  UE Dressing Level of Assistance: Maximum assistance  UE Dressing Where Assessed: Bed level  UE Dressing Comments: doff/don [facility] gown    Therapy/Activity: Therapeutic Exercise  Therapeutic Exercise Performed: Yes  Therapeutic Exercise Activity 1: AAROM BUE in shoulder flexion, elbow flexion, horizontal abduciton 5 reps x 1 set Pt resistant tihs date    Therapeutic Activity  Therapeutic Activity Performed:  Yes  Therapeutic Activity 1: Positioning BUE elevated pillow placement for eedema management  Therapeutic Activity 2: BLE positioning with alignment heal boots    Outcome Measures:[facility] Daily Activity  Putting on and taking off regular lower body clothing: Total  Bathing (including washing, rinsing, drying): Total  Putting on and taking off regular upper body clothing: A lot  Toileting, which includes using toilet, bedpan or urinal: Total  Taking care of personal grooming such as brushing teeth: Total  Eating Meals: Total  Daily Activity - Total Score: 7    Education Documentation  Body Mechanics, taught by GABBY Warren at 5/14/2024 12:33 PM.  Learner: Family, Patient  Readiness: Acceptance  Method: Explanation, Demonstration, Teach-back  Response: Verbalizes Understanding, Demonstrated Understanding, Needs Reinforcement  Comment: Instructed Pt/spouse/daughter adaptive ADL skills, oral care, edema management, positioning related to skiin integrity , compensatory strategies safety    ADL Training, taught by GABBY Warren at 5/14/2024 12:33 PM.  Learner: Family, Patient  Readiness: Acceptance  Method: Explanation, Demonstration, Teach-back  Response: Verbalizes Understanding, Demonstrated Understanding, Needs Reinforcement  Comment: Instructed Pt/spouse/daughter adaptive ADL skills, oral care, edema management, positioning related to skiin integrity , compensatory strategies safety    Education Comments  No comments found.        OP EDUCATION:       Goals:  Encounter Problems       Encounter Problems (Active)       ADLs       Pt will complete ADL tasks at min Awith use of AE prn  (Progressing)       Start:  05/10/24    Expected End:  06/01/24               Functional Balance       Pt will maintain static sitting balance with upper extremity support at min A  (Not Progressing)       Start:  05/10/24    Expected End:  06/01/24               OT Transfers       Pt will perform functional transfers at min A  (Not Progressing)       Start:  05/10/24    Expected End:  06/01/24               Therapeutic Exercise       Pt will perform AAROM/AROM BUE exercises to improve strength and independence with functional transfers/ADL tasks.   (Progressing)       Start:  05/10/24    Expected End:  06/01/24

## 2024-05-14 NOTE — PROGRESS NOTES
05/14/24 1702   Discharge Planning   [facility]     Met with patient and her spouse at bedside. Notified spouse that [facility] declined.  Spouse would like patient to return to [facility].  No precert is needed.  PEG tube in place.

## 2024-05-14 NOTE — RESEARCH NOTES
Artificial Intelligence Monitoring in Nursing (AIMS Nursing) Study    Principle Investigator - Dr. Carlo Green  Research Coordinator - MUNIRA Santiago     Patient Name - Rebecca Samaniego  Date - 5/14/2024 3:55 PM  [place]    Rebecca Samaniego was approached by MUNIRA Santiago to talk about participating in the AIMS Nursing Study. The patient was not able to be approached, a research coordinator will come back at a later time. Study protocol was followed and patient was given study contact information.     MUNIRA Santiago

## 2024-05-14 NOTE — PROGRESS NOTES
Hypernatremia resolved, acidosis much improved, bicarb up to 23, stop IV fluids. Defer any future IV fluids to primary team. Calcium mildly low in setting of severe vitamin D deficiency, started vitamin D 50,000 units weekly. This should be followed by her PCP going forward. Will sign off at this time, please call back with any questions, thank you.     Ambar Saleem MD

## 2024-05-14 NOTE — PROGRESS NOTES
ID Update    Seeing patient for probable C. difficile colonization.  Patient is off the floor during rounds this morning getting PEG tube.  She remains afebrile with a white count of 12.3.  No significant diarrhea is charted on flowsheet.  I am not convinced that she has active C. difficile at this time.  At time of discharge would plan to stop Flagyl.    Dr. Chen to assume care 5/15/2024.    William Neri MD  [facility]  [phone]

## 2024-05-14 NOTE — PROGRESS NOTES
Rebecca Samaniego is a 78 y.o. female on day 5 of admission presenting with General weakness.    Spoke with nursing, status post PEG tube placement.     Subjective   Patient seen and examined.  Resting in bed in no acute distress.  Spouse and daughter bedside.  Eyes closed.  Drowsy.  Arousable.  States name.  No complaints.  Review of systems limited.      Objective   Contact plus precautions in place.    Physical Exam  Constitutional:       General: She is not in acute distress.     Appearance: Normal appearance. She is normal weight. She is ill-appearing. She is not toxic-appearing or diaphoretic.   HENT:      Head: Normocephalic and atraumatic.      Right Ear: Tympanic membrane normal.      Left Ear: Tympanic membrane normal.      Nose: Nose normal.      Mouth/Throat:      Mouth: Mucous membranes are dry.      Pharynx: Oropharynx is clear.      Comments: Mouth dry, tongue less coated.  Eyes:      Extraocular Movements: Extraocular movements intact.      Conjunctiva/sclera: Conjunctivae normal.      Pupils: Pupils are equal, round, and reactive to light.   Cardiovascular:      Rate and Rhythm: Normal rate and regular rhythm.      Pulses: Normal pulses.      Heart sounds: Normal heart sounds. No murmur heard.  Pulmonary:      Effort: Pulmonary effort is normal. No respiratory distress.      Breath sounds: Normal breath sounds. No wheezing, rhonchi or rales.   Abdominal:      General: Bowel sounds are normal. There is no distension.      Palpations: Abdomen is soft.      Tenderness: There is no abdominal tenderness.      Comments: PEG tube in place, abdominal binder in place.    Genitourinary:     Comments: Deferred.  Musculoskeletal:         General: No swelling or tenderness. Normal range of motion.      Cervical back: Normal range of motion and neck supple.   Skin:     General: Skin is warm and dry.      Capillary Refill: Capillary refill takes less than 2 seconds.      Findings: Bruising present.   Neurological:  "     General: No focal deficit present.      Mental Status: She is alert. She is disoriented.      Comments: Eyes closed.  Drowsy.  Arousable.  Confused.  States name otherwise not answering my questions.  Speech clear and coherent.  Follows all commands.  Generalized weakness.  No focal weakness.  Gait deferred.  Cranial nerves II through XII grossly intact.   Psychiatric:         Mood and Affect: Mood normal.         Behavior: Behavior normal.       Last Recorded Vitals  Blood pressure 139/66, pulse 100, temperature 36.8 °C (98.2 °F), temperature source Oral, resp. rate 17, height 1.651 m (5' 5\"), weight 63.6 kg (140 lb 3.4 oz), SpO2 98%.    Intake/Output last 3 Shifts:  I/O last 3 completed shifts:  In: 2263.5 (35.6 mL/kg) [I.V.:1563.5 (24.6 mL/kg); IV Piggyback:700]  Out: 200 (3.1 mL/kg) [Urine:200 (0.1 mL/kg/hr)]  Weight: 63.6 kg     Telemetry normal sinus rhythm rate 80's    Relevant Results  Results for orders placed or performed during the hospital encounter of 05/09/24 (from the past 24 hour(s))   CBC   Result Value Ref Range    WBC 12.3 (H) 4.4 - 11.3 x10*3/uL    nRBC 0.0 0.0 - 0.0 /100 WBCs    RBC 2.60 (L) 4.00 - 5.20 x10*6/uL    Hemoglobin 8.0 (L) 12.0 - 16.0 g/dL    Hematocrit 24.6 (L) 36.0 - 46.0 %    MCV 95 80 - 100 fL    MCH 30.8 26.0 - 34.0 pg    MCHC 32.5 32.0 - 36.0 g/dL    RDW 13.7 11.5 - 14.5 %    Platelets 252 150 - 450 x10*3/uL   Basic Metabolic Panel   Result Value Ref Range    Glucose 101 (H) 65 - 99 mg/dL    Sodium 140 133 - 145 mmol/L    Potassium 3.7 3.4 - 5.1 mmol/L    Chloride 107 97 - 107 mmol/L    Bicarbonate 23 (L) 24 - 31 mmol/L    Urea Nitrogen 13 8 - 25 mg/dL    Creatinine 0.60 0.40 - 1.60 mg/dL    eGFR >90 >60 mL/min/1.73m*2    Calcium 7.6 (L) 8.5 - 10.4 mg/dL    Anion Gap 10 <=19 mmol/L   Vitamin D 25-Hydroxy,Total (for eval of Vitamin D levels)   Result Value Ref Range    Vitamin D, 25-Hydroxy, Total 6 (L) 31 - 100 ng/mL     Susceptibility data from last 90 days.  Collected " Specimen Info Organism Ampicillin Cefazolin Cefazolin (uncomplicated UTIs only) Ciprofloxacin Gentamicin Nitrofurantoin Piperacillin/Tazobactam Trimethoprim/Sulfamethoxazole   04/11/24 Urine from Straight Catheter Escherichia coli S S S S S S S S     Esophagogastroduodenoscopy (EGD) w PEG Tube Placement    Result Date: 5/14/2024  Table formatting from the original result was not included. Impression PEG tube placed in the stomach Mild abnormal mucosa, consistent with gastritis in the antrum; performed cold forceps biopsy Findings A [place] PEG tube measuring 24 Fr was successfully placed in the stomach using a deformable internal bolster via the pull technique after the site was identified via transillumination, visualized indentation and needle passed through abdominal wall; distance from external bolster to external end of tube: 3 cm; scope reinserted and tube rotated freely to confirm placement Mild, localized erythematous mucosa with linear furrows in the antrum, consistent with gastritis; no bleeding was identified; performed cold forceps biopsy to rule out H. pylori; Recommendation  Follow up with PCP  PEG must stay in for at least 6 weeks, after which time it could be removed in the office. (After July 1, 2024)  Indication Failure to thrive in adult, Gastritis determined by endoscopy, Cerebrovascular accident (CVA), unspecified mechanism (Multi) Staff Staff Role Sergo Kimball MD Proceduralist Medications See Anesthesia Record. Preprocedure A history and physical has been performed, and patient medication allergies have been reviewed. The patient's tolerance of previous anesthesia has been reviewed. The risks and benefits of the procedure and the sedation options and risks were discussed with the patient. All questions were answered and informed consent obtained. Details of the Procedure The patient underwent monitored anesthesia care, which was administered by an anesthesia professional. The patient's blood  pressure, ECG, ETCO2, heart rate, level of consciousness, oxygen and respirations were monitored throughout the procedure. The scope was introduced through the mouth and advanced to the second part of the duodenum. Retroflexion was performed in the cardia. Prior to the procedure, the patient's H. Pylori status was unknown. The patient's estimated blood loss was minimal (<5 mL). The procedure was not difficult. The patient tolerated the procedure well. There were no apparent adverse events. Events Procedure Events Event Event Time ENDO SCOPE IN TIME 5/14/2024  7:38 AM ENDO SCOPE OUT TIME 5/14/2024  7:48 AM Specimens ID Type Source Tests Collected by Time 1 : r/o h pylori Tissue STOMACH ANTRUM BIOPSY SURGICAL PATHOLOGY EXAM Marla Gamez RN 5/14/2024 0740 Procedure Location [address] [id number] [phone] Referring Provider No referring provider defined for this encounter. Procedure Provider No name on file      Scheduled medications  aspirin, 81 mg, oral, Daily  atorvastatin, 40 mg, oral, Nightly  ferrous sulfate (325 mg ferrous sulfate), 65 mg of iron, oral, BID  fluconazole, 100 mg, oral, Daily  heparin (porcine), 5,000 Units, subcutaneous, q8h JACQUELYN  lactobacillus acidophilus, 1 tablet, oral, BID  metoprolol succinate XL, 100 mg, oral, Daily  metroNIDAZOLE, 500 mg, intravenous, q8h  mirtazapine, 7.5 mg, oral, Nightly  potassium chloride, 20 mEq, oral, BID  vancomycin, 125 mg, oral, q6h      Continuous medications     PRN medications  PRN medications: acetaminophen **OR** acetaminophen **OR** acetaminophen, acetaminophen, benzocaine-menthol, dextromethorphan-guaifenesin, guaiFENesin, hydrALAZINE, metoprolol, ondansetron ODT, polyethylene glycol      ASSESSMENT:  Status post PEG tube placement  Generalized weakness  Adult failure to thrive  Poor oral intake  Dysphagia  Dehydration  Hypernatremia - resolved  Hypokalemia - resolved  Pyuria -  urinary tract infection  Leukocytosis  History of C. Difficile  C. Difficile  Oropharyngeal candida  Hypoalbuminemia   Normocytic anemia  History of CVA  Supraventricular tachycardia - resolved    PLAN:  Status post PEG tube placement.  Post-operative care.  Maintain abdominal binder.  Continue mouth care.  Speech therapy follow-up.  Initiate tube feeding per RD recommendations timing as per General surgery.  Aspiration precautions.  Labs reviewed.  Tube feed and flushes per RD recommendations.  Monitor electrolytes and fluid volume status.  + C. Difficile.  I's and O's reviewed.  Afebrile.  WBC 12.3.  Infectious disease following.  Input appreciated.  Continue Diflucan.  Flagyl + Vancomycin.  Discontinue Flagyl at time of discharge.  Probiotic.  Monitor stools.   Contact plus precautions per protocol.  Monitor temperature and white blood cell count.  PT/OT as tolerated.  Fall precautions.  Up with assistance only.  DVT prophylaxis.  Heparin subcutaneous.  Supportive care.  Patient reassured.  Case management following for discharge planning.  Awaiting discharge arrangements.  Referral to [facility], pending.  Anticipate discharge when tolerating tube feeding, and arrangements are made by case management.  Discussed with patient, spouse, daughter, nursing, case management and Dr. Denise.      Belen Martin, APRN-CNP

## 2024-05-14 NOTE — PROGRESS NOTES
Subjective Data:      Overnight Events:       Objective Data:  Last Recorded Vitals:  Vitals:    05/14/24 0810 05/14/24 0820 05/14/24 0830 05/14/24 0840   BP: (!) 151/94 154/75 155/83 154/80   BP Location:       Patient Position:       Pulse: 92 88 99 91   Resp: 23 20 20 18   Temp:       TempSrc:       SpO2: 97% 97% 95% 97%   Weight:       Height:           Last Labs:  CBC - 5/14/2024:  5:49 AM  12.3 8.0 252    24.6      CMP - 5/14/2024:  5:50 AM  7.6 5.5 8 --- 0.3   _ 2.4 <5 103      PTT - No results in last year.  _   _ _     HGBA1C   Date/Time Value Ref Range Status   04/12/2024 07:46 AM 5.2 See below % Final   05/16/2023 03:49 PM 5.9 % Final     Comment:          Diagnosis of Diabetes-Adults   Non-Diabetic: < or = 5.6%   Increased risk for developing diabetes: 5.7-6.4%   Diagnostic of diabetes: > or = 6.5%  .       Monitoring of Diabetes                Age (y)     Therapeutic Goal (%)   Adults:          >18           <7.0   Pediatrics:    13-18           <7.5                   7-12           <8.0                   0- 6            7.5-8.5   American Diabetes Association. Diabetes Care 33(S1), Jan 2010.     04/26/2022 12:56 PM 6.0 % Final     Comment:          Diagnosis of Diabetes-Adults   Non-Diabetic: < or = 5.6%   Increased risk for developing diabetes: 5.7-6.4%   Diagnostic of diabetes: > or = 6.5%  .       Monitoring of Diabetes                Age (y)     Therapeutic Goal (%)   Adults:          >18           <7.0   Pediatrics:    13-18           <7.5                   7-12           <8.0                   0- 6            7.5-8.5   American Diabetes Association. Diabetes Care 33(S1), Jan 2010.       LDLCALC   Date/Time Value Ref Range Status   04/12/2024 07:46  65 - 130 mg/dL Final     VLDL   Date/Time Value Ref Range Status   02/03/2021 11:42 AM 45 0 - 40 mg/dL Final      Last I/O:  I/O last 3 completed shifts:  In: 2263.5 (35.6 mL/kg) [I.V.:1563.5 (24.6 mL/kg); IV Piggyback:700]  Out: 200 (3.1 mL/kg)  "[Urine:200 (0.1 mL/kg/hr)]  Weight: 63.6 kg     Past Cardiology Tests (Last 3 Years):  EKG:  ECG 12 lead 05/09/2024      ECG 12 lead 04/10/2024    Echo:  Transthoracic Echo (TTE) Complete 04/12/2024    Ejection Fractions:  No results found for: \"EF\"  Cath:  No results found for this or any previous visit from the past 1095 days.    Stress Test:  No results found for this or any previous visit from the past 1095 days.    Cardiac Imaging:  No results found for this or any previous visit from the past 1095 days.      Inpatient Medications:  Scheduled medications   Medication Dose Route Frequency    aspirin  81 mg oral Daily    atorvastatin  40 mg oral Nightly    ferrous sulfate (325 mg ferrous sulfate)  65 mg of iron oral BID with meals    fluconazole  100 mg oral Daily    heparin (porcine)  5,000 Units subcutaneous q8h JACQUELYN    lactobacillus acidophilus  1 tablet oral BID with meals    metoprolol succinate XL  100 mg oral Daily    metroNIDAZOLE  500 mg intravenous q8h    mirtazapine  7.5 mg oral Nightly    potassium chloride  20 mEq oral BID with meals    vancomycin  125 mg oral q6h     PRN medications   Medication    acetaminophen    Or    acetaminophen    Or    acetaminophen    acetaminophen    benzocaine-menthol    dextromethorphan-guaifenesin    guaiFENesin    hydrALAZINE    metoprolol    ondansetron ODT    polyethylene glycol     Continuous Medications   Medication Dose Last Rate       Physical Exam:       Assessment/Plan     Principal Problem:    General weakness     General weakness     5/10:The patient is a 78-year-old white female whose past medical history includes hypertension, chronic kidney disease, former alcohol excess, status post bioprosthetic aortic valve replacement utilizing a number 23 mm freestyle bioprosthesis along with replacement of the ascending aorta and transverse hemiarch using a number 24 mm Gelweave graft plus tricuspid valve repair utilizing number 21 mm Mohini ring plus occlusion of a " small of membranous VSD 3/6/2008. This patient has done very well since her cardiac surgery with her last surveillance echocardiogram on 9/25/2023 showing an LV ejection fraction of 55-60% 1-2+ mitral valve regurgitation normal-appearing stentless aortic valve bioprosthesis with a peak systolic gradient of 11 mmHg and a prosthetic graft in the aortic root and ascending/arch position. Patient was admitted on 4/10/2024 with failure to thrive inability to provide self-care difficulty walking which appears to be related to progressing dementia.  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  She had a repeat echocardiogram that showed a preserved LV ejection fraction 60% normal-appearing stentless bioprosthetic aortic valve replacement minimal systolic pressure gradient with prior tricuspid valve repair and only mild to moderate tricuspid valve regurgitation.  There is trace to mild mitral valve regurgitation bubble study negative for residual VSD that was closed at the time of her aortic valve replacement.  Patient was treated with for an E. coli urinary tract infection but her mental status appeared to be significantly impaired from dementia.  She was continued on dual antiplatelet therapy with aspirin and Plavix along with Toprol-XL 50 mg daily.  Patient readmitted with failure to thrive with reduced oral intake and significant dehydration with hypernatremia hyperchloremia and hypokalemia for which the patient is receiving IV D5W and potassium supplementation.  From a cardiac standpoint the patient is stable but peers to have significantly advancing senile dementia versus multi-infarct dementia.  For now would continue  usual cardiac therapy with the dual antiplatelet therapy and atorvastatin plus metoprolol.     5/11 Samsa for Mario: Patient admitted essentially with a failure to thrive.  As noted above bioprosthetic aortic valve appears to be functioning normally and left ventricular systolic function preserved.  Serum sodium gradually decreasing with fluid hydration.  Patient does remain hypokalemic and will provide oral supplementation in addition to the potassium in the IV fluids.  Blood pressure mildly elevated and will titrate up the dose of the metoprolol succinate.     5/12:  Samsa for Mraio: Essentially no change over the last 24 hours.  The patient has been refusing oral medications.  Hemodynamically the patient appears stable.  I think at this point we should consider palliative care and possibly even hospice care in this individual.  She does not appear to be agreeable to a PEG tube at this time.  Thus a comfort care model may be a better option.     5/13: Patient lying quietly in bed she is awake and able to answer simple questions.  Hyper natremia and hyperchloremia have resolved with the IV D5W with potassium supplementation serum sodium 139 chloride 109 potassium 4.1 with a creatinine of 0.70.  CBC notable for hematocrit of 26.7 WBC 14,500.  The patient's PCR for C. difficile is positive.  Stools for C. difficile toxin are negative.  Patient is on contact precautions.  Patient has been started on oral vancomycin. Patient seen by infectious disease uncertain as to whether patient has C. difficile infection versus colonization.  She had evidently recently completed a course of oral vancomycin at the [facility] nursing facility for C. difficile colitis.  It is suspected that the patient does have more likely C. difficile colonization and is currently on IV Flagyl.  The patient has been tentatively scheduled for an EGD with PEG tube placement tomorrow but also palliative care consultation has been recommended the  patient.  Patient is on dual antiplatelet therapy after having a small cortical CVA recently but will discontinue Plavix for now to reduce risk of bleeding from PEG tube insertion.    5/14: The patient was seen and events reviewed discussed in detail with family members.  The patient underwent PEG tube insertion earlier today well-tolerated still somewhat sedated.  Patient is being followed by nephrology and yesterday was switched to IV D5 half-normal saline with sodium bicarb at night due to a mild residual metabolic acidosis.  The patient has been seen by infectious disease thought to have C. difficile colonization having completed a course of oral vancomycin at her nursing facility.  As noted the  C. difficile PCR was positive but toxin EIA negative.  Abdomen benign no diarrhea but the patient will be treated with IV Flagyl.  The patient's electrolyte panel remains improved creatinine 0.6 sodium 140 potassium 3.7 .  First PEG tube feed to be performed later today    Peripheral IV 05/14/24 22 G Distal;Right Forearm (Active)   Site Assessment Clean;Dry;Intact 05/14/24 0805   Dressing Type Transparent 05/14/24 0805   Line Status Infusing 05/14/24 0805   Dressing Status Clean;Dry;Occlusive 05/14/24 0805   Number of days: 0       Code Status:  Full Code    I spent 20 minutes in the professional and overall care of this patient.        Shaheed Martin MD

## 2024-05-14 NOTE — ANESTHESIA PREPROCEDURE EVALUATION
Patient: Rebecca Samaniego    Procedure Information       Anesthesia Start Date/Time: 05/14/24 0728    Scheduled providers: Sergo Kimball MD; Kylah Melgar MD; RAYNA Ruvalcaba    Procedure: EGD    Location: [facility]          Past Medical History:   Diagnosis Date    Hypertension      Past Surgical History:   Procedure Laterality Date    AORTIC VALVE REPLACEMENT  02/11/2014    Aortic Valve Replacement    BREAST LUMPECTOMY  02/11/2014    Breast Surgery Lumpectomy    CERVICAL DISCECTOMY  02/11/2014    Spinal Diskectomy Cervical    HYSTERECTOMY  02/11/2014    Hysterectomy    OTHER SURGICAL HISTORY  02/11/2014    Aortic Coarctation Repair     Allergies   Allergen Reactions    Sulfa (Sulfonamide Antibiotics) Hives    Sulfamethoxazole-Trimethoprim Hives    Ciprofloxacin Hives    Nitrofurantoin Diarrhea     Relevant Problems   Anesthesia (within normal limits)      Cardiac (within normal limits)      Pulmonary (within normal limits)      Neuro   (+) Stroke (Multi)      GI (within normal limits)      /Renal   (+) UTI (urinary tract infection)      Liver (within normal limits)      Endocrine (within normal limits)      Hematology (within normal limits)      Musculoskeletal (within normal limits)      HEENT (within normal limits)      ID   (+) UTI (urinary tract infection)      Skin (within normal limits)      GYN (within normal limits)       Clinical information reviewed:   Tobacco  Allergies  Meds  Problems  Med Hx  Surg Hx   Fam Hx  Soc   Hx        NPO Detail:  No data recorded     Physical Exam    Airway  Mallampati: I  TM distance: >3 FB  Neck ROM: full     Cardiovascular   Rhythm: regular  Rate: normal     Dental    Pulmonary   Breath sounds clear to auscultation     Abdominal            Anesthesia Plan    History of general anesthesia?: yes  History of complications of general anesthesia?: no    ASA 3     general     intravenous induction   Postoperative administration of  opioids is intended.  Anesthetic plan and risks discussed with patient.  Use of blood products discussed with patient who.

## 2024-05-14 NOTE — POST-PROCEDURE NOTE
One episode of emesis in PACU. Linens changed, new gown. Patient declined medication, states nausea has subsided. Pending transfer back to inpatient room.

## 2024-05-15 LAB
ANION GAP SERPL CALC-SCNC: 11 MMOL/L
BUN SERPL-MCNC: 13 MG/DL (ref 8–25)
CALCIUM SERPL-MCNC: 7.9 MG/DL (ref 8.5–10.4)
CHLORIDE SERPL-SCNC: 105 MMOL/L (ref 97–107)
CO2 SERPL-SCNC: 22 MMOL/L (ref 24–31)
CREAT SERPL-MCNC: 0.7 MG/DL (ref 0.4–1.6)
EGFRCR SERPLBLD CKD-EPI 2021: 88 ML/MIN/1.73M*2
ERYTHROCYTE [DISTWIDTH] IN BLOOD BY AUTOMATED COUNT: 14 % (ref 11.5–14.5)
GLUCOSE SERPL-MCNC: 126 MG/DL (ref 65–99)
HCT VFR BLD AUTO: 30 % (ref 36–46)
HGB BLD-MCNC: 8.9 G/DL (ref 12–16)
LABORATORY COMMENT REPORT: NORMAL
MCH RBC QN AUTO: 30.4 PG (ref 26–34)
MCHC RBC AUTO-ENTMCNC: 29.7 G/DL (ref 32–36)
MCV RBC AUTO: 102 FL (ref 80–100)
NRBC BLD-RTO: 0 /100 WBCS (ref 0–0)
PATH REPORT.FINAL DX SPEC: NORMAL
PATH REPORT.GROSS SPEC: NORMAL
PATH REPORT.RELEVANT HX SPEC: NORMAL
PATH REPORT.TOTAL CANCER: NORMAL
PLATELET # BLD AUTO: 320 X10*3/UL (ref 150–450)
POTASSIUM SERPL-SCNC: 3.7 MMOL/L (ref 3.4–5.1)
RBC # BLD AUTO: 2.93 X10*6/UL (ref 4–5.2)
SODIUM SERPL-SCNC: 138 MMOL/L (ref 133–145)
WBC # BLD AUTO: 12.7 X10*3/UL (ref 4.4–11.3)

## 2024-05-15 PROCEDURE — 97535 SELF CARE MNGMENT TRAINING: CPT | Mod: GO

## 2024-05-15 PROCEDURE — 85027 COMPLETE CBC AUTOMATED: CPT | Performed by: INTERNAL MEDICINE

## 2024-05-15 PROCEDURE — 2500000004 HC RX 250 GENERAL PHARMACY W/ HCPCS (ALT 636 FOR OP/ED): Performed by: INTERNAL MEDICINE

## 2024-05-15 PROCEDURE — 2500000001 HC RX 250 WO HCPCS SELF ADMINISTERED DRUGS (ALT 637 FOR MEDICARE OP): Performed by: INTERNAL MEDICINE

## 2024-05-15 PROCEDURE — 2500000004 HC RX 250 GENERAL PHARMACY W/ HCPCS (ALT 636 FOR OP/ED): Performed by: SURGERY

## 2024-05-15 PROCEDURE — 36415 COLL VENOUS BLD VENIPUNCTURE: CPT | Performed by: INTERNAL MEDICINE

## 2024-05-15 PROCEDURE — 2500000006 HC RX 250 W HCPCS SELF ADMINISTERED DRUGS (ALT 637 FOR ALL PAYERS): Performed by: INTERNAL MEDICINE

## 2024-05-15 PROCEDURE — 1200000002 HC GENERAL ROOM WITH TELEMETRY DAILY

## 2024-05-15 PROCEDURE — 2500000001 HC RX 250 WO HCPCS SELF ADMINISTERED DRUGS (ALT 637 FOR MEDICARE OP): Performed by: SURGERY

## 2024-05-15 PROCEDURE — 82374 ASSAY BLOOD CARBON DIOXIDE: CPT | Performed by: INTERNAL MEDICINE

## 2024-05-15 PROCEDURE — 99232 SBSQ HOSP IP/OBS MODERATE 35: CPT | Performed by: INTERNAL MEDICINE

## 2024-05-15 RX ORDER — METOPROLOL TARTRATE 50 MG/1
50 TABLET ORAL 2 TIMES DAILY
Status: DISCONTINUED | OUTPATIENT
Start: 2024-05-15 | End: 2024-05-24 | Stop reason: HOSPADM

## 2024-05-15 RX ORDER — POTASSIUM CHLORIDE 1.5 G/1.58G
20 POWDER, FOR SOLUTION ORAL
Status: DISCONTINUED | OUTPATIENT
Start: 2024-05-16 | End: 2024-05-16

## 2024-05-15 RX ADMIN — Medication 1 TABLET: at 08:52

## 2024-05-15 RX ADMIN — HEPARIN SODIUM 5000 UNITS: 5000 INJECTION, SOLUTION INTRAVENOUS; SUBCUTANEOUS at 16:57

## 2024-05-15 RX ADMIN — METOPROLOL SUCCINATE 100 MG: 100 TABLET, EXTENDED RELEASE ORAL at 08:49

## 2024-05-15 RX ADMIN — FERROUS SULFATE TAB 325 MG (65 MG ELEMENTAL FE) 1 TABLET: 325 (65 FE) TAB at 08:49

## 2024-05-15 RX ADMIN — POTASSIUM CHLORIDE 20 MEQ: 1.5 FOR SOLUTION ORAL at 08:49

## 2024-05-15 RX ADMIN — MIRTAZAPINE 7.5 MG: 15 TABLET, FILM COATED ORAL at 21:13

## 2024-05-15 RX ADMIN — Medication 1 TABLET: at 16:57

## 2024-05-15 RX ADMIN — ASPIRIN 81 MG CHEWABLE TABLET 81 MG: 81 TABLET CHEWABLE at 08:49

## 2024-05-15 RX ADMIN — VANCOMYCIN HYDROCHLORIDE 125 MG: 125 CAPSULE ORAL at 08:50

## 2024-05-15 RX ADMIN — HEPARIN SODIUM 5000 UNITS: 5000 INJECTION, SOLUTION INTRAVENOUS; SUBCUTANEOUS at 04:24

## 2024-05-15 RX ADMIN — FERROUS SULFATE TAB 325 MG (65 MG ELEMENTAL FE) 1 TABLET: 325 (65 FE) TAB at 16:57

## 2024-05-15 RX ADMIN — ATORVASTATIN CALCIUM 40 MG: 40 TABLET, FILM COATED ORAL at 21:13

## 2024-05-15 RX ADMIN — FLUCONAZOLE 100 MG: 100 TABLET ORAL at 08:49

## 2024-05-15 RX ADMIN — METRONIDAZOLE 500 MG: 5 INJECTION, SOLUTION INTRAVENOUS at 04:24

## 2024-05-15 RX ADMIN — METOPROLOL TARTRATE 50 MG: 50 TABLET, FILM COATED ORAL at 21:13

## 2024-05-15 RX ADMIN — HEPARIN SODIUM 5000 UNITS: 5000 INJECTION, SOLUTION INTRAVENOUS; SUBCUTANEOUS at 21:13

## 2024-05-15 RX ADMIN — VANCOMYCIN HYDROCHLORIDE 125 MG: 125 CAPSULE ORAL at 04:24

## 2024-05-15 ASSESSMENT — COGNITIVE AND FUNCTIONAL STATUS - GENERAL
TURNING FROM BACK TO SIDE WHILE IN FLAT BAD: A LOT
TOILETING: TOTAL
PERSONAL GROOMING: TOTAL
DRESSING REGULAR UPPER BODY CLOTHING: TOTAL
MOVING FROM LYING ON BACK TO SITTING ON SIDE OF FLAT BED WITH BEDRAILS: TOTAL
MOBILITY SCORE: 10
HELP NEEDED FOR BATHING: TOTAL
DRESSING REGULAR UPPER BODY CLOTHING: TOTAL
DRESSING REGULAR LOWER BODY CLOTHING: TOTAL
DAILY ACTIVITIY SCORE: 7
STANDING UP FROM CHAIR USING ARMS: TOTAL
DAILY ACTIVITIY SCORE: 6
MOVING TO AND FROM BED TO CHAIR: TOTAL
MOVING FROM LYING ON BACK TO SITTING ON SIDE OF FLAT BED WITH BEDRAILS: A LOT
MOBILITY SCORE: 6
STANDING UP FROM CHAIR USING ARMS: TOTAL
DAILY ACTIVITIY SCORE: 6
EATING MEALS: TOTAL
DRESSING REGULAR LOWER BODY CLOTHING: TOTAL
EATING MEALS: TOTAL
WALKING IN HOSPITAL ROOM: TOTAL
PERSONAL GROOMING: TOTAL
CLIMB 3 TO 5 STEPS WITH RAILING: TOTAL
DRESSING REGULAR LOWER BODY CLOTHING: TOTAL
DRESSING REGULAR UPPER BODY CLOTHING: A LOT
WALKING IN HOSPITAL ROOM: A LOT
HELP NEEDED FOR BATHING: TOTAL
TOILETING: TOTAL
MOVING TO AND FROM BED TO CHAIR: A LOT
CLIMB 3 TO 5 STEPS WITH RAILING: TOTAL
TURNING FROM BACK TO SIDE WHILE IN FLAT BAD: TOTAL
PERSONAL GROOMING: TOTAL
HELP NEEDED FOR BATHING: TOTAL
TOILETING: TOTAL
EATING MEALS: TOTAL

## 2024-05-15 ASSESSMENT — PAIN SCALES - GENERAL
PAINLEVEL_OUTOF10: 0 - NO PAIN
PAINLEVEL_OUTOF10: 0 - NO PAIN

## 2024-05-15 ASSESSMENT — PAIN SCALES - WONG BAKER: WONGBAKER_NUMERICALRESPONSE: NO HURT

## 2024-05-15 ASSESSMENT — ACTIVITIES OF DAILY LIVING (ADL): HOME_MANAGEMENT_TIME_ENTRY: 12

## 2024-05-15 ASSESSMENT — PAIN - FUNCTIONAL ASSESSMENT: PAIN_FUNCTIONAL_ASSESSMENT: UNABLE TO SELF-REPORT

## 2024-05-15 NOTE — PROGRESS NOTES
Rebecca Samaniego is a 78 y.o. female on day 6 of admission presenting with General weakness.    Subjective   Patient seen and examined.  Resting in bed in no acute distress.  Eyes closed.  Arousable.  Drowsy.  States name.  Review of systems is limited.     Objective     Physical Exam  Constitutional:       General: She is not in acute distress.     Appearance: Normal appearance. She is normal weight. She is ill-appearing. She is not toxic-appearing or diaphoretic.   HENT:      Head: Normocephalic and atraumatic.      Right Ear: Tympanic membrane normal.      Left Ear: Tympanic membrane normal.      Nose: Nose normal.      Mouth/Throat:      Mouth: Mucous membranes are dry.      Pharynx: Oropharynx is clear.      Comments: Mouth dry, tongue less coated.  Eyes:      Extraocular Movements: Extraocular movements intact.      Conjunctiva/sclera: Conjunctivae normal.      Pupils: Pupils are equal, round, and reactive to light.   Cardiovascular:      Rate and Rhythm: Normal rate and regular rhythm.      Pulses: Normal pulses.      Heart sounds: Normal heart sounds. No murmur heard.  Pulmonary:      Effort: Pulmonary effort is normal. No respiratory distress.      Breath sounds: Normal breath sounds. No wheezing, rhonchi or rales.   Abdominal:      General: Bowel sounds are normal. There is no distension.      Palpations: Abdomen is soft.      Tenderness: There is no abdominal tenderness.      Comments: PEG tube in place, abdominal binder in place.    Genitourinary:     Comments: Deferred.  Musculoskeletal:         General: No swelling or tenderness. Normal range of motion.      Cervical back: Normal range of motion and neck supple.   Skin:     General: Skin is warm and dry.      Capillary Refill: Capillary refill takes less than 2 seconds.      Findings: Bruising present.   Neurological:      General: No focal deficit present.      Mental Status: She is alert. She is disoriented.      Comments: Eyes closed.  Drowsy.   "Arousable.  Confused.  States name otherwise not answering my questions.  Speech clear and coherent.  Follows all commands.  Generalized weakness.  No focal weakness.  Gait deferred.  Cranial nerves II through XII grossly intact.   Psychiatric:         Mood and Affect: Mood normal.         Behavior: Behavior normal.       Last Recorded Vitals  Blood pressure 141/78, pulse 93, temperature 37.4 °C (99.3 °F), temperature source Axillary, resp. rate 17, height 1.651 m (5' 5\"), weight 63.6 kg (140 lb 3.4 oz), SpO2 98%.    Intake/Output last 3 Shifts:  I/O last 3 completed shifts:  In: 971.5 (15.3 mL/kg) [I.V.:671.5 (10.6 mL/kg); NG/GT:300]  Out: 200 (3.1 mL/kg) [Urine:200 (0.1 mL/kg/hr)]  Weight: 63.6 kg     Relevant Results  Results for orders placed or performed during the hospital encounter of 05/09/24 (from the past 24 hour(s))   CBC   Result Value Ref Range    WBC 12.7 (H) 4.4 - 11.3 x10*3/uL    nRBC 0.0 0.0 - 0.0 /100 WBCs    RBC 2.93 (L) 4.00 - 5.20 x10*6/uL    Hemoglobin 8.9 (L) 12.0 - 16.0 g/dL    Hematocrit 30.0 (L) 36.0 - 46.0 %     (H) 80 - 100 fL    MCH 30.4 26.0 - 34.0 pg    MCHC 29.7 (L) 32.0 - 36.0 g/dL    RDW 14.0 11.5 - 14.5 %    Platelets 320 150 - 450 x10*3/uL   Basic Metabolic Panel   Result Value Ref Range    Glucose 126 (H) 65 - 99 mg/dL    Sodium 138 133 - 145 mmol/L    Potassium 3.7 3.4 - 5.1 mmol/L    Chloride 105 97 - 107 mmol/L    Bicarbonate 22 (L) 24 - 31 mmol/L    Urea Nitrogen 13 8 - 25 mg/dL    Creatinine 0.70 0.40 - 1.60 mg/dL    eGFR 88 >60 mL/min/1.73m*2    Calcium 7.9 (L) 8.5 - 10.4 mg/dL    Anion Gap 11 <=19 mmol/L     Susceptibility data from last 90 days.  Collected Specimen Info Organism Ampicillin Cefazolin Cefazolin (uncomplicated UTIs only) Ciprofloxacin Gentamicin Nitrofurantoin Piperacillin/Tazobactam Trimethoprim/Sulfamethoxazole   04/11/24 Urine from Straight Catheter Escherichia coli S S S S S S S S     Esophagogastroduodenoscopy (EGD) w PEG Tube Placement    Result " Date: 5/14/2024  Table formatting from the original result was not included. Impression PEG tube placed in the stomach Mild abnormal mucosa, consistent with gastritis in the antrum; performed cold forceps biopsy Findings A [place] PEG tube measuring 24 Fr was successfully placed in the stomach using a deformable internal bolster via the pull technique after the site was identified via transillumination, visualized indentation and needle passed through abdominal wall; distance from external bolster to external end of tube: 3 cm; scope reinserted and tube rotated freely to confirm placement Mild, localized erythematous mucosa with linear furrows in the antrum, consistent with gastritis; no bleeding was identified; performed cold forceps biopsy to rule out H. pylori; Recommendation  Follow up with PCP  PEG must stay in for at least 6 weeks, after which time it could be removed in the office. (After July 1, 2024)  Indication Failure to thrive in adult, Gastritis determined by endoscopy, Cerebrovascular accident (CVA), unspecified mechanism (Multi) Staff Staff Role Sergo Kimball MD Proceduralist Medications See Anesthesia Record. Preprocedure A history and physical has been performed, and patient medication allergies have been reviewed. The patient's tolerance of previous anesthesia has been reviewed. The risks and benefits of the procedure and the sedation options and risks were discussed with the patient. All questions were answered and informed consent obtained. Details of the Procedure The patient underwent monitored anesthesia care, which was administered by an anesthesia professional. The patient's blood pressure, ECG, ETCO2, heart rate, level of consciousness, oxygen and respirations were monitored throughout the procedure. The scope was introduced through the mouth and advanced to the second part of the duodenum. Retroflexion was performed in the cardia. Prior to the procedure, the patient's H. Pylori status was  unknown. The patient's estimated blood loss was minimal (<5 mL). The procedure was not difficult. The patient tolerated the procedure well. There were no apparent adverse events. Events Procedure Events Event Event Time ENDO SCOPE IN TIME 5/14/2024  7:38 AM ENDO SCOPE OUT TIME 5/14/2024  7:48 AM Specimens ID Type Source Tests Collected by Time 1 : r/o h pylori Tissue STOMACH ANTRUM BIOPSY SURGICAL PATHOLOGY EXAM Marla Gamez RN 5/14/2024 0740 Procedure Location [address] [address] [phone] Referring Provider No referring provider defined for this encounter. Procedure Provider No name on file      Scheduled medications  aspirin, 81 mg, oral, Daily  atorvastatin, 40 mg, oral, Nightly  ceFAZolin, 2 g, intravenous, Once  [START ON 5/19/2024] cholecalciferol, 50,000 Units, oral, Every Sunday  ferrous sulfate (325 mg ferrous sulfate), 65 mg of iron, oral, BID  fluconazole, 100 mg, oral, Daily  heparin (porcine), 5,000 Units, subcutaneous, q8h JACQUELYN  lactobacillus acidophilus, 1 tablet, oral, BID  metoprolol tartrate, 50 mg, g-tube, BID  mirtazapine, 7.5 mg, oral, Nightly  [START ON 5/16/2024] potassium chloride, 20 mEq, oral, Daily with breakfast      Continuous medications     PRN medications  PRN medications: acetaminophen **OR** acetaminophen **OR** acetaminophen, acetaminophen, benzocaine-menthol, dextromethorphan-guaifenesin, guaiFENesin, hydrALAZINE, ondansetron ODT, polyethylene glycol      ASSESSMENT:  Status post PEG tube placement  Generalized weakness  Adult failure to thrive  Poor oral intake  Dysphagia  Dehydration  Hypernatremia - resolved  Hypokalemia - resolved  Pyuria - urinary tract infection  Leukocytosis  History of C. Difficile  C. Difficile  Oropharyngeal candida  Hypoalbuminemia   Normocytic anemia stable  History of CVA  Supraventricular tachycardia - resolved    PLAN:  Patient is doing okay this morning.   Post-operative PEG tube placement day 1.  PEG tube site stable.  Maintain abdominal binder.  Continue mouth care.  Tolerating tube feed at current rate.  Continue tube feed increase rate to goal as per RD recommendations.  Aspiration precautions.  Speech therapy follow-up.  General surgery signed off.  Labs reviewed.  Electrolytes and renal function stable.  WBC 12.9.  Infectious disease following.  Discussed with Dr. Chen.  Anticipate discontinuation of antibiotics.  Follow-up.  Maintain contact plus precautions per protocol.  Monitor temperature and white blood cell count.  Drowsy, arousable.  No focal deficits.  Monitor medication for now.  PT/OT as tolerated.  Fall precautions.  Up with assistance only.  DVT prophylaxis.  Heparin subcutaneous.  Supportive care.  Patient reassured.  Case management following for discharge planning.  Discharge plan to [facility].  Anticipate discharge soon.  Discussed with Dr. Denise.        Belen Martin, BILLY-CNP

## 2024-05-15 NOTE — PROGRESS NOTES
Speech-Language Pathology                 Therapy Communication Note    Patient Name: Rebecca Samaniego  MRN: [id number]  Today's Date: 5/15/2024     Discipline: Speech Language Pathology    Missed Visit Reason:  Pt had PEG placed 5/14/24, offered PO, pt refused, spouse @ bedside, states pt has been refusing any PO, ST will sign off @ this time d/t refusal. If pt status improves reorder speech tx, discussed with RN    Missed Time: Cancel    Comment:

## 2024-05-15 NOTE — PROGRESS NOTES
05/15/24 0844   Discharge Planning   Patient expects to be discharged to: [facility] dc plan secured back to SNF  -Need final dc order  -Need AVS

## 2024-05-15 NOTE — PROGRESS NOTES
Subjective Data:      Overnight Events:         Objective Data:  Last Recorded Vitals:  Vitals:    05/14/24 2000 05/14/24 2025 05/15/24 0432 05/15/24 0700   BP:  140/59 132/57 141/78   BP Location:  Right arm Right arm Right arm   Patient Position:  Lying  Lying   Pulse: (!) 115 79 96 93   Resp:  18 18 17   Temp:  36.3 °C (97.3 °F) 37 °C (98.6 °F) 37.4 °C (99.3 °F)   TempSrc:  Axillary Axillary Axillary   SpO2:  98% 97% 98%   Weight:       Height:           Last Labs:  CBC - 5/15/2024:  7:00 AM  12.7 8.9 320    30.0      CMP - 5/15/2024:  7:01 AM  7.9 5.5 8 --- 0.3   _ 2.4 <5 103      PTT - No results in last year.  _   _ _     HGBA1C   Date/Time Value Ref Range Status   04/12/2024 07:46 AM 5.2 See below % Final   05/16/2023 03:49 PM 5.9 % Final     Comment:          Diagnosis of Diabetes-Adults   Non-Diabetic: < or = 5.6%   Increased risk for developing diabetes: 5.7-6.4%   Diagnostic of diabetes: > or = 6.5%  .       Monitoring of Diabetes                Age (y)     Therapeutic Goal (%)   Adults:          >18           <7.0   Pediatrics:    13-18           <7.5                   7-12           <8.0                   0- 6            7.5-8.5   American Diabetes Association. Diabetes Care 33(S1), Jan 2010.     04/26/2022 12:56 PM 6.0 % Final     Comment:          Diagnosis of Diabetes-Adults   Non-Diabetic: < or = 5.6%   Increased risk for developing diabetes: 5.7-6.4%   Diagnostic of diabetes: > or = 6.5%  .       Monitoring of Diabetes                Age (y)     Therapeutic Goal (%)   Adults:          >18           <7.0   Pediatrics:    13-18           <7.5                   7-12           <8.0                   0- 6            7.5-8.5   American Diabetes Association. Diabetes Care 33(S1), Jan 2010.       LDLCALC   Date/Time Value Ref Range Status   04/12/2024 07:46  65 - 130 mg/dL Final     VLDL   Date/Time Value Ref Range Status   02/03/2021 11:42 AM 45 0 - 40 mg/dL Final      Last I/O:  I/O last 3  "completed shifts:  In: 971.5 (15.3 mL/kg) [I.V.:671.5 (10.6 mL/kg); NG/GT:300]  Out: 200 (3.1 mL/kg) [Urine:200 (0.1 mL/kg/hr)]  Weight: 63.6 kg     Past Cardiology Tests (Last 3 Years):  EKG:  ECG 12 lead 05/09/2024      ECG 12 lead 04/10/2024    Echo:  Transthoracic Echo (TTE) Complete 04/12/2024    Ejection Fractions:  No results found for: \"EF\"  Cath:  No results found for this or any previous visit from the past 1095 days.    Stress Test:  No results found for this or any previous visit from the past 1095 days.    Cardiac Imaging:  No results found for this or any previous visit from the past 1095 days.      Inpatient Medications:  Scheduled medications   Medication Dose Route Frequency    aspirin  81 mg oral Daily    atorvastatin  40 mg oral Nightly    ceFAZolin  2 g intravenous Once    [START ON 5/19/2024] cholecalciferol  50,000 Units oral Every Sunday    ferrous sulfate (325 mg ferrous sulfate)  65 mg of iron oral BID    fluconazole  100 mg oral Daily    heparin (porcine)  5,000 Units subcutaneous q8h [facility]    lactobacillus acidophilus  1 tablet oral BID    metoprolol succinate XL  100 mg oral Daily    metroNIDAZOLE  500 mg intravenous q8h    mirtazapine  7.5 mg oral Nightly    potassium chloride  20 mEq oral BID    vancomycin  125 mg oral q6h     PRN medications   Medication    acetaminophen    Or    acetaminophen    Or    acetaminophen    acetaminophen    benzocaine-menthol    dextromethorphan-guaifenesin    guaiFENesin    hydrALAZINE    metoprolol    ondansetron ODT    polyethylene glycol     Continuous Medications   Medication Dose Last Rate       Physical Exam:       Assessment/Plan     Principal Problem:    General weakness     General weakness     5/10:The patient is a 78-year-old white female whose past medical history includes hypertension, chronic kidney disease, former alcohol excess, status post bioprosthetic aortic valve replacement utilizing a number 23 mm freestyle bioprosthesis along with " replacement of the ascending aorta and transverse hemiarch using a number 24 mm Gelweave graft plus tricuspid valve repair utilizing number 21 mm Ather[place] ring plus 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 This patient has done very well since her cardiac surgery with her last surveillance echocardiogram on 9/25/2023 showing an LV ejection fraction of 55-60% 1-2+ mitral valve regurgitation normal-appearing stentless aortic valve bioprosthesis with a peak systolic gradient of 11 mmHg and a prosthetic graft in the aortic root and ascending/arch position. Patient was admitted on 4/10/2024 with failure to thrive inability to provide self-care difficulty walking which appears to be related to progressing dementia.  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  She had a repeat echocardiogram that showed a preserved LV ejection fraction 60% normal-appearing stentless bioprosthetic aortic valve replacement minimal systolic pressure gradient with prior tricuspid valve repair and only mild to moderate tricuspid valve regurgitation.  There is trace to mild mitral valve regurgitation bubble study negative for residual VSD that was closed at the time of her aortic valve replacement.  Patient was treated with for an E. coli urinary tract infection but her mental status appeared to be significantly impaired from dementia.  She was continued on dual antiplatelet therapy with aspirin and Plavix along with Toprol-XL 50 mg daily.  Patient readmitted with failure to thrive with reduced oral intake and significant dehydration with hypernatremia hyperchloremia and hypokalemia for which the patient is receiving IV D5W and potassium  supplementation.  From a cardiac standpoint the patient is stable but peers to have significantly advancing senile dementia versus multi-infarct dementia.  For now would continue usual cardiac therapy with the dual antiplatelet therapy and atorvastatin plus metoprolol.     5/11 [facility] for [facility]: Patient admitted essentially with a failure to thrive.  As noted above bioprosthetic aortic valve appears to be functioning normally and left ventricular systolic function preserved.  Serum sodium gradually decreasing with fluid hydration.  Patient does remain hypokalemic and will provide oral supplementation in addition to the potassium in the IV fluids.  Blood pressure mildly elevated and will titrate up the dose of the metoprolol succinate.     5/12:  [facility]: Essentially no change over the last 24 hours.  The patient has been refusing oral medications.  Hemodynamically the patient appears stable.  I think at this point we should consider palliative care and possibly even hospice care in this individual.  She does not appear to be agreeable to a PEG tube at this time.  Thus a comfort care model may be a better option.     5/13: Patient lying quietly in bed she is awake and able to answer simple questions.  Hyper natremia and hyperchloremia have resolved with the IV D5W with potassium supplementation serum sodium 139 chloride 109 potassium 4.1 with a creatinine of 0.70.  CBC notable for hematocrit of 26.7 WBC 14,500.  The patient's PCR for C. difficile is positive.  Stools for C. difficile toxin are negative.  Patient is on contact precautions.  Patient has been started on oral vancomycin. Patient seen by infectious disease uncertain as to whether patient has C. difficile infection versus colonization.  She had evidently recently completed a course of oral vancomycin at the skilled nursing facility for C. difficile colitis.  It is suspected that the patient does have more likely C. difficile colonization and is  currently on IV Flagyl.  The patient has been tentatively scheduled for an EGD with PEG tube placement tomorrow but also palliative care consultation has been recommended the patient.  Patient is on dual antiplatelet therapy after having a small cortical CVA recently but will discontinue Plavix for now to reduce risk of bleeding from PEG tube insertion.     5/14: The patient was seen and events reviewed discussed in detail with family members.  The patient underwent PEG tube insertion earlier today well-tolerated still somewhat sedated.  Patient is being followed by nephrology and yesterday was switched to IV D5 half-normal saline with sodium bicarb at night due to a mild residual metabolic acidosis.  The patient has been seen by infectious disease thought to have C. difficile colonization having completed a course of oral vancomycin at her nursing facility.  As noted the  C. difficile PCR was positive but toxin EIA negative.  Abdomen benign no diarrhea but the patient will be treated with IV Flagyl.  The patient's electrolyte panel remains improved creatinine 0.6 sodium 140 potassium 3.7 .  First PEG tube feed to be performed later today    5/15: Patient is currently resting comfortably no overt distress.  PEG tubes have been initiated.  Telemetry monitor shows a stable sinus rhythm in the 80s per minute.  Given the need for PEG tube feedings the patient's Toprol- mg daily will be switched to metoprolol to tartrate 50 mg twice daily through the PEG tube.  Other oral medications may need to be converted PEG tube administration in order to ensure adequate compliance.  Will continue usual aspirin but not Plavix.  Patient is currently on IV cefazolin plus Diflucan plus metronidazole and vancomycin orally.  Infectious disease following.  Electrolyte panel remains acceptable creatinine is 0.7 potassium 3.7 sodium 138.           Peripheral IV 05/14/24 22 G Distal;Right Forearm (Active)   Site Assessment  Clean;Dry;Intact 05/14/24 2100   Dressing Status Clean;Dry;Occlusive 05/14/24 2100   Number of days: 1       Code Status:  Full Code    I spent 20 minutes in the professional and overall care of this patient.        Shaheed Martin MD

## 2024-05-15 NOTE — PROGRESS NOTES
"Rebecca Samaniego is a 78 y.o. female on day 6 of admission presenting with General weakness.    Subjective   Interval History: following for diarrhea, Cdiff colonization. Afebrile. Had PEG placed yesterday       Objective   Physical Exam    Range of Vitals (last 24 hours)  /78 (BP Location: Right arm, Patient Position: Lying)   Pulse 93   Temp 37.4 °C (99.3 °F) (Axillary)   Resp 17   Ht 1.651 m (5' 5\")   Wt 63.6 kg (140 lb 3.4 oz)   SpO2 98%   BMI 23.33 kg/m²   Daily Weight  05/09/24 : 63.6 kg (140 lb 3.4 oz)    Body mass index is 23.33 kg/m².    General: lethargic, not interactive  Respiratory: normal respiratory effort  Abd: soft, nontender, nondistended, +PEG      Current Facility-Administered Medications:     acetaminophen (Tylenol) tablet 650 mg, 650 mg, oral, q4h PRN **OR** acetaminophen (Tylenol) oral liquid 650 mg, 650 mg, oral, q4h PRN, 650 mg at 05/14/24 2054 **OR** acetaminophen (Tylenol) suppository 650 mg, 650 mg, rectal, q4h PRN, Sergo Kimball MD    acetaminophen (Tylenol) suppository 650 mg, 650 mg, rectal, q4h PRN, Sergo Kimball MD    aspirin chewable tablet 81 mg, 81 mg, oral, Daily, Sergo Kimball MD, 81 mg at 05/15/24 0849    atorvastatin (Lipitor) tablet 40 mg, 40 mg, oral, Nightly, Sergo Kimball MD, 40 mg at 05/14/24 2051    benzocaine-menthol (Cepastat Sore Throat) lozenge 1 lozenge, 1 lozenge, Mouth/Throat, q2h PRN, Sergo Kimball MD    ceFAZolin (Ancef) injection 2,000 mg, 2 g, intravenous, Once, Veronica Farooq, APRN-CNP    [START ON 5/19/2024] cholecalciferol (Vitamin D-3) capsule 50,000 Units, 50,000 Units, oral, Every Sunday, Ambar Saleem MD    dextromethorphan-guaifenesin (Robitussin DM)  mg/5 mL oral liquid 5 mL, 5 mL, oral, q4h PRN, Sergo Kimball MD    ferrous sulfate (325 mg ferrous sulfate) tablet 1 tablet, 65 mg of iron, oral, BID, Sergo Kimball MD, 1 tablet at 05/15/24 0849    fluconazole (Diflucan) tablet 100 mg, 100 mg, oral, Daily, Sergo STOKES" "MD Jigar, 100 mg at 05/15/24 0849    guaiFENesin (Mucinex) 12 hr tablet 600 mg, 600 mg, oral, q12h PRN, Sergo Kimball MD    heparin (porcine) injection 5,000 Units, 5,000 Units, subcutaneous, q8h JACQUELYN, Sergo Kimball MD, 5,000 Units at 05/15/24 0424    hydrALAZINE (Apresoline) injection 10 mg, 10 mg, intravenous, q4h PRN, Sergo Kimball MD, 10 mg at 05/10/24 2054    lactobacillus acidophilus tablet 1 tablet, 1 tablet, oral, BID, Sergo Kimball MD, 1 tablet at 05/15/24 0852    metoprolol tartrate (Lopressor) tablet 50 mg, 50 mg, g-tube, BID, Shaheed Martin MD    metroNIDAZOLE (Flagyl) 500 mg in NaCl (iso-os) 100 mL, 500 mg, intravenous, q8h, Sergo Kimball MD, Stopped at 05/15/24 0524    mirtazapine (Remeron) tablet 7.5 mg, 7.5 mg, oral, Nightly, Sergo Kimball MD, 7.5 mg at 05/14/24 2051    ondansetron ODT (Zofran-ODT) disintegrating tablet 4 mg, 4 mg, oral, q6h PRN, Sergo Kimball MD    polyethylene glycol (Glycolax, Miralax) packet 17 g, 17 g, oral, Daily PRN, Sergo Kimball MD    [START ON 5/16/2024] potassium chloride (Klor-Con) packet 20 mEq, 20 mEq, oral, Daily with breakfast, Shaheed Martin MD    vancomycin (Vancocin) capsule 125 mg, 125 mg, oral, q6h, Sergo Kimball MD, 125 mg at 05/15/24 0850    Relevant Results  Labs:  Lab Results   Component Value Date    WBC 12.7 (H) 05/15/2024    HGB 8.9 (L) 05/15/2024    HCT 30.0 (L) 05/15/2024     (H) 05/15/2024     05/15/2024     Lab Results   Component Value Date    GLUCOSE 126 (H) 05/15/2024    CALCIUM 7.9 (L) 05/15/2024     05/15/2024    K 3.7 05/15/2024    CO2 22 (L) 05/15/2024     05/15/2024    BUN 13 05/15/2024    CREATININE 0.70 05/15/2024         Estimated Creatinine Clearance: 58.6 mL/min (by C-G formula based on SCr of 0.7 mg/dL).  No results found for: \"CRP\"    Micro:  No results found for the last 7 days.      Imaging:  Esophagogastroduodenoscopy (EGD) w PEG Tube Placement    Result Date: 5/14/2024  Table formatting " from the original result was not included. Impression PEG tube placed in the stomach Mild abnormal mucosa, consistent with gastritis in the antrum; performed cold forceps biopsy Findings A Kinderhook PEG tube measuring 24 Fr was successfully placed in the stomach using a deformable internal bolster via the pull technique after the site was identified via transillumination, visualized indentation and needle passed through abdominal wall; distance from external bolster to external end of tube: 3 cm; scope reinserted and tube rotated freely to confirm placement Mild, localized erythematous mucosa with linear furrows in the antrum, consistent with gastritis; no bleeding was identified; performed cold forceps biopsy to rule out H. pylori; Recommendation  Follow up with PCP  PEG must stay in for at least 6 weeks, after which time it could be removed in the office. (After July 1, 2024)  Indication Failure to thrive in adult, Gastritis determined by endoscopy, Cerebrovascular accident (CVA), unspecified mechanism (Multi) Staff Staff Role Sergo Kimball MD Proceduralist Medications See Anesthesia Record. Preprocedure A history and physical has been performed, and patient medication allergies have been reviewed. The patient's tolerance of previous anesthesia has been reviewed. The risks and benefits of the procedure and the sedation options and risks were discussed with the patient. All questions were answered and informed consent obtained. Details of the Procedure The patient underwent monitored anesthesia care, which was administered by an anesthesia professional. The patient's blood pressure, ECG, ETCO2, heart rate, level of consciousness, oxygen and respirations were monitored throughout the procedure. The scope was introduced through the mouth and advanced to the second part of the duodenum. Retroflexion was performed in the cardia. Prior to the procedure, the patient's H. Pylori status was unknown. The patient's estimated  blood loss was minimal (<5 mL). The procedure was not difficult. The patient tolerated the procedure well. There were no apparent adverse events. Events Procedure Events Event Event Time ENDO SCOPE IN TIME 5/14/2024  7:38 AM ENDO SCOPE OUT TIME 5/14/2024  7:48 AM Specimens ID Type Source Tests Collected by Time 1 : r/o h pylori Tissue STOMACH ANTRUM BIOPSY SURGICAL PATHOLOGY EXAM Marla Gamez RN 5/14/2024 0740 Procedure Location [address] [id number] [phone] Referring Provider No referring provider defined for this encounter. Procedure Provider No name on file     ECG 12 lead    Result Date: 5/10/2024  Normal sinus rhythm Left axis deviation Moderate voltage criteria for LVH, may be normal variant ( R in aVL , Salol product ) Nonspecific ST and T wave abnormality Abnormal ECG When compared with ECG of 10-APR-2024 22:30, Significant changes have occurred Confirmed by Davian Callaway ([id number]) on 5/10/2024 3:33:53 PM    XR chest 1 view    Result Date: 5/9/2024  Interpreted By:  Ronnie Tsai, STUDY: XR CHEST 1 VIEW;  5/9/2024 1:23 pm   INDICATION: Signs/Symptoms:failure to thrive.   COMPARISON: 04/13/2024   ACCESSION NUMBER(S): [id number]   ORDERING CLINICIAN: COREY KING   FINDINGS: CARDIOMEDIASTINAL SILHOUETTE AND VASCULATURE:   Cardiac size:  Within normal limits. Status post median sternotomy with mitral valve replacement. This is similar to the prior exam. Aortic shadow:  Within normal limits.   Mediastinal contours: Within normal limits.   Pulmonary vasculature:  The central vasculature is unremarkable   LUNGS: Lungs are clear.   ABDOMEN AND OTHER FINDINGS: No remarkable upper abdominal findings.   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       1.  No active cardiopulmonary disease.  There has not been significant interval change from the prior exam.   Signed by: Ronnie Tsai 5/9/2024 1:49 PM  Dictation workstation:   [id number]    No results found for this or any previous visit from the past 1095 days.        Assessment:  Cdiff colonization. No significant diarrhea  Dysphagia s/p PEG  Oral candida infection    Plan/Recommendations:  Stop PO vancomycin  Stop IV metronidazole  Seems that she is on PO fluconazole or oral candida infection--I'm placing an end date for 7 days    Will sign off, please recall if any questions. Discussed with primary team    Veronica Chen DO  [facility]  #[phone]

## 2024-05-15 NOTE — PROGRESS NOTES
"Rebecca Samaniego is a 78 y.o. female on day 6 of admission presenting with General weakness.    Subjective   Tolerating tube feeds at 20. Gradually ramping up to goal. No fevers. No vomiting or diarrhea.        Objective     Physical Exam  Constitutional:       Appearance: Normal appearance.   HENT:      Head: Normocephalic and atraumatic.      Right Ear: Tympanic membrane normal.      Nose: Nose normal.      Mouth/Throat:      Mouth: Mucous membranes are moist.   Eyes:      Pupils: Pupils are equal, round, and reactive to light.   Cardiovascular:      Rate and Rhythm: Normal rate and regular rhythm.      Pulses: Normal pulses.   Pulmonary:      Effort: Pulmonary effort is normal.      Breath sounds: Normal breath sounds.   Abdominal:      General: Bowel sounds are normal. There is no distension.      Tenderness: There is no abdominal tenderness. There is no guarding.      Hernia: No hernia is present.   Genitourinary:     Rectum: Normal.   Musculoskeletal:         General: Normal range of motion.   Skin:     General: Skin is warm and dry.   Neurological:      General: No focal deficit present.      Mental Status: She is alert.   Psychiatric:         Mood and Affect: Mood normal.         Behavior: Behavior normal.         Last Recorded Vitals  Blood pressure 141/78, pulse 93, temperature 37.4 °C (99.3 °F), temperature source Axillary, resp. rate 17, height 1.651 m (5' 5\"), weight 63.6 kg (140 lb 3.4 oz), SpO2 98%.  Intake/Output last 3 Shifts:  I/O last 3 completed shifts:  In: 971.5 (15.3 mL/kg) [I.V.:671.5 (10.6 mL/kg); NG/GT:300]  Out: 200 (3.1 mL/kg) [Urine:200 (0.1 mL/kg/hr)]  Weight: 63.6 kg     Relevant Results  Lab Results   Component Value Date    GLUCOSE 126 (H) 05/15/2024    CALCIUM 7.9 (L) 05/15/2024     05/15/2024    K 3.7 05/15/2024    CO2 22 (L) 05/15/2024     05/15/2024    BUN 13 05/15/2024    CREATININE 0.70 05/15/2024     Lab Results   Component Value Date    WBC 12.7 (H) 05/15/2024    " HGB 8.9 (L) 05/15/2024    HCT 30.0 (L) 05/15/2024     (H) 05/15/2024     05/15/2024                          Assessment/Plan   Principal Problem:    General weakness    5/15/2024: Postop day 1 PEG tube placement.  Starting tube feeds off slowly, tolerating so far.  Eventual ramp-up goal to 55 mL an hour.  PEG stable.  General surgery will sign off.  Please call if questions or concerns.       I spent 15 minutes in the professional and overall care of this patient.      Veronica Farooq, APRN-CNP

## 2024-05-15 NOTE — CARE PLAN
Problem: ADLs  Goal: Pt will complete ADL tasks at min Awith use of AE prn   Outcome: Progressing     Problem: Pain  Goal: My pain/discomfort is manageable  Outcome: Progressing     Problem: Safety  Goal: Patient will be injury free during hospitalization  Outcome: Progressing  Goal: I will remain free of falls  Outcome: Progressing     Problem: Daily Care  Goal: Daily care needs are met  Outcome: Progressing     Problem: Psychosocial Needs  Goal: Demonstrates ability to cope with hospitalization/illness  Outcome: Progressing  Goal: Collaborate with me, my family, and caregiver to identify my specific goals  Outcome: Progressing     Problem: Discharge Barriers  Goal: My discharge needs are met  Outcome: Progressing     Problem: Skin  Goal: Decreased wound size/increased tissue granulation at next dressing change  Outcome: Progressing  Flowsheets (Taken 5/15/2024 0107)  Decreased wound size/increased tissue granulation at next dressing change: Protective dressings over bony prominences  Goal: Participates in plan/prevention/treatment measures  Outcome: Progressing  Flowsheets (Taken 5/15/2024 0107)  Participates in plan/prevention/treatment measures: Elevate heels  Goal: Prevent/manage excess moisture  Outcome: Progressing  Flowsheets (Taken 5/15/2024 0107)  Prevent/manage excess moisture: Moisturize dry skin  Goal: Prevent/minimize sheer/friction injuries  Outcome: Progressing  Flowsheets (Taken 5/15/2024 0107)  Prevent/minimize sheer/friction injuries:   Use pull sheet   HOB 30 degrees or less  Goal: Promote/optimize nutrition  Outcome: Progressing  Flowsheets (Taken 5/15/2024 0107)  Promote/optimize nutrition: Assist with feeding  Goal: Promote skin healing  Outcome: Progressing  Flowsheets (Taken 5/15/2024 0107)  Promote skin healing: Protective dressings over bony prominences     Problem: Pain - Adult  Goal: Verbalizes/displays adequate comfort level or baseline comfort level  Outcome: Progressing     Problem:  Safety - Adult  Goal: Free from fall injury  Outcome: Progressing     Problem: Discharge Planning  Goal: Discharge to home or other facility with appropriate resources  Outcome: Progressing     Problem: Chronic Conditions and Co-morbidities  Goal: Patient's chronic conditions and co-morbidity symptoms are monitored and maintained or improved  Outcome: Progressing     Problem: Nutrition  Goal: Less than 5 days NPO/clear liquids  Outcome: Progressing  Goal: Oral intake greater than 50%  Outcome: Progressing  Goal: Oral intake greater 75%  Outcome: Progressing  Goal: Consume prescribed supplement  Outcome: Progressing  Goal: Adequate PO fluid intake  Outcome: Progressing  Goal: Nutrition support goals are met within 48 hrs  Outcome: Progressing  Goal: Nutrition support is meeting 75% of nutrient needs  Outcome: Progressing  Goal: Tube feed tolerance  Outcome: Progressing  Goal: BG  mg/dL  Outcome: Progressing  Goal: Lab values WNL  Outcome: Progressing  Goal: Electrolytes WNL  Outcome: Progressing  Goal: Promote healing  Outcome: Progressing  Goal: Maintain stable weight  Outcome: Progressing  Goal: Reduce weight from edema/fluid  Outcome: Progressing  Goal: Gradual weight gain  Outcome: Progressing  Goal: Improve ostomy output  Outcome: Progressing

## 2024-05-15 NOTE — CARE PLAN
The patient's goals for the shift include improved movement    The clinical goals for the shift include keep comfortable      Problem: ADLs  Goal: Pt will complete ADL tasks at min Awith use of AE prn   Outcome: Progressing     Problem: Pain  Goal: My pain/discomfort is manageable  Outcome: Progressing     Problem: Safety  Goal: Patient will be injury free during hospitalization  Outcome: Progressing  Goal: I will remain free of falls  Outcome: Progressing     Problem: Daily Care  Goal: Daily care needs are met  Outcome: Progressing     Problem: Psychosocial Needs  Goal: Demonstrates ability to cope with hospitalization/illness  Outcome: Progressing  Goal: Collaborate with me, my family, and caregiver to identify my specific goals  Outcome: Progressing     Problem: Discharge Barriers  Goal: My discharge needs are met  Outcome: Progressing     Problem: Skin  Goal: Decreased wound size/increased tissue granulation at next dressing change  Outcome: Progressing  Flowsheets (Taken 5/15/2024 1631)  Decreased wound size/increased tissue granulation at next dressing change: Promote sleep for wound healing  Goal: Participates in plan/prevention/treatment measures  Outcome: Progressing  Flowsheets (Taken 5/15/2024 1631)  Participates in plan/prevention/treatment measures:   Elevate heels   Discuss with provider PT/OT consult  Goal: Prevent/manage excess moisture  Outcome: Progressing  Flowsheets (Taken 5/15/2024 1631)  Prevent/manage excess moisture:   Monitor for/manage infection if present   Cleanse incontinence/protect with barrier cream  Goal: Prevent/minimize sheer/friction injuries  Outcome: Progressing  Flowsheets (Taken 5/15/2024 1631)  Prevent/minimize sheer/friction injuries:   Use pull sheet   Increase activity/out of bed for meals  Goal: Promote/optimize nutrition  Outcome: Progressing  Flowsheets (Taken 5/15/2024 1631)  Promote/optimize nutrition:   Assist with feeding   Monitor/record intake including  meals  Goal: Promote skin healing  Outcome: Progressing  Flowsheets (Taken 5/15/2024 1631)  Promote skin healing:   Assess skin/pad under line(s)/device(s)   Protective dressings over bony prominences     Problem: Pain - Adult  Goal: Verbalizes/displays adequate comfort level or baseline comfort level  Outcome: Progressing     Problem: Safety - Adult  Goal: Free from fall injury  Outcome: Progressing     Problem: Discharge Planning  Goal: Discharge to home or other facility with appropriate resources  Outcome: Progressing     Problem: Chronic Conditions and Co-morbidities  Goal: Patient's chronic conditions and co-morbidity symptoms are monitored and maintained or improved  Outcome: Progressing     Problem: Nutrition  Goal: Less than 5 days NPO/clear liquids  Outcome: Progressing  Goal: Oral intake greater than 50%  Outcome: Progressing  Goal: Oral intake greater 75%  Outcome: Progressing  Goal: Consume prescribed supplement  Outcome: Progressing  Goal: Adequate PO fluid intake  Outcome: Progressing  Goal: Nutrition support goals are met within 48 hrs  Outcome: Progressing  Goal: Nutrition support is meeting 75% of nutrient needs  Outcome: Progressing  Goal: Tube feed tolerance  Outcome: Progressing  Goal: BG  mg/dL  Outcome: Progressing  Goal: Lab values WNL  Outcome: Progressing  Goal: Electrolytes WNL  Outcome: Progressing  Goal: Promote healing  Outcome: Progressing  Goal: Maintain stable weight  Outcome: Progressing  Goal: Reduce weight from edema/fluid  Outcome: Progressing  Goal: Gradual weight gain  Outcome: Progressing  Goal: Improve ostomy output  Outcome: Progressing

## 2024-05-15 NOTE — PROGRESS NOTES
Occupational Therapy    Occupational Therapy Treatment    Name: Rebecca Samaniego  MRN: [id number]  : 1945  Date: 05/15/24  Time Calculation  Start Time: 1344  Stop Time: 1356  Time Calculation (min): 12 min    Assessment:  OT Assessment: Pt demonstrates poor active participation in therapeutic tasks and has poor/fair therapeutic potential. Continue OT for further ADL participation, family training, and d/c planning.  Prognosis: Poor  Evaluation/Treatment Tolerance: Patient limited by fatigue, Patient limited by pain  Medical Staff Made Aware: Yes  End of Session Communication: Bedside nurse  End of Session Patient Position: Bed, 3 rail up, Alarm off, not on at start of session (Call bell in reach)  Plan:  Treatment Interventions: ADL retraining, UE strengthening/ROM, Patient/family training, Compensatory technique education  OT Frequency: 2 times per week  OT Discharge Recommendations: Moderate intensity level of continued care  OT Recommended Transfer Status: Dependent  OT - OK to Discharge: Yes    Subjective   Previous Visit Info:  OT Last Visit  OT Received On: 05/15/24  General:  General  Reason for Referral: impaired ADLs, +Cidff, weakness, failure to thrive, UTI  Referred By: Kobi Denise MD  Past Medical History Relevant to Rehab: HLD, CKD, CVA, AVR, C diff, Breast lumpectomy, hysterectomy, cervical discectomy  Family/Caregiver Present: Yes  Caregiver Feedback: Spouse  Prior to Session Communication: Bedside nurse  Patient Position Received: Bed, 3 rail up, Alarm off, not on at start of session  General Comment: Pt cleared and agreeable to participate in OT on wings. (+)TF to PEG, nicholson, PIV  Precautions:  Medical Precautions: Fall precautions, Swallowing precautions, Other (comment)  Post-Surgical Precautions:  (sacral ulcers, B heals skin wounds)    Pain Assessment:  Pain Assessment  Pain Assessment: Unable to self-report     Objective     Activities of Daily Living: Grooming  Grooming Level of  "Assistance: Dependent, Maximum assistance  Grooming Where Assessed: Bed level  Grooming Comments: Washing face and attempted to brush teeth with toothette for safety    Therapy/Activity: Therapeutic Activity  Therapeutic Activity Performed: Yes  Therapeutic Activity 1: Collaborative discussion with pt's spouse regarding goals for pt. Pt's spouse states \"to get her back to her normal self.\" Pt's spouse educated on purpose and goals of OT.    Outcome Measures:  [facility] Daily Activity  Putting on and taking off regular lower body clothing: Total  Bathing (including washing, rinsing, drying): Total  Putting on and taking off regular upper body clothing: Total  Toileting, which includes using toilet, bedpan or urinal: Total  Taking care of personal grooming such as brushing teeth: Total  Eating Meals: Total  Daily Activity - Total Score: 6    Education Documentation  ADL Training, taught by Ema Vega OT at 5/15/2024  3:21 PM.  Learner: Significant Other, Patient  Readiness: Acceptance  Method: Explanation  Response: Needs Reinforcement, No Evidence of Learning  Comment: Pt spouse responsive to education. Pt demo's no evidance of learning    Education Comments  No comments found.      Goals:  Encounter Problems       Encounter Problems (Active)       ADLs       Pt will complete ADL tasks at min Awith use of AE prn  (Not Progressing)       Start:  05/10/24    Expected End:  06/01/24               Functional Balance       Pt will maintain static sitting balance with upper extremity support at min A  (Not Progressing)       Start:  05/10/24    Expected End:  06/01/24               OT Transfers       Pt will perform functional transfers at min A (Not Progressing)       Start:  05/10/24    Expected End:  06/01/24               Therapeutic Exercise       Pt will perform AAROM/AROM BUE exercises to improve strength and independence with functional transfers/ADL tasks.   (Not Progressing)       Start:  05/10/24    " Expected End:  06/01/24

## 2024-05-16 ENCOUNTER — APPOINTMENT (OUTPATIENT)
Dept: RADIOLOGY | Facility: HOSPITAL | Age: 79
DRG: 640 | End: 2024-05-16
Payer: MEDICARE

## 2024-05-16 LAB
AMORPH CRY #/AREA UR COMP ASSIST: ABNORMAL /HPF
ANION GAP SERPL CALC-SCNC: 9 MMOL/L
APPEARANCE UR: ABNORMAL
BILIRUB UR STRIP.AUTO-MCNC: NEGATIVE MG/DL
BUN SERPL-MCNC: 17 MG/DL (ref 8–25)
CALCIUM SERPL-MCNC: 7.7 MG/DL (ref 8.5–10.4)
CHLORIDE SERPL-SCNC: 106 MMOL/L (ref 97–107)
CO2 SERPL-SCNC: 24 MMOL/L (ref 24–31)
COLOR UR: ABNORMAL
CREAT SERPL-MCNC: 0.6 MG/DL (ref 0.4–1.6)
EGFRCR SERPLBLD CKD-EPI 2021: >90 ML/MIN/1.73M*2
ERYTHROCYTE [DISTWIDTH] IN BLOOD BY AUTOMATED COUNT: 14.1 % (ref 11.5–14.5)
GLUCOSE SERPL-MCNC: 135 MG/DL (ref 65–99)
GLUCOSE UR STRIP.AUTO-MCNC: NORMAL MG/DL
HCT VFR BLD AUTO: 25.5 % (ref 36–46)
HGB BLD-MCNC: 8.3 G/DL (ref 12–16)
KETONES UR STRIP.AUTO-MCNC: NEGATIVE MG/DL
LEUKOCYTE ESTERASE UR QL STRIP.AUTO: ABNORMAL
MCH RBC QN AUTO: 31.1 PG (ref 26–34)
MCHC RBC AUTO-ENTMCNC: 32.5 G/DL (ref 32–36)
MCV RBC AUTO: 96 FL (ref 80–100)
MUCOUS THREADS #/AREA URNS AUTO: ABNORMAL /LPF
NITRITE UR QL STRIP.AUTO: ABNORMAL
NRBC BLD-RTO: 0 /100 WBCS (ref 0–0)
PH UR STRIP.AUTO: 6 [PH]
PLATELET # BLD AUTO: 300 X10*3/UL (ref 150–450)
POTASSIUM SERPL-SCNC: 4.1 MMOL/L (ref 3.4–5.1)
PROT UR STRIP.AUTO-MCNC: ABNORMAL MG/DL
RBC # BLD AUTO: 2.67 X10*6/UL (ref 4–5.2)
RBC # UR STRIP.AUTO: ABNORMAL /UL
RBC #/AREA URNS AUTO: >20 /HPF
SODIUM SERPL-SCNC: 139 MMOL/L (ref 133–145)
SP GR UR STRIP.AUTO: 1.01
SQUAMOUS #/AREA URNS AUTO: ABNORMAL /HPF
UROBILINOGEN UR STRIP.AUTO-MCNC: NORMAL MG/DL
WBC # BLD AUTO: 15.7 X10*3/UL (ref 4.4–11.3)
WBC #/AREA URNS AUTO: >50 /HPF
WBC CLUMPS #/AREA URNS AUTO: ABNORMAL /HPF

## 2024-05-16 PROCEDURE — 99232 SBSQ HOSP IP/OBS MODERATE 35: CPT | Performed by: INTERNAL MEDICINE

## 2024-05-16 PROCEDURE — 85027 COMPLETE CBC AUTOMATED: CPT | Performed by: SURGERY

## 2024-05-16 PROCEDURE — 71046 X-RAY EXAM CHEST 2 VIEWS: CPT | Performed by: RADIOLOGY

## 2024-05-16 PROCEDURE — 2500000001 HC RX 250 WO HCPCS SELF ADMINISTERED DRUGS (ALT 637 FOR MEDICARE OP): Performed by: STUDENT IN AN ORGANIZED HEALTH CARE EDUCATION/TRAINING PROGRAM

## 2024-05-16 PROCEDURE — 36415 COLL VENOUS BLD VENIPUNCTURE: CPT | Performed by: SURGERY

## 2024-05-16 PROCEDURE — 1200000002 HC GENERAL ROOM WITH TELEMETRY DAILY

## 2024-05-16 PROCEDURE — 2500000001 HC RX 250 WO HCPCS SELF ADMINISTERED DRUGS (ALT 637 FOR MEDICARE OP): Performed by: SURGERY

## 2024-05-16 PROCEDURE — 80048 BASIC METABOLIC PNL TOTAL CA: CPT | Performed by: SURGERY

## 2024-05-16 PROCEDURE — 2500000004 HC RX 250 GENERAL PHARMACY W/ HCPCS (ALT 636 FOR OP/ED): Performed by: SURGERY

## 2024-05-16 PROCEDURE — 99024 POSTOP FOLLOW-UP VISIT: CPT | Performed by: NURSE PRACTITIONER

## 2024-05-16 PROCEDURE — 81001 URINALYSIS AUTO W/SCOPE: CPT | Performed by: NURSE PRACTITIONER

## 2024-05-16 PROCEDURE — 2500000001 HC RX 250 WO HCPCS SELF ADMINISTERED DRUGS (ALT 637 FOR MEDICARE OP): Performed by: INTERNAL MEDICINE

## 2024-05-16 PROCEDURE — 71046 X-RAY EXAM CHEST 2 VIEWS: CPT

## 2024-05-16 PROCEDURE — 87086 URINE CULTURE/COLONY COUNT: CPT | Mod: WESLAB | Performed by: NURSE PRACTITIONER

## 2024-05-16 RX ORDER — CEFTRIAXONE 1 G/50ML
1 INJECTION, SOLUTION INTRAVENOUS EVERY 24 HOURS
Status: DISCONTINUED | OUTPATIENT
Start: 2024-05-17 | End: 2024-05-17

## 2024-05-16 RX ORDER — ESOMEPRAZOLE MAGNESIUM 20 MG/1
10 GRANULE, DELAYED RELEASE ORAL
Status: DISCONTINUED | OUTPATIENT
Start: 2024-05-17 | End: 2024-05-24 | Stop reason: HOSPADM

## 2024-05-16 RX ADMIN — CEFTRIAXONE SODIUM 1 G: 1 INJECTION, SOLUTION INTRAVENOUS at 23:57

## 2024-05-16 RX ADMIN — METOPROLOL TARTRATE 50 MG: 50 TABLET, FILM COATED ORAL at 21:25

## 2024-05-16 RX ADMIN — FERROUS SULFATE TAB 325 MG (65 MG ELEMENTAL FE) 1 TABLET: 325 (65 FE) TAB at 16:31

## 2024-05-16 RX ADMIN — Medication 1 TABLET: at 09:23

## 2024-05-16 RX ADMIN — ATORVASTATIN CALCIUM 40 MG: 40 TABLET, FILM COATED ORAL at 21:25

## 2024-05-16 RX ADMIN — FLUCONAZOLE 100 MG: 100 TABLET ORAL at 09:23

## 2024-05-16 RX ADMIN — FERROUS SULFATE TAB 325 MG (65 MG ELEMENTAL FE) 1 TABLET: 325 (65 FE) TAB at 09:23

## 2024-05-16 RX ADMIN — Medication 1 TABLET: at 16:31

## 2024-05-16 RX ADMIN — HEPARIN SODIUM 5000 UNITS: 5000 INJECTION, SOLUTION INTRAVENOUS; SUBCUTANEOUS at 16:31

## 2024-05-16 RX ADMIN — HEPARIN SODIUM 5000 UNITS: 5000 INJECTION, SOLUTION INTRAVENOUS; SUBCUTANEOUS at 21:25

## 2024-05-16 RX ADMIN — METOPROLOL TARTRATE 50 MG: 50 TABLET, FILM COATED ORAL at 09:23

## 2024-05-16 RX ADMIN — ASPIRIN 81 MG CHEWABLE TABLET 81 MG: 81 TABLET CHEWABLE at 09:24

## 2024-05-16 RX ADMIN — MIRTAZAPINE 7.5 MG: 15 TABLET, FILM COATED ORAL at 21:25

## 2024-05-16 RX ADMIN — HEPARIN SODIUM 5000 UNITS: 5000 INJECTION, SOLUTION INTRAVENOUS; SUBCUTANEOUS at 06:06

## 2024-05-16 ASSESSMENT — PAIN SCALES - PAIN ASSESSMENT IN ADVANCED DEMENTIA (PAINAD)
BODYLANGUAGE: RELAXED
BREATHING: NORMAL
CONSOLABILITY: NO NEED TO CONSOLE
FACIALEXPRESSION: SMILING OR INEXPRESSIVE
BODYLANGUAGE: RELAXED
NEGVOCALIZATION: OCCASIONAL MOAN/GROAN, LOW SPEECH, NEGATIVE/DISAPPROVING QUALITY
TOTALSCORE: 0
TOTALSCORE: 1
FACIALEXPRESSION: SMILING OR INEXPRESSIVE
CONSOLABILITY: NO NEED TO CONSOLE
BREATHING: NORMAL

## 2024-05-16 ASSESSMENT — COGNITIVE AND FUNCTIONAL STATUS - GENERAL
TURNING FROM BACK TO SIDE WHILE IN FLAT BAD: TOTAL
WALKING IN HOSPITAL ROOM: TOTAL
PERSONAL GROOMING: TOTAL
TOILETING: TOTAL
MOVING TO AND FROM BED TO CHAIR: TOTAL
DRESSING REGULAR UPPER BODY CLOTHING: TOTAL
STANDING UP FROM CHAIR USING ARMS: TOTAL
HELP NEEDED FOR BATHING: TOTAL
MOVING FROM LYING ON BACK TO SITTING ON SIDE OF FLAT BED WITH BEDRAILS: TOTAL
MOBILITY SCORE: 6
CLIMB 3 TO 5 STEPS WITH RAILING: TOTAL
DAILY ACTIVITIY SCORE: 6
DRESSING REGULAR LOWER BODY CLOTHING: TOTAL
EATING MEALS: TOTAL

## 2024-05-16 ASSESSMENT — PAIN SCALES - GENERAL: PAINLEVEL_OUTOF10: 0 - NO PAIN

## 2024-05-16 ASSESSMENT — PAIN - FUNCTIONAL ASSESSMENT: PAIN_FUNCTIONAL_ASSESSMENT: PAINAD (PAIN ASSESSMENT IN ADVANCED DEMENTIA SCALE)

## 2024-05-16 NOTE — CARE PLAN
Problem: Skin  Goal: Decreased wound size/increased tissue granulation at next dressing change  5/15/2024 2334 by Kelsey Lopez, RN  Outcome: Progressing  Flowsheets (Taken 5/15/2024 2334)  Decreased wound size/increased tissue granulation at next dressing change:   Promote sleep for wound healing   Protective dressings over bony prominences  5/15/2024 2331 by Kelsey Lopez, RN  Outcome: Progressing   The patient's goals for the shift include improved movement    The clinical goals for the shift include rest

## 2024-05-16 NOTE — PROGRESS NOTES
Spiritual Care Visit    Clinical Encounter Type  Visited With: Patient  Routine Visit: Follow-up  Continue Visiting: Yes         Values/Beliefs  Spiritual Requests During Hospitalization: Jolene Das

## 2024-05-16 NOTE — CARE PLAN
The patient's goals for the shift include improved movement    The clinical goals for the shift include keep comfortable    Problem: ADLs  Goal: Pt will complete ADL tasks at min Awith use of AE prn   Outcome: Progressing     Problem: Pain  Goal: My pain/discomfort is manageable  Outcome: Progressing     Problem: Safety  Goal: Patient will be injury free during hospitalization  Outcome: Progressing  Goal: I will remain free of falls  Outcome: Progressing     Problem: Daily Care  Goal: Daily care needs are met  Outcome: Progressing     Problem: Psychosocial Needs  Goal: Demonstrates ability to cope with hospitalization/illness  Outcome: Progressing  Goal: Collaborate with me, my family, and caregiver to identify my specific goals  Outcome: Progressing     Problem: Discharge Barriers  Goal: My discharge needs are met  Outcome: Progressing     Problem: Skin  Goal: Decreased wound size/increased tissue granulation at next dressing change  Outcome: Progressing  Goal: Participates in plan/prevention/treatment measures  Outcome: Progressing  Goal: Prevent/manage excess moisture  Outcome: Progressing  Goal: Prevent/minimize sheer/friction injuries  Outcome: Progressing  Goal: Promote/optimize nutrition  Outcome: Progressing  Goal: Promote skin healing  Outcome: Progressing     Problem: Pain - Adult  Goal: Verbalizes/displays adequate comfort level or baseline comfort level  Outcome: Progressing     Problem: Safety - Adult  Goal: Free from fall injury  Outcome: Progressing     Problem: Discharge Planning  Goal: Discharge to home or other facility with appropriate resources  Outcome: Progressing     Problem: Chronic Conditions and Co-morbidities  Goal: Patient's chronic conditions and co-morbidity symptoms are monitored and maintained or improved  Outcome: Progressing     Problem: Nutrition  Goal: Less than 5 days NPO/clear liquids  Outcome: Progressing  Goal: Oral intake greater than 50%  Outcome: Progressing  Goal: Oral  intake greater 75%  Outcome: Progressing  Goal: Consume prescribed supplement  Outcome: Progressing  Goal: Adequate PO fluid intake  Outcome: Progressing  Goal: Nutrition support goals are met within 48 hrs  Outcome: Progressing  Goal: Nutrition support is meeting 75% of nutrient needs  Outcome: Progressing  Goal: Tube feed tolerance  Outcome: Progressing  Goal: BG  mg/dL  Outcome: Progressing  Goal: Lab values WNL  Outcome: Progressing  Goal: Electrolytes WNL  Outcome: Progressing  Goal: Promote healing  Outcome: Progressing  Goal: Maintain stable weight  Outcome: Progressing  Goal: Reduce weight from edema/fluid  Outcome: Progressing  Goal: Gradual weight gain  Outcome: Progressing  Goal: Improve ostomy output  Outcome: Progressing

## 2024-05-16 NOTE — PROGRESS NOTES
Rebecca Samaniego is a 78 y.o. female on day 7 of admission presenting with General weakness.    Subjective   Patient seen and examined.  Resting in bed in no acute distress.  Spouse bedside.  Eyes closed.  Arousable.  Minimally opens eyes.  States name.  No complaints.  Review of systems is limited.  Spoke with spouse bedside notes not talking.    Objective     Physical Exam  Vitals and nursing note reviewed.   Constitutional:       General: She is not in acute distress.     Appearance: Normal appearance. She is normal weight. She is ill-appearing. She is not toxic-appearing or diaphoretic.   HENT:      Head: Normocephalic and atraumatic.      Right Ear: Tympanic membrane normal.      Left Ear: Tympanic membrane normal.      Nose: Nose normal.      Mouth/Throat:      Mouth: Mucous membranes are dry.      Pharynx: Oropharynx is clear.      Comments: Mouth dry, tongue less coated.  Eyes:      Extraocular Movements: Extraocular movements intact.      Conjunctiva/sclera: Conjunctivae normal.      Pupils: Pupils are equal, round, and reactive to light.   Cardiovascular:      Rate and Rhythm: Normal rate and regular rhythm.      Pulses: Normal pulses.      Heart sounds: Normal heart sounds. No murmur heard.  Pulmonary:      Effort: Pulmonary effort is normal. No respiratory distress.      Breath sounds: Normal breath sounds. No wheezing, rhonchi or rales.   Abdominal:      General: Bowel sounds are normal. There is no distension.      Palpations: Abdomen is soft.      Tenderness: There is no abdominal tenderness.      Comments: PEG tube in place abdominal binder in place.  Dressing and binder removed.  PEG tube site without erythema or drainage.  Dressing and binder replaced.  Tube feed infusing.   Genitourinary:     Comments: Deferred.  Musculoskeletal:         General: No swelling or tenderness. Normal range of motion.      Cervical back: Normal range of motion and neck supple.   Skin:     General: Skin is warm and dry.  "     Capillary Refill: Capillary refill takes less than 2 seconds.      Findings: Bruising present.   Neurological:      General: No focal deficit present.      Mental Status: She is alert. She is disoriented.      Comments: Eyes closed.  Lethargic.  Arousable.  Confused.  States name otherwise not answering my questions.  Speech clear and coherent.  Follows all commands.  Generalized weakness.  No focal weakness.  Gait deferred.  Cranial nerves II through XII grossly intact.   Psychiatric:         Mood and Affect: Mood normal.         Behavior: Behavior normal.       Last Recorded Vitals  Blood pressure 140/59, pulse 93, temperature 37 °C (98.6 °F), temperature source Oral, resp. rate 17, height 1.651 m (5' 5\"), weight 63.6 kg (140 lb 3.4 oz), SpO2 96%.    Intake/Output last 3 Shifts:  I/O last 3 completed shifts:  In: 300 (4.7 mL/kg) [NG/GT:300]  Out: - (0 mL/kg)   Weight: 63.6 kg     Telemetry normal sinus rhythm rate 80's    Relevant Results  Results for orders placed or performed during the hospital encounter of 05/09/24 (from the past 24 hour(s))   Basic Metabolic Panel   Result Value Ref Range    Glucose 135 (H) 65 - 99 mg/dL    Sodium 139 133 - 145 mmol/L    Potassium 4.1 3.4 - 5.1 mmol/L    Chloride 106 97 - 107 mmol/L    Bicarbonate 24 24 - 31 mmol/L    Urea Nitrogen 17 8 - 25 mg/dL    Creatinine 0.60 0.40 - 1.60 mg/dL    eGFR >90 >60 mL/min/1.73m*2    Calcium 7.7 (L) 8.5 - 10.4 mg/dL    Anion Gap 9 <=19 mmol/L   CBC   Result Value Ref Range    WBC 15.7 (H) 4.4 - 11.3 x10*3/uL    nRBC 0.0 0.0 - 0.0 /100 WBCs    RBC 2.67 (L) 4.00 - 5.20 x10*6/uL    Hemoglobin 8.3 (L) 12.0 - 16.0 g/dL    Hematocrit 25.5 (L) 36.0 - 46.0 %    MCV 96 80 - 100 fL    MCH 31.1 26.0 - 34.0 pg    MCHC 32.5 32.0 - 36.0 g/dL    RDW 14.1 11.5 - 14.5 %    Platelets 300 150 - 450 x10*3/uL     Susceptibility data from last 90 days.  Collected Specimen Info Organism Ampicillin Cefazolin Cefazolin (uncomplicated UTIs only) Ciprofloxacin " Gentamicin Nitrofurantoin Piperacillin/Tazobactam Trimethoprim/Sulfamethoxazole   04/11/24 Urine from Straight Catheter Escherichia coli S S S S S S S S     No results found.    Scheduled medications  aspirin, 81 mg, oral, Daily  atorvastatin, 40 mg, oral, Nightly  [START ON 5/19/2024] cholecalciferol, 50,000 Units, oral, Every Sunday  ferrous sulfate (325 mg ferrous sulfate), 65 mg of iron, oral, BID  fluconazole, 100 mg, oral, Daily  heparin (porcine), 5,000 Units, subcutaneous, q8h JACQUELYN  lactobacillus acidophilus, 1 tablet, oral, BID  metoprolol tartrate, 50 mg, g-tube, BID  mirtazapine, 7.5 mg, oral, Nightly      Continuous medications     PRN medications  PRN medications: acetaminophen **OR** acetaminophen **OR** acetaminophen, acetaminophen, benzocaine-menthol, dextromethorphan-guaifenesin, guaiFENesin, hydrALAZINE, ondansetron ODT, polyethylene glycol      ASSESSMENT:  Status post PEG tube placement  Generalized weakness  Adult failure to thrive  Poor oral intake  Dysphagia  Dehydration  Hypernatremia - resolved  Hypokalemia - resolved  Pyuria - urinary tract infection  Leukocytosis - worsening  History of C. Difficile  C. Difficile  Oropharyngeal candida  Hypoalbuminemia   Normocytic anemia stable  History of CVA  Supraventricular tachycardia - resolved  Lethargy    PLAN:  WBC increased 12.7 --> 15.7..  Afebrile.  Lethargic.  Check 2 view chest x-ray and urinalysis, culture.  Follow-up results.  Monitor temperature and white blood cell count.  PEG tube site stable.  Maintain dressing and abdominal binder.  Continue mouth care.  Post-operative PEG tube placement day #2.  Continue tube feed, increase to goal rate as tolerated per RD recommendations.  Aspiration precautions.  Speech therapy follow-up.  Labs reviewed.  Electrolytes and renal function stable.  Continue to monitor.  PT/OT as tolerated.  Fall precautions.  Up with assistance only.  DVT prophylaxis.  Heparin subcutaneous.  Supportive care.  Patient  reassured.  Case management following for discharge planning.  Discharge plan to [facility].  Follow-up testing results and monitor mentation.  Discussed with patient, spouse, Dr. Denise.        BILLY Masters-CNP

## 2024-05-16 NOTE — PROGRESS NOTES
05/16/24 1146   Discharge Planning   Patient expects to be discharged to: Located within [facility]   Does the patient need discharge transport arranged? Yes   RoundTrip coordination needed? Yes   Has discharge transport been arranged? No     Safe dc plan secured  -Need dc order and med rec  -Need AVS

## 2024-05-16 NOTE — PROGRESS NOTES
Subjective Data:      Overnight Events:         Objective Data:  Last Recorded Vitals:  Vitals:    05/15/24 0432 05/15/24 0700 05/15/24 2100 05/15/24 2300   BP: 132/57 141/78 144/59 137/61   BP Location: Right arm Right arm Right arm Right arm   Patient Position:  Lying Lying Lying   Pulse: 96 93 89 83   Resp: 18 17 18 18   Temp: 37 °C (98.6 °F) 37.4 °C (99.3 °F) 36.3 °C (97.3 °F) 36.5 °C (97.7 °F)   TempSrc: Axillary Axillary Axillary Axillary   SpO2: 97% 98% 98% 97%   Weight:       Height:           Last Labs:  CBC - 5/15/2024:  7:00 AM  12.7 8.9 320    30.0      CMP - 5/15/2024:  7:01 AM  7.9 5.5 8 --- 0.3   _ 2.4 <5 103      PTT - No results in last year.  _   _ _     HGBA1C   Date/Time Value Ref Range Status   04/12/2024 07:46 AM 5.2 See below % Final   05/16/2023 03:49 PM 5.9 % Final     Comment:          Diagnosis of Diabetes-Adults   Non-Diabetic: < or = 5.6%   Increased risk for developing diabetes: 5.7-6.4%   Diagnostic of diabetes: > or = 6.5%  .       Monitoring of Diabetes                Age (y)     Therapeutic Goal (%)   Adults:          >18           <7.0   Pediatrics:    13-18           <7.5                   7-12           <8.0                   0- 6            7.5-8.5   American Diabetes Association. Diabetes Care 33(S1), Jan 2010.     04/26/2022 12:56 PM 6.0 % Final     Comment:          Diagnosis of Diabetes-Adults   Non-Diabetic: < or = 5.6%   Increased risk for developing diabetes: 5.7-6.4%   Diagnostic of diabetes: > or = 6.5%  .       Monitoring of Diabetes                Age (y)     Therapeutic Goal (%)   Adults:          >18           <7.0   Pediatrics:    13-18           <7.5                   7-12           <8.0                   0- 6            7.5-8.5   American Diabetes Association. Diabetes Care 33(S1), Jan 2010.       LDLCALC   Date/Time Value Ref Range Status   04/12/2024 07:46  65 - 130 mg/dL Final     VLDL   Date/Time Value Ref Range Status   02/03/2021 11:42 AM 45 0 - 40  "mg/dL Final      Last I/O:  I/O last 3 completed shifts:  In: 300 (4.7 mL/kg) [NG/GT:300]  Out: - (0 mL/kg)   Weight: 63.6 kg     Past Cardiology Tests (Last 3 Years):  EKG:  ECG 12 lead 05/09/2024      ECG 12 lead 04/10/2024    Echo:  Transthoracic Echo (TTE) Complete 04/12/2024    Ejection Fractions:  No results found for: \"EF\"  Cath:  No results found for this or any previous visit from the past 1095 days.    Stress Test:  No results found for this or any previous visit from the past 1095 days.    Cardiac Imaging:  No results found for this or any previous visit from the past 1095 days.      Inpatient Medications:  Scheduled medications   Medication Dose Route Frequency    aspirin  81 mg oral Daily    atorvastatin  40 mg oral Nightly    [START ON 5/19/2024] cholecalciferol  50,000 Units oral Every Sunday    ferrous sulfate (325 mg ferrous sulfate)  65 mg of iron oral BID    fluconazole  100 mg oral Daily    heparin (porcine)  5,000 Units subcutaneous q8h JACQUELYN    lactobacillus acidophilus  1 tablet oral BID    metoprolol tartrate  50 mg g-tube BID    mirtazapine  7.5 mg oral Nightly    potassium chloride  20 mEq oral Daily with breakfast     PRN medications   Medication    acetaminophen    Or    acetaminophen    Or    acetaminophen    acetaminophen    benzocaine-menthol    dextromethorphan-guaifenesin    guaiFENesin    hydrALAZINE    ondansetron ODT    polyethylene glycol     Continuous Medications   Medication Dose Last Rate       Physical Exam:       Assessment/Plan     Principal Problem:    General weakness     General weakness     5/10:The patient is a 78-year-old white female whose past medical history includes hypertension, chronic kidney disease, former alcohol excess, status post bioprosthetic aortic valve replacement utilizing a number 23 mm freestyle bioprosthesis along with replacement of the ascending aorta and transverse hemiarch using a number 24 mm Gelweave graft plus tricuspid valve repair utilizing " number 21 mm Mohini ring plus 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 This patient has done very well since her cardiac surgery with her last surveillance echocardiogram on 9/25/2023 showing an LV ejection fraction of 55-60% 1-2+ mitral valve regurgitation normal-appearing stentless aortic valve bioprosthesis with a peak systolic gradient of 11 mmHg and a prosthetic graft in the aortic root and ascending/arch position. Patient was admitted on 4/10/2024 with failure to thrive inability to provide self-care difficulty walking which appears to be related to progressing dementia.  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  She had a repeat echocardiogram that showed a preserved LV ejection fraction 60% normal-appearing stentless bioprosthetic aortic valve replacement minimal systolic pressure gradient with prior tricuspid valve repair and only mild to moderate tricuspid valve regurgitation.  There is trace to mild mitral valve regurgitation bubble study negative for residual VSD that was closed at the time of her aortic valve replacement.  Patient was treated with for an E. coli urinary tract infection but her mental status appeared to be significantly impaired from dementia.  She was continued on dual antiplatelet therapy with aspirin and Plavix along with Toprol-XL 50 mg daily.  Patient readmitted with failure to thrive with reduced oral intake and significant dehydration with hypernatremia hyperchloremia and hypokalemia for which the patient is receiving IV D5W and potassium supplementation.  From a cardiac standpoint the patient is stable but peers to have significantly advancing senile dementia versus  multi-infarct dementia.  For now would continue usual cardiac therapy with the dual antiplatelet therapy and atorvastatin plus metoprolol.     5/11 [facility] for Mario: Patient admitted essentially with a failure to thrive.  As noted above bioprosthetic aortic valve appears to be functioning normally and left ventricular systolic function preserved.  Serum sodium gradually decreasing with fluid hydration.  Patient does remain hypokalemic and will provide oral supplementation in addition to the potassium in the IV fluids.  Blood pressure mildly elevated and will titrate up the dose of the metoprolol succinate.     5/12:  [facility] for Mario: Essentially no change over the last 24 hours.  The patient has been refusing oral medications.  Hemodynamically the patient appears stable.  I think at this point we should consider palliative care and possibly even hospice care in this individual.  She does not appear to be agreeable to a PEG tube at this time.  Thus a comfort care model may be a better option.     5/13: Patient lying quietly in bed she is awake and able to answer simple questions.  Hyper natremia and hyperchloremia have resolved with the IV D5W with potassium supplementation serum sodium 139 chloride 109 potassium 4.1 with a creatinine of 0.70.  CBC notable for hematocrit of 26.7 WBC 14,500.  The patient's PCR for C. difficile is positive.  Stools for C. difficile toxin are negative.  Patient is on contact precautions.  Patient has been started on oral vancomycin. Patient seen by infectious disease uncertain as to whether patient has C. difficile infection versus colonization.  She had evidently recently completed a course of oral vancomycin at the skilled nursing facility for C. difficile colitis.  It is suspected that the patient does have more likely C. difficile colonization and is currently on IV Flagyl.  The patient has been tentatively scheduled for an EGD with PEG tube placement tomorrow but also palliative  care consultation has been recommended the patient.  Patient is on dual antiplatelet therapy after having a small cortical CVA recently but will discontinue Plavix for now to reduce risk of bleeding from PEG tube insertion.     5/14: The patient was seen and events reviewed discussed in detail with family members.  The patient underwent PEG tube insertion earlier today well-tolerated still somewhat sedated.  Patient is being followed by nephrology and yesterday was switched to IV D5 half-normal saline with sodium bicarb at night due to a mild residual metabolic acidosis.  The patient has been seen by infectious disease thought to have C. difficile colonization having completed a course of oral vancomycin at her nursing facility.  As noted the  C. difficile PCR was positive but toxin EIA negative.  Abdomen benign no diarrhea but the patient will be treated with IV Flagyl.  The patient's electrolyte panel remains improved creatinine 0.6 sodium 140 potassium 3.7 .  First PEG tube feed to be performed later today     5/15: Patient is currently resting comfortably no overt distress.  PEG tubes have been initiated.  Telemetry monitor shows a stable sinus rhythm in the 80s per minute.  Given the need for PEG tube feedings the patient's Toprol- mg daily will be switched to metoprolol to tartrate 50 mg twice daily through the PEG tube.  Other oral medications may need to be converted PEG tube administration in order to ensure adequate compliance.  Will continue usual aspirin but not Plavix.  Patient is currently on IV cefazolin plus Diflucan plus metronidazole and vancomycin orally.  Infectious disease following.  Electrolyte panel remains acceptable creatinine is 0.7 potassium 3.7 sodium 138.    5/16: The patient lying in bed very confused but arousable not oriented.  Tube feedings are progressing relatively well.  Renal parameters remain stable creatinine 0.6 electrolytes in normal range potassium 4.1 sodium 139.   Stable sinus rhythm in the 90/min range by telemetry monitor.  Patient currently on metoprolol tartrate through PEG tube 50 mg twice daily.  Fall sacral fracture as above alert and oriented x 1.  Patient is being prepared for transfer back to the skilled nursing facility later this week           Peripheral IV 05/14/24 22 G Distal;Right Forearm (Active)   Site Assessment Clean;Dry;Intact 05/15/24 2100   Dressing Status Clean;Dry 05/15/24 2100   Number of days: 2       Code Status:  Full Code    I spent 20 minutes in the professional and overall care of this patient.        Shaheed Martin MD

## 2024-05-16 NOTE — PROGRESS NOTES
"Nutrition Follow up Note    Nutrition Assessment      Tube feed running at goal rate, tolerating tube feed. Per chart, pt had watery, green stool today. Pt declined PO trial yesterday.     Nutrition History:  Food and Nutrient History: Tube feed     Food Allergies/Intolerances:  None  GI Symptoms: None  Oral Problems: None    Anthropometrics:  Ht: 165.1 cm (5' 5\"), Wt: 63.6 kg (140 lb 3.4 oz), BMI: 23.33  IBW/kg (Dietitian Calculated): 56.82 kg          Weight Change:  Daily Weight  05/09/24 : 63.6 kg (140 lb 3.4 oz)  04/16/24 : 66.5 kg (146 lb 9.7 oz)  03/20/24 : 60.2 kg (132 lb 12.8 oz)  02/20/24 : 67.1 kg (148 lb)  05/16/23 : 67.4 kg (148 lb 8 oz)  04/06/22 : 67.6 kg (149 lb 2 oz)  05/19/21 : 68.9 kg (152 lb)  01/13/21 : 66 kg (145 lb 7 oz)     Weight History / % Weight Change: weight has fluctuated between 132-148# over the past year             Nutrition Focused Physical Exam Findings:             Edema  Edema: +2 mild  Edema Location: BUE, BLE    Physical Findings (Nutrition Deficiency/Toxicity)  Skin: Positive (Deep tissue injury (DTI) to Left heel, Dry Unstagable Right heel, DTI and excoriation to Sacrum.)    Nutrition Significant Labs:  Lab Results   Component Value Date    WBC 15.7 (H) 05/16/2024    HGB 8.3 (L) 05/16/2024    HCT 25.5 (L) 05/16/2024     05/16/2024    CHOL 182 04/12/2024    TRIG 171 (H) 04/12/2024    HDL 42.0 (L) 04/12/2024    ALT <5 (L) 05/12/2024    AST 8 05/12/2024     05/16/2024    K 4.1 05/16/2024     05/16/2024    CREATININE 0.60 05/16/2024    BUN 17 05/16/2024    CO2 24 05/16/2024    TSH 1.57 04/11/2024    HGBA1C 5.2 04/12/2024     Nutrition Specific Medications:  aspirin, 81 mg, oral, Daily  atorvastatin, 40 mg, oral, Nightly  [START ON 5/19/2024] cholecalciferol, 50,000 Units, oral, Every Sunday  ferrous sulfate (325 mg ferrous sulfate), 65 mg of iron, oral, BID  fluconazole, 100 mg, oral, Daily  heparin (porcine), 5,000 Units, subcutaneous, q8h JACQUELYN  lactobacillus " acidophilus, 1 tablet, oral, BID  metoprolol tartrate, 50 mg, g-tube, BID  mirtazapine, 7.5 mg, oral, Nightly           Dietary Orders (From admission, onward)       Start     Ordered    05/14/24 1609  Enteral feeding with NPO PEG (percutaneous endoscopic gastric); 55 (Begin with 10ml per hours and increase 10 ml per hour every 8 hours until goal is reached.); 150; Water; Tap water; Every 4 hours  Diet effective now        Comments: Start tube feeding at 10 ml per hour. Increase 10 ml per hour until goal of 55 ml pr hour is reached.   Question Answer Comment   Tube feeding formula: Jevity 1.5    Tube feeding additive: Zeb    Tube feeding additive frequency: Twice a day    Feeding route: PEG (percutaneous endoscopic gastric)    Tube feeding continuous rate (mL/hr): 55 Begin with 10ml per hours and increase 10 ml per hour every 8 hours until goal is reached.   Tube feeding flush (mL): 150    Flush type: Water    Water type: Tap water    Flush frequency: Every 4 hours        05/14/24 1611    05/13/24 1443  Oral nutritional supplements  Until discontinued        Comments: Unflavored via PEG tube   Question Answer Comment   Deliver with Breakfast    Deliver with Dinner    Select supplement: Zeb        05/13/24 1442                  Nutrition Support Intake provides: 1980 kcals, 84 gm protein, 1003 mL free water      Estimated Needs:   Estimated Energy Needs  Total Energy Estimated Needs (kCal):  (5251-1320)  Total Estimated Energy Need per Day (kCal/kg):  (30-35)  Method for Estimating Needs: actual wt    Estimated Protein Needs  Total Protein Estimated Needs (g):  (76-95)  Total Protein Estimated Needs (g/kg):  (1.2-1.5)  Method for Estimating Needs: actual wt    Estimated Fluid Needs  Total Fluid Estimated Needs (mL):  (1508-4814)  Method for Estimating Needs: 1 ml/kcal        Nutrition Diagnosis   Nutrition Diagnosis:       Nutrition Diagnosis  Patient has Nutrition Diagnosis: Yes  Diagnosis Status (1):  Resolved  Nutrition Diagnosis 1: Inadequate energy intake  Related to (1): decreased ability to consume sufficient energy  As Evidenced by (1): poor PO intake       Nutrition Interventions/Recommendations   Nutrition Interventions and Recommendations:    Nutrition Prescription:  Individualized Nutrition Prescription Provided for : 8605-7033 kcals, 76-95 gm protein via enterla nutrition    Nutrition Interventions:   Food and/or Nutrient Delivery Interventions  Interventions: Enteral intake, Medical food supplement  Enteral Intake: Other (Comment)  Goal: continue Jevity 1.5 goal rate @ 55 mL/hr to provide 1980 kcals, 84 gm protein, 1003 mL free water. Pt will require an additional 150 mL water flush Q4H to meet hydration needs. Recommend initiating @ 10 mL/hr and increasing by 10 mL Q8H or as toolerated until goal is reached.  Medical Food Supplement: Commercial beverage  Goal: angelika BID to aid in wound healing    Education Documentation  No documentation found.           Nutrition Monitoring and Evaluation   Monitoring/Evaluation:   Food/Nutrient Related History Monitoring  Monitoring and Evaluation Plan: Enteral and parenteral nutrition intake  Enteral and Parenteral Nutrition Intake: Enteral nutrition formula/solution  Criteria: will monitor for tube feed tolerance            Time Spent/Follow-up:   Follow Up  Time Spent (min): 20 minutes  Last Date of Nutrition Visit: 05/16/24  Nutrition Follow-Up Needed?: 3-5 days  Follow up Comment: 5/21/24

## 2024-05-16 NOTE — PROGRESS NOTES
"Rebecca Samaniego is a 78 y.o. female on day 7 of admission presenting with General weakness.    Subjective   Tolerating tube feeds. No fevers. No vomiting or diarrhea.        Objective     Physical Exam  Constitutional:       Appearance: Normal appearance.   HENT:      Head: Normocephalic and atraumatic.      Right Ear: Tympanic membrane normal.      Nose: Nose normal.      Mouth/Throat:      Mouth: Mucous membranes are moist.   Eyes:      Pupils: Pupils are equal, round, and reactive to light.   Cardiovascular:      Rate and Rhythm: Normal rate and regular rhythm.      Pulses: Normal pulses.   Pulmonary:      Effort: Pulmonary effort is normal.      Breath sounds: Normal breath sounds.   Abdominal:      General: Bowel sounds are normal. There is no distension.      Tenderness: There is no abdominal tenderness. There is no guarding.      Hernia: No hernia is present.   Genitourinary:     Rectum: Normal.   Musculoskeletal:         General: Normal range of motion.   Skin:     General: Skin is warm and dry.   Neurological:      General: No focal deficit present.      Mental Status: She is alert.   Psychiatric:         Mood and Affect: Mood normal.         Behavior: Behavior normal.         Last Recorded Vitals  Blood pressure 157/63, pulse 89, temperature 37.7 °C (99.9 °F), temperature source Axillary, resp. rate 17, height 1.651 m (5' 5\"), weight 63.6 kg (140 lb 3.4 oz), SpO2 99%.  Intake/Output last 3 Shifts:  I/O last 3 completed shifts:  In: 300 (4.7 mL/kg) [NG/GT:300]  Out: - (0 mL/kg)   Weight: 63.6 kg     Relevant Results  Lab Results   Component Value Date    GLUCOSE 135 (H) 05/16/2024    CALCIUM 7.7 (L) 05/16/2024     05/16/2024    K 4.1 05/16/2024    CO2 24 05/16/2024     05/16/2024    BUN 17 05/16/2024    CREATININE 0.60 05/16/2024     Lab Results   Component Value Date    WBC 15.7 (H) 05/16/2024    HGB 8.3 (L) 05/16/2024    HCT 25.5 (L) 05/16/2024    MCV 96 05/16/2024     05/16/2024      "                     Assessment/Plan   Principal Problem:    General weakness  5/16/24 POD 2 peg. Tolerating tube feeds. Continue aspiration precautions. Gen surgery will sign .Please call if questions or concerns.  5/15/2024: Postop day 1 PEG tube placement.  Starting tube feeds off slowly, tolerating so far.  Eventual ramp-up goal to 55 mL an hour.  PEG stable.       I spent 15 minutes in the professional and overall care of this patient.      Veronica Farooq, IBLLY-CNP

## 2024-05-17 LAB
AMMONIA PLAS-SCNC: 18 UMOL/L (ref 12–45)
ANION GAP SERPL CALC-SCNC: 10 MMOL/L
BUN SERPL-MCNC: 28 MG/DL (ref 8–25)
CALCIUM SERPL-MCNC: 7.7 MG/DL (ref 8.5–10.4)
CHLORIDE SERPL-SCNC: 105 MMOL/L (ref 97–107)
CO2 SERPL-SCNC: 24 MMOL/L (ref 24–31)
CREAT SERPL-MCNC: 0.7 MG/DL (ref 0.4–1.6)
EGFRCR SERPLBLD CKD-EPI 2021: 88 ML/MIN/1.73M*2
ERYTHROCYTE [DISTWIDTH] IN BLOOD BY AUTOMATED COUNT: 14.3 % (ref 11.5–14.5)
GLUCOSE SERPL-MCNC: 155 MG/DL (ref 65–99)
HCT VFR BLD AUTO: 25.7 % (ref 36–46)
HGB BLD-MCNC: 7.8 G/DL (ref 12–16)
HOLD SPECIMEN: NORMAL
MCH RBC QN AUTO: 30.8 PG (ref 26–34)
MCHC RBC AUTO-ENTMCNC: 30.4 G/DL (ref 32–36)
MCV RBC AUTO: 102 FL (ref 80–100)
NRBC BLD-RTO: 0 /100 WBCS (ref 0–0)
PLATELET # BLD AUTO: 312 X10*3/UL (ref 150–450)
POTASSIUM SERPL-SCNC: 4.4 MMOL/L (ref 3.4–5.1)
RBC # BLD AUTO: 2.53 X10*6/UL (ref 4–5.2)
SODIUM SERPL-SCNC: 139 MMOL/L (ref 133–145)
WBC # BLD AUTO: 14.3 X10*3/UL (ref 4.4–11.3)

## 2024-05-17 PROCEDURE — 2500000004 HC RX 250 GENERAL PHARMACY W/ HCPCS (ALT 636 FOR OP/ED): Performed by: SURGERY

## 2024-05-17 PROCEDURE — 2500000001 HC RX 250 WO HCPCS SELF ADMINISTERED DRUGS (ALT 637 FOR MEDICARE OP): Performed by: SURGERY

## 2024-05-17 PROCEDURE — 36415 COLL VENOUS BLD VENIPUNCTURE: CPT | Performed by: INTERNAL MEDICINE

## 2024-05-17 PROCEDURE — 2500000001 HC RX 250 WO HCPCS SELF ADMINISTERED DRUGS (ALT 637 FOR MEDICARE OP): Performed by: INTERNAL MEDICINE

## 2024-05-17 PROCEDURE — 2500000004 HC RX 250 GENERAL PHARMACY W/ HCPCS (ALT 636 FOR OP/ED): Performed by: NURSE PRACTITIONER

## 2024-05-17 PROCEDURE — 2500000001 HC RX 250 WO HCPCS SELF ADMINISTERED DRUGS (ALT 637 FOR MEDICARE OP): Performed by: STUDENT IN AN ORGANIZED HEALTH CARE EDUCATION/TRAINING PROGRAM

## 2024-05-17 PROCEDURE — 99232 SBSQ HOSP IP/OBS MODERATE 35: CPT | Performed by: INTERNAL MEDICINE

## 2024-05-17 PROCEDURE — 80048 BASIC METABOLIC PNL TOTAL CA: CPT | Performed by: INTERNAL MEDICINE

## 2024-05-17 PROCEDURE — 2500000001 HC RX 250 WO HCPCS SELF ADMINISTERED DRUGS (ALT 637 FOR MEDICARE OP): Performed by: NURSE PRACTITIONER

## 2024-05-17 PROCEDURE — 1210000001 HC SEMI-PRIVATE ROOM DAILY

## 2024-05-17 PROCEDURE — 82140 ASSAY OF AMMONIA: CPT | Performed by: NURSE PRACTITIONER

## 2024-05-17 PROCEDURE — 36415 COLL VENOUS BLD VENIPUNCTURE: CPT | Performed by: NURSE PRACTITIONER

## 2024-05-17 PROCEDURE — 85027 COMPLETE CBC AUTOMATED: CPT | Performed by: INTERNAL MEDICINE

## 2024-05-17 RX ORDER — GRANULES FOR ORAL 3 G/1
3 POWDER ORAL ONCE
Qty: 3 G | Refills: 0 | Status: COMPLETED | OUTPATIENT
Start: 2024-05-17 | End: 2024-05-17

## 2024-05-17 RX ADMIN — HEPARIN SODIUM 5000 UNITS: 5000 INJECTION, SOLUTION INTRAVENOUS; SUBCUTANEOUS at 13:42

## 2024-05-17 RX ADMIN — HEPARIN SODIUM 5000 UNITS: 5000 INJECTION, SOLUTION INTRAVENOUS; SUBCUTANEOUS at 06:17

## 2024-05-17 RX ADMIN — ASPIRIN 81 MG CHEWABLE TABLET 81 MG: 81 TABLET CHEWABLE at 09:15

## 2024-05-17 RX ADMIN — Medication 1 TABLET: at 09:15

## 2024-05-17 RX ADMIN — FERROUS SULFATE TAB 325 MG (65 MG ELEMENTAL FE) 1 TABLET: 325 (65 FE) TAB at 17:01

## 2024-05-17 RX ADMIN — GRANULES FOR ORAL SOLUTION 3 G: 3 POWDER ORAL at 17:01

## 2024-05-17 RX ADMIN — MIRTAZAPINE 7.5 MG: 15 TABLET, FILM COATED ORAL at 21:00

## 2024-05-17 RX ADMIN — FERROUS SULFATE TAB 325 MG (65 MG ELEMENTAL FE) 1 TABLET: 325 (65 FE) TAB at 09:15

## 2024-05-17 RX ADMIN — ATORVASTATIN CALCIUM 40 MG: 40 TABLET, FILM COATED ORAL at 21:00

## 2024-05-17 RX ADMIN — METOPROLOL TARTRATE 50 MG: 50 TABLET, FILM COATED ORAL at 21:00

## 2024-05-17 RX ADMIN — ESOMEPRAZOLE MAGNESIUM 10 MG: 20 GRANULE, DELAYED RELEASE ORAL at 06:17

## 2024-05-17 RX ADMIN — Medication 1 TABLET: at 17:01

## 2024-05-17 RX ADMIN — METOPROLOL TARTRATE 50 MG: 50 TABLET, FILM COATED ORAL at 09:15

## 2024-05-17 RX ADMIN — HEPARIN SODIUM 5000 UNITS: 5000 INJECTION, SOLUTION INTRAVENOUS; SUBCUTANEOUS at 21:01

## 2024-05-17 ASSESSMENT — PAIN SCALES - GENERAL
PAINLEVEL_OUTOF10: 0 - NO PAIN
PAINLEVEL_OUTOF10: 0 - NO PAIN

## 2024-05-17 ASSESSMENT — COGNITIVE AND FUNCTIONAL STATUS - GENERAL
DRESSING REGULAR UPPER BODY CLOTHING: TOTAL
TURNING FROM BACK TO SIDE WHILE IN FLAT BAD: TOTAL
TURNING FROM BACK TO SIDE WHILE IN FLAT BAD: TOTAL
MOVING TO AND FROM BED TO CHAIR: TOTAL
MOBILITY SCORE: 7
MOBILITY SCORE: 6
DRESSING REGULAR LOWER BODY CLOTHING: TOTAL
STANDING UP FROM CHAIR USING ARMS: TOTAL
WALKING IN HOSPITAL ROOM: TOTAL
DRESSING REGULAR LOWER BODY CLOTHING: TOTAL
MOVING FROM LYING ON BACK TO SITTING ON SIDE OF FLAT BED WITH BEDRAILS: TOTAL
CLIMB 3 TO 5 STEPS WITH RAILING: TOTAL
WALKING IN HOSPITAL ROOM: TOTAL
EATING MEALS: TOTAL
HELP NEEDED FOR BATHING: TOTAL
DAILY ACTIVITIY SCORE: 6
EATING MEALS: TOTAL
DRESSING REGULAR UPPER BODY CLOTHING: TOTAL
MOVING TO AND FROM BED TO CHAIR: TOTAL
MOVING FROM LYING ON BACK TO SITTING ON SIDE OF FLAT BED WITH BEDRAILS: A LOT
STANDING UP FROM CHAIR USING ARMS: TOTAL
DAILY ACTIVITIY SCORE: 6
HELP NEEDED FOR BATHING: TOTAL
PERSONAL GROOMING: TOTAL
TOILETING: TOTAL
PERSONAL GROOMING: TOTAL
CLIMB 3 TO 5 STEPS WITH RAILING: TOTAL
TOILETING: TOTAL

## 2024-05-17 ASSESSMENT — PAIN - FUNCTIONAL ASSESSMENT
PAIN_FUNCTIONAL_ASSESSMENT: 0-10
PAIN_FUNCTIONAL_ASSESSMENT: 0-10

## 2024-05-17 NOTE — PROGRESS NOTES
05/17/24 1651   Discharge Planning   Patient expects to be discharged to: Located within [facility]     Plan is to return to [facility]. No precert is needed. Patient is tolerating tube feeds through PEG tube at goal rate.  ID following for UTI. Barahona inserted. Urine culture pending    DC PLAN IS SECURE

## 2024-05-17 NOTE — CARE PLAN
Problem: Skin  Goal: Participates in plan/prevention/treatment measures  5/17/2024 0205 by Kelsey Lopez RN  Outcome: Progressing  Flowsheets (Taken 5/17/2024 0205)  Participates in plan/prevention/treatment measures: Elevate heels  5/17/2024 0202 by Kelsey Lopez, RN  Outcome: Progressing   The patient's goals for the shift include improved movement    The clinical goals for the shift include rest

## 2024-05-17 NOTE — PROGRESS NOTES
Rebecca Samaniego is a 78 y.o. female on day 8 of admission presenting with General weakness.    Subjective   Patient seen and examined.  Resting in bed in no acute distress.  Spouse bedside.  Eyes closed.  Arousable.  Minimally opens eyes.  Slightly more arousable compared to yesterday.  States name.  Falls to sleep.  Review of systems is limited.     Objective     Physical Exam  Vitals and nursing note reviewed.   Constitutional:       General: She is not in acute distress.     Appearance: Normal appearance. She is normal weight. She is ill-appearing. She is not toxic-appearing or diaphoretic.   HENT:      Head: Normocephalic and atraumatic.      Right Ear: Tympanic membrane normal.      Left Ear: Tympanic membrane normal.      Nose: Nose normal.      Mouth/Throat:      Mouth: Mucous membranes are dry.      Pharynx: Oropharynx is clear.      Comments: Mouth dry, tongue less coated.  Eyes:      Extraocular Movements: Extraocular movements intact.      Conjunctiva/sclera: Conjunctivae normal.      Pupils: Pupils are equal, round, and reactive to light.   Cardiovascular:      Rate and Rhythm: Normal rate and regular rhythm.      Pulses: Normal pulses.      Heart sounds: Normal heart sounds. No murmur heard.  Pulmonary:      Effort: Pulmonary effort is normal. No respiratory distress.      Breath sounds: Normal breath sounds. No wheezing, rhonchi or rales.   Abdominal:      General: Bowel sounds are normal. There is no distension.      Palpations: Abdomen is soft.      Tenderness: There is no abdominal tenderness.      Comments: PEG tube in place abdominal binder in place.  Dressing and binder removed.  PEG tube site without erythema or drainage.  Dressing and binder replaced.  Tube feed infusing.   Genitourinary:     Comments: Deferred.  Musculoskeletal:         General: No swelling or tenderness. Normal range of motion.      Cervical back: Normal range of motion and neck supple.   Skin:     General: Skin is warm and  "dry.      Capillary Refill: Capillary refill takes less than 2 seconds.      Findings: Bruising present.   Neurological:      General: No focal deficit present.      Mental Status: She is alert. She is disoriented.      Comments: Eyes closed.  Lethargic.  Arousable.  Confused.  States name otherwise not answering my questions.  Speech clear and coherent.  Follows all commands.  Generalized weakness.  No focal weakness.  Gait deferred.  Cranial nerves II through XII grossly intact.   Psychiatric:         Mood and Affect: Mood normal.         Behavior: Behavior normal.       Last Recorded Vitals  Blood pressure 124/57, pulse 102, temperature 37.5 °C (99.5 °F), temperature source Axillary, resp. rate 18, height 1.651 m (5' 5\"), weight 63.6 kg (140 lb 3.4 oz), SpO2 98%.    Intake/Output last 3 Shifts:  I/O last 3 completed shifts:  In: 200 (3.1 mL/kg) [NG/GT:150; IV Piggyback:50]  Out: 700 (11 mL/kg) [Urine:700 (0.3 mL/kg/hr)]  Weight: 63.6 kg     Telemetry normal sinus rhythm rate 100's    Relevant Results  Results for orders placed or performed during the hospital encounter of 05/09/24 (from the past 24 hour(s))   CBC   Result Value Ref Range    WBC 14.3 (H) 4.4 - 11.3 x10*3/uL    nRBC 0.0 0.0 - 0.0 /100 WBCs    RBC 2.53 (L) 4.00 - 5.20 x10*6/uL    Hemoglobin 7.8 (L) 12.0 - 16.0 g/dL    Hematocrit 25.7 (L) 36.0 - 46.0 %     (H) 80 - 100 fL    MCH 30.8 26.0 - 34.0 pg    MCHC 30.4 (L) 32.0 - 36.0 g/dL    RDW 14.3 11.5 - 14.5 %    Platelets 312 150 - 450 x10*3/uL   Basic Metabolic Panel   Result Value Ref Range    Glucose 155 (H) 65 - 99 mg/dL    Sodium 139 133 - 145 mmol/L    Potassium 4.4 3.4 - 5.1 mmol/L    Chloride 105 97 - 107 mmol/L    Bicarbonate 24 24 - 31 mmol/L    Urea Nitrogen 28 (H) 8 - 25 mg/dL    Creatinine 0.70 0.40 - 1.60 mg/dL    eGFR 88 >60 mL/min/1.73m*2    Calcium 7.7 (L) 8.5 - 10.4 mg/dL    Anion Gap 10 <=19 mmol/L   Ammonia   Result Value Ref Range    Ammonia 18 12 - 45 umol/L "     Susceptibility data from last 90 days.  Collected Specimen Info Organism Ampicillin Cefazolin Cefazolin (uncomplicated UTIs only) Ciprofloxacin Gentamicin Nitrofurantoin Piperacillin/Tazobactam Trimethoprim/Sulfamethoxazole   04/11/24 Urine from Straight Catheter Escherichia coli S S S S S S S S     XR chest 2 views    Result Date: 5/17/2024  Interpreted By:  Doni Bernstein, STUDY: XR CHEST 2 VIEWS; 5/16/2024 6:06 pm   INDICATION: Signs/Symptoms:Leukocytosis, AMS.   COMPARISON: 05/09/2024   ACCESSION NUMBER(S): [id number]   ORDERING CLINICIAN: MICHELLE WILLIS   TECHNIQUE: AP and lateral views of the chest were obtained.   FINDINGS: The cardiac silhouette is normal in appearance. Sternal fixations devices and mediastinal clips are present. Prosthetic mitral valve is present. Small bilateral pleural effusions are present. Left basilar subsegmental atelectasis is suspected.       Small bilateral effusions are present along with left basilar subsegmental atelectasis. Postsurgical findings as above.   MACRO: none   Signed by: Doni Bernstein 5/17/2024 8:37 AM Dictation workstation:   [id number]     Scheduled medications  aspirin, 81 mg, oral, Daily  atorvastatin, 40 mg, oral, Nightly  [START ON 5/19/2024] cholecalciferol, 50,000 Units, oral, Every Sunday  esomeprazole, 10 mg, g-tube, Daily before breakfast  ferrous sulfate (325 mg ferrous sulfate), 65 mg of iron, oral, BID  heparin (porcine), 5,000 Units, subcutaneous, q8h JACQUELYN  lactobacillus acidophilus, 1 tablet, oral, BID  metoprolol tartrate, 50 mg, g-tube, BID  mirtazapine, 7.5 mg, oral, Nightly      Continuous medications     PRN medications  PRN medications: acetaminophen **OR** acetaminophen **OR** acetaminophen, acetaminophen, benzocaine-menthol, dextromethorphan-guaifenesin, guaiFENesin, hydrALAZINE, ondansetron ODT, polyethylene glycol      ASSESSMENT:  Status post PEG tube placement  Generalized weakness  Adult failure to thrive  Poor oral  intake  Dysphagia  Dehydration  Hypernatremia - resolved  Hypokalemia - resolved  Pyuria - urinary tract infection  Leukocytosis - worsening  History of C. Difficile  C. Difficile  Oropharyngeal candida  Hypoalbuminemia   Normocytic anemia stable  History of CVA  Supraventricular tachycardia - resolved  Lethargy  Pyuria - urinary tract infection  Toxic encephalopathy secondary to UTI  Small bilateral pleural effusions.      PLAN:  Urinalysis consistent with UTI.  Chest-xray shows small bilateral pleural effusions.  Respiratory status, pulse oximetry stable on room air.  Started on IV Ceftriaxone.  Discussed with Infectious disease Dr. Chen.  Start Fosfomycin x 3 doses via PEG tube.  Follow-up urine culture results.  Monitor mentation.  Suspect toxic encephalopathy secondary to UTI + failure to thrive + history of dementia, CVA.  Tolerating tube feed at goal rate.  Continue tube feeding.  Aspiration precautions.  Speech therapy follow-up.  Mouth care.  Labs reviewed.  Electrolytes and renal function stable.  Monitor.  BMP, CBC in am.  PT/OT as tolerated.  Fall precautions.  Up with assistance only.  DVT prophylaxis.  Heparin subcutaneous.  Supportive care.  Patient reassured.  Case management following for discharge planning.  Discharge plan to [facility].  Monitor mentation with UTI treatment.  Discussed with patient, spouse, daughter, Infectious disease, Dr. Denise.        Belen Martin, BILLY-CNP

## 2024-05-17 NOTE — CARE PLAN
Chart reviewed. Spoke with DARIUS Leija    ?UTI, altered    Fosfomycin x1 dose. Should cover cystitis     Veronica Chen, DO  [facility], [place]  #[phone]

## 2024-05-17 NOTE — CARE PLAN
The patient's goals for the shift include improved movement    The clinical goals for the shift include rest    Problem: ADLs  Goal: Pt will complete ADL tasks at min Awith use of AE prn   Outcome: Progressing     Problem: Pain  Goal: My pain/discomfort is manageable  Outcome: Progressing     Problem: Safety  Goal: Patient will be injury free during hospitalization  Outcome: Progressing  Goal: I will remain free of falls  Outcome: Progressing     Problem: Daily Care  Goal: Daily care needs are met  Outcome: Progressing     Problem: Psychosocial Needs  Goal: Demonstrates ability to cope with hospitalization/illness  Outcome: Progressing  Goal: Collaborate with me, my family, and caregiver to identify my specific goals  Outcome: Progressing     Problem: Discharge Barriers  Goal: My discharge needs are met  Outcome: Progressing     Problem: Skin  Goal: Decreased wound size/increased tissue granulation at next dressing change  Outcome: Progressing  Goal: Participates in plan/prevention/treatment measures  Outcome: Progressing  Goal: Prevent/manage excess moisture  Outcome: Progressing  Goal: Prevent/minimize sheer/friction injuries  Outcome: Progressing  Goal: Promote/optimize nutrition  Outcome: Progressing  Goal: Promote skin healing  Outcome: Progressing     Problem: Pain - Adult  Goal: Verbalizes/displays adequate comfort level or baseline comfort level  Outcome: Progressing     Problem: Safety - Adult  Goal: Free from fall injury  Outcome: Progressing     Problem: Discharge Planning  Goal: Discharge to home or other facility with appropriate resources  Outcome: Progressing     Problem: Chronic Conditions and Co-morbidities  Goal: Patient's chronic conditions and co-morbidity symptoms are monitored and maintained or improved  Outcome: Progressing     Problem: Nutrition  Goal: Less than 5 days NPO/clear liquids  Outcome: Progressing  Goal: Oral intake greater than 50%  Outcome: Progressing  Goal: Oral intake greater  75%  Outcome: Progressing  Goal: Consume prescribed supplement  Outcome: Progressing  Goal: Adequate PO fluid intake  Outcome: Progressing  Goal: Nutrition support goals are met within 48 hrs  Outcome: Progressing  Goal: Nutrition support is meeting 75% of nutrient needs  Outcome: Progressing  Goal: Tube feed tolerance  Outcome: Progressing  Goal: BG  mg/dL  Outcome: Progressing  Goal: Lab values WNL  Outcome: Progressing  Goal: Electrolytes WNL  Outcome: Progressing  Goal: Promote healing  Outcome: Progressing  Goal: Maintain stable weight  Outcome: Progressing  Goal: Reduce weight from edema/fluid  Outcome: Progressing  Goal: Gradual weight gain  Outcome: Progressing  Goal: Improve ostomy output  Outcome: Progressing

## 2024-05-17 NOTE — PROGRESS NOTES
Physical Therapy                 Therapy Communication Note    Patient Name: Rebecca Samaniego  MRN: [id number]  Today's Date: 5/17/2024     Discipline: Physical Therapy    Missed Visit Reason: Patient refused. Pt with eyes closed and declining tx. No reason given. Despite education and encouragement, pt continued to decline.    Missed Time: Attempt

## 2024-05-17 NOTE — PROGRESS NOTES
Subjective Data:  The patient is somewhat obtunded but arousable.  Quite confused    Overnight Events:    As described below     Objective Data:  Last Recorded Vitals:  Vitals:    05/16/24 1500 05/16/24 2324 05/17/24 0700 05/17/24 1530   BP: 140/59 139/61 119/50 124/57   BP Location: Right arm Right arm Left arm Left arm   Patient Position: Lying Lying Lying Lying   Pulse: 93 92 109 102   Resp: 17 17 18 18   Temp: 37 °C (98.6 °F) 36.7 °C (98.1 °F) 37.1 °C (98.8 °F) 37.5 °C (99.5 °F)   TempSrc: Oral Temporal Axillary Axillary   SpO2: 96% 94% 96% 98%   Weight:       Height:           Last Labs:  CBC - 5/17/2024:  6:30 AM  14.3 7.8 312    25.7      CMP - 5/17/2024:  6:30 AM  7.7 5.5 8 --- 0.3   _ 2.4 <5 103      PTT - No results in last year.  _   _ _     HGBA1C   Date/Time Value Ref Range Status   04/12/2024 07:46 AM 5.2 See below % Final   05/16/2023 03:49 PM 5.9 % Final     Comment:          Diagnosis of Diabetes-Adults   Non-Diabetic: < or = 5.6%   Increased risk for developing diabetes: 5.7-6.4%   Diagnostic of diabetes: > or = 6.5%  .       Monitoring of Diabetes                Age (y)     Therapeutic Goal (%)   Adults:          >18           <7.0   Pediatrics:    13-18           <7.5                   7-12           <8.0                   0- 6            7.5-8.5   American Diabetes Association. Diabetes Care 33(S1), Jan 2010.     04/26/2022 12:56 PM 6.0 % Final     Comment:          Diagnosis of Diabetes-Adults   Non-Diabetic: < or = 5.6%   Increased risk for developing diabetes: 5.7-6.4%   Diagnostic of diabetes: > or = 6.5%  .       Monitoring of Diabetes                Age (y)     Therapeutic Goal (%)   Adults:          >18           <7.0   Pediatrics:    13-18           <7.5                   7-12           <8.0                   0- 6            7.5-8.5   American Diabetes Association. Diabetes Care 33(S1), Jan 2010.       LDLCALC   Date/Time Value Ref Range Status   04/12/2024 07:46  65 - 130 mg/dL  "Final     VLDL   Date/Time Value Ref Range Status   02/03/2021 11:42 AM 45 0 - 40 mg/dL Final      Last I/O:  I/O last 3 completed shifts:  In: 200 (3.1 mL/kg) [NG/GT:150; IV Piggyback:50]  Out: 700 (11 mL/kg) [Urine:700 (0.3 mL/kg/hr)]  Weight: 63.6 kg     Past Cardiology Tests (Last 3 Years):  EKG:  ECG 12 lead 05/09/2024      ECG 12 lead 04/10/2024    Echo:  Transthoracic Echo (TTE) Complete 04/12/2024    Ejection Fractions:  No results found for: \"EF\"  Cath:  No results found for this or any previous visit from the past 1095 days.    Stress Test:  No results found for this or any previous visit from the past 1095 days.    Cardiac Imaging:  No results found for this or any previous visit from the past 1095 days.      Inpatient Medications:  Scheduled medications   Medication Dose Route Frequency    aspirin  81 mg oral Daily    atorvastatin  40 mg oral Nightly    [START ON 5/19/2024] cholecalciferol  50,000 Units oral Every Sunday    esomeprazole  10 mg g-tube Daily before breakfast    ferrous sulfate (325 mg ferrous sulfate)  65 mg of iron oral BID    fosfomycin  3 g oral Once    heparin (porcine)  5,000 Units subcutaneous q8h JACQUELYN    lactobacillus acidophilus  1 tablet oral BID    metoprolol tartrate  50 mg g-tube BID    mirtazapine  7.5 mg oral Nightly     PRN medications   Medication    acetaminophen    Or    acetaminophen    Or    acetaminophen    acetaminophen    benzocaine-menthol    dextromethorphan-guaifenesin    guaiFENesin    hydrALAZINE    ondansetron ODT    polyethylene glycol     Continuous Medications   Medication Dose Last Rate       Physical Exam:       Assessment/Plan     Principal Problem:    General weakness     General weakness     5/10:The patient is a 78-year-old white female whose past medical history includes hypertension, chronic kidney disease, former alcohol excess, status post bioprosthetic aortic valve replacement utilizing a number 23 mm freestyle bioprosthesis along with replacement " of the ascending aorta and transverse hemiarch using a number 24 mm Gelweave graft plus tricuspid valve repair utilizing number 21 mm Mohini ring plus 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 This patient has done very well since her cardiac surgery with her last surveillance echocardiogram on 9/25/2023 showing an LV ejection fraction of 55-60% 1-2+ mitral valve regurgitation normal-appearing stentless aortic valve bioprosthesis with a peak systolic gradient of 11 mmHg and a prosthetic graft in the aortic root and ascending/arch position. Patient was admitted on 4/10/2024 with failure to thrive inability to provide self-care difficulty walking which appears to be related to progressing dementia.  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  She had a repeat echocardiogram that showed a preserved LV ejection fraction 60% normal-appearing stentless bioprosthetic aortic valve replacement minimal systolic pressure gradient with prior tricuspid valve repair and only mild to moderate tricuspid valve regurgitation.  There is trace to mild mitral valve regurgitation bubble study negative for residual VSD that was closed at the time of her aortic valve replacement.  Patient was treated with for an E. coli urinary tract infection but her mental status appeared to be significantly impaired from dementia.  She was continued on dual antiplatelet therapy with aspirin and Plavix along with Toprol-XL 50 mg daily.  Patient readmitted with failure to thrive with reduced oral intake and significant dehydration with hypernatremia hyperchloremia and hypokalemia for which the patient is receiving IV D5W and potassium supplementation.   From a cardiac standpoint the patient is stable but peers to have significantly advancing senile dementia versus multi-infarct dementia.  For now would continue usual cardiac therapy with the dual antiplatelet therapy and atorvastatin plus metoprolol.     5/11 [facility] for [facility]: Patient admitted essentially with a failure to thrive.  As noted above bioprosthetic aortic valve appears to be functioning normally and left ventricular systolic function preserved.  Serum sodium gradually decreasing with fluid hydration.  Patient does remain hypokalemic and will provide oral supplementation in addition to the potassium in the IV fluids.  Blood pressure mildly elevated and will titrate up the dose of the metoprolol succinate.     5/12:  [facility] for [place]: Essentially no change over the last 24 hours.  The patient has been refusing oral medications.  Hemodynamically the patient appears stable.  I think at this point we should consider palliative care and possibly even hospice care in this individual.  She does not appear to be agreeable to a PEG tube at this time.  Thus a comfort care model may be a better option.     5/13: Patient lying quietly in bed she is awake and able to answer simple questions.  Hyper natremia and hyperchloremia have resolved with the IV D5W with potassium supplementation serum sodium 139 chloride 109 potassium 4.1 with a creatinine of 0.70.  CBC notable for hematocrit of 26.7 WBC 14,500.  The patient's PCR for C. difficile is positive.  Stools for C. difficile toxin are negative.  Patient is on contact precautions.  Patient has been started on oral vancomycin. Patient seen by infectious disease uncertain as to whether patient has C. difficile infection versus colonization.  She had evidently recently completed a course of oral vancomycin at the skilled nursing facility for C. difficile colitis.  It is suspected that the patient does have more likely C. difficile colonization and is currently on IV  Flagyl.  The patient has been tentatively scheduled for an EGD with PEG tube placement tomorrow but also palliative care consultation has been recommended the patient.  Patient is on dual antiplatelet therapy after having a small cortical CVA recently but will discontinue Plavix for now to reduce risk of bleeding from PEG tube insertion.     5/14: The patient was seen and events reviewed discussed in detail with family members.  The patient underwent PEG tube insertion earlier today well-tolerated still somewhat sedated.  Patient is being followed by nephrology and yesterday was switched to IV D5 half-normal saline with sodium bicarb at night due to a mild residual metabolic acidosis.  The patient has been seen by infectious disease thought to have C. difficile colonization having completed a course of oral vancomycin at her nursing facility.  As noted the  C. difficile PCR was positive but toxin EIA negative.  Abdomen benign no diarrhea but the patient will be treated with IV Flagyl.  The patient's electrolyte panel remains improved creatinine 0.6 sodium 140 potassium 3.7 .  First PEG tube feed to be performed later today     5/15: Patient is currently resting comfortably no overt distress.  PEG tubes have been initiated.  Telemetry monitor shows a stable sinus rhythm in the 80s per minute.  Given the need for PEG tube feedings the patient's Toprol- mg daily will be switched to metoprolol to tartrate 50 mg twice daily through the PEG tube.  Other oral medications may need to be converted PEG tube administration in order to ensure adequate compliance.  Will continue usual aspirin but not Plavix.  Patient is currently on IV cefazolin plus Diflucan plus metronidazole and vancomycin orally.  Infectious disease following.  Electrolyte panel remains acceptable creatinine is 0.7 potassium 3.7 sodium 138.     5/16: The patient lying in bed very confused but arousable not oriented.  Tube feedings are progressing  relatively well.  Renal parameters remain stable creatinine 0.6 electrolytes in normal range potassium 4.1 sodium 139.  Stable sinus rhythm in the 90/min range by telemetry monitor.  Patient currently on metoprolol tartrate through PEG tube 50 mg twice daily.  Fall sacral fracture as above alert and oriented x 1.  Patient is being prepared for transfer back to the skilled nursing facility later this week    5/17: The patient remains very somnolent arousable but not cognizant.  A Barahona catheter was inserted and the patient's output was quite discolored consistent with suspected urinary tract infection.  CBC notable for WBC of 14,300 hemoglobin 7.8 hematocrit 25.7.  Renal parameters and electrolytes remain acceptable creatinine is 0.70 sodium 139.  PEG tubes are proceeding.  Suspect the patient does have a degree of metabolic encephalopathy related to the urinary tract infection.  Patient seen by infectious disease and placed on fosfomycin.                Peripheral IV 05/14/24 22 G Distal;Right Forearm (Active)   Site Assessment Clean;Dry;Intact 05/17/24 0700   Dressing Type Transparent 05/17/24 0700   Line Status Saline locked 05/17/24 0700   Dressing Status Clean;Dry;Occlusive 05/17/24 0700   Number of days: 3       Code Status:  Full Code    I spent is that he was paranoid by minutes in the professional and overall care of this patient.        Shaheed Martin MD

## 2024-05-18 ENCOUNTER — APPOINTMENT (OUTPATIENT)
Dept: RADIOLOGY | Facility: HOSPITAL | Age: 79
DRG: 640 | End: 2024-05-18
Payer: MEDICARE

## 2024-05-18 LAB
ANION GAP SERPL CALC-SCNC: 13 MMOL/L
BACTERIA UR CULT: ABNORMAL
BUN SERPL-MCNC: 28 MG/DL (ref 8–25)
CALCIUM SERPL-MCNC: 7.7 MG/DL (ref 8.5–10.4)
CHLORIDE SERPL-SCNC: 103 MMOL/L (ref 97–107)
CO2 SERPL-SCNC: 23 MMOL/L (ref 24–31)
CREAT SERPL-MCNC: 0.6 MG/DL (ref 0.4–1.6)
EGFRCR SERPLBLD CKD-EPI 2021: >90 ML/MIN/1.73M*2
ERYTHROCYTE [DISTWIDTH] IN BLOOD BY AUTOMATED COUNT: 14.6 % (ref 11.5–14.5)
GLUCOSE SERPL-MCNC: 133 MG/DL (ref 65–99)
HCT VFR BLD AUTO: 26.2 % (ref 36–46)
HGB BLD-MCNC: 8 G/DL (ref 12–16)
MCH RBC QN AUTO: 31 PG (ref 26–34)
MCHC RBC AUTO-ENTMCNC: 30.5 G/DL (ref 32–36)
MCV RBC AUTO: 102 FL (ref 80–100)
NRBC BLD-RTO: 0 /100 WBCS (ref 0–0)
PLATELET # BLD AUTO: 322 X10*3/UL (ref 150–450)
POTASSIUM SERPL-SCNC: 4.2 MMOL/L (ref 3.4–5.1)
RBC # BLD AUTO: 2.58 X10*6/UL (ref 4–5.2)
SODIUM SERPL-SCNC: 139 MMOL/L (ref 133–145)
WBC # BLD AUTO: 14.8 X10*3/UL (ref 4.4–11.3)

## 2024-05-18 PROCEDURE — 2500000001 HC RX 250 WO HCPCS SELF ADMINISTERED DRUGS (ALT 637 FOR MEDICARE OP): Performed by: INTERNAL MEDICINE

## 2024-05-18 PROCEDURE — 36415 COLL VENOUS BLD VENIPUNCTURE: CPT | Performed by: INTERNAL MEDICINE

## 2024-05-18 PROCEDURE — 70450 CT HEAD/BRAIN W/O DYE: CPT

## 2024-05-18 PROCEDURE — 2500000001 HC RX 250 WO HCPCS SELF ADMINISTERED DRUGS (ALT 637 FOR MEDICARE OP): Performed by: NURSE PRACTITIONER

## 2024-05-18 PROCEDURE — 85027 COMPLETE CBC AUTOMATED: CPT | Performed by: INTERNAL MEDICINE

## 2024-05-18 PROCEDURE — 99232 SBSQ HOSP IP/OBS MODERATE 35: CPT | Performed by: INTERNAL MEDICINE

## 2024-05-18 PROCEDURE — 82374 ASSAY BLOOD CARBON DIOXIDE: CPT | Performed by: INTERNAL MEDICINE

## 2024-05-18 PROCEDURE — 1210000001 HC SEMI-PRIVATE ROOM DAILY

## 2024-05-18 PROCEDURE — 70450 CT HEAD/BRAIN W/O DYE: CPT | Performed by: RADIOLOGY

## 2024-05-18 PROCEDURE — 2500000001 HC RX 250 WO HCPCS SELF ADMINISTERED DRUGS (ALT 637 FOR MEDICARE OP): Performed by: SURGERY

## 2024-05-18 PROCEDURE — 2500000004 HC RX 250 GENERAL PHARMACY W/ HCPCS (ALT 636 FOR OP/ED): Performed by: SURGERY

## 2024-05-18 RX ADMIN — Medication 1 TABLET: at 17:26

## 2024-05-18 RX ADMIN — ATORVASTATIN CALCIUM 40 MG: 40 TABLET, FILM COATED ORAL at 21:54

## 2024-05-18 RX ADMIN — METOPROLOL TARTRATE 50 MG: 50 TABLET, FILM COATED ORAL at 09:31

## 2024-05-18 RX ADMIN — METOPROLOL TARTRATE 50 MG: 50 TABLET, FILM COATED ORAL at 21:54

## 2024-05-18 RX ADMIN — ASPIRIN 81 MG CHEWABLE TABLET 81 MG: 81 TABLET CHEWABLE at 09:31

## 2024-05-18 RX ADMIN — FERROUS SULFATE TAB 325 MG (65 MG ELEMENTAL FE) 1 TABLET: 325 (65 FE) TAB at 17:26

## 2024-05-18 RX ADMIN — FERROUS SULFATE TAB 325 MG (65 MG ELEMENTAL FE) 1 TABLET: 325 (65 FE) TAB at 09:31

## 2024-05-18 RX ADMIN — ACETAMINOPHEN 650 MG: 160 SOLUTION ORAL at 21:55

## 2024-05-18 RX ADMIN — ESOMEPRAZOLE MAGNESIUM 10 MG: 20 GRANULE, DELAYED RELEASE ORAL at 06:14

## 2024-05-18 RX ADMIN — HEPARIN SODIUM 5000 UNITS: 5000 INJECTION, SOLUTION INTRAVENOUS; SUBCUTANEOUS at 06:14

## 2024-05-18 RX ADMIN — HEPARIN SODIUM 5000 UNITS: 5000 INJECTION, SOLUTION INTRAVENOUS; SUBCUTANEOUS at 21:54

## 2024-05-18 RX ADMIN — HEPARIN SODIUM 5000 UNITS: 5000 INJECTION, SOLUTION INTRAVENOUS; SUBCUTANEOUS at 15:25

## 2024-05-18 RX ADMIN — Medication 1 TABLET: at 09:31

## 2024-05-18 ASSESSMENT — PAIN SCALES - GENERAL
PAINLEVEL_OUTOF10: 0 - NO PAIN
PAINLEVEL_OUTOF10: 0 - NO PAIN

## 2024-05-18 ASSESSMENT — COGNITIVE AND FUNCTIONAL STATUS - GENERAL
STANDING UP FROM CHAIR USING ARMS: TOTAL
TOILETING: TOTAL
STANDING UP FROM CHAIR USING ARMS: TOTAL
WALKING IN HOSPITAL ROOM: TOTAL
DRESSING REGULAR UPPER BODY CLOTHING: TOTAL
CLIMB 3 TO 5 STEPS WITH RAILING: TOTAL
EATING MEALS: TOTAL
HELP NEEDED FOR BATHING: TOTAL
MOVING FROM LYING ON BACK TO SITTING ON SIDE OF FLAT BED WITH BEDRAILS: TOTAL
MOBILITY SCORE: 6
DRESSING REGULAR UPPER BODY CLOTHING: TOTAL
TURNING FROM BACK TO SIDE WHILE IN FLAT BAD: TOTAL
CLIMB 3 TO 5 STEPS WITH RAILING: TOTAL
EATING MEALS: TOTAL
DRESSING REGULAR LOWER BODY CLOTHING: TOTAL
PERSONAL GROOMING: TOTAL
HELP NEEDED FOR BATHING: TOTAL
TOILETING: TOTAL
DRESSING REGULAR LOWER BODY CLOTHING: TOTAL
MOBILITY SCORE: 6
TURNING FROM BACK TO SIDE WHILE IN FLAT BAD: TOTAL
MOVING TO AND FROM BED TO CHAIR: TOTAL
DAILY ACTIVITIY SCORE: 6
PERSONAL GROOMING: TOTAL
DAILY ACTIVITIY SCORE: 6
MOVING TO AND FROM BED TO CHAIR: TOTAL
WALKING IN HOSPITAL ROOM: TOTAL
MOVING FROM LYING ON BACK TO SITTING ON SIDE OF FLAT BED WITH BEDRAILS: TOTAL

## 2024-05-18 ASSESSMENT — PAIN - FUNCTIONAL ASSESSMENT: PAIN_FUNCTIONAL_ASSESSMENT: 0-10

## 2024-05-18 ASSESSMENT — PAIN SCALES - WONG BAKER: WONGBAKER_NUMERICALRESPONSE: NO HURT

## 2024-05-18 NOTE — CARE PLAN
Problem: ADLs  Goal: Pt will complete ADL tasks at min Awith use of AE prn   Outcome: Progressing     Problem: Pain  Goal: My pain/discomfort is manageable  Outcome: Progressing     Problem: Safety  Goal: Patient will be injury free during hospitalization  Outcome: Progressing  Goal: I will remain free of falls  Outcome: Progressing     Problem: Daily Care  Goal: Daily care needs are met  Outcome: Progressing     Problem: Psychosocial Needs  Goal: Demonstrates ability to cope with hospitalization/illness  Outcome: Progressing  Goal: Collaborate with me, my family, and caregiver to identify my specific goals  Outcome: Progressing     Problem: Discharge Barriers  Goal: My discharge needs are met  Outcome: Progressing     Problem: Skin  Goal: Decreased wound size/increased tissue granulation at next dressing change  Outcome: Progressing  Flowsheets (Taken 5/17/2024 2251)  Decreased wound size/increased tissue granulation at next dressing change:   Promote sleep for wound healing   Protective dressings over bony prominences   Utilize specialty bed per algorithm  Goal: Participates in plan/prevention/treatment measures  Outcome: Progressing  Flowsheets (Taken 5/17/2024 2251)  Participates in plan/prevention/treatment measures:   Discuss with provider PT/OT consult   Elevate heels   Increase activity/out of bed for meals  Goal: Prevent/manage excess moisture  Outcome: Progressing  Flowsheets (Taken 5/17/2024 2251)  Prevent/manage excess moisture:   Cleanse incontinence/protect with barrier cream   Follow provider orders for dressing changes   Moisturize dry skin   Monitor for/manage infection if present   Use wicking fabric (obtain order)  Goal: Prevent/minimize sheer/friction injuries  Outcome: Progressing  Flowsheets (Taken 5/17/2024 2251)  Prevent/minimize sheer/friction injuries:   Complete micro-shifts as needed if patient unable. Adjust patient position to relieve pressure points, not a full turn   HOB 30 degrees  or less   Increase activity/out of bed for meals   Turn/reposition every 2 hours/use positioning/transfer devices   Use pull sheet   Utilize specialty bed per algorithm  Goal: Promote/optimize nutrition  Outcome: Progressing  Flowsheets (Taken 5/17/2024 2251)  Promote/optimize nutrition:   Assist with feeding   Consume > 50% meals/supplements   Discuss with provider if NPO > 2 days   Monitor/record intake including meals   Offer water/supplements/favorite foods   Reassess MST if dietician not consulted  Goal: Promote skin healing  Outcome: Progressing  Flowsheets (Taken 5/17/2024 2251)  Promote skin healing:   Assess skin/pad under line(s)/device(s)   Ensure correct size (line/device) and apply per  instructions   Protective dressings over bony prominences   Rotate device position/do not position patient on device   Turn/reposition every 2 hours/use positioning/transfer devices     Problem: Pain - Adult  Goal: Verbalizes/displays adequate comfort level or baseline comfort level  Outcome: Progressing     Problem: Safety - Adult  Goal: Free from fall injury  Outcome: Progressing     Problem: Discharge Planning  Goal: Discharge to home or other facility with appropriate resources  Outcome: Progressing     Problem: Chronic Conditions and Co-morbidities  Goal: Patient's chronic conditions and co-morbidity symptoms are monitored and maintained or improved  Outcome: Progressing     Problem: Nutrition  Goal: Less than 5 days NPO/clear liquids  Outcome: Progressing  Goal: Oral intake greater than 50%  Outcome: Progressing  Goal: Oral intake greater 75%  Outcome: Progressing  Goal: Consume prescribed supplement  Outcome: Progressing  Goal: Adequate PO fluid intake  Outcome: Progressing  Goal: Nutrition support goals are met within 48 hrs  Outcome: Progressing  Goal: Nutrition support is meeting 75% of nutrient needs  Outcome: Progressing  Goal: Tube feed tolerance  Outcome: Progressing  Goal: BG   mg/dL  Outcome: Progressing  Goal: Lab values WNL  Outcome: Progressing  Goal: Electrolytes WNL  Outcome: Progressing  Goal: Promote healing  Outcome: Progressing  Goal: Maintain stable weight  Outcome: Progressing  Goal: Reduce weight from edema/fluid  Outcome: Progressing  Goal: Gradual weight gain  Outcome: Progressing  Goal: Improve ostomy output  Outcome: Progressing     Problem: Fall/Injury  Goal: Not fall by end of shift  Outcome: Progressing  Goal: Be free from injury by end of the shift  Outcome: Progressing  Goal: Verbalize understanding of personal risk factors for fall in the hospital  Outcome: Progressing  Goal: Verbalize understanding of risk factor reduction measures to prevent injury from fall in the home  Outcome: Progressing  Goal: Use assistive devices by end of the shift  Outcome: Progressing  Goal: Pace activities to prevent fatigue by end of the shift  Outcome: Progressing   The patient's goals for the shift include improved movement    The clinical goals for the shift include Maintain safety.    Over the shift, the patient did not make progress toward the following goals. Barriers to progression include . Recommendations to address these barriers include .

## 2024-05-18 NOTE — PROGRESS NOTES
Subjective Data:  Patient remains obtunded arouses briefly with tactile stimulation.    Overnight Events:    As described below     Objective Data:  Last Recorded Vitals:  Vitals:    05/17/24 0700 05/17/24 1530 05/18/24 0530 05/18/24 0700   BP: 119/50 124/57 146/58 146/62   BP Location: Left arm Left arm Left arm Left arm   Patient Position: Lying Lying Lying Lying   Pulse: 109 102 102 101   Resp: 18 18 18 18   Temp: 37.1 °C (98.8 °F) 37.5 °C (99.5 °F) 37.9 °C (100.2 °F) 37 °C (98.6 °F)   TempSrc: Axillary Axillary Axillary Axillary   SpO2: 96% 98% 96% 97%   Weight:       Height:           Last Labs:  CBC - 5/18/2024:  5:59 AM  14.8 8.0 322    26.2      CMP - 5/18/2024:  5:59 AM  7.7 5.5 8 --- 0.3   _ 2.4 <5 103      PTT - No results in last year.  _   _ _     HGBA1C   Date/Time Value Ref Range Status   04/12/2024 07:46 AM 5.2 See below % Final   05/16/2023 03:49 PM 5.9 % Final     Comment:          Diagnosis of Diabetes-Adults   Non-Diabetic: < or = 5.6%   Increased risk for developing diabetes: 5.7-6.4%   Diagnostic of diabetes: > or = 6.5%  .       Monitoring of Diabetes                Age (y)     Therapeutic Goal (%)   Adults:          >18           <7.0   Pediatrics:    13-18           <7.5                   7-12           <8.0                   0- 6            7.5-8.5   American Diabetes Association. Diabetes Care 33(S1), Jan 2010.     04/26/2022 12:56 PM 6.0 % Final     Comment:          Diagnosis of Diabetes-Adults   Non-Diabetic: < or = 5.6%   Increased risk for developing diabetes: 5.7-6.4%   Diagnostic of diabetes: > or = 6.5%  .       Monitoring of Diabetes                Age (y)     Therapeutic Goal (%)   Adults:          >18           <7.0   Pediatrics:    13-18           <7.5                   7-12           <8.0                   0- 6            7.5-8.5   American Diabetes Association. Diabetes Care 33(S1), Jan 2010.       LDLCALC   Date/Time Value Ref Range Status   04/12/2024 07:46  65  130  "mg/dL Final     VLDL   Date/Time Value Ref Range Status   02/03/2021 11:42 AM 45 0 - 40 mg/dL Final      Last I/O:  I/O last 3 completed shifts:  In: 500 (7.9 mL/kg) [NG/GT:450; IV Piggyback:50]  Out: - (0 mL/kg)   Weight: 63.6 kg     Past Cardiology Tests (Last 3 Years):  EKG:  ECG 12 lead 05/09/2024      ECG 12 lead 04/10/2024    Echo:  Transthoracic Echo (TTE) Complete 04/12/2024    Ejection Fractions:  No results found for: \"EF\"  Cath:  No results found for this or any previous visit from the past 1095 days.    Stress Test:  No results found for this or any previous visit from the past 1095 days.    Cardiac Imaging:  No results found for this or any previous visit from the past 1095 days.      Inpatient Medications:  Scheduled medications   Medication Dose Route Frequency    aspirin  81 mg oral Daily    atorvastatin  40 mg oral Nightly    [START ON 5/19/2024] cholecalciferol  50,000 Units oral Every Sunday    esomeprazole  10 mg g-tube Daily before breakfast    ferrous sulfate (325 mg ferrous sulfate)  65 mg of iron oral BID    heparin (porcine)  5,000 Units subcutaneous q8h JACQUELYN    lactobacillus acidophilus  1 tablet oral BID    metoprolol tartrate  50 mg g-tube BID    mirtazapine  7.5 mg oral Nightly     PRN medications   Medication    acetaminophen    Or    acetaminophen    Or    acetaminophen    acetaminophen    benzocaine-menthol    dextromethorphan-guaifenesin    guaiFENesin    hydrALAZINE    ondansetron ODT    polyethylene glycol     Continuous Medications   Medication Dose Last Rate       Physical Exam:       Assessment/Plan     Principal Problem:    General weakness     General weakness     5/10:The patient is a 78-year-old white female whose past medical history includes hypertension, chronic kidney disease, former alcohol excess, status post bioprosthetic aortic valve replacement utilizing a number 23 mm freestyle bioprosthesis along with replacement of the ascending aorta and transverse hemiarch using " a number 24 mm Gelweave graft plus tricuspid valve repair utilizing number 21 mm [place] ring plus 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 This patient has done very well since her cardiac surgery with her last surveillance echocardiogram on 9/25/2023 showing an LV ejection fraction of 55-60% 1-2+ mitral valve regurgitation normal-appearing stentless aortic valve bioprosthesis with a peak systolic gradient of 11 mmHg and a prosthetic graft in the aortic root and ascending/arch position. Patient was admitted on 4/10/2024 with failure to thrive inability to provide self-care difficulty walking which appears to be related to progressing dementia.  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  She had a repeat echocardiogram that showed a preserved LV ejection fraction 60% normal-appearing stentless bioprosthetic aortic valve replacement minimal systolic pressure gradient with prior tricuspid valve repair and only mild to moderate tricuspid valve regurgitation.  There is trace to mild mitral valve regurgitation bubble study negative for residual VSD that was closed at the time of her aortic valve replacement.  Patient was treated with for an E. coli urinary tract infection but her mental status appeared to be significantly impaired from dementia.  She was continued on dual antiplatelet therapy with aspirin and Plavix along with Toprol-XL 50 mg daily.  Patient readmitted with failure to thrive with reduced oral intake and significant dehydration with hypernatremia hyperchloremia and hypokalemia for which the patient is receiving IV D5W and potassium supplementation.  From a cardiac standpoint the patient is stable but  peers to have significantly advancing senile dementia versus multi-infarct dementia.  For now would continue usual cardiac therapy with the dual antiplatelet therapy and atorvastatin plus metoprolol.     5/11 Samsa for Mario: Patient admitted essentially with a failure to thrive.  As noted above bioprosthetic aortic valve appears to be functioning normally and left ventricular systolic function preserved.  Serum sodium gradually decreasing with fluid hydration.  Patient does remain hypokalemic and will provide oral supplementation in addition to the potassium in the IV fluids.  Blood pressure mildly elevated and will titrate up the dose of the metoprolol succinate.     5/12:  Samsa for Mario: Essentially no change over the last 24 hours.  The patient has been refusing oral medications.  Hemodynamically the patient appears stable.  I think at this point we should consider palliative care and possibly even hospice care in this individual.  She does not appear to be agreeable to a PEG tube at this time.  Thus a comfort care model may be a better option.     5/13: Patient lying quietly in bed she is awake and able to answer simple questions.  Hyper natremia and hyperchloremia have resolved with the IV D5W with potassium supplementation serum sodium 139 chloride 109 potassium 4.1 with a creatinine of 0.70.  CBC notable for hematocrit of 26.7 WBC 14,500.  The patient's PCR for C. difficile is positive.  Stools for C. difficile toxin are negative.  Patient is on contact precautions.  Patient has been started on oral vancomycin. Patient seen by infectious disease uncertain as to whether patient has C. difficile infection versus colonization.  She had evidently recently completed a course of oral vancomycin at the skilled nursing facility for C. difficile colitis.  It is suspected that the patient does have more likely C. difficile colonization and is currently on IV Flagyl.  The patient has been tentatively scheduled for  an EGD with PEG tube placement tomorrow but also palliative care consultation has been recommended the patient.  Patient is on dual antiplatelet therapy after having a small cortical CVA recently but will discontinue Plavix for now to reduce risk of bleeding from PEG tube insertion.     5/14: The patient was seen and events reviewed discussed in detail with family members.  The patient underwent PEG tube insertion earlier today well-tolerated still somewhat sedated.  Patient is being followed by nephrology and yesterday was switched to IV D5 half-normal saline with sodium bicarb at night due to a mild residual metabolic acidosis.  The patient has been seen by infectious disease thought to have C. difficile colonization having completed a course of oral vancomycin at her nursing facility.  As noted the  C. difficile PCR was positive but toxin EIA negative.  Abdomen benign no diarrhea but the patient will be treated with IV Flagyl.  The patient's electrolyte panel remains improved creatinine 0.6 sodium 140 potassium 3.7 .  First PEG tube feed to be performed later today     5/15: Patient is currently resting comfortably no overt distress.  PEG tubes have been initiated.  Telemetry monitor shows a stable sinus rhythm in the 80s per minute.  Given the need for PEG tube feedings the patient's Toprol- mg daily will be switched to metoprolol to tartrate 50 mg twice daily through the PEG tube.  Other oral medications may need to be converted PEG tube administration in order to ensure adequate compliance.  Will continue usual aspirin but not Plavix.  Patient is currently on IV cefazolin plus Diflucan plus metronidazole and vancomycin orally.  Infectious disease following.  Electrolyte panel remains acceptable creatinine is 0.7 potassium 3.7 sodium 138.     5/16: The patient lying in bed very confused but arousable not oriented.  Tube feedings are progressing relatively well.  Renal parameters remain stable creatinine  0.6 electrolytes in normal range potassium 4.1 sodium 139.  Stable sinus rhythm in the 90/min range by telemetry monitor.  Patient currently on metoprolol tartrate through PEG tube 50 mg twice daily.  Fall sacral fracture as above alert and oriented x 1.  Patient is being prepared for transfer back to the skilled nursing facility later this week     5/17: The patient remains very somnolent arousable but not cognizant.  A [place] catheter was inserted and the patient's output was quite discolored consistent with suspected urinary tract infection.  CBC notable for WBC of 14,300 hemoglobin 7.8 hematocrit 25.7.  Renal parameters and electrolytes remain acceptable creatinine is 0.70 sodium 139.  PEG tubes are proceeding.  Suspect the patient does have a degree of metabolic encephalopathy related to the urinary tract infection.  Patient seen by infectious disease and placed on fosfomycin.    5/18: Patient seen and events reviewed discussed with  at bedside.  Patient remains obtunded and arouses only with tactile stimulation for very brief period of time.  Still suspect toxic metabolic encephalopathy possibly from urinary tract infection superimposed upon progressive dementia..  Will check head CT without contrast to rule out CVA.  Urine C&S shows multiple organisms possible contaminant but she has been placed on antibiotic therapy for suspected UTI.  CBC includes a WBC of 14,800 gradually unchanged hemoglobin also stable at 8.0.  Will discontinue all CNS active agents including the Remeron.       Peripheral IV 05/17/24 20 G Left;Anterior Forearm (Active)   Site Assessment Clean;Intact;Dry 05/18/24 0813   Dressing Status Clean;Dry 05/18/24 0813   Number of days: 1       Code Status:  Full Code    I spent 20 minutes in the professional and overall care of this patient.        Shaheed Martin MD

## 2024-05-19 ENCOUNTER — APPOINTMENT (OUTPATIENT)
Dept: RADIOLOGY | Facility: HOSPITAL | Age: 79
DRG: 640 | End: 2024-05-19
Payer: MEDICARE

## 2024-05-19 LAB
ANION GAP SERPL CALC-SCNC: 9 MMOL/L
APPEARANCE UR: ABNORMAL
BASOPHILS # BLD AUTO: 0.03 X10*3/UL (ref 0–0.1)
BASOPHILS NFR BLD AUTO: 0.3 %
BILIRUB UR STRIP.AUTO-MCNC: NEGATIVE MG/DL
BUN SERPL-MCNC: 31 MG/DL (ref 8–25)
CALCIUM SERPL-MCNC: 7.6 MG/DL (ref 8.5–10.4)
CHLORIDE SERPL-SCNC: 102 MMOL/L (ref 97–107)
CO2 SERPL-SCNC: 27 MMOL/L (ref 24–31)
COLOR UR: YELLOW
CREAT SERPL-MCNC: 0.6 MG/DL (ref 0.4–1.6)
EGFRCR SERPLBLD CKD-EPI 2021: >90 ML/MIN/1.73M*2
EOSINOPHIL # BLD AUTO: 0.16 X10*3/UL (ref 0–0.4)
EOSINOPHIL NFR BLD AUTO: 1.3 %
ERYTHROCYTE [DISTWIDTH] IN BLOOD BY AUTOMATED COUNT: 14.7 % (ref 11.5–14.5)
GLUCOSE SERPL-MCNC: 102 MG/DL (ref 65–99)
GLUCOSE UR STRIP.AUTO-MCNC: NORMAL MG/DL
HCT VFR BLD AUTO: 25.8 % (ref 36–46)
HGB BLD-MCNC: 7.7 G/DL (ref 12–16)
IMM GRANULOCYTES # BLD AUTO: 0.14 X10*3/UL (ref 0–0.5)
IMM GRANULOCYTES NFR BLD AUTO: 1.2 % (ref 0–0.9)
KETONES UR STRIP.AUTO-MCNC: NEGATIVE MG/DL
LEUKOCYTE ESTERASE UR QL STRIP.AUTO: ABNORMAL
LYMPHOCYTES # BLD AUTO: 2.23 X10*3/UL (ref 0.8–3)
LYMPHOCYTES NFR BLD AUTO: 18.6 %
MCH RBC QN AUTO: 30.4 PG (ref 26–34)
MCHC RBC AUTO-ENTMCNC: 29.8 G/DL (ref 32–36)
MCV RBC AUTO: 102 FL (ref 80–100)
MONOCYTES # BLD AUTO: 1.66 X10*3/UL (ref 0.05–0.8)
MONOCYTES NFR BLD AUTO: 13.8 %
MUCOUS THREADS #/AREA URNS AUTO: ABNORMAL /LPF
NEUTROPHILS # BLD AUTO: 7.77 X10*3/UL (ref 1.6–5.5)
NEUTROPHILS NFR BLD AUTO: 64.8 %
NITRITE UR QL STRIP.AUTO: ABNORMAL
NRBC BLD-RTO: 0 /100 WBCS (ref 0–0)
PH UR STRIP.AUTO: 6 [PH]
PLATELET # BLD AUTO: 301 X10*3/UL (ref 150–450)
POTASSIUM SERPL-SCNC: 4.3 MMOL/L (ref 3.4–5.1)
PROT UR STRIP.AUTO-MCNC: ABNORMAL MG/DL
RBC # BLD AUTO: 2.53 X10*6/UL (ref 4–5.2)
RBC # UR STRIP.AUTO: ABNORMAL /UL
RBC #/AREA URNS AUTO: >20 /HPF
SODIUM SERPL-SCNC: 138 MMOL/L (ref 133–145)
SP GR UR STRIP.AUTO: 1.01
SQUAMOUS #/AREA URNS AUTO: ABNORMAL /HPF
UROBILINOGEN UR STRIP.AUTO-MCNC: NORMAL MG/DL
WBC # BLD AUTO: 12 X10*3/UL (ref 4.4–11.3)
WBC #/AREA URNS AUTO: >50 /HPF
WBC CLUMPS #/AREA URNS AUTO: ABNORMAL /HPF

## 2024-05-19 PROCEDURE — 71045 X-RAY EXAM CHEST 1 VIEW: CPT | Performed by: RADIOLOGY

## 2024-05-19 PROCEDURE — 2500000001 HC RX 250 WO HCPCS SELF ADMINISTERED DRUGS (ALT 637 FOR MEDICARE OP): Performed by: INTERNAL MEDICINE

## 2024-05-19 PROCEDURE — 2500000001 HC RX 250 WO HCPCS SELF ADMINISTERED DRUGS (ALT 637 FOR MEDICARE OP): Performed by: NURSE PRACTITIONER

## 2024-05-19 PROCEDURE — 2500000001 HC RX 250 WO HCPCS SELF ADMINISTERED DRUGS (ALT 637 FOR MEDICARE OP): Performed by: SURGERY

## 2024-05-19 PROCEDURE — 85025 COMPLETE CBC W/AUTO DIFF WBC: CPT | Performed by: INTERNAL MEDICINE

## 2024-05-19 PROCEDURE — 99232 SBSQ HOSP IP/OBS MODERATE 35: CPT | Performed by: INTERNAL MEDICINE

## 2024-05-19 PROCEDURE — 2500000005 HC RX 250 GENERAL PHARMACY W/O HCPCS: Performed by: INTERNAL MEDICINE

## 2024-05-19 PROCEDURE — 81001 URINALYSIS AUTO W/SCOPE: CPT | Performed by: SURGERY

## 2024-05-19 PROCEDURE — 80048 BASIC METABOLIC PNL TOTAL CA: CPT | Performed by: INTERNAL MEDICINE

## 2024-05-19 PROCEDURE — 71045 X-RAY EXAM CHEST 1 VIEW: CPT

## 2024-05-19 PROCEDURE — 87086 URINE CULTURE/COLONY COUNT: CPT | Mod: WESLAB | Performed by: INTERNAL MEDICINE

## 2024-05-19 PROCEDURE — 1210000001 HC SEMI-PRIVATE ROOM DAILY

## 2024-05-19 PROCEDURE — 2500000004 HC RX 250 GENERAL PHARMACY W/ HCPCS (ALT 636 FOR OP/ED): Performed by: INTERNAL MEDICINE

## 2024-05-19 PROCEDURE — 2500000004 HC RX 250 GENERAL PHARMACY W/ HCPCS (ALT 636 FOR OP/ED): Performed by: SURGERY

## 2024-05-19 PROCEDURE — 36415 COLL VENOUS BLD VENIPUNCTURE: CPT | Performed by: INTERNAL MEDICINE

## 2024-05-19 RX ORDER — VANCOMYCIN HCL 50 MG/ML
250 SOLUTION, RECONSTITUTED, ORAL ORAL 4 TIMES DAILY
Status: DISCONTINUED | OUTPATIENT
Start: 2024-05-19 | End: 2024-05-20

## 2024-05-19 RX ADMIN — ACETAMINOPHEN 650 MG: 325 TABLET ORAL at 16:00

## 2024-05-19 RX ADMIN — ESOMEPRAZOLE MAGNESIUM 10 MG: 20 GRANULE, DELAYED RELEASE ORAL at 06:46

## 2024-05-19 RX ADMIN — Medication 50000 UNITS: at 09:15

## 2024-05-19 RX ADMIN — VANCOMYCIN HYDROCHLORIDE 250 MG: KIT at 16:00

## 2024-05-19 RX ADMIN — ACETAMINOPHEN 650 MG: 650 SUPPOSITORY RECTAL at 10:11

## 2024-05-19 RX ADMIN — METOPROLOL TARTRATE 50 MG: 50 TABLET, FILM COATED ORAL at 22:10

## 2024-05-19 RX ADMIN — ATORVASTATIN CALCIUM 40 MG: 40 TABLET, FILM COATED ORAL at 22:10

## 2024-05-19 RX ADMIN — HEPARIN SODIUM 5000 UNITS: 5000 INJECTION, SOLUTION INTRAVENOUS; SUBCUTANEOUS at 06:45

## 2024-05-19 RX ADMIN — Medication 1 TABLET: at 17:43

## 2024-05-19 RX ADMIN — HEPARIN SODIUM 5000 UNITS: 5000 INJECTION, SOLUTION INTRAVENOUS; SUBCUTANEOUS at 22:11

## 2024-05-19 RX ADMIN — FERROUS SULFATE TAB 325 MG (65 MG ELEMENTAL FE) 1 TABLET: 325 (65 FE) TAB at 17:43

## 2024-05-19 RX ADMIN — PIPERACILLIN SODIUM AND TAZOBACTAM SODIUM 4.5 G: 4; .5 INJECTION, SOLUTION INTRAVENOUS at 15:26

## 2024-05-19 RX ADMIN — METOPROLOL TARTRATE 50 MG: 50 TABLET, FILM COATED ORAL at 09:15

## 2024-05-19 RX ADMIN — ACETAMINOPHEN 650 MG: 650 SUPPOSITORY RECTAL at 15:35

## 2024-05-19 RX ADMIN — PIPERACILLIN SODIUM AND TAZOBACTAM SODIUM 4.5 G: 4; .5 INJECTION, SOLUTION INTRAVENOUS at 22:12

## 2024-05-19 RX ADMIN — Medication 1 TABLET: at 09:15

## 2024-05-19 RX ADMIN — VANCOMYCIN HYDROCHLORIDE 250 MG: KIT at 21:00

## 2024-05-19 RX ADMIN — FERROUS SULFATE TAB 325 MG (65 MG ELEMENTAL FE) 1 TABLET: 325 (65 FE) TAB at 09:15

## 2024-05-19 RX ADMIN — HEPARIN SODIUM 5000 UNITS: 5000 INJECTION, SOLUTION INTRAVENOUS; SUBCUTANEOUS at 14:36

## 2024-05-19 RX ADMIN — ASPIRIN 81 MG CHEWABLE TABLET 81 MG: 81 TABLET CHEWABLE at 09:15

## 2024-05-19 ASSESSMENT — COGNITIVE AND FUNCTIONAL STATUS - GENERAL
TOILETING: TOTAL
PERSONAL GROOMING: TOTAL
MOBILITY SCORE: 6
MOVING FROM LYING ON BACK TO SITTING ON SIDE OF FLAT BED WITH BEDRAILS: TOTAL
TURNING FROM BACK TO SIDE WHILE IN FLAT BAD: TOTAL
MOVING FROM LYING ON BACK TO SITTING ON SIDE OF FLAT BED WITH BEDRAILS: TOTAL
TURNING FROM BACK TO SIDE WHILE IN FLAT BAD: TOTAL
DAILY ACTIVITIY SCORE: 6
DAILY ACTIVITIY SCORE: 6
CLIMB 3 TO 5 STEPS WITH RAILING: TOTAL
DRESSING REGULAR LOWER BODY CLOTHING: TOTAL
DRESSING REGULAR UPPER BODY CLOTHING: TOTAL
DRESSING REGULAR UPPER BODY CLOTHING: TOTAL
WALKING IN HOSPITAL ROOM: TOTAL
CLIMB 3 TO 5 STEPS WITH RAILING: TOTAL
WALKING IN HOSPITAL ROOM: TOTAL
HELP NEEDED FOR BATHING: TOTAL
MOVING TO AND FROM BED TO CHAIR: TOTAL
STANDING UP FROM CHAIR USING ARMS: TOTAL
MOBILITY SCORE: 6
EATING MEALS: TOTAL
PERSONAL GROOMING: TOTAL
TOILETING: TOTAL
EATING MEALS: TOTAL
STANDING UP FROM CHAIR USING ARMS: TOTAL
MOVING TO AND FROM BED TO CHAIR: TOTAL
HELP NEEDED FOR BATHING: TOTAL
DRESSING REGULAR LOWER BODY CLOTHING: TOTAL

## 2024-05-19 ASSESSMENT — PAIN - FUNCTIONAL ASSESSMENT: PAIN_FUNCTIONAL_ASSESSMENT: 0-10

## 2024-05-19 ASSESSMENT — PAIN DESCRIPTION - LOCATION: LOCATION: ARM

## 2024-05-19 ASSESSMENT — PAIN SCALES - GENERAL
PAINLEVEL_OUTOF10: 6
PAINLEVEL_OUTOF10: 0 - NO PAIN

## 2024-05-19 NOTE — PROGRESS NOTES
Rebecca Samaniego is a 78 y.o. female on day 10 of admission presenting with General weakness.    Subjective   The patient was seen and examined.  Patient is more alert as compared to before.  Patient is having low-grade fever now.  Urine micro is positive for UTI.  Chest x-ray was ordered and done but report is pending.       Objective     Physical Exam  HEENT:  Head externally atraumatic,  extraocular movements intact, oral mucosa somewhat dry  Neck:  Supple, no JVP, no palpable adenopathy or thyromegaly.  No carotid bruit.  Chest:  Clear to auscultation and resonant.  Heart:  Regular rate and rhythm, no murmur or gallop could be appreciated.  Abdomen:  Soft, nontender, bowel sounds present, normoactive, no palpable hepatosplenomegaly.  PEG tube present  Extremities:  No edema, pulses present, no cyanosis or clubbing.  CNS: Patient obtunded but arousable.  Patient will recognize her family.  Skin:  No active rash.  Musculoskeletal:  No  apparent joint swelling or erythema, range of movement normal.  Last Recorded Vitals  Heart Rate:  []   Temp:  [36.6 °C (97.9 °F)-38.7 °C (101.7 °F)]   Resp:  [18]   BP: ()/(36-54)   SpO2:  [95 %-100 %]     Intake/Output last 3 Shifts:  I/O last 3 completed shifts:  In: 450 (7.1 mL/kg) [NG/GT:450]  Out: 300 (4.7 mL/kg) [Urine:300 (0.1 mL/kg/hr)]  Weight: 63.6 kg     Relevant Results  Susceptibility data from last 90 days.  Collected Specimen Info Organism Ampicillin Cefazolin Cefazolin (uncomplicated UTIs only) Ciprofloxacin Gentamicin Nitrofurantoin Piperacillin/Tazobactam Trimethoprim/Sulfamethoxazole   04/11/24 Urine from Straight Catheter Escherichia coli S S S S S S S S     Results for orders placed or performed during the hospital encounter of 05/09/24 (from the past 24 hour(s))   Urinalysis with Reflex Microscopic   Result Value Ref Range    Color, Urine Yellow Light-Yellow, Yellow, Dark-Yellow    Appearance, Urine Ex.Turbid (N) Clear    Specific Gravity, Urine  1.015 1.005 - 1.035    pH, Urine 6.0 5.0, 5.5, 6.0, 6.5, 7.0, 7.5, 8.0    Protein, Urine 50 (1+) (A) NEGATIVE, 10 (TRACE), 20 (TRACE) mg/dL    Glucose, Urine Normal Normal mg/dL    Blood, Urine 0.2 (2+) (A) NEGATIVE    Ketones, Urine NEGATIVE NEGATIVE mg/dL    Bilirubin, Urine NEGATIVE NEGATIVE    Urobilinogen, Urine Normal Normal mg/dL    Nitrite, Urine 1+ (A) NEGATIVE    Leukocyte Esterase, Urine 500 Jeremy/µL (A) NEGATIVE   Microscopic Only, Urine   Result Value Ref Range    WBC, Urine >50 (A) 1-5, NONE /HPF    WBC Clumps, Urine MANY Reference range not established. /HPF    RBC, Urine >20 (A) NONE, 1-2, 3-5 /HPF    Squamous Epithelial Cells, Urine 1-9 (SPARSE) Reference range not established. /HPF    Mucus, Urine 1+ Reference range not established. /LPF   Basic Metabolic Panel   Result Value Ref Range    Glucose 102 (H) 65 - 99 mg/dL    Sodium 138 133 - 145 mmol/L    Potassium 4.3 3.4 - 5.1 mmol/L    Chloride 102 97 - 107 mmol/L    Bicarbonate 27 24 - 31 mmol/L    Urea Nitrogen 31 (H) 8 - 25 mg/dL    Creatinine 0.60 0.40 - 1.60 mg/dL    eGFR >90 >60 mL/min/1.73m*2    Calcium 7.6 (L) 8.5 - 10.4 mg/dL    Anion Gap 9 <=19 mmol/L   CBC and Auto Differential   Result Value Ref Range    WBC 12.0 (H) 4.4 - 11.3 x10*3/uL    nRBC 0.0 0.0 - 0.0 /100 WBCs    RBC 2.53 (L) 4.00 - 5.20 x10*6/uL    Hemoglobin 7.7 (L) 12.0 - 16.0 g/dL    Hematocrit 25.8 (L) 36.0 - 46.0 %     (H) 80 - 100 fL    MCH 30.4 26.0 - 34.0 pg    MCHC 29.8 (L) 32.0 - 36.0 g/dL    RDW 14.7 (H) 11.5 - 14.5 %    Platelets 301 150 - 450 x10*3/uL    Neutrophils % 64.8 40.0 - 80.0 %    Immature Granulocytes %, Automated 1.2 (H) 0.0 - 0.9 %    Lymphocytes % 18.6 13.0 - 44.0 %    Monocytes % 13.8 2.0 - 10.0 %    Eosinophils % 1.3 0.0 - 6.0 %    Basophils % 0.3 0.0 - 2.0 %    Neutrophils Absolute 7.77 (H) 1.60 - 5.50 x10*3/uL    Immature Granulocytes Absolute, Automated 0.14 0.00 - 0.50 x10*3/uL    Lymphocytes Absolute 2.23 0.80 - 3.00 x10*3/uL    Monocytes  Absolute 1.66 (H) 0.05 - 0.80 x10*3/uL    Eosinophils Absolute 0.16 0.00 - 0.40 x10*3/uL    Basophils Absolute 0.03 0.00 - 0.10 x10*3/uL        Current Facility-Administered Medications:     acetaminophen (Tylenol) tablet 650 mg, 650 mg, oral, q4h PRN **OR** acetaminophen (Tylenol) oral liquid 650 mg, 650 mg, oral, q4h PRN, 650 mg at 05/18/24 2155 **OR** acetaminophen (Tylenol) suppository 650 mg, 650 mg, rectal, q4h PRN, Sergo Kimball MD, 650 mg at 05/19/24 1011    acetaminophen (Tylenol) suppository 650 mg, 650 mg, rectal, q4h PRN, Sergo Kimball MD    aspirin chewable tablet 81 mg, 81 mg, oral, Daily, Sergo Kimball MD, 81 mg at 05/19/24 0915    atorvastatin (Lipitor) tablet 40 mg, 40 mg, oral, Nightly, Sergo Kimball MD, 40 mg at 05/18/24 2154    benzocaine-menthol (Cepastat Sore Throat) lozenge 1 lozenge, 1 lozenge, Mouth/Throat, q2h PRN, Sergo Kimball MD    cholecalciferol (Vitamin D-3) capsule 50,000 Units, 50,000 Units, oral, Every Sunday, Ambar Saleem MD, 50,000 Units at 05/19/24 0915    dextromethorphan-guaifenesin (Robitussin DM)  mg/5 mL oral liquid 5 mL, 5 mL, oral, q4h PRN, Sergo Kimball MD    esomeprazole (NexIUM) suspension 10 mg, 10 mg, g-tube, Daily before breakfast, Belen Martin, BILLY-CNP, 10 mg at 05/19/24 0646    ferrous sulfate (325 mg ferrous sulfate) tablet 1 tablet, 65 mg of iron, oral, BID, Sergo Kimball MD, 1 tablet at 05/19/24 0915    guaiFENesin (Mucinex) 12 hr tablet 600 mg, 600 mg, oral, q12h PRN, Sergo Kimball MD    heparin (porcine) injection 5,000 Units, 5,000 Units, subcutaneous, q8h JACQUELYN, Sergo Kimball MD, 5,000 Units at 05/19/24 0645    hydrALAZINE (Apresoline) injection 10 mg, 10 mg, intravenous, q4h PRN, Sergo Kimball MD, 10 mg at 05/10/24 2054    lactobacillus acidophilus tablet 1 tablet, 1 tablet, oral, BID, Sergo Kimball MD, 1 tablet at 05/19/24 0915    metoprolol tartrate (Lopressor) tablet 50 mg, 50 mg, g-tube, BID, Shaheed Martin MD, 50 mg at  05/19/24 0915    ondansetron ODT (Zofran-ODT) disintegrating tablet 4 mg, 4 mg, oral, q6h PRN, Sergo Kimball MD    polyethylene glycol (Glycolax, Miralax) packet 17 g, 17 g, oral, Daily PRN, Sergo Kimball MD   Assessment/Plan   Principal Problem:    General weakness  Adult failure to thrive  Dehydration  Dysphagia  Hyponatremia resolved  UTI  Leukocytosis  History of C. difficile  Hypoalbuminemia  Normocytic anemia  History of CVA    Plan: Continue current medication patient is having a low-grade fever now.  Leukocytosis is still present.  At this time start the patient on IV antibiotics.  Check urine culture.  Check blood culture.  Patient has been admitted C. difficile also start the patient on oral vancomycin along with antibiotics.         Kobi Denise MD

## 2024-05-19 NOTE — NURSING NOTE
Notified Dr. Denise of pt being flagged for sepsis. Recited vitals. Dr. Denise stated no interventions at that time.

## 2024-05-19 NOTE — CARE PLAN
The patient's goals for the shift include improved movement    The clinical goals for the shift include DISCHARGE    Problem: ADLs  Goal: Pt will complete ADL tasks at min Awith use of AE prn   Outcome: Progressing     Problem: Pain  Goal: My pain/discomfort is manageable  Outcome: Progressing     Problem: Safety  Goal: Patient will be injury free during hospitalization  Outcome: Progressing  Goal: I will remain free of falls  Outcome: Progressing     Problem: Daily Care  Goal: Daily care needs are met  Outcome: Progressing     Problem: Psychosocial Needs  Goal: Demonstrates ability to cope with hospitalization/illness  Outcome: Progressing  Goal: Collaborate with me, my family, and caregiver to identify my specific goals  Outcome: Progressing     Problem: Discharge Barriers  Goal: My discharge needs are met  Outcome: Progressing     Problem: Skin  Goal: Decreased wound size/increased tissue granulation at next dressing change  Outcome: Progressing  Goal: Participates in plan/prevention/treatment measures  Outcome: Progressing  Goal: Prevent/manage excess moisture  Outcome: Progressing  Goal: Prevent/minimize sheer/friction injuries  Outcome: Progressing  Goal: Promote/optimize nutrition  Outcome: Progressing  Goal: Promote skin healing  Outcome: Progressing     Problem: Pain - Adult  Goal: Verbalizes/displays adequate comfort level or baseline comfort level  Outcome: Progressing     Problem: Safety - Adult  Goal: Free from fall injury  Outcome: Progressing     Problem: Discharge Planning  Goal: Discharge to home or other facility with appropriate resources  Outcome: Progressing     Problem: Chronic Conditions and Co-morbidities  Goal: Patient's chronic conditions and co-morbidity symptoms are monitored and maintained or improved  Outcome: Progressing     Problem: Nutrition  Goal: Less than 5 days NPO/clear liquids  Outcome: Progressing  Goal: Oral intake greater than 50%  Outcome: Progressing  Goal: Oral intake  greater 75%  Outcome: Progressing  Goal: Consume prescribed supplement  Outcome: Progressing  Goal: Adequate PO fluid intake  Outcome: Progressing  Goal: Nutrition support goals are met within 48 hrs  Outcome: Progressing  Goal: Nutrition support is meeting 75% of nutrient needs  Outcome: Progressing  Goal: Tube feed tolerance  Outcome: Progressing  Goal: BG  mg/dL  Outcome: Progressing  Goal: Lab values WNL  Outcome: Progressing  Goal: Electrolytes WNL  Outcome: Progressing  Goal: Promote healing  Outcome: Progressing  Goal: Maintain stable weight  Outcome: Progressing  Goal: Reduce weight from edema/fluid  Outcome: Progressing  Goal: Gradual weight gain  Outcome: Progressing  Goal: Improve ostomy output  Outcome: Progressing     Problem: Fall/Injury  Goal: Not fall by end of shift  Outcome: Progressing  Goal: Be free from injury by end of the shift  Outcome: Progressing  Goal: Verbalize understanding of personal risk factors for fall in the hospital  Outcome: Progressing  Goal: Verbalize understanding of risk factor reduction measures to prevent injury from fall in the home  Outcome: Progressing  Goal: Use assistive devices by end of the shift  Outcome: Progressing  Goal: Pace activities to prevent fatigue by end of the shift  Outcome: Progressing

## 2024-05-19 NOTE — PROGRESS NOTES
Subjective Data:      Overnight Events:         Objective Data:  Last Recorded Vitals:  Vitals:    05/19/24 0033 05/19/24 0410 05/19/24 0900 05/19/24 0956   BP: (!) 99/36 130/51 135/54    BP Location: Left arm Left arm Left arm    Patient Position: Lying Lying Lying    Pulse: 88 90 103    Resp: 18 18 18    Temp: 37.4 °C (99.3 °F) 36.6 °C (97.9 °F) 37.7 °C (99.9 °F) (!) 38.2 °C (100.8 °F)   TempSrc: Oral Oral Oral Rectal   SpO2: 98% 99% 100%    Weight:       Height:           Last Labs:  CBC - 5/19/2024:  4:38 AM  12.0 7.7 301    25.8      CMP - 5/19/2024:  4:38 AM  7.6 5.5 8 --- 0.3   _ 2.4 <5 103      PTT - No results in last year.  _   _ _     HGBA1C   Date/Time Value Ref Range Status   04/12/2024 07:46 AM 5.2 See below % Final   05/16/2023 03:49 PM 5.9 % Final     Comment:          Diagnosis of Diabetes-Adults   Non-Diabetic: < or = 5.6%   Increased risk for developing diabetes: 5.7-6.4%   Diagnostic of diabetes: > or = 6.5%  .       Monitoring of Diabetes                Age (y)     Therapeutic Goal (%)   Adults:          >18           <7.0   Pediatrics:    13-18           <7.5                   7-12           <8.0                   0- 6            7.5-8.5   American Diabetes Association. Diabetes Care 33(S1), Jan 2010.     04/26/2022 12:56 PM 6.0 % Final     Comment:          Diagnosis of Diabetes-Adults   Non-Diabetic: < or = 5.6%   Increased risk for developing diabetes: 5.7-6.4%   Diagnostic of diabetes: > or = 6.5%  .       Monitoring of Diabetes                Age (y)     Therapeutic Goal (%)   Adults:          >18           <7.0   Pediatrics:    13-18           <7.5                   7-12           <8.0                   0- 6            7.5-8.5   American Diabetes Association. Diabetes Care 33(S1), Jan 2010.       LDLCALC   Date/Time Value Ref Range Status   04/12/2024 07:46  65 - 130 mg/dL Final     VLDL   Date/Time Value Ref Range Status   02/03/2021 11:42 AM 45 0 - 40 mg/dL Final      Last  "I/O:  I/O last 3 completed shifts:  In: 450 (7.1 mL/kg) [NG/GT:450]  Out: 300 (4.7 mL/kg) [Urine:300 (0.1 mL/kg/hr)]  Weight: 63.6 kg     Past Cardiology Tests (Last 3 Years):  EKG:  ECG 12 lead 05/09/2024      ECG 12 lead 04/10/2024    Echo:  Transthoracic Echo (TTE) Complete 04/12/2024    Ejection Fractions:  No results found for: \"EF\"  Cath:  No results found for this or any previous visit from the past 1095 days.    Stress Test:  No results found for this or any previous visit from the past 1095 days.    Cardiac Imaging:  No results found for this or any previous visit from the past 1095 days.      Inpatient Medications:  Scheduled medications   Medication Dose Route Frequency    aspirin  81 mg oral Daily    atorvastatin  40 mg oral Nightly    cholecalciferol  50,000 Units oral Every Sunday    esomeprazole  10 mg g-tube Daily before breakfast    ferrous sulfate (325 mg ferrous sulfate)  65 mg of iron oral BID    heparin (porcine)  5,000 Units subcutaneous q8h JACQUELYN    lactobacillus acidophilus  1 tablet oral BID    metoprolol tartrate  50 mg g-tube BID     PRN medications   Medication    acetaminophen    Or    acetaminophen    Or    acetaminophen    acetaminophen    benzocaine-menthol    dextromethorphan-guaifenesin    guaiFENesin    hydrALAZINE    ondansetron ODT    polyethylene glycol     Continuous Medications   Medication Dose Last Rate       Physical Exam:       Assessment/Plan     Principal Problem:    General weakness     General weakness     5/10:The patient is a 78-year-old white female whose past medical history includes hypertension, chronic kidney disease, former alcohol excess, status post bioprosthetic aortic valve replacement utilizing a number 23 mm freestyle bioprosthesis along with replacement of the ascending aorta and transverse hemiarch using a number 24 mm Gelweave graft plus tricuspid valve repair utilizing number 21 mm Mohini ring plus 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 This " patient has done very well since her cardiac surgery with her last surveillance echocardiogram on 9/25/2023 showing an LV ejection fraction of 55-60% 1-2+ mitral valve regurgitation normal-appearing stentless aortic valve bioprosthesis with a peak systolic gradient of 11 mmHg and a prosthetic graft in the aortic root and ascending/arch position. Patient was admitted on 4/10/2024 with failure to thrive inability to provide self-care difficulty walking which appears to be related to progressing dementia.  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  She had a repeat echocardiogram that showed a preserved LV ejection fraction 60% normal-appearing stentless bioprosthetic aortic valve replacement minimal systolic pressure gradient with prior tricuspid valve repair and only mild to moderate tricuspid valve regurgitation.  There is trace to mild mitral valve regurgitation bubble study negative for residual VSD that was closed at the time of her aortic valve replacement.  Patient was treated with for an E. coli urinary tract infection but her mental status appeared to be significantly impaired from dementia.  She was continued on dual antiplatelet therapy with aspirin and Plavix along with Toprol-XL 50 mg daily.  Patient readmitted with failure to thrive with reduced oral intake and significant dehydration with hypernatremia hyperchloremia and hypokalemia for which the patient is receiving IV D5W and potassium supplementation.  From a cardiac standpoint the patient is stable but peers to have significantly advancing senile dementia versus multi-infarct dementia.  For now would continue usual cardiac therapy with the dual  antiplatelet therapy and atorvastatin plus metoprolol.     5/11 [facility] for [facility]: Patient admitted essentially with a failure to thrive.  As noted above bioprosthetic aortic valve appears to be functioning normally and left ventricular systolic function preserved.  Serum sodium gradually decreasing with fluid hydration.  Patient does remain hypokalemic and will provide oral supplementation in addition to the potassium in the IV fluids.  Blood pressure mildly elevated and will titrate up the dose of the metoprolol succinate.     5/12:  [facility] for [facility]: Essentially no change over the last 24 hours.  The patient has been refusing oral medications.  Hemodynamically the patient appears stable.  I think at this point we should consider palliative care and possibly even hospice care in this individual.  She does not appear to be agreeable to a PEG tube at this time.  Thus a comfort care model may be a better option.     5/13: Patient lying quietly in bed she is awake and able to answer simple questions.  Hyper natremia and hyperchloremia have resolved with the IV D5W with potassium supplementation serum sodium 139 chloride 109 potassium 4.1 with a creatinine of 0.70.  CBC notable for hematocrit of 26.7 WBC 14,500.  The patient's PCR for C. difficile is positive.  Stools for C. difficile toxin are negative.  Patient is on contact precautions.  Patient has been started on oral vancomycin. Patient seen by infectious disease uncertain as to whether patient has C. difficile infection versus colonization.  She had evidently recently completed a course of oral vancomycin at the skilled nursing facility for C. difficile colitis.  It is suspected that the patient does have more likely C. difficile colonization and is currently on IV Flagyl.  The patient has been tentatively scheduled for an EGD with PEG tube placement tomorrow but also palliative care consultation has been recommended the patient.  Patient is on dual antiplatelet  therapy after having a small cortical CVA recently but will discontinue Plavix for now to reduce risk of bleeding from PEG tube insertion.     5/14: The patient was seen and events reviewed discussed in detail with family members.  The patient underwent PEG tube insertion earlier today well-tolerated still somewhat sedated.  Patient is being followed by nephrology and yesterday was switched to IV D5 half-normal saline with sodium bicarb at night due to a mild residual metabolic acidosis.  The patient has been seen by infectious disease thought to have C. difficile colonization having completed a course of oral vancomycin at her nursing facility.  As noted the  C. difficile PCR was positive but toxin EIA negative.  Abdomen benign no diarrhea but the patient will be treated with IV Flagyl.  The patient's electrolyte panel remains improved creatinine 0.6 sodium 140 potassium 3.7 .  First PEG tube feed to be performed later today     5/15: Patient is currently resting comfortably no overt distress.  PEG tubes have been initiated.  Telemetry monitor shows a stable sinus rhythm in the 80s per minute.  Given the need for PEG tube feedings the patient's Toprol- mg daily will be switched to metoprolol to tartrate 50 mg twice daily through the PEG tube.  Other oral medications may need to be converted PEG tube administration in order to ensure adequate compliance.  Will continue usual aspirin but not Plavix.  Patient is currently on IV cefazolin plus Diflucan plus metronidazole and vancomycin orally.  Infectious disease following.  Electrolyte panel remains acceptable creatinine is 0.7 potassium 3.7 sodium 138.     5/16: The patient lying in bed very confused but arousable not oriented.  Tube feedings are progressing relatively well.  Renal parameters remain stable creatinine 0.6 electrolytes in normal range potassium 4.1 sodium 139.  Stable sinus rhythm in the 90/min range by telemetry monitor.  Patient currently on  metoprolol tartrate through PEG tube 50 mg twice daily.  Fall sacral fracture as above alert and oriented x 1.  Patient is being prepared for transfer back to the skilled nursing facility later this week     5/17: The patient remains very somnolent arousable but not cognizant.  A Barahona catheter was inserted and the patient's output was quite discolored consistent with suspected urinary tract infection.  CBC notable for WBC of 14,300 hemoglobin 7.8 hematocrit 25.7.  Renal parameters and electrolytes remain acceptable creatinine is 0.70 sodium 139.  PEG tubes are proceeding.  Suspect the patient does have a degree of metabolic encephalopathy related to the urinary tract infection.  Patient seen by infectious disease and placed on fosfomycin.    5/18: The patient is clinically actually much improved.  She does respond to verbal questioning and answers correctly within the capabilities of her dementia.  It appears that the toxic metabolic encephalopathy is clearing with treatment of her urinary tract infection and with further nutrition and hydration through the PEG tube.  Electrolyte panel is unremarkable with a creatinine is 0.6 potassium 4.3.  WBC 12,000 hematocrit stable at 25.8.  Patient potentially may be prepared for transfer back to skilled nursing facility in the next 24-48 hours.                    Peripheral IV 05/17/24 20 G Left;Anterior Forearm (Active)   Site Assessment Clean;Dry;Intact 05/19/24 0835   Dressing Status Clean;Dry 05/19/24 0835   Number of days: 2       Code Status:  Full Code    I spent 20 minutes in the professional and overall care of this patient.        Shaheed Martin MD

## 2024-05-19 NOTE — PROGRESS NOTES
Rebecca Samaniego is a 78 y.o. female on day 9 of admission presenting with General weakness.    Subjective   The patient was seen and examined.  Patient is obtunded.  But arousable.     Objective     Physical Exam  HEENT:  Head externally atraumatic,  extraocular movements intact, oral mucosa moist  Neck:  Supple, no JVP, no palpable adenopathy or thyromegaly.  No carotid bruit.  Chest:  Clear to auscultation and resonant.  Heart:  Regular rate and rhythm, no murmur or gallop could be appreciated.  Abdomen:  Soft, nontender, bowel sounds present, normoactive, no palpable hepatosplenomegaly.  Extremities:  No edema, pulses present, no cyanosis or clubbing.  CNS:  Patient alert, oriented to time, place and person.    No new deficit.  Cranial nerves 2-12 grossly intact  Skin:  No active rash.  Musculoskeletal:  No  apparent joint swelling or erythema, range of movement normal.  Last Recorded Vitals  Heart Rate:  [101-102]   Temp:  [37 °C (98.6 °F)-37.9 °C (100.2 °F)]   Resp:  [18]   BP: (146)/(58-62)   SpO2:  [96 %-97 %]     Intake/Output last 3 Shifts:  I/O last 3 completed shifts:  In: 450 (7.1 mL/kg) [NG/GT:450]  Out: - (0 mL/kg)   Weight: 63.6 kg     Relevant Results  Susceptibility data from last 90 days.  Collected Specimen Info Organism Ampicillin Cefazolin Cefazolin (uncomplicated UTIs only) Ciprofloxacin Gentamicin Nitrofurantoin Piperacillin/Tazobactam Trimethoprim/Sulfamethoxazole   04/11/24 Urine from Straight Catheter Escherichia coli S S S S S S S S     Results for orders placed or performed during the hospital encounter of 05/09/24 (from the past 24 hour(s))   CBC   Result Value Ref Range    WBC 14.8 (H) 4.4 - 11.3 x10*3/uL    nRBC 0.0 0.0 - 0.0 /100 WBCs    RBC 2.58 (L) 4.00 - 5.20 x10*6/uL    Hemoglobin 8.0 (L) 12.0 - 16.0 g/dL    Hematocrit 26.2 (L) 36.0 - 46.0 %     (H) 80 - 100 fL    MCH 31.0 26.0 - 34.0 pg    MCHC 30.5 (L) 32.0 - 36.0 g/dL    RDW 14.6 (H) 11.5 - 14.5 %    Platelets 322 150 -  450 x10*3/uL   Basic Metabolic Panel   Result Value Ref Range    Glucose 133 (H) 65 - 99 mg/dL    Sodium 139 133 - 145 mmol/L    Potassium 4.2 3.4 - 5.1 mmol/L    Chloride 103 97 - 107 mmol/L    Bicarbonate 23 (L) 24 - 31 mmol/L    Urea Nitrogen 28 (H) 8 - 25 mg/dL    Creatinine 0.60 0.40 - 1.60 mg/dL    eGFR >90 >60 mL/min/1.73m*2    Calcium 7.7 (L) 8.5 - 10.4 mg/dL    Anion Gap 13 <=19 mmol/L        Current Facility-Administered Medications:     acetaminophen (Tylenol) tablet 650 mg, 650 mg, oral, q4h PRN **OR** acetaminophen (Tylenol) oral liquid 650 mg, 650 mg, oral, q4h PRN, 650 mg at 05/14/24 2054 **OR** acetaminophen (Tylenol) suppository 650 mg, 650 mg, rectal, q4h PRN, Sergo Kimball MD    acetaminophen (Tylenol) suppository 650 mg, 650 mg, rectal, q4h PRN, Sergo Kimball MD    aspirin chewable tablet 81 mg, 81 mg, oral, Daily, Sergo Kimball MD, 81 mg at 05/18/24 0931    atorvastatin (Lipitor) tablet 40 mg, 40 mg, oral, Nightly, Sergo Kimball MD, 40 mg at 05/17/24 2100    benzocaine-menthol (Cepastat Sore Throat) lozenge 1 lozenge, 1 lozenge, Mouth/Throat, q2h PRN, Sergo Kimball MD    [START ON 5/19/2024] cholecalciferol (Vitamin D-3) capsule 50,000 Units, 50,000 Units, oral, Every Sunday, Ambar Saleem MD    dextromethorphan-guaifenesin (Robitussin DM)  mg/5 mL oral liquid 5 mL, 5 mL, oral, q4h PRN, Sergo Kimball MD    esomeprazole (NexIUM) suspension 10 mg, 10 mg, g-tube, Daily before breakfast, Belen Martin, BILLY-CNP, 10 mg at 05/18/24 0614    ferrous sulfate (325 mg ferrous sulfate) tablet 1 tablet, 65 mg of iron, oral, BID, Sergo Kimball MD, 1 tablet at 05/18/24 1726    guaiFENesin (Mucinex) 12 hr tablet 600 mg, 600 mg, oral, q12h PRN, Sergo Kimball MD    heparin (porcine) injection 5,000 Units, 5,000 Units, subcutaneous, q8h JACQUELYN, Sergo Kimball MD, 5,000 Units at 05/18/24 1525    hydrALAZINE (Apresoline) injection 10 mg, 10 mg, intravenous, q4h PRN, Sergo Kimball MD, 10 mg at  05/10/24 2054    lactobacillus acidophilus tablet 1 tablet, 1 tablet, oral, BID, Sergo Kimball MD, 1 tablet at 05/18/24 1726    metoprolol tartrate (Lopressor) tablet 50 mg, 50 mg, g-tube, BID, Shaheed Martin MD, 50 mg at 05/18/24 0931    ondansetron ODT (Zofran-ODT) disintegrating tablet 4 mg, 4 mg, oral, q6h PRN, Sergo Kimball MD    polyethylene glycol (Glycolax, Miralax) packet 17 g, 17 g, oral, Daily PRN, Sergo Kimball MD   Assessment/Plan   Principal Problem:    General weakness    Dysphagia  Toxic and metabolic encephalopathy  Brain atrophy  History of CVA    Continue current medication.  Supportive care.  Physical therapy and Occupational Therapy.  Monitor CBC and BMP.  Increase activity.  We will treat DVT, fall, aspiration decubitus 23 discussed with  at bedside.  Overall prognosis poor.         Kobi Denise MD

## 2024-05-19 NOTE — NURSING NOTE
Pt complaining of pain, swelling and redness in arms. LPN called Dr. Denise to order CMP to rule out decreased albumin.Last CMP was 05/12 also asked to order U/S of bilateral extremities to rule out DVTs.  Dr. Denise states that he will look into it.

## 2024-05-19 NOTE — NURSING NOTE
Pt had BM, Nurse cleaned patient then completed rectal temp. LPN got 100.8 temp, LPN gave patient Tylenol rectally to decrease pt's temp.

## 2024-05-20 LAB
ABO GROUP (TYPE) IN BLOOD: NORMAL
ABO GROUP (TYPE) IN BLOOD: NORMAL
ANION GAP SERPL CALC-SCNC: 13 MMOL/L
ANTIBODY SCREEN: NORMAL
BACTERIA UR CULT: NORMAL
BASOPHILS # BLD AUTO: 0.03 X10*3/UL (ref 0–0.1)
BASOPHILS NFR BLD AUTO: 0.3 %
BUN SERPL-MCNC: 28 MG/DL (ref 8–25)
CALCIUM SERPL-MCNC: 7.4 MG/DL (ref 8.5–10.4)
CHLORIDE SERPL-SCNC: 100 MMOL/L (ref 97–107)
CO2 SERPL-SCNC: 25 MMOL/L (ref 24–31)
CREAT SERPL-MCNC: 0.6 MG/DL (ref 0.4–1.6)
EGFRCR SERPLBLD CKD-EPI 2021: >90 ML/MIN/1.73M*2
EOSINOPHIL # BLD AUTO: 0.21 X10*3/UL (ref 0–0.4)
EOSINOPHIL NFR BLD AUTO: 1.8 %
ERYTHROCYTE [DISTWIDTH] IN BLOOD BY AUTOMATED COUNT: 14.8 % (ref 11.5–14.5)
FERRITIN SERPL-MCNC: 3501 NG/ML (ref 13–150)
FOLATE SERPL-MCNC: 10.6 NG/ML (ref 4.2–19.9)
GLUCOSE SERPL-MCNC: 135 MG/DL (ref 65–99)
HCT VFR BLD AUTO: 24.2 % (ref 36–46)
HEMOCCULT SP1 STL QL: NEGATIVE
HGB BLD-MCNC: 7 G/DL (ref 12–16)
HYPOCHROMIA BLD QL SMEAR: NORMAL
IMM GRANULOCYTES # BLD AUTO: 0.1 X10*3/UL (ref 0–0.5)
IMM GRANULOCYTES NFR BLD AUTO: 0.8 % (ref 0–0.9)
IRON SATN MFR SERPL: 29 % (ref 12–50)
IRON SERPL-MCNC: 25 UG/DL (ref 30–160)
LYMPHOCYTES # BLD AUTO: 1.61 X10*3/UL (ref 0.8–3)
LYMPHOCYTES NFR BLD AUTO: 13.6 %
MCH RBC QN AUTO: 30.8 PG (ref 26–34)
MCHC RBC AUTO-ENTMCNC: 28.9 G/DL (ref 32–36)
MCV RBC AUTO: 107 FL (ref 80–100)
MONOCYTES # BLD AUTO: 0.98 X10*3/UL (ref 0.05–0.8)
MONOCYTES NFR BLD AUTO: 8.3 %
NEUTROPHILS # BLD AUTO: 8.88 X10*3/UL (ref 1.6–5.5)
NEUTROPHILS NFR BLD AUTO: 75.2 %
NRBC BLD-RTO: 0 /100 WBCS (ref 0–0)
OVALOCYTES BLD QL SMEAR: NORMAL
PLATELET # BLD AUTO: 272 X10*3/UL (ref 150–450)
POLYCHROMASIA BLD QL SMEAR: NORMAL
POTASSIUM SERPL-SCNC: 4.4 MMOL/L (ref 3.4–5.1)
RBC # BLD AUTO: 2.27 X10*6/UL (ref 4–5.2)
RBC MORPH BLD: NORMAL
RH FACTOR (ANTIGEN D): NORMAL
RH FACTOR (ANTIGEN D): NORMAL
SODIUM SERPL-SCNC: 138 MMOL/L (ref 133–145)
TIBC SERPL-MCNC: 87 UG/DL (ref 228–428)
UIBC SERPL-MCNC: 62 UG/DL (ref 110–370)
VIT B12 SERPL-MCNC: 463 PG/ML (ref 211–946)
WBC # BLD AUTO: 11.8 X10*3/UL (ref 4.4–11.3)

## 2024-05-20 PROCEDURE — 2500000004 HC RX 250 GENERAL PHARMACY W/ HCPCS (ALT 636 FOR OP/ED): Performed by: INTERNAL MEDICINE

## 2024-05-20 PROCEDURE — 86920 COMPATIBILITY TEST SPIN: CPT

## 2024-05-20 PROCEDURE — 2500000005 HC RX 250 GENERAL PHARMACY W/O HCPCS: Performed by: INTERNAL MEDICINE

## 2024-05-20 PROCEDURE — 36415 COLL VENOUS BLD VENIPUNCTURE: CPT | Performed by: INTERNAL MEDICINE

## 2024-05-20 PROCEDURE — 2500000001 HC RX 250 WO HCPCS SELF ADMINISTERED DRUGS (ALT 637 FOR MEDICARE OP): Performed by: INTERNAL MEDICINE

## 2024-05-20 PROCEDURE — 82270 OCCULT BLOOD FECES: CPT | Performed by: NURSE PRACTITIONER

## 2024-05-20 PROCEDURE — 1210000001 HC SEMI-PRIVATE ROOM DAILY

## 2024-05-20 PROCEDURE — 2500000004 HC RX 250 GENERAL PHARMACY W/ HCPCS (ALT 636 FOR OP/ED): Performed by: SURGERY

## 2024-05-20 PROCEDURE — 86901 BLOOD TYPING SEROLOGIC RH(D): CPT | Performed by: NURSE PRACTITIONER

## 2024-05-20 PROCEDURE — 2500000001 HC RX 250 WO HCPCS SELF ADMINISTERED DRUGS (ALT 637 FOR MEDICARE OP): Performed by: NURSE PRACTITIONER

## 2024-05-20 PROCEDURE — 83540 ASSAY OF IRON: CPT | Performed by: NURSE PRACTITIONER

## 2024-05-20 PROCEDURE — P9016 RBC LEUKOCYTES REDUCED: HCPCS

## 2024-05-20 PROCEDURE — 99232 SBSQ HOSP IP/OBS MODERATE 35: CPT | Performed by: INTERNAL MEDICINE

## 2024-05-20 PROCEDURE — 82728 ASSAY OF FERRITIN: CPT | Performed by: NURSE PRACTITIONER

## 2024-05-20 PROCEDURE — 82746 ASSAY OF FOLIC ACID SERUM: CPT | Performed by: NURSE PRACTITIONER

## 2024-05-20 PROCEDURE — 82374 ASSAY BLOOD CARBON DIOXIDE: CPT | Performed by: INTERNAL MEDICINE

## 2024-05-20 PROCEDURE — 36430 TRANSFUSION BLD/BLD COMPNT: CPT

## 2024-05-20 PROCEDURE — 36415 COLL VENOUS BLD VENIPUNCTURE: CPT | Performed by: NURSE PRACTITIONER

## 2024-05-20 PROCEDURE — 82607 VITAMIN B-12: CPT | Performed by: NURSE PRACTITIONER

## 2024-05-20 PROCEDURE — 2500000001 HC RX 250 WO HCPCS SELF ADMINISTERED DRUGS (ALT 637 FOR MEDICARE OP): Performed by: SURGERY

## 2024-05-20 PROCEDURE — 85025 COMPLETE CBC W/AUTO DIFF WBC: CPT | Performed by: INTERNAL MEDICINE

## 2024-05-20 RX ORDER — VANCOMYCIN HCL 50 MG/ML
125 SOLUTION, RECONSTITUTED, ORAL ORAL EVERY 12 HOURS
Status: DISCONTINUED | OUTPATIENT
Start: 2024-05-20 | End: 2024-05-24 | Stop reason: HOSPADM

## 2024-05-20 RX ADMIN — METOPROLOL TARTRATE 50 MG: 50 TABLET, FILM COATED ORAL at 09:33

## 2024-05-20 RX ADMIN — ESOMEPRAZOLE MAGNESIUM 10 MG: 20 GRANULE, DELAYED RELEASE ORAL at 06:06

## 2024-05-20 RX ADMIN — HEPARIN SODIUM 5000 UNITS: 5000 INJECTION, SOLUTION INTRAVENOUS; SUBCUTANEOUS at 06:09

## 2024-05-20 RX ADMIN — PIPERACILLIN SODIUM AND TAZOBACTAM SODIUM 4.5 G: 4; .5 INJECTION, SOLUTION INTRAVENOUS at 04:30

## 2024-05-20 RX ADMIN — VANCOMYCIN HYDROCHLORIDE 125 MG: KIT at 17:47

## 2024-05-20 RX ADMIN — Medication 1 TABLET: at 09:33

## 2024-05-20 RX ADMIN — VANCOMYCIN HYDROCHLORIDE 250 MG: KIT at 07:00

## 2024-05-20 RX ADMIN — FERROUS SULFATE TAB 325 MG (65 MG ELEMENTAL FE) 1 TABLET: 325 (65 FE) TAB at 17:47

## 2024-05-20 RX ADMIN — FERROUS SULFATE TAB 325 MG (65 MG ELEMENTAL FE) 1 TABLET: 325 (65 FE) TAB at 09:33

## 2024-05-20 RX ADMIN — PIPERACILLIN SODIUM AND TAZOBACTAM SODIUM 3.38 G: 3; .375 INJECTION, SOLUTION INTRAVENOUS at 21:05

## 2024-05-20 RX ADMIN — PIPERACILLIN SODIUM AND TAZOBACTAM SODIUM 4.5 G: 4; .5 INJECTION, SOLUTION INTRAVENOUS at 09:33

## 2024-05-20 RX ADMIN — ASPIRIN 81 MG CHEWABLE TABLET 81 MG: 81 TABLET CHEWABLE at 09:33

## 2024-05-20 RX ADMIN — PIPERACILLIN SODIUM AND TAZOBACTAM SODIUM 3.38 G: 3; .375 INJECTION, SOLUTION INTRAVENOUS at 15:30

## 2024-05-20 RX ADMIN — ATORVASTATIN CALCIUM 40 MG: 40 TABLET, FILM COATED ORAL at 21:02

## 2024-05-20 RX ADMIN — METOPROLOL TARTRATE 50 MG: 50 TABLET, FILM COATED ORAL at 21:05

## 2024-05-20 RX ADMIN — Medication 1 TABLET: at 17:47

## 2024-05-20 SDOH — ECONOMIC STABILITY: HOUSING INSECURITY: IN THE LAST 12 MONTHS, HOW MANY PLACES HAVE YOU LIVED?: 2

## 2024-05-20 SDOH — ECONOMIC STABILITY: INCOME INSECURITY: IN THE PAST 12 MONTHS, HAS THE ELECTRIC, GAS, OIL, OR WATER COMPANY THREATENED TO SHUT OFF SERVICE IN YOUR HOME?: NO

## 2024-05-20 SDOH — HEALTH STABILITY: MENTAL HEALTH: HOW MANY STANDARD DRINKS CONTAINING ALCOHOL DO YOU HAVE ON A TYPICAL DAY?: 1 OR 2

## 2024-05-20 SDOH — ECONOMIC STABILITY: HOUSING INSECURITY
IN THE LAST 12 MONTHS, WAS THERE A TIME WHEN YOU DID NOT HAVE A STEADY PLACE TO SLEEP OR SLEPT IN A SHELTER (INCLUDING NOW)?: NO

## 2024-05-20 SDOH — ECONOMIC STABILITY: INCOME INSECURITY: IN THE LAST 12 MONTHS, WAS THERE A TIME WHEN YOU WERE NOT ABLE TO PAY THE MORTGAGE OR RENT ON TIME?: NO

## 2024-05-20 SDOH — ECONOMIC STABILITY: FOOD INSECURITY: WITHIN THE PAST 12 MONTHS, YOU WORRIED THAT YOUR FOOD WOULD RUN OUT BEFORE YOU GOT MONEY TO BUY MORE.: NEVER TRUE

## 2024-05-20 SDOH — SOCIAL STABILITY: SOCIAL NETWORK: IN A TYPICAL WEEK, HOW MANY TIMES DO YOU TALK ON THE PHONE WITH FAMILY, FRIENDS, OR NEIGHBORS?: PATIENT UNABLE TO ANSWER

## 2024-05-20 SDOH — ECONOMIC STABILITY: FOOD INSECURITY: WITHIN THE PAST 12 MONTHS, THE FOOD YOU BOUGHT JUST DIDN'T LAST AND YOU DIDN'T HAVE MONEY TO GET MORE.: NEVER TRUE

## 2024-05-20 SDOH — HEALTH STABILITY: MENTAL HEALTH: HOW OFTEN DO YOU HAVE A DRINK CONTAINING ALCOHOL?: 4 OR MORE TIMES A WEEK

## 2024-05-20 SDOH — SOCIAL STABILITY: SOCIAL INSECURITY: WITHIN THE LAST YEAR, HAVE YOU BEEN AFRAID OF YOUR PARTNER OR EX-PARTNER?: PATIENT UNABLE TO ANSWER

## 2024-05-20 SDOH — SOCIAL STABILITY: SOCIAL NETWORK: ARE YOU MARRIED, WIDOWED, DIVORCED, SEPARATED, NEVER MARRIED, OR LIVING WITH A PARTNER?: MARRIED

## 2024-05-20 SDOH — ECONOMIC STABILITY: TRANSPORTATION INSECURITY
IN THE PAST 12 MONTHS, HAS THE LACK OF TRANSPORTATION KEPT YOU FROM MEDICAL APPOINTMENTS OR FROM GETTING MEDICATIONS?: NO

## 2024-05-20 SDOH — HEALTH STABILITY: MENTAL HEALTH: HOW OFTEN DO YOU HAVE 6 OR MORE DRINKS ON ONE OCCASION?: NEVER

## 2024-05-20 SDOH — ECONOMIC STABILITY: INCOME INSECURITY: HOW HARD IS IT FOR YOU TO PAY FOR THE VERY BASICS LIKE FOOD, HOUSING, MEDICAL CARE, AND HEATING?: NOT HARD AT ALL

## 2024-05-20 SDOH — SOCIAL STABILITY: SOCIAL NETWORK: HOW OFTEN DO YOU GET TOGETHER WITH FRIENDS OR RELATIVES?: PATIENT UNABLE TO ANSWER

## 2024-05-20 SDOH — ECONOMIC STABILITY: TRANSPORTATION INSECURITY
IN THE PAST 12 MONTHS, HAS LACK OF TRANSPORTATION KEPT YOU FROM MEETINGS, WORK, OR FROM GETTING THINGS NEEDED FOR DAILY LIVING?: NO

## 2024-05-20 SDOH — HEALTH STABILITY: PHYSICAL HEALTH: ON AVERAGE, HOW MANY MINUTES DO YOU ENGAGE IN EXERCISE AT THIS LEVEL?: 0 MIN

## 2024-05-20 SDOH — SOCIAL STABILITY: SOCIAL NETWORK: HOW OFTEN DO YOU ATTENT MEETINGS OF THE CLUB OR ORGANIZATION YOU BELONG TO?: NEVER

## 2024-05-20 SDOH — HEALTH STABILITY: PHYSICAL HEALTH: ON AVERAGE, HOW MANY DAYS PER WEEK DO YOU ENGAGE IN MODERATE TO STRENUOUS EXERCISE (LIKE A BRISK WALK)?: 0 DAYS

## 2024-05-20 SDOH — SOCIAL STABILITY: SOCIAL NETWORK: HOW OFTEN DO YOU ATTEND CHURCH OR RELIGIOUS SERVICES?: NEVER

## 2024-05-20 ASSESSMENT — COGNITIVE AND FUNCTIONAL STATUS - GENERAL
WALKING IN HOSPITAL ROOM: TOTAL
CLIMB 3 TO 5 STEPS WITH RAILING: TOTAL
TOILETING: TOTAL
EATING MEALS: TOTAL
DRESSING REGULAR UPPER BODY CLOTHING: TOTAL
STANDING UP FROM CHAIR USING ARMS: TOTAL
HELP NEEDED FOR BATHING: TOTAL
TURNING FROM BACK TO SIDE WHILE IN FLAT BAD: TOTAL
DAILY ACTIVITIY SCORE: 6
MOVING FROM LYING ON BACK TO SITTING ON SIDE OF FLAT BED WITH BEDRAILS: TOTAL
MOVING TO AND FROM BED TO CHAIR: TOTAL
TOILETING: TOTAL
MOBILITY SCORE: 6
MOBILITY SCORE: 6
DRESSING REGULAR UPPER BODY CLOTHING: TOTAL
STANDING UP FROM CHAIR USING ARMS: TOTAL
TURNING FROM BACK TO SIDE WHILE IN FLAT BAD: TOTAL
HELP NEEDED FOR BATHING: TOTAL
MOVING TO AND FROM BED TO CHAIR: TOTAL
MOVING FROM LYING ON BACK TO SITTING ON SIDE OF FLAT BED WITH BEDRAILS: TOTAL
CLIMB 3 TO 5 STEPS WITH RAILING: TOTAL
DRESSING REGULAR LOWER BODY CLOTHING: TOTAL
PERSONAL GROOMING: TOTAL
EATING MEALS: TOTAL
PERSONAL GROOMING: TOTAL
WALKING IN HOSPITAL ROOM: TOTAL
DAILY ACTIVITIY SCORE: 6
DRESSING REGULAR LOWER BODY CLOTHING: TOTAL

## 2024-05-20 ASSESSMENT — LIFESTYLE VARIABLES
AUDIT-C TOTAL SCORE: 4
SKIP TO QUESTIONS 9-10: 1

## 2024-05-20 ASSESSMENT — PAIN - FUNCTIONAL ASSESSMENT
PAIN_FUNCTIONAL_ASSESSMENT: 0-10
PAIN_FUNCTIONAL_ASSESSMENT: 0-10

## 2024-05-20 ASSESSMENT — PAIN SCALES - GENERAL
PAINLEVEL_OUTOF10: 0 - NO PAIN
PAINLEVEL_OUTOF10: 0 - NO PAIN

## 2024-05-20 ASSESSMENT — PAIN SCALES - WONG BAKER: WONGBAKER_NUMERICALRESPONSE: NO HURT

## 2024-05-20 NOTE — PROGRESS NOTES
DISCHARGE PLAN SECURE TO RETURN TO [facility], NO PRECERT NEEDED.       05/20/24 8372   Discharge Planning   Living Arrangements Spouse/significant other   Support Systems Spouse/significant other   Assistance Needed dependent   Type of Residence Skilled nursing facility   Do you have animals or pets at home? No   Who is requesting discharge planning? Provider   Home or Post Acute Services Post acute facilities (Rehab/SNF/etc)   Type of Post Acute Facility Services Skilled nursing   Patient expects to be discharged to: [facility], no precert needed   Does the patient need discharge transport arranged? Yes   RoundTrip coordination needed? Yes   Has discharge transport been arranged? No   Financial Resource Strain   How hard is it for you to pay for the very basics like food, housing, medical care, and heating? Not hard   Housing Stability   In the last 12 months, was there a time when you were not able to pay the mortgage or rent on time? N   In the last 12 months, how many places have you lived? 2   In the last 12 months, was there a time when you did not have a steady place to sleep or slept in a shelter (including now)? N   Transportation Needs   In the past 12 months, has lack of transportation kept you from medical appointments or from getting medications? no   In the past 12 months, has lack of transportation kept you from meetings, work, or from getting things needed for daily living? No

## 2024-05-20 NOTE — PROGRESS NOTES
Subjective Data:      Overnight Events:         Objective Data:  Last Recorded Vitals:  Vitals:    05/19/24 1847 05/19/24 2210 05/20/24 0015 05/20/24 0700   BP: (!) 124/49 139/59 153/61 150/62   BP Location: Left arm  Left arm Left arm   Patient Position:   Lying Lying   Pulse: 94 97 108 101   Resp:   18 18   Temp:   36.8 °C (98.2 °F) 37 °C (98.6 °F)   TempSrc:   Axillary Oral   SpO2: 96%  100% 100%   Weight:       Height:           Last Labs:  CBC - 5/19/2024:  4:38 AM  12.0 7.7 301    25.8      CMP - 5/20/2024:  6:39 AM  7.4 5.5 8 --- 0.3   _ 2.4 <5 103      PTT - No results in last year.  _   _ _     HGBA1C   Date/Time Value Ref Range Status   04/12/2024 07:46 AM 5.2 See below % Final   05/16/2023 03:49 PM 5.9 % Final     Comment:          Diagnosis of Diabetes-Adults   Non-Diabetic: < or = 5.6%   Increased risk for developing diabetes: 5.7-6.4%   Diagnostic of diabetes: > or = 6.5%  .       Monitoring of Diabetes                Age (y)     Therapeutic Goal (%)   Adults:          >18           <7.0   Pediatrics:    13-18           <7.5                   7-12           <8.0                   0- 6            7.5-8.5   American Diabetes Association. Diabetes Care 33(S1), Jan 2010.     04/26/2022 12:56 PM 6.0 % Final     Comment:          Diagnosis of Diabetes-Adults   Non-Diabetic: < or = 5.6%   Increased risk for developing diabetes: 5.7-6.4%   Diagnostic of diabetes: > or = 6.5%  .       Monitoring of Diabetes                Age (y)     Therapeutic Goal (%)   Adults:          >18           <7.0   Pediatrics:    13-18           <7.5                   7-12           <8.0                   0- 6            7.5-8.5   American Diabetes Association. Diabetes Care 33(S1), Jan 2010.       LDLCALC   Date/Time Value Ref Range Status   04/12/2024 07:46  65 - 130 mg/dL Final     VLDL   Date/Time Value Ref Range Status   02/03/2021 11:42 AM 45 0 - 40 mg/dL Final      Last I/O:  I/O last 3 completed shifts:  In: 300 (4.7  "mL/kg) [IV Piggyback:300]  Out: 300 (4.7 mL/kg) [Urine:300 (0.1 mL/kg/hr)]  Weight: 63.6 kg     Past Cardiology Tests (Last 3 Years):  EKG:  ECG 12 lead 05/09/2024      ECG 12 lead 04/10/2024    Echo:  Transthoracic Echo (TTE) Complete 04/12/2024    Ejection Fractions:  No results found for: \"EF\"  Cath:  No results found for this or any previous visit from the past 1095 days.    Stress Test:  No results found for this or any previous visit from the past 1095 days.    Cardiac Imaging:  No results found for this or any previous visit from the past 1095 days.      Inpatient Medications:  Scheduled medications   Medication Dose Route Frequency    aspirin  81 mg oral Daily    atorvastatin  40 mg oral Nightly    cholecalciferol  50,000 Units oral Every Sunday    esomeprazole  10 mg g-tube Daily before breakfast    ferrous sulfate (325 mg ferrous sulfate)  65 mg of iron oral BID    heparin (porcine)  5,000 Units subcutaneous q8h JACQUELYN    lactobacillus acidophilus  1 tablet oral BID    metoprolol tartrate  50 mg g-tube BID    piperacillin-tazobactam  4.5 g intravenous q6h    vancomycin  250 mg g-tube 4x daily     PRN medications   Medication    acetaminophen    Or    acetaminophen    Or    acetaminophen    acetaminophen    benzocaine-menthol    dextromethorphan-guaifenesin    guaiFENesin    hydrALAZINE    ondansetron ODT    polyethylene glycol     Continuous Medications   Medication Dose Last Rate       Physical Exam:       Assessment/Plan   Principal Problem:    General weakness     General weakness     5/10:The patient is a 78-year-old white female whose past medical history includes hypertension, chronic kidney disease, former alcohol excess, status post bioprosthetic aortic valve replacement utilizing a number 23 mm freestyle bioprosthesis along with replacement of the ascending aorta and transverse hemiarch using a number 24 mm Gelweave graft plus tricuspid valve repair utilizing number 21 mm [place] ring plus occlusion " of a small of membranous VSD 3/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 This patient has done very well since her cardiac surgery with her last surveillance echocardiogram on 9/25/2023 showing an LV ejection fraction of 55-60% 1-2+ mitral valve regurgitation normal-appearing stentless aortic valve bioprosthesis with a peak systolic gradient of 11 mmHg and a prosthetic graft in the aortic root and ascending/arch position. Patient was admitted on 4/10/2024 with failure to thrive inability to provide self-care difficulty walking which appears to be related to progressing dementia.  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  She had a repeat echocardiogram that showed a preserved LV ejection fraction 60% normal-appearing stentless bioprosthetic aortic valve replacement minimal systolic pressure gradient with prior tricuspid valve repair and only mild to moderate tricuspid valve regurgitation.  There is trace to mild mitral valve regurgitation bubble study negative for residual VSD that was closed at the time of her aortic valve replacement.  Patient was treated with for an E. coli urinary tract infection but her mental status appeared to be significantly impaired from dementia.  She was continued on dual antiplatelet therapy with aspirin and Plavix along with Toprol-XL 50 mg daily.  Patient readmitted with failure to thrive with reduced oral intake and significant dehydration with hypernatremia hyperchloremia and hypokalemia for which the patient is receiving IV D5W and potassium supplementation.  From a cardiac standpoint the patient is stable but peers to have significantly advancing senile dementia versus multi-infarct dementia.  For now would  continue usual cardiac therapy with the dual antiplatelet therapy and atorvastatin plus metoprolol.     5/11 Samsa for Mario: Patient admitted essentially with a failure to thrive.  As noted above bioprosthetic aortic valve appears to be functioning normally and left ventricular systolic function preserved.  Serum sodium gradually decreasing with fluid hydration.  Patient does remain hypokalemic and will provide oral supplementation in addition to the potassium in the IV fluids.  Blood pressure mildly elevated and will titrate up the dose of the metoprolol succinate.     5/12:  Samsa for Mario: Essentially no change over the last 24 hours.  The patient has been refusing oral medications.  Hemodynamically the patient appears stable.  I think at this point we should consider palliative care and possibly even hospice care in this individual.  She does not appear to be agreeable to a PEG tube at this time.  Thus a comfort care model may be a better option.     5/13: Patient lying quietly in bed she is awake and able to answer simple questions.  Hyper natremia and hyperchloremia have resolved with the IV D5W with potassium supplementation serum sodium 139 chloride 109 potassium 4.1 with a creatinine of 0.70.  CBC notable for hematocrit of 26.7 WBC 14,500.  The patient's PCR for C. difficile is positive.  Stools for C. difficile toxin are negative.  Patient is on contact precautions.  Patient has been started on oral vancomycin. Patient seen by infectious disease uncertain as to whether patient has C. difficile infection versus colonization.  She had evidently recently completed a course of oral vancomycin at the skilled nursing facility for C. difficile colitis.  It is suspected that the patient does have more likely C. difficile colonization and is currently on IV Flagyl.  The patient has been tentatively scheduled for an EGD with PEG tube placement tomorrow but also palliative care consultation has been recommended  the patient.  Patient is on dual antiplatelet therapy after having a small cortical CVA recently but will discontinue Plavix for now to reduce risk of bleeding from PEG tube insertion.     5/14: The patient was seen and events reviewed discussed in detail with family members.  The patient underwent PEG tube insertion earlier today well-tolerated still somewhat sedated.  Patient is being followed by nephrology and yesterday was switched to IV D5 half-normal saline with sodium bicarb at night due to a mild residual metabolic acidosis.  The patient has been seen by infectious disease thought to have C. difficile colonization having completed a course of oral vancomycin at her nursing facility.  As noted the  C. difficile PCR was positive but toxin EIA negative.  Abdomen benign no diarrhea but the patient will be treated with IV Flagyl.  The patient's electrolyte panel remains improved creatinine 0.6 sodium 140 potassium 3.7 .  First PEG tube feed to be performed later today     5/15: Patient is currently resting comfortably no overt distress.  PEG tubes have been initiated.  Telemetry monitor shows a stable sinus rhythm in the 80s per minute.  Given the need for PEG tube feedings the patient's Toprol- mg daily will be switched to metoprolol to tartrate 50 mg twice daily through the PEG tube.  Other oral medications may need to be converted PEG tube administration in order to ensure adequate compliance.  Will continue usual aspirin but not Plavix.  Patient is currently on IV cefazolin plus Diflucan plus metronidazole and vancomycin orally.  Infectious disease following.  Electrolyte panel remains acceptable creatinine is 0.7 potassium 3.7 sodium 138.     5/16: The patient lying in bed very confused but arousable not oriented.  Tube feedings are progressing relatively well.  Renal parameters remain stable creatinine 0.6 electrolytes in normal range potassium 4.1 sodium 139.  Stable sinus rhythm in the 90/min range  by telemetry monitor.  Patient currently on metoprolol tartrate through PEG tube 50 mg twice daily.  Fall sacral fracture as above alert and oriented x 1.  Patient is being prepared for transfer back to the skilled nursing facility later this week     5/17: The patient remains very somnolent arousable but not cognizant.  A Barahona catheter was inserted and the patient's output was quite discolored consistent with suspected urinary tract infection.  CBC notable for WBC of 14,300 hemoglobin 7.8 hematocrit 25.7.  Renal parameters and electrolytes remain acceptable creatinine is 0.70 sodium 139.  PEG tubes are proceeding.  Suspect the patient does have a degree of metabolic encephalopathy related to the urinary tract infection.  Patient seen by infectious disease and placed on fosfomycin.     5/18: Patient seen and events reviewed discussed with  at bedside. Patient remains obtunded and arouses only with tactile stimulation for very brief period of time. Still suspect toxic metabolic encephalopathy possibly from urinary tract infection superimposed upon progressive dementia.. Will check head CT without contrast to rule out CVA. Urine C&S shows multiple organisms possible contaminant but she has been placed on antibiotic therapy for suspected UTI. CBC includes a WBC of 14,800 gradually unchanged hemoglobin also stable at 8.0. Will discontinue all CNS active agents including the Remeron.     5/19: The patient is clinically actually much improved.  She does respond to verbal questioning and answers correctly within the capabilities of her dementia.  It appears that the toxic metabolic encephalopathy is clearing with treatment of her urinary tract infection and with further nutrition and hydration through the PEG tube.  Electrolyte panel is unremarkable with a creatinine is 0.6 potassium 4.3.  WBC 12,000 hematocrit stable at 25.8.  Patient potentially may be prepared for transfer back to skilled nursing facility in the  next 24-48 hours.    5/20: Patient is awake but somewhat less alert and aware as compared to yesterday.  CBC is still pending.  Electrolytes are in normal range sodium 138 potassium 4.4 and creatinine 0.60.  Repeat urine culture sent yesterday result pending.  The patient does remain on IV Zosyn and vancomycin.  Tube feedings remain in progress.                Peripheral IV 05/17/24 20 G Left;Anterior Forearm (Active)   Site Assessment Clean;Dry;Intact 05/19/24 2100   Dressing Status Clean;Dry 05/19/24 2100   Number of days: 3       Code Status:  Full Code    I spent 20 minutes in the professional and overall care of this patient.        Shaheed Martin MD

## 2024-05-20 NOTE — PROGRESS NOTES
Rebecca Samaniego is a 78 y.o. female on day 11 of admission presenting with General weakness.    Subjective   Patient seen and examined.  Resting in bed in no acute distress.  Eyes closed.  Arousable.  Slightly more awake, alert.  No complaints.  Review of systems limited.     Spouse at bedside.  Loose stool.  No other issues.  Labs reviewed.  Discussed anemia and blood transfusion, including risks and benefits.  He is agreeable.      Objective     Physical Exam  Vitals and nursing note reviewed.   Constitutional:       General: She is not in acute distress.     Appearance: Normal appearance. She is normal weight. She is ill-appearing. She is not toxic-appearing or diaphoretic.   HENT:      Head: Normocephalic and atraumatic.      Right Ear: Tympanic membrane normal.      Left Ear: Tympanic membrane normal.      Nose: Nose normal.      Mouth/Throat:      Mouth: Mucous membranes are dry.      Pharynx: Oropharynx is clear.      Comments: Mouth dry, tongue less coated.  Eyes:      Extraocular Movements: Extraocular movements intact.      Conjunctiva/sclera: Conjunctivae normal.      Pupils: Pupils are equal, round, and reactive to light.   Cardiovascular:      Rate and Rhythm: Normal rate and regular rhythm.      Pulses: Normal pulses.      Heart sounds: Normal heart sounds. No murmur heard.  Pulmonary:      Effort: Pulmonary effort is normal. No respiratory distress.      Breath sounds: Normal breath sounds. No wheezing, rhonchi or rales.      Comments: Room air.  Abdominal:      General: Bowel sounds are normal. There is no distension.      Palpations: Abdomen is soft.      Tenderness: There is no abdominal tenderness.      Comments: PEG tube in place abdominal binder in place.  Dressing and binder removed.  PEG tube site without erythema or drainage.  Dressing and binder replaced.  Tube feed infusing 55 mL/hr.   Genitourinary:     Comments: Deferred.  Musculoskeletal:         General: Swelling present. No  "tenderness. Normal range of motion.      Cervical back: Normal range of motion and neck supple.      Right lower leg: No edema.      Left lower leg: No edema.      Comments: Bilateral upper extremity edema   Skin:     General: Skin is warm and dry.      Capillary Refill: Capillary refill takes less than 2 seconds.      Findings: Bruising present.   Neurological:      General: No focal deficit present.      Mental Status: She is alert. She is disoriented.      Comments: Eyes closed.  Arousable.  Slightly more awake, alert.  States name.  Speech clear and coherent.  Follows all commands.  Generalized weakness.  No focal weakness.  Gait deferred.  Cranial nerves II through XII grossly intact.   Psychiatric:         Mood and Affect: Mood normal.         Behavior: Behavior normal.       Last Recorded Vitals  Blood pressure 142/66, pulse 100, temperature 36.9 °C (98.4 °F), temperature source Oral, resp. rate 18, height 1.651 m (5' 5\"), weight 63.6 kg (140 lb 3.4 oz), SpO2 100%.    Intake/Output last 3 Shifts:  I/O last 3 completed shifts:  In: 300 (4.7 mL/kg) [IV Piggyback:300]  Out: 300 (4.7 mL/kg) [Urine:300 (0.1 mL/kg/hr)]  Weight: 63.6 kg     Relevant Results  Results for orders placed or performed during the hospital encounter of 05/09/24 (from the past 24 hour(s))   Basic Metabolic Panel   Result Value Ref Range    Glucose 135 (H) 65 - 99 mg/dL    Sodium 138 133 - 145 mmol/L    Potassium 4.4 3.4 - 5.1 mmol/L    Chloride 100 97 - 107 mmol/L    Bicarbonate 25 24 - 31 mmol/L    Urea Nitrogen 28 (H) 8 - 25 mg/dL    Creatinine 0.60 0.40 - 1.60 mg/dL    eGFR >90 >60 mL/min/1.73m*2    Calcium 7.4 (L) 8.5 - 10.4 mg/dL    Anion Gap 13 <=19 mmol/L   CBC and Auto Differential   Result Value Ref Range    WBC 11.8 (H) 4.4 - 11.3 x10*3/uL    nRBC 0.0 0.0 - 0.0 /100 WBCs    RBC 2.27 (L) 4.00 - 5.20 x10*6/uL    Hemoglobin 7.0 (L) 12.0 - 16.0 g/dL    Hematocrit 24.2 (L) 36.0 - 46.0 %     (H) 80 - 100 fL    MCH 30.8 26.0 - " 34.0 pg    MCHC 28.9 (L) 32.0 - 36.0 g/dL    RDW 14.8 (H) 11.5 - 14.5 %    Platelets 272 150 - 450 x10*3/uL    Neutrophils % 75.2 40.0 - 80.0 %    Immature Granulocytes %, Automated 0.8 0.0 - 0.9 %    Lymphocytes % 13.6 13.0 - 44.0 %    Monocytes % 8.3 2.0 - 10.0 %    Eosinophils % 1.8 0.0 - 6.0 %    Basophils % 0.3 0.0 - 2.0 %    Neutrophils Absolute 8.88 (H) 1.60 - 5.50 x10*3/uL    Immature Granulocytes Absolute, Automated 0.10 0.00 - 0.50 x10*3/uL    Lymphocytes Absolute 1.61 0.80 - 3.00 x10*3/uL    Monocytes Absolute 0.98 (H) 0.05 - 0.80 x10*3/uL    Eosinophils Absolute 0.21 0.00 - 0.40 x10*3/uL    Basophils Absolute 0.03 0.00 - 0.10 x10*3/uL   Morphology   Result Value Ref Range    RBC Morphology See Below     Polychromasia Mild     Hypochromia Mild     Ovalocytes Few    Iron and TIBC   Result Value Ref Range    Iron 25 (L) 30 - 160 ug/dL    UIBC 62 (L) 110 - 370 ug/dL    TIBC 87 (L) 228 - 428 ug/dL    % Saturation 29 12 - 50 %   Ferritin   Result Value Ref Range    Ferritin 3,501 (H) 13 - 150 ng/mL   Vitamin B12   Result Value Ref Range    Vitamin B12 463 211 - 946 pg/mL   Folate   Result Value Ref Range    Folate, Serum 10.6 4.2 - 19.9 ng/mL   Prepare RBC: 1 Units   Result Value Ref Range    PRODUCT CODE [id number]     Unit Number [id number]     Unit ABO A     Unit RH POS     XM INTEP COMP     Dispense Status XM     Blood Expiration Date May 30, 2024 23:59 EDT     PRODUCT BLOOD TYPE 6200     UNIT VOLUME 350    Type and screen   Result Value Ref Range    ABO TYPE A     Rh TYPE POS     ANTIBODY SCREEN NEG    VERIFY ABO/Rh Group Test   Result Value Ref Range    ABO TYPE A     Rh TYPE POS      Susceptibility data from last 90 days.  Collected Specimen Info Organism Ampicillin Cefazolin Cefazolin (uncomplicated UTIs only) Ciprofloxacin Gentamicin Nitrofurantoin Piperacillin/Tazobactam Trimethoprim/Sulfamethoxazole   04/11/24 Urine from Straight Catheter Escherichia coli S S S S S S S S     XR chest 1  view    Result Date: 5/19/2024  Interpreted By:  Abisai Harrell, STUDY: Chest dated  5/19/2024.   INDICATION: Signs/Symptoms:fever   COMPARISON: Chest dated 05/16/2024.   ACCESSION NUMBER(S): [id number]   ORDERING CLINICIAN: YADIEL LUIS   TECHNIQUE: One view of the chest.   FINDINGS: There is retrocardiac opacity with blunting of the costophrenic angle. There is faint gradient right basilar opacity with linear superimposed right basilar opacities.  No pneumothorax is evident. The cardiomediastinal silhouette is  enlarged but similar to the prior exam.Surgical changes are seen of the mediastinum.Degenerative change is seen of the spine and shoulders.Surgical changes seen of the cervical spine.       1. Small left pleural effusion with associated atelectasis and/or pneumonitis. 2. Likely small layering right pleural effusion with atelectasis, subtle pneumonitis is not excluded. 3. These findings are felt to be similar to the prior exam.   MACRO: None   Signed by: Abisai Harrell 5/19/2024 4:30 PM Dictation workstation:   [id number]     Scheduled medications  aspirin, 81 mg, oral, Daily  atorvastatin, 40 mg, oral, Nightly  cholecalciferol, 50,000 Units, oral, Every Sunday  esomeprazole, 10 mg, g-tube, Daily before breakfast  ferrous sulfate (325 mg ferrous sulfate), 65 mg of iron, oral, BID  heparin (porcine), 5,000 Units, subcutaneous, q8h JACQUELYN  lactobacillus acidophilus, 1 tablet, oral, BID  metoprolol tartrate, 50 mg, g-tube, BID  piperacillin-tazobactam, 3.375 g, intravenous, q6h  vancomycin, 125 mg, g-tube, q12h      Continuous medications     PRN medications  PRN medications: acetaminophen **OR** acetaminophen **OR** acetaminophen, acetaminophen, benzocaine-menthol, dextromethorphan-guaifenesin, guaiFENesin, hydrALAZINE, ondansetron ODT, polyethylene glycol      ASSESSMENT:  Status post PEG tube placement  Generalized weakness  Adult failure to thrive  Poor oral  intake  Dysphagia  Dehydration  Hypernatremia - resolved  Hypokalemia - resolved  Leukocytosis - improved  History of C. Difficile  C. Difficile  Oropharyngeal candida  Hypoalbuminemia   Normocytic anemia worsening  History of CVA  Supraventricular tachycardia - resolved  Lethargy  Pyuria - ? urinary tract infection  Toxic encephalopathy secondary to UTI ? pneumonitis  Small bilateral pleural effusions  ? Pneumonitis  Fever    PLAN:  Chart reviewed.  Afebrile.  Leukocytosis improved.  5/16/2024 urine culture grew multiple organisms, probable contamination.  Repeat urine culture 5/19/2024 pending.  Chest x-ray radiology report reviewed.  Bilateral pleural effusion, atelectasis and/or pneumonitis not excluded.  Infectious disease input appreciated.  Continue IV Zosyn renal dosing.  Follow-up cultures.  Monitor temperature and white blood cell count.  Prophylactic Vancomycin 125 mg via PEG every 12 hours.  Monitor stools.  Post void residual bladder scan.  Note patient is incontinent.  Monitor I's and O's.  Respiratory status, pulse oximetry stable on room air.  Monitor respiratory status.  H&H noted.  No evidence of acute blood loss.  Guaiac stools.  Transfuse 1 unit of packed red blood cells.  Monitor H&H.  Tolerating tube feeding at goal rate.  Continue tube feeding.  Aspiration precautions.  Speech therapy follow-up.  Mouth care.  Monitor BMP, CBC.  Mentation stable.  No focal deficits.  5/18/2024 CT brain without contrast radiology report reviewed.  No acute findings.  Suspect toxic, metabolic encephalopathy + underlying history of dementia, CVA.  Continue to monitor.  PT OT as tolerated.  Fall precautions.  Up with assistance.  DVT prophylaxis.  Heparin subcutaneous.  Monitor for bleeding.  Port of care.  Patient reassured.  Case management following for discharge planning.  Discharge plan to [facility] when medically cleared.  Discussed with patient, spouse at bedside,   Howie.      Belen Martin, BILLY-CNP

## 2024-05-20 NOTE — PROGRESS NOTES
Spiritual Care Visit    Clinical Encounter Type  Visited With: Patient  Routine Visit: Follow-up  Continue Visiting: Yes         Values/Beliefs  Spiritual Requests During Hospitalization: [place] today Sleeping.     Neal Das

## 2024-05-20 NOTE — CARE PLAN
The patient's goals for the shift include improved movement    The clinical goals for the shift include safety      Problem: ADLs  Goal: Pt will complete ADL tasks at min Awith use of AE prn   Outcome: Progressing     Problem: Pain  Goal: My pain/discomfort is manageable  Outcome: Progressing     Problem: Safety  Goal: Patient will be injury free during hospitalization  Outcome: Progressing  Goal: I will remain free of falls  Outcome: Progressing     Problem: Daily Care  Goal: Daily care needs are met  Outcome: Progressing     Problem: Psychosocial Needs  Goal: Demonstrates ability to cope with hospitalization/illness  Outcome: Progressing  Goal: Collaborate with me, my family, and caregiver to identify my specific goals  Outcome: Progressing     Problem: Discharge Barriers  Goal: My discharge needs are met  Outcome: Progressing     Problem: Skin  Goal: Decreased wound size/increased tissue granulation at next dressing change  Outcome: Progressing  Goal: Participates in plan/prevention/treatment measures  Outcome: Progressing  Goal: Prevent/manage excess moisture  Outcome: Progressing  Goal: Prevent/minimize sheer/friction injuries  Outcome: Progressing  Goal: Promote/optimize nutrition  Outcome: Progressing  Goal: Promote skin healing  Outcome: Progressing     Problem: Pain - Adult  Goal: Verbalizes/displays adequate comfort level or baseline comfort level  Outcome: Progressing     Problem: Safety - Adult  Goal: Free from fall injury  Outcome: Progressing     Problem: Discharge Planning  Goal: Discharge to home or other facility with appropriate resources  Outcome: Progressing     Problem: Chronic Conditions and Co-morbidities  Goal: Patient's chronic conditions and co-morbidity symptoms are monitored and maintained or improved  Outcome: Progressing     Problem: Nutrition  Goal: Less than 5 days NPO/clear liquids  Outcome: Progressing  Goal: Oral intake greater than 50%  Outcome: Progressing  Goal: Oral intake  greater 75%  Outcome: Progressing  Goal: Consume prescribed supplement  Outcome: Progressing  Goal: Adequate PO fluid intake  Outcome: Progressing  Goal: Nutrition support goals are met within 48 hrs  Outcome: Progressing  Goal: Nutrition support is meeting 75% of nutrient needs  Outcome: Progressing  Goal: Tube feed tolerance  Outcome: Progressing  Goal: BG  mg/dL  Outcome: Progressing  Goal: Lab values WNL  Outcome: Progressing  Goal: Electrolytes WNL  Outcome: Progressing  Goal: Promote healing  Outcome: Progressing  Goal: Maintain stable weight  Outcome: Progressing  Goal: Reduce weight from edema/fluid  Outcome: Progressing  Goal: Gradual weight gain  Outcome: Progressing  Goal: Improve ostomy output  Outcome: Progressing     Problem: Fall/Injury  Goal: Not fall by end of shift  Outcome: Progressing  Goal: Be free from injury by end of the shift  Outcome: Progressing  Goal: Verbalize understanding of personal risk factors for fall in the hospital  Outcome: Progressing  Goal: Verbalize understanding of risk factor reduction measures to prevent injury from fall in the home  Outcome: Progressing  Goal: Use assistive devices by end of the shift  Outcome: Progressing  Goal: Pace activities to prevent fatigue by end of the shift  Outcome: Progressing

## 2024-05-20 NOTE — PROGRESS NOTES
"INFECTIOUS DISEASES PROGRESS NOTE    Consulted / following patient for:  C. difficile PCR positive, toxin negative  Fever  Leukocytosis    Saw patient earlier during this admission for discordant C. difficile results.  Signed off.  PEG tube was placed.  Reconsulted for fever and leukocytosis.  Patient was started on high-dose intravenous Zosyn and enteral vancomycin    Subjective   Interval History:   Patient says she is doing \"fine\" and denies pain.  Denies diarrhea     Objective   PHYSICAL EXAMINATION  Vital signs:  Visit Vitals  /62 (BP Location: Left arm, Patient Position: Lying)   Pulse 101   Temp 37 °C (98.6 °F) (Oral)   Resp 18   Single elevated temperature to 38.2 degrees yesterday morning  General: Chronically but not acutely ill.  Alert and responsive, although weak  Lungs: Clear to auscultation  Heart: S1, S2 normal  Abdomen:  Soft, nontender. No palpable organs or masses.  No diarrhea.  PEG tube in place  Genitalia: Unremarkable.  No Barahona catheter in place  Extremities extensive ecchymoses, edema.  No tenderness or suppuration    Relevant Results  WBC: 15,700 ---> 14,800 ---> 12,000 ---> 11,800    Results from last 72 hours   Lab Units 05/20/24  0639   CREATININE mg/dL 0.60   ANION GAP mmol/L 13   EGFR mL/min/1.73m*2 >90   Urinalysis (5/19): Heavy pyuria  Microbiology:  C. difficile: PCR positive, toxin EIA negative  Urine 5/16: Multiple organisms  Urine 5/19: Pending    Impression:  1.  C. difficile infection versus colonization  -- Patient recently completed a course of oral vancomycin at her nursing facility for C. difficile colitis.  It is not clear when therapy ended.  -- Patient now is C. difficile PCR positive but toxin EIA negative.  She denies any active diarrhea.  Her abdomen is benign.  Suspect C. difficile colonization  2.  Leukocytosis, transient fever.  Started on high-dose Zosyn and enteral vancomycin.  Rule out UTI/urinary retention     Plan:  1.  Will continue IV Zosyn pending " further observation and incubation of cultures, reduce dose for estimated creatinine clearance 68 mL/min  2.  Postvoid bladder scan, rule out significant retention.  3.  Reduce prophylactic vancomycin to 125 mg p.o./PEG every 12 hours    Doni Morales MD  [facility]  Office:  [phone]

## 2024-05-20 NOTE — PROGRESS NOTES
Occupational Therapy                 Therapy Communication Note    Patient Name: Rebecca Samaniego  MRN: [id number]  Today's Date: 5/20/2024     Discipline: Occupational Therapy    Missed Visit Reason: Missed Visit Reason: Cancel (Therapist approaching Pt at 1648  per NSG cancel Pt to receive blood transfusion)    Missed Time: Cancel

## 2024-05-20 NOTE — NURSING NOTE
Blood transfusion begun at this time for H/H 7.0/24.2 Due to error in epic unable to release blood form nor document in Epic offically. Epic ticket sent. VS taken and stable. No s/s transfusion rxn noted.  at bedside. Will continue to monitor pt.

## 2024-05-21 ENCOUNTER — APPOINTMENT (OUTPATIENT)
Dept: ORTHOPEDIC SURGERY | Facility: HOSPITAL | Age: 79
End: 2024-05-21
Payer: MEDICARE

## 2024-05-21 LAB
ANION GAP SERPL CALC-SCNC: 12 MMOL/L
BLOOD EXPIRATION DATE: NORMAL
BUN SERPL-MCNC: 27 MG/DL (ref 8–25)
CALCIUM SERPL-MCNC: 7.5 MG/DL (ref 8.5–10.4)
CHLORIDE SERPL-SCNC: 101 MMOL/L (ref 97–107)
CO2 SERPL-SCNC: 27 MMOL/L (ref 24–31)
CREAT SERPL-MCNC: 0.7 MG/DL (ref 0.4–1.6)
DISPENSE STATUS: NORMAL
EGFRCR SERPLBLD CKD-EPI 2021: 88 ML/MIN/1.73M*2
ERYTHROCYTE [DISTWIDTH] IN BLOOD BY AUTOMATED COUNT: 15.9 % (ref 11.5–14.5)
GLUCOSE SERPL-MCNC: 125 MG/DL (ref 65–99)
HCT VFR BLD AUTO: 25.9 % (ref 36–46)
HGB BLD-MCNC: 8 G/DL (ref 12–16)
MCH RBC QN AUTO: 30.2 PG (ref 26–34)
MCHC RBC AUTO-ENTMCNC: 30.9 G/DL (ref 32–36)
MCV RBC AUTO: 98 FL (ref 80–100)
NRBC BLD-RTO: 0 /100 WBCS (ref 0–0)
PLATELET # BLD AUTO: 336 X10*3/UL (ref 150–450)
POTASSIUM SERPL-SCNC: 4.1 MMOL/L (ref 3.4–5.1)
PRODUCT BLOOD TYPE: 6200
PRODUCT CODE: NORMAL
RBC # BLD AUTO: 2.65 X10*6/UL (ref 4–5.2)
SODIUM SERPL-SCNC: 140 MMOL/L (ref 133–145)
UNIT ABO: NORMAL
UNIT NUMBER: NORMAL
UNIT RH: NORMAL
UNIT VOLUME: 350
WBC # BLD AUTO: 11 X10*3/UL (ref 4.4–11.3)
XM INTEP: NORMAL

## 2024-05-21 PROCEDURE — 2500000001 HC RX 250 WO HCPCS SELF ADMINISTERED DRUGS (ALT 637 FOR MEDICARE OP): Performed by: SURGERY

## 2024-05-21 PROCEDURE — 2500000001 HC RX 250 WO HCPCS SELF ADMINISTERED DRUGS (ALT 637 FOR MEDICARE OP): Performed by: INTERNAL MEDICINE

## 2024-05-21 PROCEDURE — 1210000001 HC SEMI-PRIVATE ROOM DAILY

## 2024-05-21 PROCEDURE — 2500000001 HC RX 250 WO HCPCS SELF ADMINISTERED DRUGS (ALT 637 FOR MEDICARE OP): Performed by: NURSE PRACTITIONER

## 2024-05-21 PROCEDURE — 2500000004 HC RX 250 GENERAL PHARMACY W/ HCPCS (ALT 636 FOR OP/ED): Performed by: INTERNAL MEDICINE

## 2024-05-21 PROCEDURE — 97110 THERAPEUTIC EXERCISES: CPT | Mod: GP

## 2024-05-21 PROCEDURE — 36415 COLL VENOUS BLD VENIPUNCTURE: CPT | Performed by: INTERNAL MEDICINE

## 2024-05-21 PROCEDURE — 85027 COMPLETE CBC AUTOMATED: CPT | Performed by: INTERNAL MEDICINE

## 2024-05-21 PROCEDURE — 2500000005 HC RX 250 GENERAL PHARMACY W/O HCPCS: Performed by: INTERNAL MEDICINE

## 2024-05-21 PROCEDURE — 80048 BASIC METABOLIC PNL TOTAL CA: CPT | Performed by: INTERNAL MEDICINE

## 2024-05-21 RX ADMIN — FERROUS SULFATE TAB 325 MG (65 MG ELEMENTAL FE) 1 TABLET: 325 (65 FE) TAB at 08:35

## 2024-05-21 RX ADMIN — PIPERACILLIN SODIUM AND TAZOBACTAM SODIUM 3.38 G: 3; .375 INJECTION, SOLUTION INTRAVENOUS at 16:49

## 2024-05-21 RX ADMIN — VANCOMYCIN HYDROCHLORIDE 125 MG: KIT at 06:22

## 2024-05-21 RX ADMIN — VANCOMYCIN HYDROCHLORIDE 125 MG: KIT at 17:00

## 2024-05-21 RX ADMIN — ATORVASTATIN CALCIUM 40 MG: 40 TABLET, FILM COATED ORAL at 21:22

## 2024-05-21 RX ADMIN — ACETAMINOPHEN 650 MG: 650 SUPPOSITORY RECTAL at 21:40

## 2024-05-21 RX ADMIN — ESOMEPRAZOLE MAGNESIUM 10 MG: 20 GRANULE, DELAYED RELEASE ORAL at 06:21

## 2024-05-21 RX ADMIN — ASPIRIN 81 MG CHEWABLE TABLET 81 MG: 81 TABLET CHEWABLE at 08:34

## 2024-05-21 RX ADMIN — Medication 1 TABLET: at 16:49

## 2024-05-21 RX ADMIN — PIPERACILLIN SODIUM AND TAZOBACTAM SODIUM 3.38 G: 3; .375 INJECTION, SOLUTION INTRAVENOUS at 08:35

## 2024-05-21 RX ADMIN — FERROUS SULFATE TAB 325 MG (65 MG ELEMENTAL FE) 1 TABLET: 325 (65 FE) TAB at 16:49

## 2024-05-21 RX ADMIN — METOPROLOL TARTRATE 50 MG: 50 TABLET, FILM COATED ORAL at 08:35

## 2024-05-21 RX ADMIN — PIPERACILLIN SODIUM AND TAZOBACTAM SODIUM 3.38 G: 3; .375 INJECTION, SOLUTION INTRAVENOUS at 03:38

## 2024-05-21 RX ADMIN — Medication 1 TABLET: at 08:34

## 2024-05-21 RX ADMIN — METOPROLOL TARTRATE 50 MG: 50 TABLET, FILM COATED ORAL at 21:22

## 2024-05-21 RX ADMIN — PIPERACILLIN SODIUM AND TAZOBACTAM SODIUM 3.38 G: 3; .375 INJECTION, SOLUTION INTRAVENOUS at 21:22

## 2024-05-21 ASSESSMENT — PAIN SCALES - PAIN ASSESSMENT IN ADVANCED DEMENTIA (PAINAD)
TOTALSCORE: REPOSITIONED
BREATHING: NORMAL
BODYLANGUAGE: RELAXED
FACIALEXPRESSION: SMILING OR INEXPRESSIVE
CONSOLABILITY: NO NEED TO CONSOLE
TOTALSCORE: 1
NEGVOCALIZATION: OCCASIONAL MOAN/GROAN, LOW SPEECH, NEGATIVE/DISAPPROVING QUALITY

## 2024-05-21 ASSESSMENT — COGNITIVE AND FUNCTIONAL STATUS - GENERAL
DRESSING REGULAR LOWER BODY CLOTHING: TOTAL
DRESSING REGULAR UPPER BODY CLOTHING: TOTAL
STANDING UP FROM CHAIR USING ARMS: TOTAL
CLIMB 3 TO 5 STEPS WITH RAILING: TOTAL
MOBILITY SCORE: 6
DRESSING REGULAR UPPER BODY CLOTHING: TOTAL
WALKING IN HOSPITAL ROOM: TOTAL
MOVING FROM LYING ON BACK TO SITTING ON SIDE OF FLAT BED WITH BEDRAILS: TOTAL
DRESSING REGULAR LOWER BODY CLOTHING: TOTAL
TURNING FROM BACK TO SIDE WHILE IN FLAT BAD: TOTAL
MOVING TO AND FROM BED TO CHAIR: TOTAL
CLIMB 3 TO 5 STEPS WITH RAILING: TOTAL
STANDING UP FROM CHAIR USING ARMS: TOTAL
TOILETING: TOTAL
PERSONAL GROOMING: TOTAL
WALKING IN HOSPITAL ROOM: TOTAL
MOVING FROM LYING ON BACK TO SITTING ON SIDE OF FLAT BED WITH BEDRAILS: TOTAL
PERSONAL GROOMING: TOTAL
MOVING TO AND FROM BED TO CHAIR: TOTAL
HELP NEEDED FOR BATHING: TOTAL
DAILY ACTIVITIY SCORE: 6
MOBILITY SCORE: 6
HELP NEEDED FOR BATHING: TOTAL
MOVING TO AND FROM BED TO CHAIR: TOTAL
MOBILITY SCORE: 6
EATING MEALS: TOTAL
TOILETING: TOTAL
TURNING FROM BACK TO SIDE WHILE IN FLAT BAD: TOTAL
TURNING FROM BACK TO SIDE WHILE IN FLAT BAD: TOTAL
STANDING UP FROM CHAIR USING ARMS: TOTAL
CLIMB 3 TO 5 STEPS WITH RAILING: TOTAL
EATING MEALS: TOTAL
DAILY ACTIVITIY SCORE: 6
MOVING FROM LYING ON BACK TO SITTING ON SIDE OF FLAT BED WITH BEDRAILS: TOTAL
WALKING IN HOSPITAL ROOM: TOTAL

## 2024-05-21 ASSESSMENT — PAIN - FUNCTIONAL ASSESSMENT
PAIN_FUNCTIONAL_ASSESSMENT: 0-10
PAIN_FUNCTIONAL_ASSESSMENT: FLACC (FACE, LEGS, ACTIVITY, CRY, CONSOLABILITY)
PAIN_FUNCTIONAL_ASSESSMENT: PAINAD (PAIN ASSESSMENT IN ADVANCED DEMENTIA SCALE)

## 2024-05-21 ASSESSMENT — PAIN SCALES - GENERAL
PAINLEVEL_OUTOF10: 3
PAINLEVEL_OUTOF10: 0 - NO PAIN

## 2024-05-21 NOTE — PROGRESS NOTES
"INFECTIOUS DISEASES PROGRESS NOTE    Consulted / following patient for:  C. difficile PCR positive, toxin negative  Fever  Leukocytosis    Subjective   Interval History:   Patient says she is doing \"fine\" and offers no complaints    Objective   PHYSICAL EXAMINATION  Vital signs:  Visit Vitals  /54 (BP Location: Left leg, Patient Position: Lying)   Pulse 102   Temp 36.7 °C (98.1 °F) (Axillary)   Resp 18   Afebrile for > 36 hours  General: Chronically but not acutely ill.    Lungs: Clear to auscultation  Heart: S1, S2 normal  Abdomen:  Soft, nontender. No palpable organs or masses.  No diarrhea.  PEG tube in place  Genitalia: Unremarkable.  No Barahona catheter in place.  On 5/1, bladder scan =164 mL  Extremities extensive ecchymoses, edema.  No tenderness or suppuration    Relevant Results  WBC: 15,700 ---> 14,800 ---> 12,000 ---> 11,800 ---> 11,000    Results from last 72 hours   Lab Units 05/21/24  0639   CREATININE mg/dL 0.70   ANION GAP mmol/L 12   EGFR mL/min/1.73m*2 88   Urinalysis (5/19): Heavy pyuria  Microbiology:  C. difficile: PCR positive, toxin EIA negative  Urine 5/16: Multiple organisms  Urine 5/19: No significant growth    Impression:  1.  C. difficile infection versus colonization  -- Patient recently completed a course of oral vancomycin at her nursing facility for C. difficile colitis.  It is not clear when therapy ended.  -- Patient now is C. difficile PCR positive but toxin EIA negative.  She denies any active diarrhea.  Her abdomen is benign.  Suspect C. difficile colonization, now maintaining prophylactic low-dose vancomycin while she is on empiric broad-spectrum antibiotics  2.  Leukocytosis, transient fever.  Started on high-dose Zosyn for presumed UTI (pyuria)     Plan:  1.  Will continue empiric IV Zosyn to complete 5 days of therapy on 5/23  2.   Continue prophylactic vancomycin to 125 mg p.o./PEG every 12 hours    Doni Morales MD  [facility]  Office:  [phone]  "

## 2024-05-21 NOTE — PROGRESS NOTES
"Nutrition Follow up Note    Nutrition Assessment      Pt is tolerating tube feed at goal rate. Tube feed running @ 55 mL/hr. Pt is noted to have loose stools/ diarrhea. Will continue to provide Zeb BID.     Nutrition History:  Food and Nutrient History: Tube feed     Food Allergies/Intolerances:  None  GI Symptoms: Diarrhea  Oral Problems: Swallowing difficulty    Anthropometrics:  Ht: 165.1 cm (5' 5\"), Wt: 63.6 kg (140 lb 3.4 oz), BMI: 23.33  IBW/kg (Dietitian Calculated): 56.82 kg          Weight Change:  Daily Weight  05/09/24 : 63.6 kg (140 lb 3.4 oz)  04/16/24 : 66.5 kg (146 lb 9.7 oz)  03/20/24 : 60.2 kg (132 lb 12.8 oz)  02/20/24 : 67.1 kg (148 lb)  05/16/23 : 67.4 kg (148 lb 8 oz)  04/06/22 : 67.6 kg (149 lb 2 oz)  05/19/21 : 68.9 kg (152 lb)  01/13/21 : 66 kg (145 lb 7 oz)     Weight History / % Weight Change: weight has fluctuated between 132-148# over the past year             Nutrition Focused Physical Exam Findings:             Edema  Edema: +1 trace  Edema Location: BLE    Physical Findings (Nutrition Deficiency/Toxicity)  Skin: Positive (Deep tissue injury (DTI) to Left heel, Dry Unstagable Right heel, DTI and excoriation to Sacrum.)    Nutrition Significant Labs:  Lab Results   Component Value Date    WBC 11.0 05/21/2024    HGB 8.0 (L) 05/21/2024    HCT 25.9 (L) 05/21/2024     05/21/2024    CHOL 182 04/12/2024    TRIG 171 (H) 04/12/2024    HDL 42.0 (L) 04/12/2024    ALT <5 (L) 05/12/2024    AST 8 05/12/2024     05/21/2024    K 4.1 05/21/2024     05/21/2024    CREATININE 0.70 05/21/2024    BUN 27 (H) 05/21/2024    CO2 27 05/21/2024    TSH 1.57 04/11/2024    HGBA1C 5.2 04/12/2024     Nutrition Specific Medications:  aspirin, 81 mg, oral, Daily  atorvastatin, 40 mg, oral, Nightly  cholecalciferol, 50,000 Units, oral, Every Sunday  esomeprazole, 10 mg, g-tube, Daily before breakfast  ferrous sulfate (325 mg ferrous sulfate), 65 mg of iron, oral, BID  heparin (porcine), 5,000 Units, " subcutaneous, q8h JACQUELYN  lactobacillus acidophilus, 1 tablet, oral, BID  metoprolol tartrate, 50 mg, g-tube, BID  piperacillin-tazobactam, 3.375 g, intravenous, q6h  vancomycin, 125 mg, g-tube, q12h          Dietary Orders (From admission, onward)       Start     Ordered    05/14/24 1609  Enteral feeding with NPO PEG (percutaneous endoscopic gastric); 55 (Begin with 10ml per hours and increase 10 ml per hour every 8 hours until goal is reached.); 150; Water; Tap water; Every 4 hours  Diet effective now        Comments: Start tube feeding at 10 ml per hour. Increase 10 ml per hour until goal of 55 ml pr hour is reached.   Question Answer Comment   Tube feeding formula: Jevity 1.5    Tube feeding additive: Zeb    Tube feeding additive frequency: Twice a day    Feeding route: PEG (percutaneous endoscopic gastric)    Tube feeding continuous rate (mL/hr): 55 Begin with 10ml per hours and increase 10 ml per hour every 8 hours until goal is reached.   Tube feeding flush (mL): 150    Flush type: Water    Water type: Tap water    Flush frequency: Every 4 hours        05/14/24 1611    05/13/24 1443  Oral nutritional supplements  Until discontinued        Comments: Unflavored via PEG tube   Question Answer Comment   Deliver with Breakfast    Deliver with Dinner    Select supplement: Zeb        05/13/24 1442                  Estimated Needs:   Estimated Energy Needs  Total Energy Estimated Needs (kCal):  (4394-4657)  Total Estimated Energy Need per Day (kCal/kg):  (30-35)  Method for Estimating Needs: actual wt    Estimated Protein Needs  Total Protein Estimated Needs (g):  (76-95)  Total Protein Estimated Needs (g/kg):  (1.2-1.5)  Method for Estimating Needs: actual wt    Estimated Fluid Needs  Total Fluid Estimated Needs (mL):  (2799-6038)  Method for Estimating Needs: 1 ml/kcal        Nutrition Diagnosis   Nutrition Diagnosis:       Nutrition Diagnosis  Patient has Nutrition Diagnosis: Yes  Diagnosis Status (1):  New  Nutrition Diagnosis 1: Inadequate oral intake  Related to (1): decreased ability to consume sufficient energy PO  As Evidenced by (1): need for tube feed       Nutrition Interventions/Recommendations   Nutrition Interventions and Recommendations:    Nutrition Prescription:  Individualized Nutrition Prescription Provided for : 8629-8432 kcals, 76-95 gm protein via enterla nutrition    Nutrition Interventions:   Food and/or Nutrient Delivery Interventions  Interventions: Enteral intake, Medical food supplement  Enteral Intake: Other (Comment)  Goal: continue Jevity 1.5 goal rate @ 55 mL/hr to provide 1980 kcals, 84 gm protein, 1003 mL free water. Pt will require an additional 150 mL water flush Q4H to meet hydration needs. Recommend initiating @ 10 mL/hr and increasing by 10 mL Q8H or as toolerated until goal is reached.  Medical Food Supplement: Commercial beverage  Goal: angelika BID to aid in wound healing    Education Documentation  No documentation found.           Nutrition Monitoring and Evaluation   Monitoring/Evaluation:   Food/Nutrient Related History Monitoring  Monitoring and Evaluation Plan: Enteral and parenteral nutrition intake  Enteral and Parenteral Nutrition Intake: Enteral nutrition formula/solution  Criteria: will monitor for tube feed tolerance            Time Spent/Follow-up:   Follow Up  Time Spent (min): 20 minutes  Last Date of Nutrition Visit: 05/21/24  Nutrition Follow-Up Needed?: 5-7 days  Follow up Comment: 5/28/24

## 2024-05-21 NOTE — PROGRESS NOTES
05/21/24 1523   Discharge Planning   Patient expects to be discharged to: [facility]     Discharge plan is secure to return to [facility].  No precert is needed.  Patient is tolerating tube feed at goal rate. ID following for fever/UTI.  Recommendation is for IV Zosyn through 5/23.     DC PLAN IS SECURE.

## 2024-05-21 NOTE — PROGRESS NOTES
Rebecca Samaniego is a 78 y.o. female on day 12 of admission presenting with General weakness.    Subjective   Patient seen and examined.  Resting in bed in no acute distress.  Eyes open.  More awake, alert.  States name, birth-date.  States she is in the hospital year 2024.    Spoke with nursing, no new issues.  1 stool yesterday.  Fecal occult negative.     Objective     Physical Exam  Vitals and nursing note reviewed.   Constitutional:       General: She is not in acute distress.     Appearance: Normal appearance. She is normal weight. She is ill-appearing. She is not toxic-appearing or diaphoretic.   HENT:      Head: Normocephalic and atraumatic.      Right Ear: Tympanic membrane normal.      Left Ear: Tympanic membrane normal.      Nose: Nose normal.      Mouth/Throat:      Mouth: Mucous membranes are dry.      Pharynx: Oropharynx is clear.      Comments: Mouth dry, tongue less coated.  Eyes:      Extraocular Movements: Extraocular movements intact.      Conjunctiva/sclera: Conjunctivae normal.      Pupils: Pupils are equal, round, and reactive to light.   Cardiovascular:      Rate and Rhythm: Normal rate and regular rhythm.      Pulses: Normal pulses.      Heart sounds: Normal heart sounds. No murmur heard.  Pulmonary:      Effort: Pulmonary effort is normal. No respiratory distress.      Breath sounds: Normal breath sounds. No wheezing, rhonchi or rales.      Comments: Room air.  Abdominal:      General: Bowel sounds are normal. There is no distension.      Palpations: Abdomen is soft.      Tenderness: There is no abdominal tenderness.      Comments: PEG tube in place abdominal binder in place.  Dressing and binder removed.  PEG tube site with crusting, no erythema or drainage.  Tube feed infusing 55 mL/hr.   Genitourinary:     Comments: Deferred.  Musculoskeletal:         General: Swelling present. No tenderness. Normal range of motion.      Cervical back: Normal range of motion and neck supple.      Right  "lower leg: No edema.      Left lower leg: No edema.      Comments: Bilateral upper extremity edema   Skin:     General: Skin is warm and dry.      Capillary Refill: Capillary refill takes less than 2 seconds.      Findings: Bruising present.   Neurological:      General: No focal deficit present.      Mental Status: She is alert. She is disoriented.      Comments: Eyes open. More awake, alert. States name and birth-date. States she is in the hospital, year 2024.  Speech clear and coherent.  Follows all commands.  Generalized weakness.  No focal weakness.  Gait deferred.  Cranial nerves II through XII grossly intact.   Psychiatric:         Mood and Affect: Mood normal.         Behavior: Behavior normal.       Last Recorded Vitals  Blood pressure 146/54, pulse 102, temperature 36.7 °C (98.1 °F), temperature source Axillary, resp. rate 18, height 1.651 m (5' 5\"), weight 63.6 kg (140 lb 3.4 oz), SpO2 96%.    Intake/Output last 3 Shifts:  I/O last 3 completed shifts:  In: 750 (11.8 mL/kg) [NG/GT:300; IV Piggyback:450]  Out: 150 (2.4 mL/kg) [Emesis/NG output:150]  Weight: 63.6 kg     Relevant Results  Results for orders placed or performed during the hospital encounter of 05/09/24 (from the past 24 hour(s))   Prepare RBC: 1 Units   Result Value Ref Range    PRODUCT CODE [id number]     Unit Number [id number]     Unit ABO A     Unit RH POS     XM INTEP COMP     Dispense Status IS     Blood Expiration Date May 30, 2024 23:59 EDT     PRODUCT BLOOD TYPE 6200     UNIT VOLUME 350    Type and screen   Result Value Ref Range    ABO TYPE A     Rh TYPE POS     ANTIBODY SCREEN NEG    VERIFY ABO/Rh Group Test   Result Value Ref Range    ABO TYPE A     Rh TYPE POS    Occult Blood, Stool    Specimen: Stool   Result Value Ref Range    Occult Blood, Stool X1 Negative Negative   CBC   Result Value Ref Range    WBC 11.0 4.4 - 11.3 x10*3/uL    nRBC 0.0 0.0 - 0.0 /100 WBCs    RBC 2.65 (L) 4.00 - 5.20 x10*6/uL    Hemoglobin 8.0 (L) 12.0 - " 16.0 g/dL    Hematocrit 25.9 (L) 36.0 - 46.0 %    MCV 98 80 - 100 fL    MCH 30.2 26.0 - 34.0 pg    MCHC 30.9 (L) 32.0 - 36.0 g/dL    RDW 15.9 (H) 11.5 - 14.5 %    Platelets 336 150 - 450 x10*3/uL   Basic Metabolic Panel   Result Value Ref Range    Glucose 125 (H) 65 - 99 mg/dL    Sodium 140 133 - 145 mmol/L    Potassium 4.1 3.4 - 5.1 mmol/L    Chloride 101 97 - 107 mmol/L    Bicarbonate 27 24 - 31 mmol/L    Urea Nitrogen 27 (H) 8 - 25 mg/dL    Creatinine 0.70 0.40 - 1.60 mg/dL    eGFR 88 >60 mL/min/1.73m*2    Calcium 7.5 (L) 8.5 - 10.4 mg/dL    Anion Gap 12 <=19 mmol/L     Susceptibility data from last 90 days.  Collected Specimen Info Organism Ampicillin Cefazolin Cefazolin (uncomplicated UTIs only) Ciprofloxacin Gentamicin Nitrofurantoin Piperacillin/Tazobactam Trimethoprim/Sulfamethoxazole   04/11/24 Urine from Straight Catheter Escherichia coli S S S S S S S S     No results found.    Scheduled medications  aspirin, 81 mg, oral, Daily  atorvastatin, 40 mg, oral, Nightly  cholecalciferol, 50,000 Units, oral, Every Sunday  esomeprazole, 10 mg, g-tube, Daily before breakfast  ferrous sulfate (325 mg ferrous sulfate), 65 mg of iron, oral, BID  heparin (porcine), 5,000 Units, subcutaneous, q8h JACQUELYN  lactobacillus acidophilus, 1 tablet, oral, BID  metoprolol tartrate, 50 mg, g-tube, BID  piperacillin-tazobactam, 3.375 g, intravenous, q6h  vancomycin, 125 mg, g-tube, q12h      Continuous medications     PRN medications  PRN medications: acetaminophen **OR** acetaminophen **OR** acetaminophen, acetaminophen, benzocaine-menthol, dextromethorphan-guaifenesin, guaiFENesin, hydrALAZINE, ondansetron ODT, polyethylene glycol      ASSESSMENT:  Status post PEG tube placement  Generalized weakness  Adult failure to thrive  Poor oral intake  Dysphagia  Dehydration  Hypernatremia - resolved  Hypokalemia - resolved  Leukocytosis - resolved  History of C. Difficile  C. Difficile  Oropharyngeal candida  Hypoalbuminemia   Normocytic  anemia worsening  History of CVA  Supraventricular tachycardia - resolved  Lethargy  Pyuria - ? urinary tract infection  Toxic encephalopathy secondary to UTI ? Pneumonitis - metabolic encephalopathy  Small bilateral pleural effusions  ? Pneumonitis  Fever     PLAN:  Patient is doing okay this morning.  No new issues.  Afebrile.  Leukocytosis resolved.  Cultures reviewed.  5/19/2024 urine culture pending no growth.  Respiratory status, pulse oximetry stable on room air.  Overall, mentation slightly improved.  Infectious disease following.  Continue management.  IV Zosyn.  Follow-up cultures.  Monitor temperature and white blood cell count.  Prophylactic Vancomycin 125 mg via PEG every 12 hours.  Monitor stools.  Post void residual bladder scan, follow-up.  Patient is incontinent.  Monitor I's and O's.  Monitor respiratory status.  Aspiration precautions.  H&H improved status post blood transfusion.  No evidence of acute blood loss.  5/20/2024 Fecal occult negative.  Monitor H&H.  Tolerating tube feeding at goal rate.  Continue tube feeding.  Aspiration precautions.  Speech therapy follow-up.  Mouth care.  PEG tube site care.  Cleanse with soap and water and apply dressing.  Discussed with nursing.  Monitor BMP, CBC.  Mentation stable.  No focal deficits.  Suspect toxic, metabolic encephalopathy + underlying history of dementia, CVA.  Continue to monitor.  PT/OT as tolerated.  Fall precautions.  Up with assistance.  DVT prophylaxis.  Heparin subcutaneous.  Monitor H&H.  GI prophylaxis PPI Nexium.  Supportive care.  Patient reassured.  Case management following for discharge planning.  Discharge plan to [facility] when medically cleared.  Discussed with patient, nursing and Dr. Denise.         Belen Martin, APRN-CNP

## 2024-05-22 LAB
ANION GAP SERPL CALC-SCNC: 11 MMOL/L
BUN SERPL-MCNC: 28 MG/DL (ref 8–25)
CALCIUM SERPL-MCNC: 7.6 MG/DL (ref 8.5–10.4)
CHLORIDE SERPL-SCNC: 104 MMOL/L (ref 97–107)
CO2 SERPL-SCNC: 28 MMOL/L (ref 24–31)
CREAT SERPL-MCNC: 0.6 MG/DL (ref 0.4–1.6)
EGFRCR SERPLBLD CKD-EPI 2021: >90 ML/MIN/1.73M*2
ERYTHROCYTE [DISTWIDTH] IN BLOOD BY AUTOMATED COUNT: 15.3 % (ref 11.5–14.5)
GLUCOSE SERPL-MCNC: 142 MG/DL (ref 65–99)
HCT VFR BLD AUTO: 24.8 % (ref 36–46)
HGB BLD-MCNC: 8.2 G/DL (ref 12–16)
MCH RBC QN AUTO: 30.6 PG (ref 26–34)
MCHC RBC AUTO-ENTMCNC: 33.1 G/DL (ref 32–36)
MCV RBC AUTO: 93 FL (ref 80–100)
NRBC BLD-RTO: 0 /100 WBCS (ref 0–0)
PLATELET # BLD AUTO: 374 X10*3/UL (ref 150–450)
POTASSIUM SERPL-SCNC: 3.9 MMOL/L (ref 3.4–5.1)
RBC # BLD AUTO: 2.68 X10*6/UL (ref 4–5.2)
SODIUM SERPL-SCNC: 143 MMOL/L (ref 133–145)
WBC # BLD AUTO: 11.7 X10*3/UL (ref 4.4–11.3)

## 2024-05-22 PROCEDURE — 80048 BASIC METABOLIC PNL TOTAL CA: CPT | Performed by: NURSE PRACTITIONER

## 2024-05-22 PROCEDURE — 2500000001 HC RX 250 WO HCPCS SELF ADMINISTERED DRUGS (ALT 637 FOR MEDICARE OP): Performed by: NURSE PRACTITIONER

## 2024-05-22 PROCEDURE — 85027 COMPLETE CBC AUTOMATED: CPT | Performed by: NURSE PRACTITIONER

## 2024-05-22 PROCEDURE — 36415 COLL VENOUS BLD VENIPUNCTURE: CPT | Performed by: NURSE PRACTITIONER

## 2024-05-22 PROCEDURE — 2500000001 HC RX 250 WO HCPCS SELF ADMINISTERED DRUGS (ALT 637 FOR MEDICARE OP): Performed by: SURGERY

## 2024-05-22 PROCEDURE — 2500000004 HC RX 250 GENERAL PHARMACY W/ HCPCS (ALT 636 FOR OP/ED): Performed by: INTERNAL MEDICINE

## 2024-05-22 PROCEDURE — 2500000001 HC RX 250 WO HCPCS SELF ADMINISTERED DRUGS (ALT 637 FOR MEDICARE OP): Performed by: INTERNAL MEDICINE

## 2024-05-22 PROCEDURE — 2500000005 HC RX 250 GENERAL PHARMACY W/O HCPCS: Performed by: INTERNAL MEDICINE

## 2024-05-22 PROCEDURE — 1210000001 HC SEMI-PRIVATE ROOM DAILY

## 2024-05-22 PROCEDURE — 2500000004 HC RX 250 GENERAL PHARMACY W/ HCPCS (ALT 636 FOR OP/ED): Performed by: SURGERY

## 2024-05-22 PROCEDURE — 97530 THERAPEUTIC ACTIVITIES: CPT | Mod: GO,CO

## 2024-05-22 RX ORDER — ATORVASTATIN CALCIUM 40 MG/1
40 TABLET, FILM COATED ORAL NIGHTLY
Start: 2024-05-22 | End: 2024-05-23

## 2024-05-22 RX ORDER — METOPROLOL TARTRATE 50 MG/1
50 TABLET ORAL 2 TIMES DAILY
Start: 2024-05-23 | End: 2024-05-23

## 2024-05-22 RX ORDER — ONDANSETRON 4 MG/1
4 TABLET, FILM COATED ORAL EVERY 6 HOURS PRN
Start: 2024-05-22 | End: 2024-05-23

## 2024-05-22 RX ORDER — CLOPIDOGREL BISULFATE 75 MG/1
75 TABLET ORAL DAILY
Start: 2024-05-22 | End: 2024-05-23

## 2024-05-22 RX ORDER — VANCOMYCIN HYDROCHLORIDE 125 MG/1
125 CAPSULE ORAL EVERY 12 HOURS
Start: 2024-05-22 | End: 2024-05-23

## 2024-05-22 RX ORDER — NAPROXEN SODIUM 220 MG/1
81 TABLET, FILM COATED ORAL DAILY
Start: 2024-05-22 | End: 2024-05-23

## 2024-05-22 RX ORDER — ASPIRIN 325 MG
50000 TABLET, DELAYED RELEASE (ENTERIC COATED) ORAL
Start: 2024-05-26 | End: 2024-05-23

## 2024-05-22 RX ORDER — FERROUS SULFATE 325(65) MG
65 TABLET ORAL
Start: 2024-05-23 | End: 2024-05-23

## 2024-05-22 RX ORDER — ACETAMINOPHEN 325 MG/1
650 TABLET ORAL EVERY 8 HOURS
Start: 2024-05-22 | End: 2024-05-23

## 2024-05-22 RX ORDER — IBUPROFEN 200 MG
1 TABLET ORAL 2 TIMES DAILY
Start: 2024-05-22 | End: 2024-05-23

## 2024-05-22 RX ORDER — POLYETHYLENE GLYCOL 3350 17 G/17G
17 POWDER, FOR SOLUTION ORAL DAILY PRN
Start: 2024-05-22 | End: 2024-05-23

## 2024-05-22 RX ORDER — ESOMEPRAZOLE MAGNESIUM 10 MG/1
10 GRANULE, FOR SUSPENSION, EXTENDED RELEASE ORAL
Start: 2024-05-23 | End: 2024-05-23 | Stop reason: HOSPADM

## 2024-05-22 RX ADMIN — HEPARIN SODIUM 5000 UNITS: 5000 INJECTION, SOLUTION INTRAVENOUS; SUBCUTANEOUS at 05:10

## 2024-05-22 RX ADMIN — ESOMEPRAZOLE MAGNESIUM 10 MG: 20 GRANULE, DELAYED RELEASE ORAL at 06:29

## 2024-05-22 RX ADMIN — ACETAMINOPHEN 650 MG: 160 SOLUTION ORAL at 10:59

## 2024-05-22 RX ADMIN — PIPERACILLIN SODIUM AND TAZOBACTAM SODIUM 3.38 G: 3; .375 INJECTION, SOLUTION INTRAVENOUS at 21:20

## 2024-05-22 RX ADMIN — Medication 1 TABLET: at 17:39

## 2024-05-22 RX ADMIN — PIPERACILLIN SODIUM AND TAZOBACTAM SODIUM 3.38 G: 3; .375 INJECTION, SOLUTION INTRAVENOUS at 15:55

## 2024-05-22 RX ADMIN — Medication 1 TABLET: at 11:00

## 2024-05-22 RX ADMIN — PIPERACILLIN SODIUM AND TAZOBACTAM SODIUM 3.38 G: 3; .375 INJECTION, SOLUTION INTRAVENOUS at 05:10

## 2024-05-22 RX ADMIN — METOPROLOL TARTRATE 50 MG: 50 TABLET, FILM COATED ORAL at 21:21

## 2024-05-22 RX ADMIN — HEPARIN SODIUM 5000 UNITS: 5000 INJECTION, SOLUTION INTRAVENOUS; SUBCUTANEOUS at 21:21

## 2024-05-22 RX ADMIN — ATORVASTATIN CALCIUM 40 MG: 40 TABLET, FILM COATED ORAL at 21:21

## 2024-05-22 RX ADMIN — VANCOMYCIN HYDROCHLORIDE 125 MG: KIT at 05:10

## 2024-05-22 RX ADMIN — ASPIRIN 81 MG CHEWABLE TABLET 81 MG: 81 TABLET CHEWABLE at 11:00

## 2024-05-22 RX ADMIN — METOPROLOL TARTRATE 50 MG: 50 TABLET, FILM COATED ORAL at 11:00

## 2024-05-22 RX ADMIN — FERROUS SULFATE TAB 325 MG (65 MG ELEMENTAL FE) 1 TABLET: 325 (65 FE) TAB at 17:40

## 2024-05-22 RX ADMIN — VANCOMYCIN HYDROCHLORIDE 125 MG: KIT at 17:40

## 2024-05-22 RX ADMIN — HEPARIN SODIUM 5000 UNITS: 5000 INJECTION, SOLUTION INTRAVENOUS; SUBCUTANEOUS at 15:54

## 2024-05-22 RX ADMIN — FERROUS SULFATE TAB 325 MG (65 MG ELEMENTAL FE) 1 TABLET: 325 (65 FE) TAB at 11:00

## 2024-05-22 RX ADMIN — PIPERACILLIN SODIUM AND TAZOBACTAM SODIUM 3.38 G: 3; .375 INJECTION, SOLUTION INTRAVENOUS at 11:00

## 2024-05-22 ASSESSMENT — COGNITIVE AND FUNCTIONAL STATUS - GENERAL
DAILY ACTIVITIY SCORE: 6
TOILETING: TOTAL
PERSONAL GROOMING: TOTAL
EATING MEALS: TOTAL
DRESSING REGULAR UPPER BODY CLOTHING: TOTAL
DRESSING REGULAR LOWER BODY CLOTHING: TOTAL
HELP NEEDED FOR BATHING: TOTAL

## 2024-05-22 ASSESSMENT — PAIN SCALES - PAIN ASSESSMENT IN ADVANCED DEMENTIA (PAINAD)
BODYLANGUAGE: RELAXED
TOTALSCORE: 0
BREATHING: NORMAL
CONSOLABILITY: NO NEED TO CONSOLE
FACIALEXPRESSION: SMILING OR INEXPRESSIVE

## 2024-05-22 ASSESSMENT — PAIN SCALES - GENERAL: PAINLEVEL_OUTOF10: 0 - NO PAIN

## 2024-05-22 ASSESSMENT — PAIN - FUNCTIONAL ASSESSMENT
PAIN_FUNCTIONAL_ASSESSMENT: 0-10
PAIN_FUNCTIONAL_ASSESSMENT: PAINAD (PAIN ASSESSMENT IN ADVANCED DEMENTIA SCALE)

## 2024-05-22 NOTE — NURSING NOTE
"Patient's  at bedside. He asks many questions regarding care being provided. He asks about the tube feedings and if she'll ever be able to eat again. He comments on how much she sleeps. He asks if she'll ever get up and walk again, if she'll get better. Provide emotional support through active listening. Gently explain to him that the patient has a lot going on with her body due to her infections and disease processes. Explain that her body is tired, that she is tired.  states, \"No one has ever explained that to me.\"  "

## 2024-05-22 NOTE — PROGRESS NOTES
Rebecca Samaniego is a 78 y.o. female on day 13 of admission presenting with General weakness.    Subjective   Patient seen and examined.  Resting in bed in no acute distress.  Eyes closed.  Arousable.  States name, birth-date, month, year.  No complaints.  States she wants to be left alone.  Review of systems limited.    Objective     Physical Exam  Vitals and nursing note reviewed.   Constitutional:       General: She is not in acute distress.     Appearance: Normal appearance. She is normal weight. She is ill-appearing. She is not toxic-appearing or diaphoretic.   HENT:      Head: Normocephalic and atraumatic.      Right Ear: Tympanic membrane normal.      Left Ear: Tympanic membrane normal.      Nose: Nose normal.      Mouth/Throat:      Mouth: Mucous membranes are dry.      Pharynx: Oropharynx is clear.      Comments: Mouth dry, tongue less coated.  Eyes:      Extraocular Movements: Extraocular movements intact.      Conjunctiva/sclera: Conjunctivae normal.      Pupils: Pupils are equal, round, and reactive to light.   Cardiovascular:      Rate and Rhythm: Normal rate and regular rhythm.      Pulses: Normal pulses.      Heart sounds: Normal heart sounds. No murmur heard.  Pulmonary:      Effort: Pulmonary effort is normal. No respiratory distress.      Breath sounds: Normal breath sounds. No wheezing, rhonchi or rales.      Comments: Room air.  Abdominal:      General: Bowel sounds are normal. There is no distension.      Palpations: Abdomen is soft.      Tenderness: There is no abdominal tenderness.      Comments: PEG tube in place with dressing clean dry intact no crusting, no erythema or drainage.  Tube feed infusing 55 mL/hr.   Genitourinary:     Comments: Deferred.  Musculoskeletal:         General: Swelling present. No tenderness. Normal range of motion.      Cervical back: Normal range of motion and neck supple.      Right lower leg: No edema.      Left lower leg: No edema.      Comments: Bilateral upper  "extremity edema   Skin:     General: Skin is warm and dry.      Capillary Refill: Capillary refill takes less than 2 seconds.      Findings: Bruising present.   Neurological:      General: No focal deficit present.      Mental Status: She is alert. She is disoriented.      Comments: Eyes closed. Arousable. States name and birth-date. States she is in the hospital, year 2024.  Speech clear and coherent.  Follows all commands.  Generalized weakness.  No focal weakness.  Gait deferred.  Cranial nerves II through XII grossly intact.   Psychiatric:         Mood and Affect: Mood normal.         Behavior: Behavior normal.       Last Recorded Vitals  Blood pressure 144/61, pulse 89, temperature 36.2 °C (97.2 °F), temperature source Temporal, resp. rate 18, height 1.651 m (5' 5\"), weight 63.6 kg (140 lb 3.4 oz), SpO2 98%.    Intake/Output last 3 Shifts:  I/O last 3 completed shifts:  In: 650 (10.2 mL/kg) [NG/GT:450; IV Piggyback:200]  Out: - (0 mL/kg)   Weight: 63.6 kg     Relevant Results  Results for orders placed or performed during the hospital encounter of 05/09/24 (from the past 24 hour(s))   Basic Metabolic Panel   Result Value Ref Range    Glucose 142 (H) 65 - 99 mg/dL    Sodium 143 133 - 145 mmol/L    Potassium 3.9 3.4 - 5.1 mmol/L    Chloride 104 97 - 107 mmol/L    Bicarbonate 28 24 - 31 mmol/L    Urea Nitrogen 28 (H) 8 - 25 mg/dL    Creatinine 0.60 0.40 - 1.60 mg/dL    eGFR >90 >60 mL/min/1.73m*2    Calcium 7.6 (L) 8.5 - 10.4 mg/dL    Anion Gap 11 <=19 mmol/L   CBC   Result Value Ref Range    WBC 11.7 (H) 4.4 - 11.3 x10*3/uL    nRBC 0.0 0.0 - 0.0 /100 WBCs    RBC 2.68 (L) 4.00 - 5.20 x10*6/uL    Hemoglobin 8.2 (L) 12.0 - 16.0 g/dL    Hematocrit 24.8 (L) 36.0 - 46.0 %    MCV 93 80 - 100 fL    MCH 30.6 26.0 - 34.0 pg    MCHC 33.1 32.0 - 36.0 g/dL    RDW 15.3 (H) 11.5 - 14.5 %    Platelets 374 150 - 450 x10*3/uL     Susceptibility data from last 90 days.  Collected Specimen Info Organism Ampicillin Cefazolin " Cefazolin (uncomplicated UTIs only) Ciprofloxacin Gentamicin Nitrofurantoin Piperacillin/Tazobactam Trimethoprim/Sulfamethoxazole   04/11/24 Urine from Straight Catheter Escherichia coli S S S S S S S S     No results found.    Scheduled medications  aspirin, 81 mg, oral, Daily  atorvastatin, 40 mg, oral, Nightly  cholecalciferol, 50,000 Units, oral, Every Sunday  esomeprazole, 10 mg, g-tube, Daily before breakfast  ferrous sulfate (325 mg ferrous sulfate), 65 mg of iron, oral, BID  heparin (porcine), 5,000 Units, subcutaneous, q8h JACQUELYN  lactobacillus acidophilus, 1 tablet, oral, BID  metoprolol tartrate, 50 mg, g-tube, BID  piperacillin-tazobactam, 3.375 g, intravenous, q6h  vancomycin, 125 mg, g-tube, q12h      Continuous medications     PRN medications  PRN medications: acetaminophen **OR** acetaminophen **OR** acetaminophen, acetaminophen, benzocaine-menthol, dextromethorphan-guaifenesin, guaiFENesin, hydrALAZINE, ondansetron ODT, polyethylene glycol      ASSESSMENT:  Status post PEG tube placement  Generalized weakness  Adult failure to thrive  Poor oral intake  Dysphagia  Dehydration  Hypernatremia - resolved  Hypokalemia - resolved  Vitamin d deficiency  Leukocytosis - resolved  History of C. Difficile  C. Difficile  Oropharyngeal candida  Hypoalbuminemia   Normocytic anemia improved   History of CVA  Supraventricular tachycardia - resolved  Lethargy improved  Pyuria - ? urinary tract infection   Toxic encephalopathy secondary to UTI ? Pneumonitis - metabolic encephalopathy  Small bilateral pleural effusions  ? Pneumonitis  Fever intermittent    PLAN:  Patient is doing okay this morning.  Overall, no changes, slowly improving.  Vital signs reviewed.  Temperatures noted.  Labs and cultures reviewed.  Infectious disease following.  Continue IV Zosyn thru 5/23/2024.  Prophylactic Vancomycin 125 mg via PEG every 12 hours.  Monitor stools.  I's and O's.  Respiratory status and pulse oximetry stable on room air.   Aspiration precautions.  H&H stable.  No acute blood loss.  5/20/2024 fecal occult negative.  Monitor H&H.  Tolerating tube feeding at goal rate.  Continue tube feeding.  Aspiration precautions.  Speech therapy follow-up.  Mouth care.  PEG tube site care.  Cleanse with soap and water and apply.  Discussed with nursing.  Monitor BMP, CBC.  Mentation stable.  More awake, alert, oriented.  No focal deficits.  Suspect toxic, metabolic encephalopathy + underlying history of dementia, CVA.  Outpatient follow-up goals of care discussion and Palliative medicine consultation at the [facility].  PT/OT.  Fall precautions.  Up with assistance.  DVT prophylaxis.  Heparin subcutaneous.  Monitor H&H.  GI prophylaxis PPI Nexium.  Supportive care.  Patient reassured.  Case management following for discharge planning.  Discharge plan to [facility] when medically cleared.  Anticipate discharge after IV Zosyn is complete.  Discussed with patient, nursing and Dr. Denise.       Belen Martin, APRN-CNP

## 2024-05-22 NOTE — NURSING NOTE
Dressing to sacrum changed and photo taken and BL heels photo taken, Peg tube dressing also changed and site is clean, dry and intact.patient tolerated well.

## 2024-05-22 NOTE — PROGRESS NOTES
"INFECTIOUS DISEASES PROGRESS NOTE    Consulted / following patient for:  C. difficile PCR positive, toxin negative  Fever  Leukocytosis    Subjective   Interval History:   Patient says she is doing \"fine\" and offers no complaints    Objective   PHYSICAL EXAMINATION  Vital signs:  Visit Vitals  /61   Pulse 89   Temp 36.2 °C (97.2 °F) (Temporal)   Resp 18   Isolated T38.1 last evening  General: Chronically but not acutely ill.    Lungs: Clear to auscultation  Heart: S1, S2 normal  Abdomen:  Soft, nontender. No palpable organs or masses.  Nurse describes urine mixed with stool.  PEG tube in place  Extremities extensive ecchymoses, edema.  No tenderness or suppuration    Relevant Results  WBC: 15,700 ---> 14,800 ---> 12,000 ---> 11,800 ---> 11,000 ---> 11,700    Results from last 72 hours   Lab Units 05/22/24  0611   CREATININE mg/dL 0.60   ANION GAP mmol/L 11   EGFR mL/min/1.73m*2 >90   Urinalysis (5/19): Heavy pyuria  Microbiology:  C. difficile: PCR positive, toxin EIA negative  Urine 5/16: Multiple organisms  Urine 5/19: No significant growth    Impression:  1.  C. difficile infection versus colonization  -- Patient recently completed a course of oral vancomycin at her nursing facility for C. difficile colitis.  It is not clear when therapy ended.  -- Patient now is C. difficile PCR positive but toxin EIA negative.  She denies any active diarrhea.  Her abdomen is benign.  Suspect C. difficile colonization, now maintaining prophylactic low-dose vancomycin while she is on empiric broad-spectrum antibiotics  2.  Leukocytosis, transient fever.  Started on high-dose Zosyn for presumed UTI (pyuria).  Transient low-grade fever last evening.  Looks remarkably well today.  Zosyn is to stop 5/23    Plan:  1.  Will continue empiric IV Zosyn to complete 5 days of therapy on 5/23  2.   Continue prophylactic vancomycin to 125 mg p.o./PEG every 12 hours    Doni Morales MD  [facility] [employer]  Office:  [phone]  "

## 2024-05-22 NOTE — PROGRESS NOTES
Occupational Therapy    OT Treatment    Patient Name: Rebecca Samaniego  MRN: [id number]  Today's Date: 5/22/2024  Time Calculation  Start Time: 1406  Stop Time: 1422  Time Calculation (min): 16 min         Assessment:  OT Assessment: Pt limited participation this session at times removing self by closing eyes and resisting participation.  Will continue to address remaining deficits with skilled OT services as Pt acceptance.  Prognosis: Poor  Evaluation/Treatment Tolerance: Patient limited by fatigue  Medical Staff Made Aware: Yes  End of Session Communication: Bedside nurse  End of Session Patient Position: Bed, 4 rail up, Alarm on (call light in reach all needs met)  OT Assessment Results: Decreased ADL status, Decreased upper extremity range of motion, Decreased upper extremity strength, Decreased safe judgment during ADL, Decreased cognition, Decreased functional mobility, Decreased gross motor control, Decreased endurance  Prognosis: Poor  Evaluation/Treatment Tolerance: Patient limited by fatigue  Medical Staff Made Aware: Yes  Strengths: Support of extended family/friends  Barriers to Participation: Attitude of self, Coping skills, Comorbidities  Plan:  Treatment Interventions: ADL retraining, Compensatory technique education, UE strengthening/ROM  OT Frequency: 2 times per week  OT Discharge Recommendations: Moderate intensity level of continued care  OT Recommended Transfer Status: Dependent  OT - OK to Discharge: Yes  Treatment Interventions: ADL retraining, Compensatory technique education, UE strengthening/ROM    Subjective   Previous Visit Info:  OT Last Visit  OT Received On: 05/22/24  General:  General  Reason for Referral: impaired ADLs, +Cidff, weakness, failure to thrive, UTI  Referred By: Kobi Denise MD  Past Medical History Relevant to Rehab: HLD, CKD, CVA, AVR, C diff, Breast lumpectomy, hysterectomy, cervical discectomy  Prior to Session Communication: Bedside nurse  Patient Position  "Received: Bed, 4 rail up, Alarm on  General Comment: Cleared by NSG.  Pt presenting eyes open upon arrival however closing eyes with treatment session actively placing arm down to resist.  Precautions:  Hearing/Visual Limitations: wears glasses  Medical Precautions: Fall precautions  Post-Surgical Precautions: Other (comment) (sacral ulcers, B heals skin wounds)  Precautions Comment: Aspiration precautions (NPO PEG in place)   Pain:  Pain Assessment  Pain Assessment: 0-10  Pain Score: 0 - No pain    Objective    Cognition:  Cognition  Orientation Level: Unable to assess (Pt declined answering)  Processing Speed: Delayed  Coordination:  Coordination Comment: unable to assess due to minimal patient participation.  Activities of Daily Living:    Grooming  Grooming Level of Assistance: Dependent  Grooming Where Assessed: Bed level  Grooming Comments: Attempting washing face task .application lip balm Pt declining assist    Therapy/Activity: Therapeutic Exercise  Therapeutic Exercise Performed: Yes  Therapeutic Exercise Activity 1: AAROM/PROM RUE in shoulder flexion/elbow flexion/forearm pronation 10 reps  Therapeutic Exercise Activity 2: attempting shoulder AAROM Pt forcing hand to bed stating \"I'm done no more\"    Therapeutic Activity  Therapeutic Activity Performed: Yes  Therapeutic Activity 1: BUE positioning in long sit d/t marked edema elevated pilloow placement    Outcome Measures:[facility] Daily Activity  Putting on and taking off regular lower body clothing: Total  Bathing (including washing, rinsing, drying): Total  Putting on and taking off regular upper body clothing: Total  Toileting, which includes using toilet, bedpan or urinal: Total  Taking care of personal grooming such as brushing teeth: Total  Eating Meals: Total  Daily Activity - Total Score: 6    Education Documentation  Body Mechanics, taught by GABBY Warren at 5/22/2024  2:36 PM.  Learner: Patient  Readiness: Acceptance  Method: Explanation, " Demonstration, Teach-back  Response: No Evidence of Learning, Refused Teaching  Comment: Instructed Pt with joint protection, body mechanics for skin integrity , compensatory strategies    ADL Training, taught by GABBY Warren at 5/22/2024  2:36 PM.  Learner: Patient  Readiness: Acceptance  Method: Explanation, Demonstration, Teach-back  Response: No Evidence of Learning, Refused Teaching  Comment: Instructed Pt with joint protection, body mechanics for skin integrity , compensatory strategies    Education Comments  No comments found.        OP EDUCATION:       Goals:  Encounter Problems       Encounter Problems (Active)       ADLs       Pt will complete ADL tasks at min Awith use of AE prn  (Not Progressing)       Start:  05/10/24    Expected End:  06/01/24               Functional Balance       Pt will maintain static sitting balance with upper extremity support at min A  (Not Progressing)       Start:  05/10/24    Expected End:  06/01/24               OT Transfers       Pt will perform functional transfers at min A (Not Progressing)       Start:  05/10/24    Expected End:  06/01/24               Therapeutic Exercise       Pt will perform AAROM/AROM BUE exercises to improve strength and independence with functional transfers/ADL tasks.   (Not Progressing)       Start:  05/10/24    Expected End:  06/01/24

## 2024-05-22 NOTE — PROGRESS NOTES
05/22/24 0836   Discharge Planning   Patient expects to be discharged to: [facility]-return     Safe dc plan secured  -Need dc order  -Need Med rec  -Need AVS  -No 7000 required for a return

## 2024-05-22 NOTE — NURSING NOTE
Patient tried to bite me, then clamped her mouth shut tightly when I attempted to check her mouth and do mouthcare.

## 2024-05-23 ENCOUNTER — APPOINTMENT (OUTPATIENT)
Dept: RADIOLOGY | Facility: HOSPITAL | Age: 79
DRG: 640 | End: 2024-05-23
Payer: MEDICARE

## 2024-05-23 LAB
ANION GAP SERPL CALC-SCNC: 11 MMOL/L
BUN SERPL-MCNC: 25 MG/DL (ref 8–25)
CALCIUM SERPL-MCNC: 7.7 MG/DL (ref 8.5–10.4)
CHLORIDE SERPL-SCNC: 101 MMOL/L (ref 97–107)
CO2 SERPL-SCNC: 26 MMOL/L (ref 24–31)
CREAT SERPL-MCNC: 0.6 MG/DL (ref 0.4–1.6)
EGFRCR SERPLBLD CKD-EPI 2021: >90 ML/MIN/1.73M*2
ERYTHROCYTE [DISTWIDTH] IN BLOOD BY AUTOMATED COUNT: 15.1 % (ref 11.5–14.5)
GLUCOSE SERPL-MCNC: 130 MG/DL (ref 65–99)
HCT VFR BLD AUTO: 25.1 % (ref 36–46)
HGB BLD-MCNC: 8 G/DL (ref 12–16)
MCH RBC QN AUTO: 30.7 PG (ref 26–34)
MCHC RBC AUTO-ENTMCNC: 31.9 G/DL (ref 32–36)
MCV RBC AUTO: 96 FL (ref 80–100)
NRBC BLD-RTO: 0 /100 WBCS (ref 0–0)
PLATELET # BLD AUTO: 410 X10*3/UL (ref 150–450)
POTASSIUM SERPL-SCNC: 4 MMOL/L (ref 3.4–5.1)
RBC # BLD AUTO: 2.61 X10*6/UL (ref 4–5.2)
SODIUM SERPL-SCNC: 138 MMOL/L (ref 133–145)
WBC # BLD AUTO: 12.5 X10*3/UL (ref 4.4–11.3)

## 2024-05-23 PROCEDURE — 2500000001 HC RX 250 WO HCPCS SELF ADMINISTERED DRUGS (ALT 637 FOR MEDICARE OP): Performed by: INTERNAL MEDICINE

## 2024-05-23 PROCEDURE — 2500000004 HC RX 250 GENERAL PHARMACY W/ HCPCS (ALT 636 FOR OP/ED): Performed by: INTERNAL MEDICINE

## 2024-05-23 PROCEDURE — 2500000004 HC RX 250 GENERAL PHARMACY W/ HCPCS (ALT 636 FOR OP/ED): Performed by: SURGERY

## 2024-05-23 PROCEDURE — 36415 COLL VENOUS BLD VENIPUNCTURE: CPT | Performed by: NURSE PRACTITIONER

## 2024-05-23 PROCEDURE — 2500000001 HC RX 250 WO HCPCS SELF ADMINISTERED DRUGS (ALT 637 FOR MEDICARE OP): Performed by: NURSE PRACTITIONER

## 2024-05-23 PROCEDURE — 80051 ELECTROLYTE PANEL: CPT | Performed by: NURSE PRACTITIONER

## 2024-05-23 PROCEDURE — 71045 X-RAY EXAM CHEST 1 VIEW: CPT

## 2024-05-23 PROCEDURE — 85027 COMPLETE CBC AUTOMATED: CPT | Performed by: NURSE PRACTITIONER

## 2024-05-23 PROCEDURE — 1210000001 HC SEMI-PRIVATE ROOM DAILY

## 2024-05-23 PROCEDURE — 2500000001 HC RX 250 WO HCPCS SELF ADMINISTERED DRUGS (ALT 637 FOR MEDICARE OP): Performed by: SURGERY

## 2024-05-23 PROCEDURE — 2500000005 HC RX 250 GENERAL PHARMACY W/O HCPCS: Performed by: INTERNAL MEDICINE

## 2024-05-23 PROCEDURE — 71045 X-RAY EXAM CHEST 1 VIEW: CPT | Performed by: STUDENT IN AN ORGANIZED HEALTH CARE EDUCATION/TRAINING PROGRAM

## 2024-05-23 RX ORDER — CLOPIDOGREL BISULFATE 75 MG/1
75 TABLET ORAL DAILY
Status: DISCONTINUED | OUTPATIENT
Start: 2024-05-23 | End: 2024-05-24 | Stop reason: HOSPADM

## 2024-05-23 RX ORDER — ONDANSETRON 4 MG/1
4 TABLET, FILM COATED ORAL EVERY 6 HOURS PRN
Start: 2024-05-23

## 2024-05-23 RX ORDER — ASPIRIN 325 MG
50000 TABLET, DELAYED RELEASE (ENTERIC COATED) ORAL
Start: 2024-05-26

## 2024-05-23 RX ORDER — IBUPROFEN 200 MG
1 TABLET ORAL 2 TIMES DAILY
Start: 2024-05-23

## 2024-05-23 RX ORDER — ACETAMINOPHEN 325 MG/1
650 TABLET ORAL EVERY 8 HOURS
Start: 2024-05-23

## 2024-05-23 RX ORDER — ACETAMINOPHEN 325 MG/1
650 TABLET ORAL EVERY 8 HOURS
Start: 2024-05-23 | End: 2024-05-23

## 2024-05-23 RX ORDER — CLOPIDOGREL BISULFATE 75 MG/1
75 TABLET ORAL DAILY
Status: ON HOLD
Start: 2024-05-23 | End: 2024-05-29 | Stop reason: ALTCHOICE

## 2024-05-23 RX ORDER — FERROUS SULFATE 325(65) MG
65 TABLET ORAL
Start: 2024-05-23 | End: 2024-05-29 | Stop reason: HOSPADM

## 2024-05-23 RX ORDER — ATORVASTATIN CALCIUM 40 MG/1
40 TABLET, FILM COATED ORAL NIGHTLY
Start: 2024-05-23

## 2024-05-23 RX ORDER — PANTOPRAZOLE SODIUM 40 MG/1
40 FOR SUSPENSION ORAL
Status: ON HOLD
Start: 2024-05-23 | End: 2024-05-29

## 2024-05-23 RX ORDER — VANCOMYCIN HYDROCHLORIDE 125 MG/1
125 CAPSULE ORAL EVERY 12 HOURS
Start: 2024-05-23 | End: 2024-05-29 | Stop reason: HOSPADM

## 2024-05-23 RX ORDER — NAPROXEN SODIUM 220 MG/1
81 TABLET, FILM COATED ORAL DAILY
Start: 2024-05-23

## 2024-05-23 RX ORDER — POLYETHYLENE GLYCOL 3350 17 G/17G
17 POWDER, FOR SOLUTION ORAL DAILY PRN
Start: 2024-05-23

## 2024-05-23 RX ORDER — METOPROLOL TARTRATE 50 MG/1
50 TABLET ORAL 2 TIMES DAILY
Start: 2024-05-23

## 2024-05-23 RX ORDER — ACETAMINOPHEN 650 MG/1
650 SUPPOSITORY RECTAL EVERY 4 HOURS PRN
Start: 2024-05-23

## 2024-05-23 RX ORDER — ASCORBIC ACID 500 MG
500 TABLET ORAL DAILY
Start: 2024-05-23

## 2024-05-23 RX ADMIN — HEPARIN SODIUM 5000 UNITS: 5000 INJECTION, SOLUTION INTRAVENOUS; SUBCUTANEOUS at 14:50

## 2024-05-23 RX ADMIN — PIPERACILLIN SODIUM AND TAZOBACTAM SODIUM 3.38 G: 3; .375 INJECTION, SOLUTION INTRAVENOUS at 03:14

## 2024-05-23 RX ADMIN — HEPARIN SODIUM 5000 UNITS: 5000 INJECTION, SOLUTION INTRAVENOUS; SUBCUTANEOUS at 22:28

## 2024-05-23 RX ADMIN — VANCOMYCIN HYDROCHLORIDE 125 MG: KIT at 06:16

## 2024-05-23 RX ADMIN — CLOPIDOGREL BISULFATE 75 MG: 75 TABLET ORAL at 17:30

## 2024-05-23 RX ADMIN — FERROUS SULFATE TAB 325 MG (65 MG ELEMENTAL FE) 1 TABLET: 325 (65 FE) TAB at 17:26

## 2024-05-23 RX ADMIN — ASPIRIN 81 MG CHEWABLE TABLET 81 MG: 81 TABLET CHEWABLE at 09:00

## 2024-05-23 RX ADMIN — HEPARIN SODIUM 5000 UNITS: 5000 INJECTION, SOLUTION INTRAVENOUS; SUBCUTANEOUS at 06:13

## 2024-05-23 RX ADMIN — PIPERACILLIN SODIUM AND TAZOBACTAM SODIUM 3.38 G: 3; .375 INJECTION, SOLUTION INTRAVENOUS at 09:00

## 2024-05-23 RX ADMIN — ESOMEPRAZOLE MAGNESIUM 10 MG: 20 GRANULE, DELAYED RELEASE ORAL at 06:14

## 2024-05-23 RX ADMIN — ACETAMINOPHEN 650 MG: 325 TABLET ORAL at 17:26

## 2024-05-23 RX ADMIN — Medication 1 TABLET: at 17:26

## 2024-05-23 RX ADMIN — FERROUS SULFATE TAB 325 MG (65 MG ELEMENTAL FE) 1 TABLET: 325 (65 FE) TAB at 09:00

## 2024-05-23 RX ADMIN — Medication 1 TABLET: at 09:00

## 2024-05-23 RX ADMIN — METOPROLOL TARTRATE 50 MG: 50 TABLET, FILM COATED ORAL at 22:28

## 2024-05-23 RX ADMIN — VANCOMYCIN HYDROCHLORIDE 125 MG: KIT at 17:27

## 2024-05-23 RX ADMIN — METOPROLOL TARTRATE 50 MG: 50 TABLET, FILM COATED ORAL at 09:00

## 2024-05-23 RX ADMIN — ATORVASTATIN CALCIUM 40 MG: 40 TABLET, FILM COATED ORAL at 22:27

## 2024-05-23 ASSESSMENT — PAIN SCALES - GENERAL
PAINLEVEL_OUTOF10: 0 - NO PAIN
PAINLEVEL_OUTOF10: 4
PAINLEVEL_OUTOF10: 0 - NO PAIN

## 2024-05-23 ASSESSMENT — PAIN - FUNCTIONAL ASSESSMENT
PAIN_FUNCTIONAL_ASSESSMENT: WONG-BAKER FACES
PAIN_FUNCTIONAL_ASSESSMENT: WONG-BAKER FACES

## 2024-05-23 ASSESSMENT — PAIN DESCRIPTION - LOCATION: LOCATION: GENERALIZED

## 2024-05-23 ASSESSMENT — PAIN SCALES - WONG BAKER
WONGBAKER_NUMERICALRESPONSE: NO HURT
WONGBAKER_NUMERICALRESPONSE: HURTS LITTLE BIT

## 2024-05-23 NOTE — PROGRESS NOTES
05/23/24 1621   Discharge Planning   Patient expects to be discharged to: [facility]     Patient has an active discharge order.  [facility] is able to accept patient back today.  [facility] ambulance transfer arranged for 1900 pick.  Patient's spouse notified.  Patient expressed concern about facility being able to handle tube feeding.  This TCC confirmed with [facility] that tube feed is ready and available for patient.   Spouse was reassured.  AVS, DC summary and goldenrod sent to [facility] via [employer].     1821 UPDATE:  Notified by Liss Martin that discharge to [facility] has been canceled for today.  [facility] will need a thoracentesis tomorrow[facility]  [facility] updated via [employer].     DC PLAN IS SECURE

## 2024-05-23 NOTE — CARE PLAN
The patient's goals for the shift include improved movement    The clinical goals for the shift include safety

## 2024-05-23 NOTE — PROGRESS NOTES
"INFECTIOUS DISEASES PROGRESS NOTE    Consulted / following patient for:  C. difficile PCR positive, toxin negative  Fever  Leukocytosis    Subjective   Interval History:   Patient again says she is doing \"fine\" and offers no complaints    Objective   PHYSICAL EXAMINATION  Vital signs:  Visit Vitals  /69 (BP Location: Left arm, Patient Position: Lying)   Pulse 102   Temp 37.5 °C (99.5 °F) (Axillary) Comment: RN notified   Resp 17   T < 38 for several days except for one elevation to 38.12 evenings ago  General: Chronically but not acutely ill.    Lungs: Clear to auscultation  Heart: S1, S2 normal  Abdomen:  Soft, nontender. No palpable organs or masses.  PEG tube in place  Extremities extensive ecchymoses, edema.  No tenderness or suppuration    Relevant Results  WBC: 15,700 ---> 14,800 ---> 12,000 ---> 11,800 ---> 11,000 ---> 11,700 ---> 12,500    Results from last 72 hours   Lab Units 05/23/24  0615   CREATININE mg/dL 0.60   ANION GAP mmol/L 11   EGFR mL/min/1.73m*2 >90   Urinalysis (5/19): Heavy pyuria  Microbiology:  C. difficile: PCR positive, toxin EIA negative  Urine 5/16: Multiple organisms  Urine 5/19: No significant growth    Impression:  1.  C. difficile infection versus colonization  -- Patient recently completed a course of oral vancomycin at her nursing facility for C. difficile colitis.  It is not clear when therapy ended.  -- Patient now is C. difficile PCR positive but toxin EIA negative.  She denies any active diarrhea.  Her abdomen is benign.  Suspect C. difficile colonization, now maintaining prophylactic low-dose vancomycin while she is on empiric broad-spectrum antibiotics, which are to be discontinued today  2.  Leukocytosis, transient fever.  Started on high-dose Zosyn for presumed UTI (pyuria).  Transient low-grade fever last evening.  Looks remarkably well today.  Zosyn is to stop today    Plan:  1.  Will complete 5 days of empiric empiric IV Zosyn today   2.   Continue prophylactic " vancomycin to 125 mg p.o./PEG every 12 hours  3.   Trend temperature and WBC    Doni Morales MD  [facility] [employer]  Office:  [phone]

## 2024-05-23 NOTE — PROGRESS NOTES
Rebecca Samaniego is a 78 y.o. female on day 14 of admission presenting with General weakness.    Subjective   Patient seen and examined.  Resting in bed in no acute distress.  Eyes open.  Awake, alert.  States name, birth-date.  States she does not want to answer my questions.  Asking for her spouse.  States his name, Alex, states she has been  60 years.  No complaints.  Review of systems limited.     Objective     Physical Exam  Vitals and nursing note reviewed.   Constitutional:       General: She is not in acute distress.     Appearance: Normal appearance. She is normal weight. She is ill-appearing. She is not toxic-appearing or diaphoretic.   HENT:      Head: Normocephalic and atraumatic.      Right Ear: Tympanic membrane normal.      Left Ear: Tympanic membrane normal.      Nose: Nose normal.      Mouth/Throat:      Mouth: Mucous membranes are dry.      Pharynx: Oropharynx is clear.      Comments: Mouth dry, tongue less coated.  Eyes:      Extraocular Movements: Extraocular movements intact.      Conjunctiva/sclera: Conjunctivae normal.      Pupils: Pupils are equal, round, and reactive to light.   Cardiovascular:      Rate and Rhythm: Normal rate and regular rhythm.      Pulses: Normal pulses.      Heart sounds: Normal heart sounds. No murmur heard.  Pulmonary:      Effort: Pulmonary effort is normal. No respiratory distress.      Breath sounds: Normal breath sounds. No wheezing, rhonchi or rales.      Comments: Room air.  Abdominal:      General: Bowel sounds are normal. There is no distension.      Palpations: Abdomen is soft.      Tenderness: There is no abdominal tenderness.      Comments: PEG tube in place with dressing clean dry intact no crusting, no erythema or drainage.  Tube feed infusing 55 mL/hr.   Genitourinary:     Comments: Deferred.  Musculoskeletal:         General: Swelling present. No tenderness. Normal range of motion.      Cervical back: Normal range of motion and neck supple.       "Right lower leg: No edema.      Left lower leg: No edema.      Comments: Bilateral upper extremity edema   Skin:     General: Skin is warm and dry.      Capillary Refill: Capillary refill takes less than 2 seconds.      Findings: Bruising present.   Neurological:      General: No focal deficit present.      Mental Status: She is alert. She is disoriented.      Comments: Eyes open. Awake, alert. States name and birth-date. States she does not want to answer my questions. Speech clear and coherent. Follows all commands. Generalized weakness. No focal weakness. Gait deferred. Cranial nerves II through XII grossly intact.   Psychiatric:         Mood and Affect: Mood normal.         Behavior: Behavior normal.       Last Recorded Vitals  Blood pressure 156/69, pulse 102, temperature 37.5 °C (99.5 °F), temperature source Axillary, resp. rate 17, height 1.651 m (5' 5\"), weight 63.6 kg (140 lb 3.4 oz), SpO2 97%.    Intake/Output last 3 Shifts:  I/O last 3 completed shifts:  In: 300 (4.7 mL/kg) [NG/GT:300]  Out: - (0 mL/kg)   Weight: 63.6 kg     Relevant Results  Results for orders placed or performed during the hospital encounter of 05/09/24 (from the past 24 hour(s))   CBC   Result Value Ref Range    WBC 12.5 (H) 4.4 - 11.3 x10*3/uL    nRBC 0.0 0.0 - 0.0 /100 WBCs    RBC 2.61 (L) 4.00 - 5.20 x10*6/uL    Hemoglobin 8.0 (L) 12.0 - 16.0 g/dL    Hematocrit 25.1 (L) 36.0 - 46.0 %    MCV 96 80 - 100 fL    MCH 30.7 26.0 - 34.0 pg    MCHC 31.9 (L) 32.0 - 36.0 g/dL    RDW 15.1 (H) 11.5 - 14.5 %    Platelets 410 150 - 450 x10*3/uL   Basic Metabolic Panel   Result Value Ref Range    Glucose 130 (H) 65 - 99 mg/dL    Sodium 138 133 - 145 mmol/L    Potassium 4.0 3.4 - 5.1 mmol/L    Chloride 101 97 - 107 mmol/L    Bicarbonate 26 24 - 31 mmol/L    Urea Nitrogen 25 8 - 25 mg/dL    Creatinine 0.60 0.40 - 1.60 mg/dL    eGFR >90 >60 mL/min/1.73m*2    Calcium 7.7 (L) 8.5 - 10.4 mg/dL    Anion Gap 11 <=19 mmol/L     Susceptibility data from last " 90 days.  Collected Specimen Info Organism Ampicillin Cefazolin Cefazolin (uncomplicated UTIs only) Ciprofloxacin Gentamicin Nitrofurantoin Piperacillin/Tazobactam Trimethoprim/Sulfamethoxazole   04/11/24 Urine from Straight Catheter Escherichia coli S S S S S S S S     No results found.    Scheduled medications  aspirin, 81 mg, oral, Daily  atorvastatin, 40 mg, oral, Nightly  cholecalciferol, 50,000 Units, oral, Every Sunday  clopidogrel, 75 mg, g-tube, Daily  esomeprazole, 10 mg, g-tube, Daily before breakfast  ferrous sulfate (325 mg ferrous sulfate), 65 mg of iron, oral, BID  heparin (porcine), 5,000 Units, subcutaneous, q8h JACQUELYN  lactobacillus acidophilus, 1 tablet, oral, BID  metoprolol tartrate, 50 mg, g-tube, BID  vancomycin, 125 mg, g-tube, q12h      Continuous medications     PRN medications  PRN medications: acetaminophen **OR** acetaminophen **OR** acetaminophen, acetaminophen, benzocaine-menthol, dextromethorphan-guaifenesin, guaiFENesin, hydrALAZINE, ondansetron ODT, polyethylene glycol      ASSESSMENT:  Status post PEG tube placement  Generalized weakness  Adult failure to thrive  Poor oral intake  Dysphagia  Dehydration  Hypernatremia - resolved  Hypokalemia - resolved  Vitamin d deficiency  Leukocytosis - resolved  History of C. Difficile  C. Difficile  Oropharyngeal candida  Hypoalbuminemia   Normocytic anemia improved   History of CVA  Supraventricular tachycardia - resolved  Lethargy improved  Pyuria - ? urinary tract infection   Toxic encephalopathy secondary to UTI ? Pneumonitis - metabolic encephalopathy  Small bilateral pleural effusions  ? Pneumonitis  Fever intermittent    PLAN:  Patient is doing well this morning.  No new issues.  Overall, no changes.  Vital signs reviewed.  Temperature noted.  Infectious disease following.  Input appreciated.  IV Zosyn course complete.  Prophylactic Vancomycin 125 mg via PEG every 12 hours.  Monitor stools.  I's and O's.  Respiratory status, pulse oximetry  stable on room air.  Aspiration precautions.  Labs reviewed.  Stable.  Tolerating tube feeding at goal rate.  Continue tube feeding per RD recommendations.  Aspiration precautions.  Therapy follow-up.  Mouth care.  PEG tube site care.  Cleanse with soap and water.  Mentation stable.  Awake, alert.  More interactive.  Suspect toxic, metabolic encephalopathy plus underlying dementia, history of CVA.  Continue current treatment.  Outpatient follow-up goals of care discussion and palliative medicine consultation at the [facility].  PT/OT.  Fall precautions.  Up with assistance.  DVT prophylaxis.  Heparin subcutaneous.  GI prophylaxis PPI Nexium.  Pressure ulcer prevention measures.  Turn and reposition every 2 hours and as needed.  Heels off bed.  Offloading.  Supportive care.  Patient reassured.  Case management following for discharge planning.  Discharge plan to [facility].  Discussed with Dr. Denise.  Okay to discharge when arrangements are made by case management.  Discussed with patient, Dr. Denise.    ADDENDUM:  Spoke with nursing on nursing floor this afternoon, no new issues.  Spoke with patient at bedside.  Updated.  Mild dyspnea with conversation.  Vital signs reviewed, pulse oximetry 95% on room air.  Discharge pending follow-up chest x-ray.    Chest x-ray radiology report reviewed.  Increased left pleural effusion. Discussed with Dr. Denise.  Hold discharge.  Thoracentesis tomorrow.  Updated nursing and case management.      BILLY Masters-CNP

## 2024-05-23 NOTE — DISCHARGE SUMMARY
Discharge Diagnosis  Generalized weakness  Adult failure to thrive  Poor oral intake  Dysphagia  Dehydration   Status post PEG tube placement  Hypernatremia - resolved  Hypokalemia - resolved  Vitamin d deficiency  Leukocytosis - resolved  History of C. Difficile  C. Difficile  Oropharyngeal candida  Hypoalbuminemia   Normocytic anemia improved - stable  History of CVA  Supraventricular tachycardia - resolved  Lethargy improved  Pyuria - ? urinary tract infection   Toxic encephalopathy secondary to UTI ? Pneumonitis - metabolic encephalopathy - improved  Small bilateral pleural effusions  ? Pneumonitis  Fever intermittent    Issues Requiring Follow-Up  Above    Discharge Meds     Your medication list        START taking these medications        Instructions Last Dose Given Next Dose Due   ascorbic acid 500 mg tablet  Commonly known as: Vitamin C      1 tablet (500 mg) by g-tube route once daily.       cholecalciferol 50,000 unit capsule  Commonly known as: Vitamin D-3  Start taking on: May 26, 2024      1 capsule (50,000 Units) by g-tube route 1 (one) time per week.       ferrous sulfate (325 mg ferrous sulfate) tablet  Replaces: ferrous sulfate 325 (65 Fe) MG EC tablet      1 tablet by g-tube route 2 times daily (morning and late afternoon).       metoprolol tartrate 50 mg tablet  Commonly known as: Lopressor      1 tablet by g-tube route 2 times a day.       pantoprazole 40 mg packet  Commonly known as: ProtoNix      1 packet (40 mg) by g-tube route once daily in the morning. Take before meals.       polyethylene glycol 17 gram packet  Commonly known as: Glycolax, Miralax      Take 17 g by mouth once daily as needed (Constipation.). Via PEG tube.              CHANGE how you take these medications        Instructions Last Dose Given Next Dose Due   acetaminophen 650 mg suppository  Commonly known as: Tylenol  What changed:   Another medication with the same name was changed. Make sure you understand how and when to  take each.  Another medication with the same name was removed. Continue taking this medication, and follow the directions you see here.      Insert 1 suppository (650 mg) into the rectum every 4 hours if needed for fever (temp greater than 38.0 C).       acetaminophen 325 mg tablet  Commonly known as: Tylenol  What changed:   how to take this  when to take this  reasons to take this  additional instructions  Another medication with the same name was removed. Continue taking this medication, and follow the directions you see here.      2 tablets (650 mg) by g-tube route every 8 hours. Do not exceed maximum daily dose of Acetaminophen.       Acidophilus capsule  Generic drug: lactobacillus acidophilus  What changed: additional instructions      Take 1 capsule by mouth 2 times a day. Via PEG tube.       aspirin 81 mg chewable tablet  What changed: how to take this      1 tablet (81 mg) by g-tube route once daily.       atorvastatin 40 mg tablet  Commonly known as: Lipitor  What changed: how to take this      1 tablet (40 mg) by g-tube route once daily at bedtime.       clopidogrel 75 mg tablet  Commonly known as: Plavix  What changed:   how to take this  additional instructions      1 tablet (75 mg) by g-tube route once daily. Administer 5/23/2024 1800.       ondansetron 4 mg tablet  Commonly known as: Zofran  What changed: how to take this      1 tablet (4 mg) by g-tube route every 6 hours if needed for nausea or vomiting.       vancomycin 125 mg capsule  Commonly known as: Vancocin  What changed:   when to take this  additional instructions      Take 1 capsule (125 mg) by mouth every 12 hours. Via PEG tube.              STOP taking these medications      atenolol 50 mg tablet  Commonly known as: Tenormin        bacitracin 500 unit/gram ointment        cephalexin 500 mg capsule  Commonly known as: Keflex        ferrous sulfate 325 (65 Fe) MG EC tablet  Replaced by: ferrous sulfate (325 mg ferrous sulfate) tablet         fluconazole 100 mg tablet  Commonly known as: Diflucan        metoprolol succinate XL 50 mg 24 hr tablet  Commonly known as: Toprol-XL                  Where to Get Your Medications        Information about where to get these medications is not yet available    Ask your nurse or doctor about these medications  acetaminophen 325 mg tablet  acetaminophen 650 mg suppository  Acidophilus capsule  ascorbic acid 500 mg tablet  aspirin 81 mg chewable tablet  atorvastatin 40 mg tablet  cholecalciferol 50,000 unit capsule  clopidogrel 75 mg tablet  ferrous sulfate (325 mg ferrous sulfate) tablet  metoprolol tartrate 50 mg tablet  ondansetron 4 mg tablet  pantoprazole 40 mg packet  polyethylene glycol 17 gram packet  vancomycin 125 mg capsule         Test Results Pending At Discharge  Pending Labs       No current pending labs.            Hospital Course  This is a very pleasant 78-year-old  female presenting to the emergency department with generalized weakness, functional decline and worsening blood work.  In the emergency department, initial workup was done.  She was found to have hypernatremia and severe dehydration.  Urinalysis was consistent with possible UTI.  She was treated with IV fluids and IV antibiotics.  She was evaluated and treated by Nephrology.  She was treated with IV fluids.  She was treated with IV antibiotics for possible UTI.  She was treated with treatment for history of C. Difficile.  C. Difficile  PCR was positive was positive.  Infectious disease was consulted.  She was evaluated and treated by Infectious disease.  She was treated for C. Difficile and UTI (multiple organisms).  She was evaluated by speech therapy and failed the speech therapy swallow evaluation.  Gastroenterology was consulted for PEG tube placement.  A PEG tube was placed by General surgery.  Tube feeds were initiated per RD recommendations which she tolerated.  CT brain without contrast showed nothing acute.  Suspect  altered mental status toxic, metabolic encephalopathy.  She was re-evaluated by Infectious disease for fever and leukocytosis.  She was treated with empiric IV Zosyn for presumed UTI.  She was cleared by Infectious disease.   Her condition improved.  She was evaluated by physical, occupational therapy.  Case management was consulted for discharge planning.  She is stable for discharge to skilled nursing rehabilitation with outpatient Palliative Medicine consultation, goals of care discussion.     Pertinent Physical Exam At Time of Discharge  See physical examination.    Outpatient Follow-Up  Future Appointments   Date Time Provider Department Center   7/10/2024  2:45 PM Shaheed Martin MD [address]         Belen Martin, BILLY-CNP

## 2024-05-23 NOTE — PROGRESS NOTES
Dr. Morales and Liss Martin NP made aware of sepsis screen for elevated , Temp 37.5 and increase in WBC, reports that sepsis is not suspected, screening closed out per Dr. Morales.

## 2024-05-24 ENCOUNTER — APPOINTMENT (OUTPATIENT)
Dept: RADIOLOGY | Facility: HOSPITAL | Age: 79
DRG: 640 | End: 2024-05-24
Payer: MEDICARE

## 2024-05-24 VITALS
TEMPERATURE: 99 F | RESPIRATION RATE: 17 BRPM | HEIGHT: 65 IN | BODY MASS INDEX: 23.36 KG/M2 | SYSTOLIC BLOOD PRESSURE: 155 MMHG | OXYGEN SATURATION: 98 % | WEIGHT: 140.21 LBS | HEART RATE: 106 BPM | DIASTOLIC BLOOD PRESSURE: 69 MMHG

## 2024-05-24 LAB
ANION GAP SERPL CALC-SCNC: 11 MMOL/L
BASOPHILS # BLD AUTO: 0.04 X10*3/UL (ref 0–0.1)
BASOPHILS NFR BLD AUTO: 0.3 %
BUN SERPL-MCNC: 26 MG/DL (ref 8–25)
CALCIUM SERPL-MCNC: 8 MG/DL (ref 8.5–10.4)
CHLORIDE SERPL-SCNC: 98 MMOL/L (ref 97–107)
CO2 SERPL-SCNC: 27 MMOL/L (ref 24–31)
CREAT SERPL-MCNC: 0.5 MG/DL (ref 0.4–1.6)
EGFRCR SERPLBLD CKD-EPI 2021: >90 ML/MIN/1.73M*2
EOSINOPHIL # BLD AUTO: 0.32 X10*3/UL (ref 0–0.4)
EOSINOPHIL NFR BLD AUTO: 2.6 %
ERYTHROCYTE [DISTWIDTH] IN BLOOD BY AUTOMATED COUNT: 14.9 % (ref 11.5–14.5)
GLUCOSE SERPL-MCNC: 125 MG/DL (ref 65–99)
HCT VFR BLD AUTO: 25.2 % (ref 36–46)
HGB BLD-MCNC: 8.1 G/DL (ref 12–16)
IMM GRANULOCYTES # BLD AUTO: 0.1 X10*3/UL (ref 0–0.5)
IMM GRANULOCYTES NFR BLD AUTO: 0.8 % (ref 0–0.9)
LYMPHOCYTES # BLD AUTO: 1.37 X10*3/UL (ref 0.8–3)
LYMPHOCYTES NFR BLD AUTO: 11 %
MCH RBC QN AUTO: 30 PG (ref 26–34)
MCHC RBC AUTO-ENTMCNC: 32.1 G/DL (ref 32–36)
MCV RBC AUTO: 93 FL (ref 80–100)
MONOCYTES # BLD AUTO: 0.63 X10*3/UL (ref 0.05–0.8)
MONOCYTES NFR BLD AUTO: 5 %
NEUTROPHILS # BLD AUTO: 10.04 X10*3/UL (ref 1.6–5.5)
NEUTROPHILS NFR BLD AUTO: 80.3 %
NRBC BLD-RTO: 0 /100 WBCS (ref 0–0)
PLATELET # BLD AUTO: 454 X10*3/UL (ref 150–450)
POTASSIUM SERPL-SCNC: 4.1 MMOL/L (ref 3.4–5.1)
RBC # BLD AUTO: 2.7 X10*6/UL (ref 4–5.2)
SODIUM SERPL-SCNC: 136 MMOL/L (ref 133–145)
WBC # BLD AUTO: 12.5 X10*3/UL (ref 4.4–11.3)

## 2024-05-24 PROCEDURE — 2500000004 HC RX 250 GENERAL PHARMACY W/ HCPCS (ALT 636 FOR OP/ED): Performed by: SURGERY

## 2024-05-24 PROCEDURE — 85025 COMPLETE CBC W/AUTO DIFF WBC: CPT | Performed by: INTERNAL MEDICINE

## 2024-05-24 PROCEDURE — 2500000001 HC RX 250 WO HCPCS SELF ADMINISTERED DRUGS (ALT 637 FOR MEDICARE OP): Performed by: NURSE PRACTITIONER

## 2024-05-24 PROCEDURE — 2500000001 HC RX 250 WO HCPCS SELF ADMINISTERED DRUGS (ALT 637 FOR MEDICARE OP): Performed by: SURGERY

## 2024-05-24 PROCEDURE — 82374 ASSAY BLOOD CARBON DIOXIDE: CPT | Performed by: NURSE PRACTITIONER

## 2024-05-24 PROCEDURE — 36415 COLL VENOUS BLD VENIPUNCTURE: CPT | Performed by: NURSE PRACTITIONER

## 2024-05-24 PROCEDURE — 93970 EXTREMITY STUDY: CPT

## 2024-05-24 PROCEDURE — 2500000005 HC RX 250 GENERAL PHARMACY W/O HCPCS: Performed by: INTERNAL MEDICINE

## 2024-05-24 PROCEDURE — 2500000001 HC RX 250 WO HCPCS SELF ADMINISTERED DRUGS (ALT 637 FOR MEDICARE OP): Performed by: INTERNAL MEDICINE

## 2024-05-24 PROCEDURE — 97110 THERAPEUTIC EXERCISES: CPT | Mod: GO,CO

## 2024-05-24 PROCEDURE — 93970 EXTREMITY STUDY: CPT | Performed by: RADIOLOGY

## 2024-05-24 RX ORDER — CLOPIDOGREL BISULFATE 75 MG/1
75 TABLET ORAL DAILY
Start: 2024-05-25

## 2024-05-24 RX ADMIN — HEPARIN SODIUM 5000 UNITS: 5000 INJECTION, SOLUTION INTRAVENOUS; SUBCUTANEOUS at 05:28

## 2024-05-24 RX ADMIN — ESOMEPRAZOLE MAGNESIUM 10 MG: 20 GRANULE, DELAYED RELEASE ORAL at 05:28

## 2024-05-24 RX ADMIN — Medication 1 TABLET: at 09:04

## 2024-05-24 RX ADMIN — ACETAMINOPHEN 650 MG: 325 TABLET ORAL at 17:01

## 2024-05-24 RX ADMIN — ACETAMINOPHEN 650 MG: 325 TABLET ORAL at 09:04

## 2024-05-24 RX ADMIN — ASPIRIN 81 MG CHEWABLE TABLET 81 MG: 81 TABLET CHEWABLE at 09:04

## 2024-05-24 RX ADMIN — Medication 1 TABLET: at 17:01

## 2024-05-24 RX ADMIN — METOPROLOL TARTRATE 50 MG: 50 TABLET, FILM COATED ORAL at 09:04

## 2024-05-24 RX ADMIN — FERROUS SULFATE TAB 325 MG (65 MG ELEMENTAL FE) 1 TABLET: 325 (65 FE) TAB at 17:01

## 2024-05-24 RX ADMIN — CLOPIDOGREL BISULFATE 75 MG: 75 TABLET ORAL at 09:04

## 2024-05-24 RX ADMIN — HEPARIN SODIUM 5000 UNITS: 5000 INJECTION, SOLUTION INTRAVENOUS; SUBCUTANEOUS at 14:07

## 2024-05-24 RX ADMIN — VANCOMYCIN HYDROCHLORIDE 125 MG: KIT at 05:28

## 2024-05-24 RX ADMIN — VANCOMYCIN HYDROCHLORIDE 125 MG: KIT at 17:02

## 2024-05-24 RX ADMIN — FERROUS SULFATE TAB 325 MG (65 MG ELEMENTAL FE) 1 TABLET: 325 (65 FE) TAB at 09:05

## 2024-05-24 ASSESSMENT — COGNITIVE AND FUNCTIONAL STATUS - GENERAL
DRESSING REGULAR UPPER BODY CLOTHING: TOTAL
DRESSING REGULAR LOWER BODY CLOTHING: TOTAL
EATING MEALS: TOTAL
TOILETING: TOTAL
HELP NEEDED FOR BATHING: TOTAL
DAILY ACTIVITIY SCORE: 6
PERSONAL GROOMING: TOTAL

## 2024-05-24 ASSESSMENT — PAIN - FUNCTIONAL ASSESSMENT
PAIN_FUNCTIONAL_ASSESSMENT: 0-10
PAIN_FUNCTIONAL_ASSESSMENT: WONG-BAKER FACES

## 2024-05-24 ASSESSMENT — PAIN SCALES - GENERAL
PAINLEVEL_OUTOF10: 4
PAINLEVEL_OUTOF10: 0 - NO PAIN

## 2024-05-24 ASSESSMENT — PAIN SCALES - WONG BAKER: WONGBAKER_NUMERICALRESPONSE: HURTS LITTLE BIT

## 2024-05-24 ASSESSMENT — PAIN DESCRIPTION - ORIENTATION: ORIENTATION: UPPER

## 2024-05-24 NOTE — PROGRESS NOTES
Rebecca Samaniego is a 78 y.o. female on day 15 of admission presenting with General weakness.    5/23/2024: Call to spouse @ home number, no answer, voicemail left with nursing station and office call back numbers. I also came to the room and the spouse was not bedside. Call to spouse @ cell number provided, unable to reach.  Discussed with Dr. Denise.     Subjective   Patient seen and examined.  Resting in bed in no acute distress.  Awake alert oriented x 3.  States name, birth-date, month, year.  Forgetful.  No complaints.  Review of systems limited.     Per Interventional radiology, chest x-ray reviewed, no indication for thoracentesis.    Spoke with spouse, Alex, bedside.  Discussed plan of care.  Discussed overall long-term prognosis, goals of care and code status.  He requests to continue current treatment model of care and full code status at this time.  He states he will discuss goals of care and code status with his daughter.  Discussed discharge plan to [facility] and he is agreeable.     Objective   Contact plus precautions in place.    Physical Exam  Vitals and nursing note reviewed.   Constitutional:       General: She is not in acute distress.     Appearance: Normal appearance. She is normal weight. She is ill-appearing. She is not toxic-appearing or diaphoretic.   HENT:      Head: Normocephalic and atraumatic.      Right Ear: Tympanic membrane normal.      Left Ear: Tympanic membrane normal.      Nose: Nose normal.      Mouth/Throat:      Mouth: Mucous membranes are dry.      Pharynx: Oropharynx is clear.      Comments: Mouth dry, tongue less coated.  Eyes:      Extraocular Movements: Extraocular movements intact.      Conjunctiva/sclera: Conjunctivae normal.      Pupils: Pupils are equal, round, and reactive to light.   Cardiovascular:      Rate and Rhythm: Normal rate and regular rhythm.      Pulses: Normal pulses.      Heart sounds: Normal heart sounds. No murmur  "heard.  Pulmonary:      Effort: Pulmonary effort is normal. No respiratory distress.      Breath sounds: Normal breath sounds. No wheezing, rhonchi or rales.      Comments: Room air.  Abdominal:      General: Bowel sounds are normal. There is no distension.      Palpations: Abdomen is soft.      Tenderness: There is no abdominal tenderness.      Comments: PEG tube in place with dressing clean dry intact no crusting, no erythema or drainage.  Tube feed infusing 55 mL/hr.   Genitourinary:     Comments: Deferred.  Musculoskeletal:         General: Swelling present. No tenderness. Normal range of motion.      Cervical back: Normal range of motion and neck supple.      Right lower leg: No edema.      Left lower leg: No edema.      Comments: Bilateral upper extremity edema increased right > left.   Skin:     General: Skin is warm and dry.      Capillary Refill: Capillary refill takes less than 2 seconds.      Findings: Bruising present.      Comments: Prafo boots in place.    Neurological:      General: No focal deficit present.      Mental Status: She is alert. She is disoriented.      Comments: Eyes open. Awake, alert. States name and birth-date. States she is in the hospital. Month May, Year 2024. States her spouses name states they have been  60 years. Speech clear and coherent. Follows all commands. Generalized weakness. No focal weakness. Gait deferred. Cranial nerves II through XII grossly intact.   Psychiatric:         Mood and Affect: Mood normal.         Behavior: Behavior normal.       Last Recorded Vitals  Blood pressure 155/69, pulse 106, temperature 37.2 °C (99 °F), temperature source Oral, resp. rate 17, height 1.651 m (5' 5\"), weight 63.6 kg (140 lb 3.4 oz), SpO2 98%.    Intake/Output last 3 Shifts:  I/O last 3 completed shifts:  In: 2629 (41.3 mL/kg) [NG/GT:2629]  Out: - (0 mL/kg)   Weight: 63.6 kg     Relevant Results  Results for orders placed or performed during the hospital encounter of " 05/09/24 (from the past 24 hour(s))   CBC and Auto Differential   Result Value Ref Range    WBC 12.5 (H) 4.4 - 11.3 x10*3/uL    nRBC 0.0 0.0 - 0.0 /100 WBCs    RBC 2.70 (L) 4.00 - 5.20 x10*6/uL    Hemoglobin 8.1 (L) 12.0 - 16.0 g/dL    Hematocrit 25.2 (L) 36.0 - 46.0 %    MCV 93 80 - 100 fL    MCH 30.0 26.0 - 34.0 pg    MCHC 32.1 32.0 - 36.0 g/dL    RDW 14.9 (H) 11.5 - 14.5 %    Platelets 454 (H) 150 - 450 x10*3/uL    Neutrophils % 80.3 40.0 - 80.0 %    Immature Granulocytes %, Automated 0.8 0.0 - 0.9 %    Lymphocytes % 11.0 13.0 - 44.0 %    Monocytes % 5.0 2.0 - 10.0 %    Eosinophils % 2.6 0.0 - 6.0 %    Basophils % 0.3 0.0 - 2.0 %    Neutrophils Absolute 10.04 (H) 1.60 - 5.50 x10*3/uL    Immature Granulocytes Absolute, Automated 0.10 0.00 - 0.50 x10*3/uL    Lymphocytes Absolute 1.37 0.80 - 3.00 x10*3/uL    Monocytes Absolute 0.63 0.05 - 0.80 x10*3/uL    Eosinophils Absolute 0.32 0.00 - 0.40 x10*3/uL    Basophils Absolute 0.04 0.00 - 0.10 x10*3/uL   Basic Metabolic Panel   Result Value Ref Range    Glucose 125 (H) 65 - 99 mg/dL    Sodium 136 133 - 145 mmol/L    Potassium 4.1 3.4 - 5.1 mmol/L    Chloride 98 97 - 107 mmol/L    Bicarbonate 27 24 - 31 mmol/L    Urea Nitrogen 26 (H) 8 - 25 mg/dL    Creatinine 0.50 0.40 - 1.60 mg/dL    eGFR >90 >60 mL/min/1.73m*2    Calcium 8.0 (L) 8.5 - 10.4 mg/dL    Anion Gap 11 <=19 mmol/L     Susceptibility data from last 90 days.  Collected Specimen Info Organism Ampicillin Cefazolin Cefazolin (uncomplicated UTIs only) Ciprofloxacin Gentamicin Nitrofurantoin Piperacillin/Tazobactam Trimethoprim/Sulfamethoxazole   04/11/24 Urine from Straight Catheter Escherichia coli S S S S S S S S     Upper extremity venous duplex bilateral    Result Date: 5/24/2024  Interpreted By:  Baron Douglass, STUDY: [facility] UPPER EXTREMITY VENOUS DUPLEX BILATERAL  5/24/2024 1:17 pm   INDICATION: 79 y/o   F with  Signs/Symptoms:Upper bilateral extremity swelling.   COMPARISON: None.   ACCESSION NUMBER(S):  [id number]   ORDERING CLINICIAN: MICHELLE WILLIS   TECHNIQUE: Routine ultrasound of the  right and left upper extremity was performed with duplex Doppler (color and spectral) evaluation. Static images were obtained for remote interpretation.   FINDINGS: UPPER EXTREMITY VEINS:  Examination was performed with color and duplex Doppler.   The internal jugular, subclavian, and axillary veins are patent and free of thrombus.  The visualized brachiocephalic veins are patent. The brachial, basilic, and cephalic veins are patent and compressible.       Negative study.  No thrombus in the central veins of the  right or left upper extremities.   MACRO: None   Signed by: Baron Douglass 5/24/2024 2:18 PM Dictation workstation:   [id number]    XR chest 1 view    Result Date: 5/23/2024  Interpreted By:  Rene Holland, STUDY: XR CHEST 1 VIEW;  5/23/2024 4:48 pm   INDICATION: Signs/Symptoms:Follow-up pleural effusion.   COMPARISON: 05/19/2024   ACCESSION NUMBER(S): [id number]   ORDERING CLINICIAN: MICHELLE WILLIS   FINDINGS: Status post median sternotomy. Cardiac valve prosthesis again noted.   Cardiomegaly. Atherosclerotic aorta. There is slight enlargement of left pleural effusion. There is a small right pleural effusion similar to prior study. No pneumothorax. Left basilar airspace disease represents atelectasis and/or consolidation.       Slightly larger left pleural effusion with adjacent atelectasis/consolidation.     MACRO: None.   Signed by: Rene Holland 5/23/2024 6:11 PM Dictation workstation:   [id number]     Scheduled medications  aspirin, 81 mg, oral, Daily  atorvastatin, 40 mg, oral, Nightly  cholecalciferol, 50,000 Units, oral, Every Sunday  clopidogrel, 75 mg, g-tube, Daily  esomeprazole, 10 mg, g-tube, Daily before breakfast  ferrous sulfate (325 mg ferrous sulfate), 65 mg of iron, oral, BID  heparin (porcine), 5,000 Units, subcutaneous, q8h JACQUELYN  lactobacillus acidophilus, 1 tablet, oral, BID  metoprolol  tartrate, 50 mg, g-tube, BID  vancomycin, 125 mg, g-tube, q12h      Continuous medications     PRN medications  PRN medications: acetaminophen **OR** acetaminophen **OR** acetaminophen, acetaminophen, benzocaine-menthol, dextromethorphan-guaifenesin, guaiFENesin, hydrALAZINE, ondansetron ODT, polyethylene glycol      ASSESSMENT:  Status post PEG tube placement  Generalized weakness  Adult failure to thrive  Poor oral intake  Dysphagia  Dehydration  Hypernatremia - resolved  Hypokalemia - resolved  Vitamin d deficiency  Leukocytosis - resolved  History of C. Difficile  C. Difficile  Oropharyngeal candida  Hypoalbuminemia   Normocytic anemia improved   History of CVA  Supraventricular tachycardia - resolved  Lethargy - improved  Toxic encephalopathy secondary to UTI ? Pneumonitis - metabolic encephalopathy - improved  Small bilateral pleural effusions - stable  ? Pneumonitis  Fever intermittent resolved  Pyuria - ? Urinary tract infection - treated    PLAN:  Per Interventional Radiology, no indication for thoracentesis.  Respiratory status, pulse oximetry is stable on room air.  Bilateral upper extremity edema increased right greater than left.  Check ultrasound.  Follow-up.  Elevate.  Overall, condition is stable. Spoke with spouse, Alex.  Discussed overall long-term prognosis, goals of care and code status.  He requests to continue current treatment model of care and full code status at this time.  He states he will discuss goals of care and code status with his daughter.  I advised outpatient follow-up and he is agreeable.  Continue treatment model of care per wishes.  Infectious disease is following.  Continue current treatment.  Prophylactic vancomycin 125 mg p.o. PEG every 12 hours.  Monitor stools.  I's and O's.  Tolerating tube feed.  Continue tube feeding at goal rate per RD recommendations.  Aspiration precautions.  Speech therapy follow-up.  Mouth care.  PEG tube site care.  Mentation stable.  Goal deficits.   Suspect toxic, metabolic encephalopathy plus underlying dementia, history of CVA.  Continue current treatment.  PT/OT.  Fall precautions.  Up with assistance.  DVT prophylaxis.  Heparin subcutaneous.  GI prophylaxis PPI.  Pressure ulcer prevention measures.  Turn and reposition every 2 hours and as needed.  Heels off bed.  Offloading.  Supportive care.  Patient reassured.  Case management following for discharge planning.  Discharge plan to [facility].  Discussed with Dr. Denise.  Anticipate discharge pending bilateral upper extremity ultrasound.     ADDENDUM:  Bilateral upper extremity ultrasound negative for DVT.  Elevated and apply compression.  Okay to discharge.       Belen Martin, APRN-CNP

## 2024-05-24 NOTE — CARE PLAN
The patient's goals for the shift include improved movement    The clinical goals for the shift include skin integrity and emotional support for family      Problem: ADLs  Goal: Pt will complete ADL tasks at min Awith use of AE prn   Outcome: Progressing     Problem: Pain  Goal: My pain/discomfort is manageable  Outcome: Progressing     Problem: Safety  Goal: Patient will be injury free during hospitalization  Outcome: Progressing  Goal: I will remain free of falls  Outcome: Progressing     Problem: Daily Care  Goal: Daily care needs are met  Outcome: Progressing     Problem: Psychosocial Needs  Goal: Demonstrates ability to cope with hospitalization/illness  Outcome: Progressing  Goal: Collaborate with me, my family, and caregiver to identify my specific goals  Outcome: Progressing     Problem: Discharge Barriers  Goal: My discharge needs are met  Outcome: Progressing     Problem: Skin  Goal: Decreased wound size/increased tissue granulation at next dressing change  Outcome: Progressing  Flowsheets (Taken 5/24/2024 1118)  Decreased wound size/increased tissue granulation at next dressing change:   Promote sleep for wound healing   Protective dressings over bony prominences   Utilize specialty bed per algorithm  Goal: Participates in plan/prevention/treatment measures  Outcome: Progressing  Flowsheets (Taken 5/24/2024 1118)  Participates in plan/prevention/treatment measures:   Discuss with provider PT/OT consult   Elevate heels  Goal: Prevent/manage excess moisture  Outcome: Progressing  Flowsheets (Taken 5/24/2024 1118)  Prevent/manage excess moisture:   Use wicking fabric (obtain order)   Follow provider orders for dressing changes   Cleanse incontinence/protect with barrier cream  Goal: Prevent/minimize sheer/friction injuries  Outcome: Progressing  Flowsheets (Taken 5/24/2024 1118)  Prevent/minimize sheer/friction injuries:   HOB 30 degrees or less   Turn/reposition every 2 hours/use positioning/transfer  devices   Utilize specialty bed per algorithm  Goal: Promote/optimize nutrition  Outcome: Progressing  Flowsheets (Taken 5/24/2024 1118)  Promote/optimize nutrition:   Consume > 50% meals/supplements   Discuss with provider if NPO > 2 days   Monitor/record intake including meals   Assist with feeding  Goal: Promote skin healing  Outcome: Progressing  Flowsheets (Taken 5/24/2024 1118)  Promote skin healing:   Turn/reposition every 2 hours/use positioning/transfer devices   Protective dressings over bony prominences   Assess skin/pad under line(s)/device(s)   Rotate device position/do not position patient on device     Problem: Pain - Adult  Goal: Verbalizes/displays adequate comfort level or baseline comfort level  Outcome: Progressing     Problem: Safety - Adult  Goal: Free from fall injury  Outcome: Progressing     Problem: Discharge Planning  Goal: Discharge to home or other facility with appropriate resources  Outcome: Progressing     Problem: Chronic Conditions and Co-morbidities  Goal: Patient's chronic conditions and co-morbidity symptoms are monitored and maintained or improved  Outcome: Progressing     Problem: Nutrition  Goal: Less than 5 days NPO/clear liquids  Outcome: Progressing  Goal: Oral intake greater than 50%  Outcome: Progressing  Goal: Oral intake greater 75%  Outcome: Progressing  Goal: Consume prescribed supplement  Outcome: Progressing  Goal: Adequate PO fluid intake  Outcome: Progressing  Goal: Nutrition support goals are met within 48 hrs  Outcome: Progressing  Goal: Nutrition support is meeting 75% of nutrient needs  Outcome: Progressing  Goal: Tube feed tolerance  Outcome: Progressing  Goal: BG  mg/dL  Outcome: Progressing  Goal: Lab values WNL  Outcome: Progressing  Goal: Electrolytes WNL  Outcome: Progressing  Goal: Promote healing  Outcome: Progressing  Goal: Maintain stable weight  Outcome: Progressing  Goal: Reduce weight from edema/fluid  Outcome: Progressing  Goal: Gradual  weight gain  Outcome: Progressing  Goal: Improve ostomy output  Outcome: Progressing     Problem: Fall/Injury  Goal: Not fall by end of shift  Outcome: Progressing  Goal: Be free from injury by end of the shift  Outcome: Progressing  Goal: Verbalize understanding of personal risk factors for fall in the hospital  Outcome: Progressing  Goal: Verbalize understanding of risk factor reduction measures to prevent injury from fall in the home  Outcome: Progressing  Goal: Use assistive devices by end of the shift  Outcome: Progressing  Goal: Pace activities to prevent fatigue by end of the shift  Outcome: Progressing

## 2024-05-24 NOTE — PROGRESS NOTES
Physical Therapy                 Therapy Communication Note    Patient Name: Rebecca Samaniego  MRN: [id number]  Today's Date: 5/24/2024     Discipline: Physical Therapy    Missed Visit Reason: Missed Visit Reason: Other (Comment) (Pt off the floor for a thorocentesis.)    Missed Time: Attempt

## 2024-05-24 NOTE — PROGRESS NOTES
05/24/24 1528   Discharge Planning   Patient expects to be discharged to: [facility]     Patient has an active [facility] order .  [facility]  AVS and goldenrod sent via [facility].  [facility] ambulance transfer arranged for 1730.  Spouse notified. Hortensia SOLARES aware of discharge plan.     DC PLAN IS SECURE

## 2024-05-24 NOTE — PROGRESS NOTES
INFECTIOUS DISEASES PROGRESS NOTE    Consulted / following patient for:  C. difficile PCR positive, toxin negative  Fever  Leukocytosis    Subjective   Interval History:   No new complaints  Discussed in detail with Ms. Martin    Objective   PHYSICAL EXAMINATION  Vital signs:  Visit Vitals  /69 (BP Location: Left arm, Patient Position: Lying)   Pulse 106   Temp 37.2 °C (99 °F) (Oral)   Resp 17   Remains afebrile  General: Chronically but not acutely ill.      Relevant Results  WBC: 15,700 ---> 14,800 ---> 12,000 ---> 11,800 ---> 11,000 ---> 11,700 ---> 12,500 ---> 1500    Results from last 72 hours   Lab Units 05/24/24  0606   CREATININE mg/dL 0.50   ANION GAP mmol/L 11   EGFR mL/min/1.73m*2 >90   00  Urinalysis (5/19): Heavy pyuria  Microbiology:  C. difficile: PCR positive, toxin EIA negative  Urine 5/16: Multiple organisms  Urine 5/19: No significant growth    Impression:  1.  C. difficile infection versus colonization  -- Patient recently completed a course of oral vancomycin at her nursing facility for C. difficile colitis.  It is not clear when therapy ended.  -- C. difficile PCR positive but toxin EIA negative.  She denies any active diarrhea.  Her abdomen is benign.  Suspect C. difficile colonization, now maintaining prophylactic low-dose vancomycin in the aftermath of completion of broad-spectrum antibiotics 2.  Stable leukocytosis, no fever    Plan:  Continue vancomycin to 125 mg p.o./PEG every 12 hours through 5/29    WILL SIGN OFF.  PLEASE RE-CONSULT PRN.  THANK YOU.    Doni Morales MD  [facility] [employer]  Office:  [phone]

## 2024-05-24 NOTE — PROGRESS NOTES
Occupational Therapy    OT Treatment    Patient Name: Rebecca Samaniego  MRN: [id number]  Today's Date: 5/24/2024  Time Calculation  Start Time: 0947  Stop Time: 1010  Time Calculation (min): 23 min         Assessment:  OT Assessment: Minimal progress made towards OT goals. Continue with current OT POC to increase strength, balance and functional tolerance to maximize potential during ADLs.  Evaluation/Treatment Tolerance: Patient limited by fatigue, Patient limited by pain  End of Session Communication: Bedside nurse  End of Session Patient Position: Bed, 4 rail up, Alarm on (BUE elevated, all needs in reach, spouse present in room)  OT Assessment Results: Decreased ADL status, Decreased upper extremity range of motion, Decreased upper extremity strength, Decreased safe judgment during ADL, Decreased cognition, Decreased functional mobility, Decreased gross motor control, Decreased endurance  Evaluation/Treatment Tolerance: Patient limited by fatigue, Patient limited by pain  Plan:  Treatment Interventions: ADL retraining, Compensatory technique education, UE strengthening/ROM  OT Frequency: 2 times per week  OT Discharge Recommendations: Moderate intensity level of continued care  OT Recommended Transfer Status: Dependent  OT - OK to Discharge: Yes  Treatment Interventions: ADL retraining, Compensatory technique education, UE strengthening/ROM    Subjective   Previous Visit Info:  OT Last Visit  OT Received On: 05/24/24  General:  General  Reason for Referral: impaired ADLs, +Cidff, weakness, failure to thrive, UTI  Past Medical History Relevant to Rehab: HLD, CKD, CVA, AVR, C diff, Breast lumpectomy, hysterectomy, cervical discectomy  Prior to Session Communication: Bedside nurse (CNP)  Patient Position Received: Bed, 4 rail up, Alarm on  General Comment: Pt cleared for OT session per nursing and CNP, pt pleasantly confused with significant swelling in BUE. CNP cleared for BUE AAROM exercises this  date.  Precautions:  Hearing/Visual Limitations: wears glasses  Medical Precautions: Fall precautions, Infection precautions, Swallowing precautions (Contact precautions for +Cdiff, PEG tube)  Precautions Comment: PEG tube, IV, nicholson  Vital Signs:     Pain:  Pain Assessment  Pain Assessment: 0-10  Pain Score: 0 - No pain (pt reports 0/10 pain however demonstrates facial grimacing with BUE AAROM.)  Clinical Progression: Other (Comment) (0/10 pain at rest and minimal pain with BUE AAROM)  Pain Interventions: Repositioned    Objective    Cognition:  Cognition  Overall Cognitive Status: Impaired  Arousal/Alertness: Inconsistent responses to stimuli  Orientation Level: Other (Comment) (Oriented to self only, observed garbled speech this date.)  Following Commands:  (inconsistent command following)  Safety/Judgement: Exceptions to WFL  Insight: Severe  Task Initiation: Initiates with cues  Processing Speed: Delayed  Coordination:  Movements are Fluid and Coordinated: No  Upper Body Coordination: poor BUE motor recruitment  Lower Body Coordination: poor BLE motor recruitment    Bed Mobility/Transfers: Bed Mobility  Bed Mobility: No (d/t limited participation pt not appropriate to participate in bed mobility tasks/ OOB activity this date. Strongly recommend co-tx with PT during functional mobility trials.)    Transfers  Transfer: No    Therapy/Activity: Therapeutic Exercise  Therapeutic Exercise Performed: Yes  Therapeutic Exercise Activity 1: BUE AAROM completed 2x10 in all planes to increase ROM and strength and decrease observed swelling. Verbal instruction and demonstration provided to ensure proper muscle recruitment. Pt intermittently resistive to AAROM this date.      Outcome Measures:[facility] Daily Activity  Putting on and taking off regular lower body clothing: Total  Bathing (including washing, rinsing, drying): Total  Putting on and taking off regular upper body clothing: Total  Toileting, which includes using  toilet, bedpan or urinal: Total  Taking care of personal grooming such as brushing teeth: Total  Eating Meals: Total  Daily Activity - Total Score: 6        Education Documentation  Body Mechanics, taught by GABBY Baldwin at 5/24/2024 10:22 AM.  Learner: Patient  Readiness: Acceptance  Method: Explanation, Demonstration  Response: No Evidence of Learning, Needs Reinforcement    ADL Training, taught by GABBY Baldwin at 5/24/2024 10:22 AM.  Learner: Patient  Readiness: Acceptance  Method: Explanation, Demonstration  Response: No Evidence of Learning, Needs Reinforcement    Education Comments  Education provided on role of OT/POC, safety awareness throughout functional tasks/transfers, importance of activity/ rest routine, EC/WS techniques, and use of call light for assistance. Questions, comments and concerns addressed regarding OT.      Goals:  Encounter Problems       Encounter Problems (Active)       ADLs       Pt will complete ADL tasks at min Awith use of AE prn  (Progressing)       Start:  05/10/24    Expected End:  06/01/24               Functional Balance       Pt will maintain static sitting balance with upper extremity support at min A  (Progressing)       Start:  05/10/24    Expected End:  06/01/24               OT Transfers       Pt will perform functional transfers at min A (Progressing)       Start:  05/10/24    Expected End:  06/01/24               Therapeutic Exercise       Pt will perform AAROM/AROM BUE exercises to improve strength and independence with functional transfers/ADL tasks.   (Progressing)       Start:  05/10/24    Expected End:  06/01/24

## 2024-05-25 ENCOUNTER — APPOINTMENT (OUTPATIENT)
Dept: RADIOLOGY | Facility: HOSPITAL | Age: 79
DRG: 640 | End: 2024-05-25
Payer: MEDICARE

## 2024-05-25 ENCOUNTER — HOSPITAL ENCOUNTER (OUTPATIENT)
Dept: CARDIOLOGY | Facility: HOSPITAL | Age: 79
Discharge: HOME | DRG: 640 | End: 2024-05-25
Payer: MEDICARE

## 2024-05-25 ENCOUNTER — HOSPITAL ENCOUNTER (INPATIENT)
Facility: HOSPITAL | Age: 79
LOS: 4 days | Discharge: NURSING, REHAB OR LONG-TERM CARE | DRG: 640 | End: 2024-05-29
Attending: STUDENT IN AN ORGANIZED HEALTH CARE EDUCATION/TRAINING PROGRAM | Admitting: INTERNAL MEDICINE
Payer: MEDICARE

## 2024-05-25 DIAGNOSIS — A41.9 SEPSIS, DUE TO UNSPECIFIED ORGANISM, UNSPECIFIED WHETHER ACUTE ORGAN DYSFUNCTION PRESENT (MULTI): ICD-10-CM

## 2024-05-25 DIAGNOSIS — E86.0 DEHYDRATION: ICD-10-CM

## 2024-05-25 DIAGNOSIS — A04.72 C. DIFFICILE DIARRHEA: ICD-10-CM

## 2024-05-25 DIAGNOSIS — M54.50 ACUTE LOW BACK PAIN WITHOUT SCIATICA, UNSPECIFIED BACK PAIN LATERALITY: ICD-10-CM

## 2024-05-25 DIAGNOSIS — R53.1 GENERALIZED WEAKNESS: Primary | ICD-10-CM

## 2024-05-25 DIAGNOSIS — Z79.02 ANTIPLATELET OR ANTITHROMBOTIC LONG-TERM USE: ICD-10-CM

## 2024-05-25 DIAGNOSIS — D50.9 IRON DEFICIENCY ANEMIA, UNSPECIFIED IRON DEFICIENCY ANEMIA TYPE: ICD-10-CM

## 2024-05-25 LAB
ALBUMIN SERPL-MCNC: 2.2 G/DL (ref 3.5–5)
ALP BLD-CCNC: 218 U/L (ref 35–125)
ALT SERPL-CCNC: 83 U/L (ref 5–40)
ANION GAP SERPL CALC-SCNC: 11 MMOL/L
APPEARANCE UR: ABNORMAL
AST SERPL-CCNC: 88 U/L (ref 5–40)
BASOPHILS # BLD AUTO: 0.05 X10*3/UL (ref 0–0.1)
BASOPHILS NFR BLD AUTO: 0.4 %
BILIRUB SERPL-MCNC: <0.2 MG/DL (ref 0.1–1.2)
BILIRUB UR STRIP.AUTO-MCNC: NEGATIVE MG/DL
BUN SERPL-MCNC: 35 MG/DL (ref 8–25)
CALCIUM SERPL-MCNC: 8.3 MG/DL (ref 8.5–10.4)
CHLORIDE SERPL-SCNC: 100 MMOL/L (ref 97–107)
CO2 SERPL-SCNC: 27 MMOL/L (ref 24–31)
COLOR UR: ABNORMAL
CREAT SERPL-MCNC: 0.5 MG/DL (ref 0.4–1.6)
EGFRCR SERPLBLD CKD-EPI 2021: >90 ML/MIN/1.73M*2
EOSINOPHIL # BLD AUTO: 0.31 X10*3/UL (ref 0–0.4)
EOSINOPHIL NFR BLD AUTO: 2.4 %
ERYTHROCYTE [DISTWIDTH] IN BLOOD BY AUTOMATED COUNT: 15 % (ref 11.5–14.5)
GLUCOSE SERPL-MCNC: 121 MG/DL (ref 65–99)
GLUCOSE UR STRIP.AUTO-MCNC: NORMAL MG/DL
HCT VFR BLD AUTO: 24.5 % (ref 36–46)
HGB BLD-MCNC: 7.7 G/DL (ref 12–16)
HOLD SPECIMEN: NORMAL
IMM GRANULOCYTES # BLD AUTO: 0.11 X10*3/UL (ref 0–0.5)
IMM GRANULOCYTES NFR BLD AUTO: 0.8 % (ref 0–0.9)
KETONES UR STRIP.AUTO-MCNC: NEGATIVE MG/DL
LACTATE BLDV-SCNC: 1.7 MMOL/L (ref 0.4–2)
LEUKOCYTE ESTERASE UR QL STRIP.AUTO: ABNORMAL
LIPASE SERPL-CCNC: 122 U/L (ref 16–63)
LYMPHOCYTES # BLD AUTO: 1.8 X10*3/UL (ref 0.8–3)
LYMPHOCYTES NFR BLD AUTO: 13.8 %
MCH RBC QN AUTO: 30.6 PG (ref 26–34)
MCHC RBC AUTO-ENTMCNC: 31.4 G/DL (ref 32–36)
MCV RBC AUTO: 97 FL (ref 80–100)
MONOCYTES # BLD AUTO: 0.71 X10*3/UL (ref 0.05–0.8)
MONOCYTES NFR BLD AUTO: 5.4 %
NEUTROPHILS # BLD AUTO: 10.08 X10*3/UL (ref 1.6–5.5)
NEUTROPHILS NFR BLD AUTO: 77.2 %
NITRITE UR QL STRIP.AUTO: NEGATIVE
NRBC BLD-RTO: 0 /100 WBCS (ref 0–0)
NT-PROBNP SERPL-MCNC: 3831 PG/ML (ref 0–624)
PH UR STRIP.AUTO: 8 [PH]
PLATELET # BLD AUTO: 485 X10*3/UL (ref 150–450)
POTASSIUM SERPL-SCNC: 4.1 MMOL/L (ref 3.4–5.1)
PROT SERPL-MCNC: 5.8 G/DL (ref 5.9–7.9)
PROT UR STRIP.AUTO-MCNC: ABNORMAL MG/DL
RBC # BLD AUTO: 2.52 X10*6/UL (ref 4–5.2)
RBC # UR STRIP.AUTO: NEGATIVE /UL
RBC #/AREA URNS AUTO: ABNORMAL /HPF
SODIUM SERPL-SCNC: 138 MMOL/L (ref 133–145)
SP GR UR STRIP.AUTO: 1.03
SQUAMOUS #/AREA URNS AUTO: ABNORMAL /HPF
TRANS CELLS #/AREA UR COMP ASSIST: ABNORMAL /HPF
TROPONIN T SERPL-MCNC: 87 NG/L
TROPONIN T SERPL-MCNC: 87 NG/L
UROBILINOGEN UR STRIP.AUTO-MCNC: NORMAL MG/DL
WBC # BLD AUTO: 13.1 X10*3/UL (ref 4.4–11.3)
WBC #/AREA URNS AUTO: ABNORMAL /HPF

## 2024-05-25 PROCEDURE — 76705 ECHO EXAM OF ABDOMEN: CPT | Performed by: RADIOLOGY

## 2024-05-25 PROCEDURE — 71045 X-RAY EXAM CHEST 1 VIEW: CPT

## 2024-05-25 PROCEDURE — 93005 ELECTROCARDIOGRAM TRACING: CPT

## 2024-05-25 PROCEDURE — 81001 URINALYSIS AUTO W/SCOPE: CPT

## 2024-05-25 PROCEDURE — 93010 ELECTROCARDIOGRAM REPORT: CPT | Performed by: INTERNAL MEDICINE

## 2024-05-25 PROCEDURE — P9612 CATHETERIZE FOR URINE SPEC: HCPCS

## 2024-05-25 PROCEDURE — 96365 THER/PROPH/DIAG IV INF INIT: CPT

## 2024-05-25 PROCEDURE — 96361 HYDRATE IV INFUSION ADD-ON: CPT

## 2024-05-25 PROCEDURE — 9420000001 HC RT PATIENT EDUCATION 5 MIN

## 2024-05-25 PROCEDURE — 85025 COMPLETE CBC W/AUTO DIFF WBC: CPT

## 2024-05-25 PROCEDURE — 1200000002 HC GENERAL ROOM WITH TELEMETRY DAILY

## 2024-05-25 PROCEDURE — 71045 X-RAY EXAM CHEST 1 VIEW: CPT | Performed by: RADIOLOGY

## 2024-05-25 PROCEDURE — 94762 N-INVAS EAR/PLS OXIMTRY CONT: CPT

## 2024-05-25 PROCEDURE — 83605 ASSAY OF LACTIC ACID: CPT

## 2024-05-25 PROCEDURE — 83690 ASSAY OF LIPASE: CPT

## 2024-05-25 PROCEDURE — 2500000004 HC RX 250 GENERAL PHARMACY W/ HCPCS (ALT 636 FOR OP/ED): Performed by: STUDENT IN AN ORGANIZED HEALTH CARE EDUCATION/TRAINING PROGRAM

## 2024-05-25 PROCEDURE — 76705 ECHO EXAM OF ABDOMEN: CPT

## 2024-05-25 PROCEDURE — 2500000004 HC RX 250 GENERAL PHARMACY W/ HCPCS (ALT 636 FOR OP/ED)

## 2024-05-25 PROCEDURE — 2550000001 HC RX 255 CONTRASTS: Performed by: STUDENT IN AN ORGANIZED HEALTH CARE EDUCATION/TRAINING PROGRAM

## 2024-05-25 PROCEDURE — 84484 ASSAY OF TROPONIN QUANT: CPT | Performed by: STUDENT IN AN ORGANIZED HEALTH CARE EDUCATION/TRAINING PROGRAM

## 2024-05-25 PROCEDURE — 99285 EMERGENCY DEPT VISIT HI MDM: CPT | Mod: 25

## 2024-05-25 PROCEDURE — 36415 COLL VENOUS BLD VENIPUNCTURE: CPT

## 2024-05-25 PROCEDURE — 74177 CT ABD & PELVIS W/CONTRAST: CPT | Performed by: STUDENT IN AN ORGANIZED HEALTH CARE EDUCATION/TRAINING PROGRAM

## 2024-05-25 PROCEDURE — 87040 BLOOD CULTURE FOR BACTERIA: CPT | Mod: 91,WESLAB

## 2024-05-25 PROCEDURE — 84484 ASSAY OF TROPONIN QUANT: CPT

## 2024-05-25 PROCEDURE — 80053 COMPREHEN METABOLIC PANEL: CPT

## 2024-05-25 PROCEDURE — 87086 URINE CULTURE/COLONY COUNT: CPT | Mod: WESLAB

## 2024-05-25 PROCEDURE — 74177 CT ABD & PELVIS W/CONTRAST: CPT

## 2024-05-25 PROCEDURE — 83880 ASSAY OF NATRIURETIC PEPTIDE: CPT

## 2024-05-25 PROCEDURE — 2500000004 HC RX 250 GENERAL PHARMACY W/ HCPCS (ALT 636 FOR OP/ED): Performed by: INTERNAL MEDICINE

## 2024-05-25 PROCEDURE — 2500000001 HC RX 250 WO HCPCS SELF ADMINISTERED DRUGS (ALT 637 FOR MEDICARE OP): Performed by: INTERNAL MEDICINE

## 2024-05-25 RX ORDER — GUAIFENESIN 600 MG/1
600 TABLET, EXTENDED RELEASE ORAL EVERY 12 HOURS PRN
Status: DISCONTINUED | OUTPATIENT
Start: 2024-05-25 | End: 2024-05-29 | Stop reason: HOSPADM

## 2024-05-25 RX ORDER — FERROUS SULFATE 325(65) MG
65 TABLET ORAL
Status: DISCONTINUED | OUTPATIENT
Start: 2024-05-26 | End: 2024-05-29 | Stop reason: HOSPADM

## 2024-05-25 RX ORDER — POLYETHYLENE GLYCOL 3350 17 G/17G
17 POWDER, FOR SOLUTION ORAL DAILY PRN
Status: DISCONTINUED | OUTPATIENT
Start: 2024-05-25 | End: 2024-05-29 | Stop reason: HOSPADM

## 2024-05-25 RX ORDER — ASCORBIC ACID 500 MG
500 TABLET ORAL DAILY
Status: DISCONTINUED | OUTPATIENT
Start: 2024-05-25 | End: 2024-05-29 | Stop reason: HOSPADM

## 2024-05-25 RX ORDER — ACETAMINOPHEN 325 MG/1
650 TABLET ORAL EVERY 8 HOURS
Status: DISCONTINUED | OUTPATIENT
Start: 2024-05-25 | End: 2024-05-29 | Stop reason: HOSPADM

## 2024-05-25 RX ORDER — GUAIFENESIN/DEXTROMETHORPHAN 100-10MG/5
5 SYRUP ORAL EVERY 4 HOURS PRN
Status: DISCONTINUED | OUTPATIENT
Start: 2024-05-25 | End: 2024-05-29 | Stop reason: HOSPADM

## 2024-05-25 RX ORDER — ATORVASTATIN CALCIUM 40 MG/1
40 TABLET, FILM COATED ORAL NIGHTLY
Status: DISCONTINUED | OUTPATIENT
Start: 2024-05-25 | End: 2024-05-29 | Stop reason: HOSPADM

## 2024-05-25 RX ORDER — ACETAMINOPHEN 650 MG/1
650 SUPPOSITORY RECTAL EVERY 4 HOURS PRN
Status: DISCONTINUED | OUTPATIENT
Start: 2024-05-25 | End: 2024-05-29 | Stop reason: HOSPADM

## 2024-05-25 RX ORDER — L. ACIDOPHILUS/L.BULGARICUS 1MM CELL
1 TABLET ORAL 2 TIMES DAILY
Status: DISCONTINUED | OUTPATIENT
Start: 2024-05-25 | End: 2024-05-29 | Stop reason: HOSPADM

## 2024-05-25 RX ORDER — ACETAMINOPHEN 160 MG/5ML
650 SOLUTION ORAL EVERY 4 HOURS PRN
Status: DISCONTINUED | OUTPATIENT
Start: 2024-05-25 | End: 2024-05-29 | Stop reason: HOSPADM

## 2024-05-25 RX ORDER — VANCOMYCIN HYDROCHLORIDE 125 MG/1
125 CAPSULE ORAL EVERY 12 HOURS
Status: DISCONTINUED | OUTPATIENT
Start: 2024-05-25 | End: 2024-05-29

## 2024-05-25 RX ORDER — CLOPIDOGREL BISULFATE 75 MG/1
75 TABLET ORAL DAILY
Status: DISCONTINUED | OUTPATIENT
Start: 2024-05-26 | End: 2024-05-29 | Stop reason: HOSPADM

## 2024-05-25 RX ORDER — ONDANSETRON 4 MG/1
4 TABLET, FILM COATED ORAL EVERY 6 HOURS PRN
Status: DISCONTINUED | OUTPATIENT
Start: 2024-05-25 | End: 2024-05-29 | Stop reason: HOSPADM

## 2024-05-25 RX ORDER — ASPIRIN 325 MG
50000 TABLET, DELAYED RELEASE (ENTERIC COATED) ORAL
Status: DISCONTINUED | OUTPATIENT
Start: 2024-05-26 | End: 2024-05-29 | Stop reason: HOSPADM

## 2024-05-25 RX ORDER — ACETAMINOPHEN 325 MG/1
650 TABLET ORAL EVERY 4 HOURS PRN
Status: DISCONTINUED | OUTPATIENT
Start: 2024-05-25 | End: 2024-05-29 | Stop reason: HOSPADM

## 2024-05-25 RX ORDER — METOPROLOL TARTRATE 50 MG/1
50 TABLET ORAL 2 TIMES DAILY
Status: DISCONTINUED | OUTPATIENT
Start: 2024-05-25 | End: 2024-05-29 | Stop reason: HOSPADM

## 2024-05-25 RX ORDER — NAPROXEN SODIUM 220 MG/1
81 TABLET, FILM COATED ORAL DAILY
Status: DISCONTINUED | OUTPATIENT
Start: 2024-05-25 | End: 2024-05-29 | Stop reason: HOSPADM

## 2024-05-25 RX ORDER — HEPARIN SODIUM 5000 [USP'U]/ML
5000 INJECTION, SOLUTION INTRAVENOUS; SUBCUTANEOUS EVERY 8 HOURS SCHEDULED
Status: DISCONTINUED | OUTPATIENT
Start: 2024-05-25 | End: 2024-05-29 | Stop reason: HOSPADM

## 2024-05-25 RX ADMIN — OXYCODONE HYDROCHLORIDE AND ACETAMINOPHEN 500 MG: 500 TABLET ORAL at 23:08

## 2024-05-25 RX ADMIN — PIPERACILLIN SODIUM AND TAZOBACTAM SODIUM 3.38 G: 3; .375 INJECTION, SOLUTION INTRAVENOUS at 23:13

## 2024-05-25 RX ADMIN — ASPIRIN 81 MG CHEWABLE TABLET 81 MG: 81 TABLET CHEWABLE at 23:08

## 2024-05-25 RX ADMIN — PIPERACILLIN SODIUM AND TAZOBACTAM SODIUM 3.38 G: 3; .375 INJECTION, SOLUTION INTRAVENOUS at 19:11

## 2024-05-25 RX ADMIN — Medication 1 TABLET: at 23:08

## 2024-05-25 RX ADMIN — ACETAMINOPHEN 650 MG: 325 TABLET ORAL at 18:45

## 2024-05-25 RX ADMIN — METOPROLOL TARTRATE 50 MG: 50 TABLET, FILM COATED ORAL at 23:08

## 2024-05-25 RX ADMIN — IOHEXOL 75 ML: 350 INJECTION, SOLUTION INTRAVENOUS at 10:52

## 2024-05-25 RX ADMIN — ACETAMINOPHEN 650 MG: 160 SOLUTION ORAL at 23:07

## 2024-05-25 RX ADMIN — HEPARIN SODIUM 5000 UNITS: 5000 INJECTION, SOLUTION INTRAVENOUS; SUBCUTANEOUS at 23:08

## 2024-05-25 RX ADMIN — SODIUM CHLORIDE 1905 ML: 900 INJECTION, SOLUTION INTRAVENOUS at 09:48

## 2024-05-25 RX ADMIN — PIPERACILLIN SODIUM AND TAZOBACTAM SODIUM 3.38 G: 3; .375 INJECTION, SOLUTION INTRAVENOUS at 10:27

## 2024-05-25 RX ADMIN — ATORVASTATIN CALCIUM 40 MG: 40 TABLET, FILM COATED ORAL at 23:08

## 2024-05-25 SDOH — SOCIAL STABILITY: SOCIAL INSECURITY: HAVE YOU HAD ANY THOUGHTS OF HARMING ANYONE ELSE?: NO

## 2024-05-25 SDOH — SOCIAL STABILITY: SOCIAL INSECURITY: ARE THERE ANY APPARENT SIGNS OF INJURIES/BEHAVIORS THAT COULD BE RELATED TO ABUSE/NEGLECT?: NO

## 2024-05-25 SDOH — SOCIAL STABILITY: SOCIAL INSECURITY: DO YOU FEEL UNSAFE GOING BACK TO THE PLACE WHERE YOU ARE LIVING?: NO

## 2024-05-25 SDOH — SOCIAL STABILITY: SOCIAL INSECURITY: DOES ANYONE TRY TO KEEP YOU FROM HAVING/CONTACTING OTHER FRIENDS OR DOING THINGS OUTSIDE YOUR HOME?: NO

## 2024-05-25 SDOH — SOCIAL STABILITY: SOCIAL INSECURITY: ARE YOU OR HAVE YOU BEEN THREATENED OR ABUSED PHYSICALLY, EMOTIONALLY, OR SEXUALLY BY ANYONE?: NO

## 2024-05-25 SDOH — SOCIAL STABILITY: SOCIAL INSECURITY: HAS ANYONE EVER THREATENED TO HURT YOUR FAMILY OR YOUR PETS?: NO

## 2024-05-25 SDOH — SOCIAL STABILITY: SOCIAL INSECURITY: WERE YOU ABLE TO COMPLETE ALL THE BEHAVIORAL HEALTH SCREENINGS?: YES

## 2024-05-25 SDOH — SOCIAL STABILITY: SOCIAL INSECURITY: ABUSE: ADULT

## 2024-05-25 SDOH — SOCIAL STABILITY: SOCIAL INSECURITY: DO YOU FEEL ANYONE HAS EXPLOITED OR TAKEN ADVANTAGE OF YOU FINANCIALLY OR OF YOUR PERSONAL PROPERTY?: NO

## 2024-05-25 SDOH — SOCIAL STABILITY: SOCIAL INSECURITY: HAVE YOU HAD THOUGHTS OF HARMING ANYONE ELSE?: NO

## 2024-05-25 ASSESSMENT — PATIENT HEALTH QUESTIONNAIRE - PHQ9
1. LITTLE INTEREST OR PLEASURE IN DOING THINGS: NOT AT ALL
2. FEELING DOWN, DEPRESSED OR HOPELESS: NOT AT ALL
SUM OF ALL RESPONSES TO PHQ9 QUESTIONS 1 & 2: 0

## 2024-05-25 ASSESSMENT — COGNITIVE AND FUNCTIONAL STATUS - GENERAL
DRESSING REGULAR LOWER BODY CLOTHING: TOTAL
EATING MEALS: TOTAL
HELP NEEDED FOR BATHING: TOTAL
MOVING FROM LYING ON BACK TO SITTING ON SIDE OF FLAT BED WITH BEDRAILS: TOTAL
PERSONAL GROOMING: TOTAL
TURNING FROM BACK TO SIDE WHILE IN FLAT BAD: TOTAL
PATIENT BASELINE BEDBOUND: UNABLE TO ASSESS AT THIS TIME
WALKING IN HOSPITAL ROOM: TOTAL
STANDING UP FROM CHAIR USING ARMS: TOTAL
DAILY ACTIVITIY SCORE: 6
MOBILITY SCORE: 6
DRESSING REGULAR UPPER BODY CLOTHING: TOTAL
MOVING TO AND FROM BED TO CHAIR: TOTAL
CLIMB 3 TO 5 STEPS WITH RAILING: TOTAL
TOILETING: TOTAL

## 2024-05-25 ASSESSMENT — ACTIVITIES OF DAILY LIVING (ADL)
PATIENT'S MEMORY ADEQUATE TO SAFELY COMPLETE DAILY ACTIVITIES?: NO
GROOMING: DEPENDENT
ASSISTIVE_DEVICE: EYEGLASSES
DRESSING YOURSELF: DEPENDENT
BATHING: DEPENDENT
LACK_OF_TRANSPORTATION: PATIENT UNABLE TO ANSWER
TOILETING: DEPENDENT
HEARING - LEFT EAR: FUNCTIONAL
HEARING - RIGHT EAR: FUNCTIONAL
JUDGMENT_ADEQUATE_SAFELY_COMPLETE_DAILY_ACTIVITIES: NO
FEEDING YOURSELF: DEPENDENT
WALKS IN HOME: DEPENDENT
ADEQUATE_TO_COMPLETE_ADL: YES

## 2024-05-25 ASSESSMENT — PAIN SCALES - GENERAL: PAINLEVEL_OUTOF10: 0 - NO PAIN

## 2024-05-25 ASSESSMENT — LIFESTYLE VARIABLES
TOTAL SCORE: 0
HOW OFTEN DO YOU HAVE 6 OR MORE DRINKS ON ONE OCCASION: NEVER
EVER FELT BAD OR GUILTY ABOUT YOUR DRINKING: NO
SKIP TO QUESTIONS 9-10: 1
HOW OFTEN DURING THE LAST YEAR HAVE YOU FOUND THAT YOU WERE NOT ABLE TO STOP DRINKING ONCE YOU HAD STARTED: NEVER
HAVE PEOPLE ANNOYED YOU BY CRITICIZING YOUR DRINKING: NO
HOW OFTEN DURING THE LAST YEAR HAVE YOU BEEN UNABLE TO REMEMBER WHAT HAPPENED THE NIGHT BEFORE BECAUSE YOU HAD BEEN DRINKING: NEVER
HAS A RELATIVE, FRIEND, DOCTOR, OR ANOTHER HEALTH PROFESSIONAL EXPRESSED CONCERN ABOUT YOUR DRINKING OR SUGGESTED YOU CUT DOWN: NO
HOW OFTEN DURING THE LAST YEAR HAVE YOU HAD A FEELING OF GUILT OR REMORSE AFTER DRINKING: NEVER
AUDIT-C TOTAL SCORE: 4
HAVE YOU EVER FELT YOU SHOULD CUT DOWN ON YOUR DRINKING: NO
EVER HAD A DRINK FIRST THING IN THE MORNING TO STEADY YOUR NERVES TO GET RID OF A HANGOVER: NO
HOW MANY STANDARD DRINKS CONTAINING ALCOHOL DO YOU HAVE ON A TYPICAL DAY: 1 OR 2
HAVE YOU OR SOMEONE ELSE BEEN INJURED AS A RESULT OF YOUR DRINKING: NO
AUDIT TOTAL SCORE: 4
AUDIT TOTAL SCORE: 0
AUDIT-C TOTAL SCORE: 4
HOW OFTEN DURING THE LAST YEAR HAVE YOU FAILED TO DO WHAT WAS NORMALLY EXPECTED FROM YOU BECAUSE OF DRINKING: NEVER
HOW OFTEN DO YOU HAVE A DRINK CONTAINING ALCOHOL: 4 OR MORE TIMES A WEEK
HOW OFTEN DURING THE LAST YEAR HAVE YOU NEEDED AN ALCOHOLIC DRINK FIRST THING IN THE MORNING TO GET YOURSELF GOING AFTER A NIGHT OF HEAVY DRINKING: NEVER

## 2024-05-25 ASSESSMENT — COLUMBIA-SUICIDE SEVERITY RATING SCALE - C-SSRS
1. IN THE PAST MONTH, HAVE YOU WISHED YOU WERE DEAD OR WISHED YOU COULD GO TO SLEEP AND NOT WAKE UP?: NO
2. HAVE YOU ACTUALLY HAD ANY THOUGHTS OF KILLING YOURSELF?: NO
6. HAVE YOU EVER DONE ANYTHING, STARTED TO DO ANYTHING, OR PREPARED TO DO ANYTHING TO END YOUR LIFE?: NO

## 2024-05-25 ASSESSMENT — PAIN - FUNCTIONAL ASSESSMENT: PAIN_FUNCTIONAL_ASSESSMENT: 0-10

## 2024-05-25 NOTE — ED TRIAGE NOTES
Patient arrives from [facility] with c/o Failure to Thrive per [facility]. Patient is a Full Code. Patient Tachy at 127 BPM and Temp of 100.3f during triage. Patient Alert and oriented to self. RR e/unlabored, skin warm and dry.

## 2024-05-25 NOTE — ED PROVIDER NOTES
Supervisory note:  Patient seen in conjunction with MONTRELL Mcrae.  Patient presents from nursing home after an episode in which she was nearly unresponsive.  On arrival to the emergency department, patient is not able to significantly contribute to history.  Her  presents and states that while he was with her, patient was more responsive.  She was noted to be febrile and significantly tachycardic.  Patient was just released from the hospital after having been admitted with sepsis.  On examination, heart sounds are tachycardic.  Lungs unremarkable.  Abdomen without significant tenderness to palpation.  Patient easily awakens to voice.  Laboratory studies with elevated but stable troponin.  Lipase significantly elevated.  LFTs mildly elevated.  CT abdomen with questionable gallbladder findings as well as possible pancreatitis.  Ultrasound of gallbladder not consistent with cholecystitis.  Patient given broad-spectrum antibiotics, however was only given limited fluid bolus in setting of fluid around lungs with elevated BNP and troponin.  Concern for heart failure/fluid overload.  Patient accepted to primary care physician's service for further management.  I personally saw the patient and made/approved the management plan and take responsiblity for the patient management.  Parts of this chart were completed with dictation software, please excuse any errors in transcription.     Satinder Batista MD  05/25/24 2537

## 2024-05-25 NOTE — ED PROVIDER NOTES
HPI   Chief Complaint   Patient presents with    Weakness, Gen       HPI  Patient is a 78-year-old female with history of failure to thrive, dysphagia, C. difficile, CVA, CKD, hyperlipidemia sent to ER by her nursing facility for generalized weakness as well as high heart rate.  Patient was just discharged from the hospital yesterday for similar complaints.  Patient's  is at bedside and says that after being discharged yesterday the patient was very with it and moving all extremities thrashing around the bed but today the nursing facility was concerned that she was weaker and a little bit confused.  They sent her to ER for evaluation.  Patient has also been treated for C. difficile with oral vancomycin and  states that the patient is still having some bouts of diarrhea.  Patient has no acute complaints at this time.  She denies chest pain, shortness of breath, abdominal pain, headache.               Sanger Coma Scale Score: 15                     Patient History   Past Medical History:   Diagnosis Date    Hypertension      Past Surgical History:   Procedure Laterality Date    AORTIC VALVE REPLACEMENT  02/11/2014    Aortic Valve Replacement    BREAST LUMPECTOMY  02/11/2014    Breast Surgery Lumpectomy    CERVICAL DISCECTOMY  02/11/2014    Spinal Diskectomy Cervical    HYSTERECTOMY  02/11/2014    Hysterectomy    OTHER SURGICAL HISTORY  02/11/2014    Aortic Coarctation Repair     No family history on file.  Social History     Tobacco Use    Smoking status: Former     Types: Cigarettes    Smokeless tobacco: Never   Vaping Use    Vaping status: Never Used   Substance Use Topics    Alcohol use: Yes    Drug use: Never       Physical Exam   ED Triage Vitals [05/25/24 0854]   Temperature Heart Rate Respirations BP   (!) 37.9 °C (100.3 °F) (!) 127 16 137/74      Pulse Ox Temp Source Heart Rate Source Patient Position   94 % Axillary Monitor Lying      BP Location FiO2 (%)     Left arm --       Physical  Exam  Vitals and nursing note reviewed.   Constitutional:       General: She is not in acute distress.     Appearance: She is well-developed.   HENT:      Head: Normocephalic and atraumatic.      Mouth/Throat:      Mouth: Mucous membranes are dry.   Eyes:      Conjunctiva/sclera: Conjunctivae normal.   Cardiovascular:      Rate and Rhythm: Regular rhythm. Tachycardia present.      Heart sounds: No murmur heard.  Pulmonary:      Comments: In no respiratory distress.  Bilateral breath sounds with Rales at the bases.  Abdominal:      Palpations: Abdomen is soft.      Tenderness: There is no abdominal tenderness.   Musculoskeletal:         General: No swelling.      Cervical back: Neck supple.   Skin:     General: Skin is warm and dry.      Capillary Refill: Capillary refill takes less than 2 seconds.   Neurological:      General: No focal deficit present.      Mental Status: She is alert.      Comments: Awake and easily arousable but only oriented to person.  Generalized weakness of all extremities.  No focal deficit or sensory deficit.   Psychiatric:         Mood and Affect: Mood normal.         ED Course & MDM   ED Course as of 05/25/24 2258   Sat May 25, 2024   0909 EKG interpretation: Sinus tachycardia, rate 124.  Rightward axis.  No ST or T wave abnormalities. [ML]   1022 Initially started full septic 30 mL/kg fluid bolus but given the patient's elevated BNP and pulmonary vascular congestion on chest x-ray there is concern for fluid overload so only given 500 cc. [JJ]      ED Course User Index  [JJ] Crissy Hernandez PA-C  [ML] Satinder Batista MD         Diagnoses as of 05/25/24 2258   Generalized weakness   Sepsis, due to unspecified organism, unspecified whether acute organ dysfunction present (Multi)       Medical Decision Making  Parts of this chart have been completed using voice recognition software. Please excuse any errors of transcription.  My thought process and reason for plan has been formulated from the  time that I saw the patient until the time of disposition and is not specific to one specific moment during their visit and furthermore my MDM encompasses this entire chart and not only this text box.      HPI: Detailed above.    Exam: A medically appropriate exam performed, outlined above, given the known history and presentation.    History obtained from: Nursing facility,     EKG: Interpreted by attending physician and reviewed by me    Social Determinants of Health considered during this visit: Resides at nursing facility    Medications given during visit:  Medications   acetaminophen (Tylenol) suppository 650 mg (has no administration in time range)   aspirin chewable tablet 81 mg (has no administration in time range)   ferrous sulfate (325 mg ferrous sulfate) tablet 1 tablet (has no administration in time range)   lactobacillus acidophilus tablet 1 tablet (has no administration in time range)   ondansetron (Zofran) tablet 4 mg (has no administration in time range)   vancomycin (Vancocin) capsule 125 mg (has no administration in time range)   clopidogrel (Plavix) tablet 75 mg (has no administration in time range)   cholecalciferol (Vitamin D-3) capsule 50,000 Units (has no administration in time range)   metoprolol tartrate (Lopressor) tablet 50 mg (has no administration in time range)   polyethylene glycol (Glycolax, Miralax) packet 17 g (has no administration in time range)   atorvastatin (Lipitor) tablet 40 mg (has no administration in time range)   ascorbic acid (Vitamin C) tablet 500 mg (has no administration in time range)   acetaminophen (Tylenol) tablet 650 mg (has no administration in time range)   acetaminophen (Tylenol) tablet 650 mg (has no administration in time range)     Or   acetaminophen (Tylenol) oral liquid 650 mg (has no administration in time range)     Or   acetaminophen (Tylenol) suppository 650 mg (has no administration in time range)   polyethylene glycol (Glycolax, Miralax)  packet 17 g (has no administration in time range)   benzocaine-menthol (Cepastat Sore Throat) lozenge 1 lozenge (has no administration in time range)   dextromethorphan-guaifenesin (Robitussin DM)  mg/5 mL oral liquid 5 mL (has no administration in time range)   guaiFENesin (Mucinex) 12 hr tablet 600 mg (has no administration in time range)   heparin (porcine) injection 5,000 Units (has no administration in time range)   piperacillin-tazobactam-dextrose (Zosyn) IV 3.375 g (0 g intravenous Stopped 5/25/24 1941)   sodium chloride 0.9 % bolus 1,905 mL (0 mL intravenous Stopped 5/25/24 1026)   piperacillin-tazobactam-dextrose (Zosyn) IV 3.375 g (0 g intravenous Stopped 5/25/24 1057)   iohexol (OMNIPaque) 350 mg iodine/mL solution 75 mL (75 mL intravenous Given 5/25/24 1052)        Diagnostic/tests  Labs Reviewed   CBC WITH AUTO DIFFERENTIAL - Abnormal       Result Value    WBC 13.1 (*)     nRBC 0.0      RBC 2.52 (*)     Hemoglobin 7.7 (*)     Hematocrit 24.5 (*)     MCV 97      MCH 30.6      MCHC 31.4 (*)     RDW 15.0 (*)     Platelets 485 (*)     Neutrophils % 77.2      Immature Granulocytes %, Automated 0.8      Lymphocytes % 13.8      Monocytes % 5.4      Eosinophils % 2.4      Basophils % 0.4      Neutrophils Absolute 10.08 (*)     Immature Granulocytes Absolute, Automated 0.11      Lymphocytes Absolute 1.80      Monocytes Absolute 0.71      Eosinophils Absolute 0.31      Basophils Absolute 0.05     COMPREHENSIVE METABOLIC PANEL - Abnormal    Glucose 121 (*)     Sodium 138      Potassium 4.1      Chloride 100      Bicarbonate 27      Urea Nitrogen 35 (*)     Creatinine 0.50      eGFR >90      Calcium 8.3 (*)     Albumin 2.2 (*)     Alkaline Phosphatase 218 (*)     Total Protein 5.8 (*)     AST 88 (*)     Bilirubin, Total <0.2      ALT 83 (*)     Anion Gap 11     LIPASE - Abnormal    Lipase 122 (*)    URINALYSIS WITH REFLEX CULTURE AND MICROSCOPIC - Abnormal    Color, Urine Light-Yellow      Appearance, Urine  Turbid (*)     Specific Gravity, Urine 1.029      pH, Urine 8.0      Protein, Urine 10 (TRACE)      Glucose, Urine Normal      Blood, Urine NEGATIVE      Ketones, Urine NEGATIVE      Bilirubin, Urine NEGATIVE      Urobilinogen, Urine Normal      Nitrite, Urine NEGATIVE      Leukocyte Esterase, Urine 75 Jeremy/µL (*)    N-TERMINAL PROBNP - Abnormal    PROBNP 3,831 (*)     Narrative:     Reference ranges are based on clinical submission data. These ranges represent the 95th percentile of normal cut-off points. As NT Pro- BNP values approach 1000 pg/ml, clinical symptoms are more likely associated with CHF.   SERIAL TROPONIN, INITIAL ([facility]) - Abnormal    Troponin T, High Sensitivity 87 (*)    MICROSCOPIC ONLY, URINE - Abnormal    WBC, Urine 6-10 (*)     RBC, Urine NONE      Squamous Epithelial Cells, Urine 26-50 (1+)      Transitional Epithelial Cells, Urine 1-2 (FEW)     TROPONIN T, HIGH SENSITIVITY - Abnormal    Troponin T, High Sensitivity 87 (*)    BLOOD CULTURE - Normal    Blood Culture Loaded on Instrument - Culture in progress     BLOOD CULTURE - Normal    Blood Culture Loaded on Instrument - Culture in progress     BLOOD GAS LACTIC ACID, VENOUS - Normal    POCT Lactate, Venous 1.7     URINE CULTURE   URINALYSIS WITH REFLEX CULTURE AND MICROSCOPIC    Narrative:     The following orders were created for panel order Urinalysis with Reflex Culture and Microscopic.  Procedure                               Abnormality         Status                     ---------                               -----------         ------                     Urinalysis with Reflex C...[[id number]]  Abnormal            Final result               Extra Urine Gray Tube[[id number]]                            Final result                 Please view results for these tests on the individual orders.   EXTRA URINE GRAY TUBE    Extra Tube Hold for add-ons.     TROPONIN T SERIES, HIGH SENSITIVITY (0, 2 HR, 6 HR)    Narrative:     The following orders  were created for panel order Troponin T Series, High Sensitivity (0, 2HR, 6HR).  Procedure                               Abnormality         Status                     ---------                               -----------         ------                     Serial Troponin, Initial...[[id number]]  Abnormal            Final result                 Please view results for these tests on the individual orders.   URINALYSIS WITH REFLEX MICROSCOPIC   COMPREHENSIVE METABOLIC PANEL   CBC      US gallbladder   Final Result   Cholelithiasis without evidence of acute cholecystitis.        MACRO:   None             Signed by: Anjum Banks 5/25/2024 1:48 PM   Dictation workstation:   [id number]      CT abdomen pelvis w IV contrast   Final Result   1.  Questionable subtle edematous thickening of the head and uncinate   process of pancreas without significant peripancreatic fat stranding.   This is a nonspecific finding and very early pancreatitis can be   considered in the differential. No definite peripancreatic fluid   collections.   2. Cholelithiasis with moderately distended gallbladder. There is   mild edematous thickening of the gallbladder wall with small amount   of pericholecystic fluid. While these changes could be reactive   secondary to acute pancreatitis versus hepatic parenchymal disease,   acute cholecystitis can be considered in the differential. Recommend   clinical and laboratory correlation for concerns of acute   cholecystitis. A gallbladder ultrasound can be performed for further   evaluation if clinically warranted.   3. Hepatomegaly with heterogeneous enhancement of hepatic parenchyma.   Recommend clinical correlation for hepatic parenchymal disease.   4. Small volume bilateral pleural effusions and mild body wall edema.   Recommend correlation with fluid status.   5. Extensive osseous demineralization.        MACRO:   None        Signed by: Carolynn Blanton 5/25/2024 11:38 AM   Dictation workstation:    [id number]      XR chest 1 view   Final Result   No change compared to 2 days ago. Pulmonary vascular congestion,   pleural effusions, and bibasilar opacities persist.        MACRO:   None        Signed by: Iglesia Angelian 5/25/2024 10:06 AM   Dictation workstation:   [id number]           Considerations/further MDM:  78-year-old female brought in from nursing facility for evaluation of generalized weakness.  On arrival to the ER the patient is awake and alert but not able to significantly to contribute to history.  Patient's vital signs were significant for fever and significant tachycardia.  On exam, the patient is tachycardic.  She does not appear to have any evidence of airway compromise or respiratory distress.  Abdomen is without significant tenderness to palpation.  Lung sounds are unremarkable.  Patient easily awakens to voice.  Given the patient's vital signs and clinical presentation, septic workup was initiated and patient was started on 30 ml per kilogram fluid bolus as well as broad-spectrum antibiotics.  Laboratory workup with elevated lipase and transaminitis.  Cardiac enzymes elevated but stable upon repeat.  Chest x-ray with pulmonary vascular congestion and BNP elevated thus for concerns of fluid overload IV fluid bolus was now completed and the patient received 500 cc.  CT abdomen with questionable gallbladder findings as well as mild or early pancreatitis.  Ultrasound of the gallbladder was performed without evidence of acute cholecystitis.  Patient was admitted to Dr. Denise for the management of her sepsis of unknown origin and congestive heart failure.  She remained stable during the ER visit.  Plan of care was discussed with the  who was agreeable.  I saw this patient in conjunction with Dr. Batista.  Please refer to his note for additional details regarding patient case.      Procedure  Critical Care    Performed by: Crissy Hernandez PA-C  Authorized by: Satinder Batista MD    Critical care  provider statement:     Critical care time (minutes):  25    Critical care time was exclusive of:  Separately billable procedures and treating other patients    Critical care was necessary to treat or prevent imminent or life-threatening deterioration of the following conditions:  Sepsis    Critical care was time spent personally by me on the following activities:  Development of treatment plan with patient or surrogate, examination of patient, obtaining history from patient or surrogate, ordering and performing treatments and interventions, ordering and review of laboratory studies, ordering and review of radiographic studies, review of old charts and re-evaluation of patient's condition    Care discussed with: admitting provider         Crissy Hernandez PA-C  05/25/24 7169

## 2024-05-25 NOTE — PROGRESS NOTES
Rebecca Samaniego is a 78 y.o. female on day 0 of admission presenting with Generalized weakness.  Patient also with sepsis.     Patient is a readmit.   She was hospitalized at [facility] 05/09 - 05/24, discharged to [facility] where she has been since prior admission with dc 04/16/2024.   Patient is A&Ox1/self. RNCC spoke with patient's spouse Kaleb. He had questions about hospice, RNCC explained the process. He wants to know if her tube feeds will stop. This writer did not know the answer, have thus far been unable to get the answer, called [facility], person who answered was not 100% sure. Told patient's spouse this writer will get back to him. He does not want to proceed if she will not receive nutrition.       Kylie Fritz RN

## 2024-05-26 LAB
ALBUMIN SERPL-MCNC: 2 G/DL (ref 3.5–5)
ALP BLD-CCNC: 188 U/L (ref 35–125)
ALT SERPL-CCNC: 65 U/L (ref 5–40)
ANION GAP SERPL CALC-SCNC: 10 MMOL/L
APPEARANCE UR: CLEAR
AST SERPL-CCNC: 63 U/L (ref 5–40)
BILIRUB SERPL-MCNC: 0.2 MG/DL (ref 0.1–1.2)
BILIRUB UR STRIP.AUTO-MCNC: NEGATIVE MG/DL
BUN SERPL-MCNC: 28 MG/DL (ref 8–25)
CALCIUM SERPL-MCNC: 8.4 MG/DL (ref 8.5–10.4)
CHLORIDE SERPL-SCNC: 98 MMOL/L (ref 97–107)
CO2 SERPL-SCNC: 26 MMOL/L (ref 24–31)
COLOR UR: ABNORMAL
CREAT SERPL-MCNC: 0.6 MG/DL (ref 0.4–1.6)
EGFRCR SERPLBLD CKD-EPI 2021: >90 ML/MIN/1.73M*2
ERYTHROCYTE [DISTWIDTH] IN BLOOD BY AUTOMATED COUNT: 15 % (ref 11.5–14.5)
GLUCOSE SERPL-MCNC: 84 MG/DL (ref 65–99)
GLUCOSE UR STRIP.AUTO-MCNC: NORMAL MG/DL
HCT VFR BLD AUTO: 24.7 % (ref 36–46)
HGB BLD-MCNC: 7.5 G/DL (ref 12–16)
HOLD SPECIMEN: NORMAL
HOLD SPECIMEN: NORMAL
KETONES UR STRIP.AUTO-MCNC: NEGATIVE MG/DL
LEUKOCYTE ESTERASE UR QL STRIP.AUTO: ABNORMAL
MCH RBC QN AUTO: 29.8 PG (ref 26–34)
MCHC RBC AUTO-ENTMCNC: 30.4 G/DL (ref 32–36)
MCV RBC AUTO: 98 FL (ref 80–100)
NITRITE UR QL STRIP.AUTO: NEGATIVE
NRBC BLD-RTO: 0 /100 WBCS (ref 0–0)
PH UR STRIP.AUTO: 7.5 [PH]
PLATELET # BLD AUTO: 467 X10*3/UL (ref 150–450)
POTASSIUM SERPL-SCNC: 4 MMOL/L (ref 3.4–5.1)
PROT SERPL-MCNC: 5.5 G/DL (ref 5.9–7.9)
PROT UR STRIP.AUTO-MCNC: ABNORMAL MG/DL
RBC # BLD AUTO: 2.52 X10*6/UL (ref 4–5.2)
RBC # UR STRIP.AUTO: NEGATIVE /UL
RBC #/AREA URNS AUTO: ABNORMAL /HPF
SODIUM SERPL-SCNC: 134 MMOL/L (ref 133–145)
SP GR UR STRIP.AUTO: 1.03
SQUAMOUS #/AREA URNS AUTO: ABNORMAL /HPF
UROBILINOGEN UR STRIP.AUTO-MCNC: NORMAL MG/DL
WBC # BLD AUTO: 10.7 X10*3/UL (ref 4.4–11.3)
WBC #/AREA URNS AUTO: ABNORMAL /HPF

## 2024-05-26 PROCEDURE — 36415 COLL VENOUS BLD VENIPUNCTURE: CPT | Performed by: INTERNAL MEDICINE

## 2024-05-26 PROCEDURE — 84075 ASSAY ALKALINE PHOSPHATASE: CPT | Performed by: INTERNAL MEDICINE

## 2024-05-26 PROCEDURE — 2500000002 HC RX 250 W HCPCS SELF ADMINISTERED DRUGS (ALT 637 FOR MEDICARE OP, ALT 636 FOR OP/ED): Performed by: INTERNAL MEDICINE

## 2024-05-26 PROCEDURE — 1200000002 HC GENERAL ROOM WITH TELEMETRY DAILY

## 2024-05-26 PROCEDURE — 2500000004 HC RX 250 GENERAL PHARMACY W/ HCPCS (ALT 636 FOR OP/ED): Performed by: INTERNAL MEDICINE

## 2024-05-26 PROCEDURE — 85027 COMPLETE CBC AUTOMATED: CPT | Performed by: INTERNAL MEDICINE

## 2024-05-26 PROCEDURE — 2500000001 HC RX 250 WO HCPCS SELF ADMINISTERED DRUGS (ALT 637 FOR MEDICARE OP): Performed by: INTERNAL MEDICINE

## 2024-05-26 PROCEDURE — 81003 URINALYSIS AUTO W/O SCOPE: CPT | Performed by: INTERNAL MEDICINE

## 2024-05-26 RX ADMIN — ACETAMINOPHEN 650 MG: 325 TABLET ORAL at 09:52

## 2024-05-26 RX ADMIN — OXYCODONE HYDROCHLORIDE AND ACETAMINOPHEN 500 MG: 500 TABLET ORAL at 09:53

## 2024-05-26 RX ADMIN — FERROUS SULFATE TAB 325 MG (65 MG ELEMENTAL FE) 1 TABLET: 325 (65 FE) TAB at 16:35

## 2024-05-26 RX ADMIN — HEPARIN SODIUM 5000 UNITS: 5000 INJECTION, SOLUTION INTRAVENOUS; SUBCUTANEOUS at 14:00

## 2024-05-26 RX ADMIN — ACETAMINOPHEN 650 MG: 325 TABLET ORAL at 16:34

## 2024-05-26 RX ADMIN — ATORVASTATIN CALCIUM 40 MG: 40 TABLET, FILM COATED ORAL at 21:15

## 2024-05-26 RX ADMIN — VANCOMYCIN HYDROCHLORIDE 125 MG: 125 CAPSULE ORAL at 00:12

## 2024-05-26 RX ADMIN — Medication 1 TABLET: at 21:15

## 2024-05-26 RX ADMIN — PIPERACILLIN SODIUM AND TAZOBACTAM SODIUM 3.38 G: 3; .375 INJECTION, SOLUTION INTRAVENOUS at 06:13

## 2024-05-26 RX ADMIN — Medication 1 TABLET: at 09:52

## 2024-05-26 RX ADMIN — ASPIRIN 81 MG CHEWABLE TABLET 81 MG: 81 TABLET CHEWABLE at 09:52

## 2024-05-26 RX ADMIN — ACETAMINOPHEN 650 MG: 325 TABLET ORAL at 03:03

## 2024-05-26 RX ADMIN — METOPROLOL TARTRATE 50 MG: 50 TABLET, FILM COATED ORAL at 09:53

## 2024-05-26 RX ADMIN — CLOPIDOGREL BISULFATE 75 MG: 75 TABLET ORAL at 09:53

## 2024-05-26 RX ADMIN — HEPARIN SODIUM 5000 UNITS: 5000 INJECTION, SOLUTION INTRAVENOUS; SUBCUTANEOUS at 06:13

## 2024-05-26 RX ADMIN — VANCOMYCIN HYDROCHLORIDE 125 MG: 125 CAPSULE ORAL at 21:15

## 2024-05-26 RX ADMIN — HEPARIN SODIUM 5000 UNITS: 5000 INJECTION, SOLUTION INTRAVENOUS; SUBCUTANEOUS at 21:15

## 2024-05-26 RX ADMIN — VANCOMYCIN HYDROCHLORIDE 125 MG: 125 CAPSULE ORAL at 09:53

## 2024-05-26 RX ADMIN — Medication 50000 UNITS: at 09:53

## 2024-05-26 RX ADMIN — FERROUS SULFATE TAB 325 MG (65 MG ELEMENTAL FE) 1 TABLET: 325 (65 FE) TAB at 09:53

## 2024-05-26 RX ADMIN — METOPROLOL TARTRATE 50 MG: 50 TABLET, FILM COATED ORAL at 21:15

## 2024-05-26 ASSESSMENT — COGNITIVE AND FUNCTIONAL STATUS - GENERAL
CLIMB 3 TO 5 STEPS WITH RAILING: TOTAL
DRESSING REGULAR LOWER BODY CLOTHING: TOTAL
TOILETING: TOTAL
MOBILITY SCORE: 6
MOVING TO AND FROM BED TO CHAIR: TOTAL
WALKING IN HOSPITAL ROOM: TOTAL
EATING MEALS: TOTAL
PERSONAL GROOMING: TOTAL
TURNING FROM BACK TO SIDE WHILE IN FLAT BAD: TOTAL
HELP NEEDED FOR BATHING: TOTAL
STANDING UP FROM CHAIR USING ARMS: TOTAL
DRESSING REGULAR UPPER BODY CLOTHING: TOTAL
DAILY ACTIVITIY SCORE: 6
MOVING FROM LYING ON BACK TO SITTING ON SIDE OF FLAT BED WITH BEDRAILS: TOTAL

## 2024-05-26 ASSESSMENT — PAIN SCALES - PAIN ASSESSMENT IN ADVANCED DEMENTIA (PAINAD)
CONSOLABILITY: NO NEED TO CONSOLE
TOTALSCORE: 1
BREATHING: NORMAL
BODYLANGUAGE: RELAXED
NEGVOCALIZATION: OCCASIONAL MOAN/GROAN, LOW SPEECH, NEGATIVE/DISAPPROVING QUALITY
FACIALEXPRESSION: SMILING OR INEXPRESSIVE

## 2024-05-26 ASSESSMENT — PAIN - FUNCTIONAL ASSESSMENT: PAIN_FUNCTIONAL_ASSESSMENT: PAINAD (PAIN ASSESSMENT IN ADVANCED DEMENTIA SCALE)

## 2024-05-26 ASSESSMENT — PAIN SCALES - GENERAL: PAINLEVEL_OUTOF10: 0 - NO PAIN

## 2024-05-26 NOTE — NURSING NOTE
Patient was on tube feeding and NPO due to dysphagia when she was discharged to SNF last week. She has a regular oral diet and no tube feedings ordered for this admission. Queried attending physician for clarification.

## 2024-05-26 NOTE — NURSING NOTE
MD notified about pt. Diet orders that needs to be updated. Notified oncoming nurse orders are still waiting to be received.

## 2024-05-26 NOTE — NURSING NOTE
Spoke with Dr. Denise regarding tube feedings, Dr. Denise stated to continue pt on what they where getting prior which is Jevity 1.5 rate @ 55 mL/hr with 150 mL water flush Q4H

## 2024-05-26 NOTE — NURSING NOTE
Assumed patient care. BSSR received from previous Nurse. Seen patient lying on bed awake, no distress or discomfort noted. On continuous pulse oximetry showing 98% saturation in room air. Safety measures ensured and call light in reach.   Plan of care ongoing.

## 2024-05-26 NOTE — CONSULTS
INFECTIOUS DISEASES CONSULT NOTE    Referring Physician: Kishore Denise  Reason For Consult: Weakness  Date of Consult: 5/26/24 at 0737    History Of Present Illness  Patient is a 78-year-old female who was recently hospitalized at [facility] with C. difficile colitis versus colonization.  The patient was ultimately discharged back to her nursing facility on 5/24 with a plan to continue oral vancomycin through doses on 5/29/2024.  Patient now sent back to the [facility] emergency room as nursing facility was concerned about generalized weakness and elevated heart rate.  On arrival to the ER temperature was 37.9 her white blood cell count was 10.7.  Urinalysis had minimal pyuria.  Blood and urine cultures were obtained.  Chest x-ray was clear.  CT A/P showed no evidence for colitis and a mildly distended gallbladder.  Right upper quadrant ultrasound showed cholelithiasis without cholecystitis.  The patient was started on Zosyn and oral vancomycin and ID has been consulted.  Patient denies any diarrhea.  She denies shortness of breath, cough, chest pain, abdominal pain.     Past Medical History  She has a past medical history of Hypertension.    Surgical History  She has a past surgical history that includes Aortic valve replacement (02/11/2014); Other surgical history (02/11/2014); Cervical discectomy (02/11/2014); Breast lumpectomy (02/11/2014); and Hysterectomy (02/11/2014).     Social History  Resides in a nursing facility.  Denies smoking alcohol or drug use.    Family History  No family exposure to known communicable illnesses  No family history of tuberculosis    Allergies  Sulfa (sulfonamide antibiotics), Sulfamethoxazole-trimethoprim, Ciprofloxacin, and Nitrofurantoin     Medications  Zosyn D1  Oral vancomycin  See Epic for remaining medications.    Review of Systems  Detailed review of systems completed.  No significant additional positives beyond what is mentioned above    Physical Exam  Vital signs:   "Visit Vitals  /63 (BP Location: Right arm, Patient Position: Lying)   Pulse 75   Temp 36.3 °C (97.3 °F) (Oral)   Resp 18      General: Elderly female resting comfortably  HEENT:  No scleral icterus or conjunctival suffusion, throat clear, neck supple, no cervical LAD  Lungs:  Clear to auscultation bilaterally  Heart:  RRR, S1, S2 normal, no extra sounds or murmurs.  Abdomen:  Normoactive BS, soft, NT/ND. No palpable organs or masses.  No peritoneal signs.  Extremities: Significant edema bilateral upper extremities    Relevant Lab Results  Results from last 72 hours   Lab Units 05/26/24  0626   WBC AUTO x10*3/uL 10.7   HEMOGLOBIN g/dL 7.5*   HEMATOCRIT % 24.7*   PLATELETS AUTO x10*3/uL 467*     Results from last 72 hours   Lab Units 05/26/24  0626   CREATININE mg/dL 0.60   EGFR mL/min/1.73m*2 >90     Results from last 72 hours   Lab Units 05/26/24  0626   AST U/L 63*   ALT U/L 65*   ALK PHOS U/L 188*   BILIRUBIN TOTAL mg/dL 0.2       Cultures  Urinalysis (5/25) Nit neg/LE small/WBC 11-20  Urine culture (5/25): Pending  Blood culture (5/25): X 2 sets-NGTD    Imaging  Chest x-ray (5/25): Negative  CT A/P (5/25): Moderately distended gallbladder.  No colitis  Right upper quadrant ultrasound (5/25): Cholelithiasis with no cholecystitis    Impression:  1.  \"Weakness\"  -- No clear evidence for active infectious process.  Patient is afebrile.  White count is normal.  Urinalysis has minimal pyuria.  Chest x-ray is clear.  CT A/P shows no intra-abdominal findings including no evidence for active colitis.  Patient currently is denying diarrhea.  2.  C. difficile colitis versus colonization  -- Most likely patient has C. difficile colonization from prior hospital stay.  Her plan at the time of discharge on 5/24 was to continue oral Comycin 3 doses on 5/29.    Plan:  1.  Stop Zosyn  2.  Continue oral vancomycin through doses on 5/29/2024.  3.  Will observe off antibiotics.  No clear evidence for ongoing infectious process " at this time.  4.  I would not be opposed to discharge back to nursing facility at any time.  5.  Following.      William Neri MD  [facility]  [phone]

## 2024-05-26 NOTE — H&P
History Of Present Illness     Rebecca Samaniego is a 78 y.o. female was discharged from the hospital 1 day prior to this admission with a history of failure to thrive, dysphagia, C. difficile, CVA, chronic kidney disease, hyperlipidemia has been sent back to the hospital with change in mental status, tachycardia, hypotension and fever.  The nurses and nursing home were concerned that he was very weak and confused.  Patient was also having diarrhea per record.  Patient had a history of C. difficile and was on oral vancomycin.  The patient is not able to provide any history.  Most of the history was taken from old record and ER record.     Past Medical History  Past Medical History:   Diagnosis Date    Hypertension        Surgical History  Past Surgical History:   Procedure Laterality Date    AORTIC VALVE REPLACEMENT  02/11/2014    Aortic Valve Replacement    BREAST LUMPECTOMY  02/11/2014    Breast Surgery Lumpectomy    CERVICAL DISCECTOMY  02/11/2014    Spinal Diskectomy Cervical    HYSTERECTOMY  02/11/2014    Hysterectomy    OTHER SURGICAL HISTORY  02/11/2014    Aortic Coarctation Repair        Social History  She reports that she has quit smoking. Her smoking use included cigarettes. She has never used smokeless tobacco. She reports current alcohol use. She reports that she does not use drugs.    Family History  No family history on file.     Allergies  Sulfa (sulfonamide antibiotics), Sulfamethoxazole-trimethoprim, Ciprofloxacin, and Nitrofurantoin    Review of Systems  I reviewed all systems reviewed as above otherwise is negative.  Physical Exam  HEENT:  Head externally atraumatic, no pallor, no icterus, extraocular movements intact, pupils reactive to light, oral mucosa dry and throat clear.  Neck:  Supple, no JVP, no palpable adenopathy or thyromegaly.  No carotid bruit.  Chest:  Clear to auscultation and resonant.  Heart:  Regular rate and rhythm, no murmur or gallop could be appreciated.  Abdomen:  Soft,  nontender, bowel sounds present, normoactive, no palpable hepatosplenomegaly.  Extremities:  No edema, pulses present, no cyanosis or clubbing.  CNS:  Patient alert, oriented x 1-2, moving all extremities.  No facial asymmetry.  Deep tendon for symmetrical.  Patient moving all extremities but power could not be assessed.  Cranial nerves 2-12 grossly intact.  Skin:  No active rash.  Musculoskeletal:  No joint swelling or erythema, range of movement normal.    Last Recorded Vitals  Heart Rate:  []   Temp:  [36 °C (96.8 °F)-36.4 °C (97.5 °F)]   Resp:  [16-18]   BP: (137-163)/(62-84)   SpO2:  [91 %-99 %]       Relevant Results        Results for orders placed or performed during the hospital encounter of 05/25/24 (from the past 24 hour(s))   Urinalysis with Reflex Microscopic   Result Value Ref Range    Color, Urine Light-Yellow Light-Yellow, Yellow, Dark-Yellow    Appearance, Urine Clear Clear    Specific Gravity, Urine 1.031 1.005 - 1.035    pH, Urine 7.5 5.0, 5.5, 6.0, 6.5, 7.0, 7.5, 8.0    Protein, Urine 10 (TRACE) NEGATIVE, 10 (TRACE), 20 (TRACE) mg/dL    Glucose, Urine Normal Normal mg/dL    Blood, Urine NEGATIVE NEGATIVE    Ketones, Urine NEGATIVE NEGATIVE mg/dL    Bilirubin, Urine NEGATIVE NEGATIVE    Urobilinogen, Urine Normal Normal mg/dL    Nitrite, Urine NEGATIVE NEGATIVE    Leukocyte Esterase, Urine 75 Jeremy/µL (A) NEGATIVE   Microscopic Only, Urine   Result Value Ref Range    WBC, Urine 11-20 (A) 1-5, NONE /HPF    RBC, Urine NONE NONE, 1-2, 3-5 /HPF    Squamous Epithelial Cells, Urine 10-25 (FEW) Reference range not established. /HPF   Comprehensive metabolic panel   Result Value Ref Range    Glucose 84 65 - 99 mg/dL    Sodium 134 133 - 145 mmol/L    Potassium 4.0 3.4 - 5.1 mmol/L    Chloride 98 97 - 107 mmol/L    Bicarbonate 26 24 - 31 mmol/L    Urea Nitrogen 28 (H) 8 - 25 mg/dL    Creatinine 0.60 0.40 - 1.60 mg/dL    eGFR >90 >60 mL/min/1.73m*2    Calcium 8.4 (L) 8.5 - 10.4 mg/dL    Albumin 2.0 (L)  3.5 - 5.0 g/dL    Alkaline Phosphatase 188 (H) 35 - 125 U/L    Total Protein 5.5 (L) 5.9 - 7.9 g/dL    AST 63 (H) 5 - 40 U/L    Bilirubin, Total 0.2 0.1 - 1.2 mg/dL    ALT 65 (H) 5 - 40 U/L    Anion Gap 10 <=19 mmol/L   CBC   Result Value Ref Range    WBC 10.7 4.4 - 11.3 x10*3/uL    nRBC 0.0 0.0 - 0.0 /100 WBCs    RBC 2.52 (L) 4.00 - 5.20 x10*6/uL    Hemoglobin 7.5 (L) 12.0 - 16.0 g/dL    Hematocrit 24.7 (L) 36.0 - 46.0 %    MCV 98 80 - 100 fL    MCH 29.8 26.0 - 34.0 pg    MCHC 30.4 (L) 32.0 - 36.0 g/dL    RDW 15.0 (H) 11.5 - 14.5 %    Platelets 467 (H) 150 - 450 x10*3/uL   Lavender Top   Result Value Ref Range    Extra Tube Hold for add-ons.    PST Top   Result Value Ref Range    Extra Tube Hold for add-ons.      Prior to Admission medications    Medication Sig Start Date End Date Taking? Authorizing Provider   acetaminophen (Tylenol) 325 mg tablet 2 tablets (650 mg) by g-tube route every 8 hours. Do not exceed maximum daily dose of Acetaminophen. 5/23/24   VASQUEZ Masters   acetaminophen (Tylenol) 650 mg suppository Insert 1 suppository (650 mg) into the rectum every 4 hours if needed for fever (temp greater than 38.0 C). 5/23/24   VASQUEZ Masters   ascorbic acid (Vitamin C) 500 mg tablet 1 tablet (500 mg) by g-tube route once daily. 5/23/24   VASQUEZ Masters   aspirin 81 mg chewable tablet 1 tablet (81 mg) by g-tube route once daily. 5/23/24   VASQUEZ Masters   atorvastatin (Lipitor) 40 mg tablet 1 tablet (40 mg) by g-tube route once daily at bedtime. 5/23/24   VASQUEZ Masters   cholecalciferol (Vitamin D-3) 50,000 unit capsule 1 capsule (50,000 Units) by g-tube route 1 (one) time per week. 5/26/24   VASQUEZ Masters   clopidogrel (Plavix) 75 mg tablet 1 tablet (75 mg) by g-tube route once daily. Administer 5/23/2024 1800. 5/23/24   VASQUEZ Masters   clopidogrel (Plavix) 75 mg tablet 1 tablet (75 mg) by g-tube route once daily. 5/25/24    VASQUEZ Masters   ferrous sulfate, 325 mg ferrous sulfate, tablet 1 tablet by g-tube route 2 times daily (morning and late afternoon). 5/23/24   VASQUEZ Masters   lactobacillus acidophilus (Acidophilus) capsule Take 1 capsule by mouth 2 times a day. Via PEG tube. 5/23/24   VASQUEZ Masters   metoprolol tartrate (Lopressor) 50 mg tablet 1 tablet by g-tube route 2 times a day. 5/23/24   VASQUEZ Masters   ondansetron (Zofran) 4 mg tablet 1 tablet (4 mg) by g-tube route every 6 hours if needed for nausea or vomiting. 5/23/24   VASQUEZ Masters   pantoprazole (ProtoNix) 40 mg packet 1 packet (40 mg) by g-tube route once daily in the morning. Take before meals. 5/23/24   VASQUEZ Masters   polyethylene glycol (Glycolax, Miralax) 17 gram packet Take 17 g by mouth once daily as needed (Constipation.). Via PEG tube. 5/23/24   VASQUEZ Masters   vancomycin (Vancocin) 125 mg capsule Take 1 capsule (125 mg) by mouth every 12 hours. Via PEG tube. 5/23/24   VASQUEZ Masters   acetaminophen (Tylenol) 325 mg tablet Take 2 tablets (650 mg) by mouth every 8 hours if needed for fever (temp greater than 38.0 C). 5/7/24 5/22/24  Historical Provider, MD   acetaminophen (Tylenol) 325 mg tablet Take 2 tablets (650 mg) by mouth every 8 hours if needed for mild pain (1 - 3).  5/24/24  Historical Provider, MD   acetaminophen (Tylenol) 325 mg tablet Take 2 tablets (650 mg) by mouth every 8 hours. Do not exceed maximum daily dose of Acetaminophen. 5/22/24 5/23/24  VASQUEZ Masters   acetaminophen (Tylenol) 325 mg tablet Take 2 tablets (650 mg) by mouth every 8 hours. Do not exceed maximum daily dose of Acetaminophen. 5/23/24 5/23/24  Belen A Vanik, APRN-CNP   acetaminophen (Tylenol) 650 mg suppository Insert 1 suppository (650 mg) into the rectum every 4 hours if needed for fever (temp greater than 38.0 C). 4/29/24 5/23/24  Historical Provider, MD    aspirin 81 mg chewable tablet Chew 1 tablet (81 mg) once daily. 4/17/24 5/22/24  Coleen Santos MD   aspirin 81 mg chewable tablet 1 tablet (81 mg) by g-tube route once daily. 5/22/24 5/23/24  VASQUEZ Masters   atenolol (Tenormin) 50 mg tablet Take 1 tablet (50 mg) by mouth once daily.  5/24/24  Historical Provider, MD   atorvastatin (Lipitor) 40 mg tablet Take 1 tablet (40 mg) by mouth once daily at bedtime. 4/16/24 5/22/24  Coleen Santos MD   atorvastatin (Lipitor) 40 mg tablet 1 tablet (40 mg) by g-tube route once daily at bedtime. 5/22/24 5/23/24  VASQUEZ Masters   bacitracin 500 unit/gram ointment Apply topically 3 times a day. Apply to legs when she has excoriations  Patient not taking: Reported on 5/9/2024 4/16/24 5/24/24  Coleen Santos MD   cephalexin (Keflex) 500 mg capsule Take 1 capsule (500 mg) by mouth 3 times a day.  5/24/24  Historical Provider, MD   cholecalciferol (Vitamin D-3) 50,000 unit capsule 1 capsule (50,000 Units) by g-tube route 1 (one) time per week. 5/26/24 5/23/24  VASQUEZ Masters   clopidogrel (Plavix) 75 mg tablet Take 1 tablet (75 mg) by mouth once daily. 4/17/24 5/22/24  Coleen Santos MD   clopidogrel (Plavix) 75 mg tablet 1 tablet (75 mg) by g-tube route once daily. 5/22/24 5/23/24  VASQUEZ Masters   esomeprazole (NexIUM) 10 mg packet Take 10 mg by mouth once daily in the morning. Take before meals. Via PEG tube. Take 12 hours apart from Plavix. 5/23/24 5/23/24  VASQUEZ Masters   ferrous sulfate 325 (65 Fe) MG EC tablet Take 65 mg by mouth 3 times a day with meals. Do not crush, chew, or split.  5/24/24  Historical Provider, MD   ferrous sulfate, 325 mg ferrous sulfate, tablet 1 tablet by g-tube route 2 times daily (morning and late afternoon). 5/23/24 5/23/24  Belen Martin, APRN-CNP   fluconazole (Diflucan) 100 mg tablet Take 1 tablet (100 mg) by mouth once daily.  5/24/24   Historical Provider, MD   lactobacillus acidophilus (Acidophilus) capsule Take 1 capsule by mouth 2 times a day.  5/22/24  Historical Provider, MD   lactobacillus acidophilus (Acidophilus) capsule Take 1 capsule by mouth 2 times a day. Via PEG tube. 5/22/24 5/23/24  VASQUEZ Masters   metoprolol succinate XL (Toprol-XL) 50 mg 24 hr tablet Take 1 tablet (50 mg) by mouth once daily. Do not crush or chew. 4/17/24 5/24/24  Coleen Santos MD   metoprolol tartrate (Lopressor) 50 mg tablet 1 tablet by g-tube route 2 times a day. 5/23/24 5/23/24  VASQUEZ Masters   ondansetron (Zofran) 4 mg tablet Take 1 tablet (4 mg) by mouth every 6 hours if needed for nausea or vomiting.  5/22/24  Historical Provider, MD   ondansetron (Zofran) 4 mg tablet 1 tablet (4 mg) by g-tube route every 6 hours if needed for nausea or vomiting. 5/22/24 5/23/24  VASQUEZ Masters   polyethylene glycol (Glycolax, Miralax) 17 gram packet Take 17 g by mouth once daily as needed (Consipation.). Via PEG tube. 5/22/24 5/23/24  VASQUEZ Masters   vancomycin (Vancocin) 125 mg capsule Take 1 capsule (125 mg) by mouth every 6 hours.  5/22/24  Historical Provider, MD   vancomycin (Vancocin) 125 mg capsule Take 1 capsule (125 mg) by mouth every 12 hours. Via PEG tube. 5/22/24 5/23/24  VASQUEZ Masters       Current Facility-Administered Medications:     acetaminophen (Tylenol) tablet 650 mg, 650 mg, oral, q4h PRN **OR** acetaminophen (Tylenol) oral liquid 650 mg, 650 mg, oral, q4h PRN, 650 mg at 05/25/24 2307 **OR** acetaminophen (Tylenol) suppository 650 mg, 650 mg, rectal, q4h PRN, Kobi Denise MD    acetaminophen (Tylenol) suppository 650 mg, 650 mg, rectal, q4h PRN, Kobi Denise MD    acetaminophen (Tylenol) tablet 650 mg, 650 mg, g-tube, q8h, Kobi Denise MD, 650 mg at 05/26/24 0952    ascorbic acid (Vitamin C) tablet 500 mg, 500 mg, g-tube, Daily, Kobi Denise MD, 500 mg  at 05/26/24 0953    aspirin chewable tablet 81 mg, 81 mg, g-tube, Daily, Kobi Denise MD, 81 mg at 05/26/24 0952    atorvastatin (Lipitor) tablet 40 mg, 40 mg, g-tube, Nightly, Kobi Denise MD, 40 mg at 05/25/24 2308    benzocaine-menthol (Cepastat Sore Throat) lozenge 1 lozenge, 1 lozenge, Mouth/Throat, q2h PRN, Kobi Denise MD    cholecalciferol (Vitamin D-3) capsule 50,000 Units, 50,000 Units, g-tube, Every Sunday, Kobi Denise MD, 50,000 Units at 05/26/24 0953    clopidogrel (Plavix) tablet 75 mg, 75 mg, g-tube, Daily, Kobi Denise MD, 75 mg at 05/26/24 0953    dextromethorphan-guaifenesin (Robitussin DM)  mg/5 mL oral liquid 5 mL, 5 mL, oral, q4h PRN, Kobi Denise MD    ferrous sulfate (325 mg ferrous sulfate) tablet 1 tablet, 65 mg of iron, oral, BID, Kobi Denise MD, 1 tablet at 05/26/24 0953    guaiFENesin (Mucinex) 12 hr tablet 600 mg, 600 mg, oral, q12h PRN, Kobi Denise MD    heparin (porcine) injection 5,000 Units, 5,000 Units, subcutaneous, q8h JACQUELYN, Kobi Denise MD, 5,000 Units at 05/26/24 1400    lactobacillus acidophilus tablet 1 tablet, 1 tablet, oral, BID, Kobi Denise MD, 1 tablet at 05/26/24 0952    metoprolol tartrate (Lopressor) tablet 50 mg, 50 mg, g-tube, BID, Kobi Denise MD, 50 mg at 05/26/24 0953    ondansetron (Zofran) tablet 4 mg, 4 mg, g-tube, q6h PRN, Kobi Denise MD    polyethylene glycol (Glycolax, Miralax) packet 17 g, 17 g, oral, Daily PRN, Kobi Denise MD    polyethylene glycol (Glycolax, Miralax) packet 17 g, 17 g, oral, Daily PRN, Kobi Denise MD    vancomycin (Vancocin) capsule 125 mg, 125 mg, oral, q12h, Kobi Denise MD, 125 mg at 05/26/24 0953  US gallbladder    Result Date: 5/25/2024  Interpreted By:  Anjum Banks, STUDY: US GALLBLADDER;  5/25/2024 1:33 pm   INDICATION: Signs/Symptoms:Possible acute cholecystitis on CT with weakness and elevated liver enzymes.    COMPARISON: None.   ACCESSION NUMBER(S): [id number]   ORDERING CLINICIAN: SAM CARMONA   TECHNIQUE: Multiple images of the right upper quadrant were obtained.  Exam limited due to patient's inability to cooperate.   FINDINGS: LIVER: The liver measures 14.5 cm in longest axis. No evidence of masses.     GALLBLADDER: The gallbladder is partially distended, and gallstones. No gallbladder wall thickening or surrounding fluid. The gallbladder wall thickness is 2.1. Sonographic Humphreys's sign is negative.     BILE DUCTS: No evidence of intra or extrahepatic biliary dilatation is identified; the common bile duct measures 0.3.   PANCREAS: Suboptimally visualized and evaluated. The visualized portions appear unremarkable.   RIGHT KIDNEY: The right kidney measures 9.9 in length. The renal cortical echogenicity and thickness are within normal limit.  No hydronephrosis or renal calculi are seen. Subcentimeter lower pole right renal cyst.       Cholelithiasis without evidence of acute cholecystitis.   MACRO: None     Signed by: Anjum Banks 5/25/2024 1:48 PM Dictation workstation:   [id number]    CT abdomen pelvis w IV contrast    Result Date: 5/25/2024  Interpreted By:  Carolynn Blanton, STUDY: CT ABDOMEN PELVIS W IV CONTRAST;  5/25/2024 11:04 am   INDICATION: Signs/Symptoms:Elevated liver enzymes and lipase fever tachycardia.   COMPARISON: None.   ACCESSION NUMBER(S): [id number]   ORDERING CLINICIAN: SAM CARMONA   TECHNIQUE: CT of the abdomen and pelvis was performed after administration of intravenous contrast. Standard contiguous axial images were obtained at 3 mm slice thickness through the abdomen and pelvis. Coronal and sagittal reconstructions at 3 mm slice thickness were performed.   75 ml of contrast 350 mg iohexol were administered intravenously without immediate complication.   FINDINGS: Evaluation of the abdomen is slightly limited due to dense streak artifacts from posterior lumbar fusion hardware. Please note  that below findings are within this limitation.   LOWER CHEST: There are small to moderate bilateral pleural effusions, more pronounced on the left compared to the right. These are only partially included in the field of view and is limited in evaluation. There is mild compressive atelectasis in bilateral lower lobe subjacent to pleural effusions. Heart is normal in size. No pericardial effusion. Valve prosthesis is partially visualized. Distal thoracic esophagus is unremarkable.   ABDOMEN:   LIVER: Liver is enlarged measuring up to 20 cm in CC dimension. There is suggestion of mild heterogenous enhancement of the liver. However no definite evidence of mass lesions.   BILE DUCTS: The intrahepatic and extrahepatic ducts are not dilated.   GALLBLADDER: Gallbladder is moderately distended and demonstrates calcified stones in the lumen. There is also a calcified stone at the gallbladder neck. There is mild edematous thickening of the gallbladder wall with small amount of pericholecystic fluid.   PANCREAS: There is suggestion of mild edematous thickening of the pancreatic head and uncinate process with loss of normal lobulated morphology. However no significant peripancreatic fat stranding is noted at this point in time. Evaluation of this region is however limited due to dense streak artifacts from posterior lumbar fusion hardware. No peripancreatic fluid collections are noted.   SPLEEN: The spleen is normal in size.   ADRENAL GLANDS: Bilateral adrenal glands appear normal.   KIDNEYS AND URETERS: Bilateral kidneys enhance symmetrically. No hydronephrosis. There are small subcentimeter cortical hypodense lesions in bilateral kidneys, too small to characterize and are statistically favored to represent cysts. The largest hypodense lesion is noted in the anterior cortex of the interpolar region of right kidney measuring up to 9 mm.   PELVIS:   BLADDER: Urinary bladder is moderately distended and appears grossly  unremarkable.   REPRODUCTIVE ORGANS: Uterus is not distinctly visualized and is likely surgically absent. No pelvic mass lesion is noted.   BOWEL: Stomach is not well distended limiting evaluation. A gastrostomy tube is noted with its bulb located in the distal body of the stomach. Small bowel loops are normal in course and caliber and does not demonstrate segmental distention to suggest obstruction. Large bowel demonstrates small stool volume and appears grossly unremarkable. Appendix is visualized and appears unremarkable.   VESSELS: Moderate atherosclerotic calcifications of the abdominal aorta without aneurysmal dilatation. IVC appears grossly unremarkable.   PERITONEUM/RETROPERITONEUM/LYMPH NODES: There is no free or loculated fluid collection, no free intraperitoneal air. The retroperitoneum appears normal.  No abdominopelvic lymphadenopathy is present.   BONES AND ABDOMINAL WALL: Postsurgical changes of bipedicular glenn and screw fixation extending from L2 through L5 vertebral bodies is noted. Evaluation is limited due to significant metal related streak artifacts through this region. The rest of the osseous structures demonstrate extensive osseous demineralization. No definite CT evidence of suspicious osseous lesions. There is diffuse reticular subcutaneous fat stranding throughout the abdominal wall soft tissues, most likely related fluid overload.       1.  Questionable subtle edematous thickening of the head and uncinate process of pancreas without significant peripancreatic fat stranding. This is a nonspecific finding and very early pancreatitis can be considered in the differential. No definite peripancreatic fluid collections. 2. Cholelithiasis with moderately distended gallbladder. There is mild edematous thickening of the gallbladder wall with small amount of pericholecystic fluid. While these changes could be reactive secondary to acute pancreatitis versus hepatic parenchymal disease, acute  cholecystitis can be considered in the differential. Recommend clinical and laboratory correlation for concerns of acute cholecystitis. A gallbladder ultrasound can be performed for further evaluation if clinically warranted. 3. Hepatomegaly with heterogeneous enhancement of hepatic parenchyma. Recommend clinical correlation for hepatic parenchymal disease. 4. Small volume bilateral pleural effusions and mild body wall edema. Recommend correlation with fluid status. 5. Extensive osseous demineralization.   MACRO: None   Signed by: Carolynn Blanton 5/25/2024 11:38 AM Dictation workstation:   [id number]    XR chest 1 view    Result Date: 5/25/2024  Interpreted By:  Iglesia Alfaro, STUDY: XR CHEST 1 VIEW;  5/25/2024 9:37 am   INDICATION: Signs/Symptoms:generalized weakness.   COMPARISON: 05/23/2024   ACCESSION NUMBER(S): [id number]   ORDERING CLINICIAN: SAM CARMONA   FINDINGS: Unchanged bibasilar opacities. Suspect underlying pleural effusions. Pulmonary vascular congestion. Cardiomediastinal silhouette unchanged. Aortic atherosclerosis. Post sternotomy. Degenerative changes of the shoulders. Partially imaged lumbar fusion hardware.       No change compared to 2 days ago. Pulmonary vascular congestion, pleural effusions, and bibasilar opacities persist.   MACRO: None   Signed by: Iglesia Alfaro 5/25/2024 10:06 AM Dictation workstation:   [id number]    Upper extremity venous duplex bilateral    Result Date: 5/24/2024  Interpreted By:  Baron Douglass, STUDY: [facility] UPPER EXTREMITY VENOUS DUPLEX BILATERAL  5/24/2024 1:17 pm   INDICATION: 79 y/o   F with  Signs/Symptoms:Upper bilateral extremity swelling.   COMPARISON: None.   ACCESSION NUMBER(S): [id number]   ORDERING CLINICIAN: MICHELLE WILLIS   TECHNIQUE: Routine ultrasound of the  right and left upper extremity was performed with duplex Doppler (color and spectral) evaluation. Static images were obtained for remote interpretation.   FINDINGS: UPPER EXTREMITY VEINS:   Examination was performed with color and duplex Doppler.   The internal jugular, subclavian, and axillary veins are patent and free of thrombus.  The visualized brachiocephalic veins are patent. The brachial, basilic, and cephalic veins are patent and compressible.       Negative study.  No thrombus in the central veins of the  right or left upper extremities.   MACRO: None   Signed by: Baron Douglass 5/24/2024 2:18 PM Dictation workstation:   [id number]    XR chest 1 view    Result Date: 5/23/2024  Interpreted By:  Rene Holland, STUDY: XR CHEST 1 VIEW;  5/23/2024 4:48 pm   INDICATION: Signs/Symptoms:Follow-up pleural effusion.   COMPARISON: 05/19/2024   ACCESSION NUMBER(S): [id number]   ORDERING CLINICIAN: MICHELLE WILLIS   FINDINGS: Status post median sternotomy. Cardiac valve prosthesis again noted.   Cardiomegaly. Atherosclerotic aorta. There is slight enlargement of left pleural effusion. There is a small right pleural effusion similar to prior study. No pneumothorax. Left basilar airspace disease represents atelectasis and/or consolidation.       Slightly larger left pleural effusion with adjacent atelectasis/consolidation.     MACRO: None.   Signed by: Rene Holland 5/23/2024 6:11 PM Dictation workstation:   [id number]    XR chest 1 view    Result Date: 5/19/2024  Interpreted By:  Abisai Harrell, STUDY: Chest dated  5/19/2024.   INDICATION: Signs/Symptoms:fever   COMPARISON: Chest dated 05/16/2024.   ACCESSION NUMBER(S): [id number]   ORDERING CLINICIAN: YADIEL LUIS   TECHNIQUE: One view of the chest.   FINDINGS: There is retrocardiac opacity with blunting of the costophrenic angle. There is faint gradient right basilar opacity with linear superimposed right basilar opacities.  No pneumothorax is evident. The cardiomediastinal silhouette is  enlarged but similar to the prior exam.Surgical changes are seen of the mediastinum.Degenerative change is seen of the spine and shoulders.Surgical  changes seen of the cervical spine.       1. Small left pleural effusion with associated atelectasis and/or pneumonitis. 2. Likely small layering right pleural effusion with atelectasis, subtle pneumonitis is not excluded. 3. These findings are felt to be similar to the prior exam.   MACRO: None   Signed by: Abisai Harrell 5/19/2024 4:30 PM Dictation workstation:   [id number]    CT head wo IV contrast    Result Date: 5/18/2024  Interpreted By:  Kaleb Barboza, STUDY: CT HEAD WO IV CONTRAST;  5/18/2024 12:10 pm   INDICATION: Signs/Symptoms:Change in mental status.   COMPARISON: MRI brain dated 04/11/2024. CT head dated 04/10/2024.   ACCESSION NUMBER(S): [id number]   ORDERING CLINICIAN: TYRONE DE OLIVEIRA   TECHNIQUE: Noncontrast axial CT scan of head was performed.   FINDINGS: Parenchyma: There is no acute intracranial hemorrhage. The grey-white differentiation is intact. There is no mass effect or midline shift. Unchanged remote ischemic insult within the anterior right frontal lobe corona radiata. Similar appearing chronic small vessel ischemic disease. Multifocal atherosclerotic vascular calcification.   CSF Spaces: The ventricles, sulci and basal cisterns are similar in appearance with moderate to advanced generalized brain atrophy.   Extra-Axial Fluid: There is no extraaxial fluid collection.   Calvarium: The calvarium is unremarkable.   Paranasal sinuses: Visualized paranasal sinuses are clear.   Mastoids: Clear.   Orbits: Normal.   Soft tissues: Unremarkable.       No acute intracranial hemorrhage, mass effect, or CT apparent acute infarct.   Similar appearance of moderate chronic small vessel ischemic disease and moderate to advanced generalized brain atrophy.   MACRO: None   Signed by: Kaleb Barboza 5/18/2024 2:59 PM Dictation workstation:   [id number]    XR chest 2 views    Result Date: 5/17/2024  Interpreted By:  Doni Bernstein, STUDY: XR CHEST 2 VIEWS; 5/16/2024 6:06 pm   INDICATION:  Signs/Symptoms:Leukocytosis, AMS.   COMPARISON: 05/09/2024   ACCESSION NUMBER(S): [id number]   ORDERING CLINICIAN: MICHELLE WILLIS   TECHNIQUE: AP and lateral views of the chest were obtained.   FINDINGS: The cardiac silhouette is normal in appearance. Sternal fixations devices and mediastinal clips are present. Prosthetic mitral valve is present. Small bilateral pleural effusions are present. Left basilar subsegmental atelectasis is suspected.       Small bilateral effusions are present along with left basilar subsegmental atelectasis. Postsurgical findings as above.   MACRO: none   Signed by: Doni Bernstein 5/17/2024 8:37 AM Dictation workstation:   [id number]    Esophagogastroduodenoscopy (EGD) w PEG Tube Placement    Result Date: 5/14/2024  Table formatting from the original result was not included. Impression PEG tube placed in the stomach Mild abnormal mucosa, consistent with gastritis in the antrum; performed cold forceps biopsy Findings A [place] PEG tube measuring 24 Fr was successfully placed in the stomach using a deformable internal bolster via the pull technique after the site was identified via transillumination, visualized indentation and needle passed through abdominal wall; distance from external bolster to external end of tube: 3 cm; scope reinserted and tube rotated freely to confirm placement Mild, localized erythematous mucosa with linear furrows in the antrum, consistent with gastritis; no bleeding was identified; performed cold forceps biopsy to rule out H. pylori; Recommendation  Follow up with PCP  PEG must stay in for at least 6 weeks, after which time it could be removed in the office. (After July 1, 2024)  Indication Failure to thrive in adult, Gastritis determined by endoscopy, Cerebrovascular accident (CVA), unspecified mechanism (Multi) Staff Staff Role Sergo Kimball MD Proceduralist Medications See Anesthesia Record. Preprocedure A history and physical has been performed, and  patient medication allergies have been reviewed. The patient's tolerance of previous anesthesia has been reviewed. The risks and benefits of the procedure and the sedation options and risks were discussed with the patient. All questions were answered and informed consent obtained. Details of the Procedure The patient underwent monitored anesthesia care, which was administered by an anesthesia professional. The patient's blood pressure, ECG, ETCO2, heart rate, level of consciousness, oxygen and respirations were monitored throughout the procedure. The scope was introduced through the mouth and advanced to the second part of the duodenum. Retroflexion was performed in the cardia. Prior to the procedure, the patient's H. Pylori status was unknown. The patient's estimated blood loss was minimal (<5 mL). The procedure was not difficult. The patient tolerated the procedure well. There were no apparent adverse events. Events Procedure Events Event Event Time ENDO SCOPE IN TIME 5/14/2024  7:38 AM ENDO SCOPE OUT TIME 5/14/2024  7:48 AM Specimens ID Type Source Tests Collected by Time 1 : r/o h pylori Tissue STOMACH ANTRUM BIOPSY SURGICAL PATHOLOGY EXAM Marla Gamez RN 5/14/2024 0740 Procedure Location [address] [id number] [phone] Referring Provider No referring provider defined for this encounter. Procedure Provider No name on file     ECG 12 lead    Result Date: 5/10/2024  Normal sinus rhythm Left axis deviation Moderate voltage criteria for LVH, may be normal variant ( R in aVL , [place] product ) Nonspecific ST and T wave abnormality Abnormal ECG When compared with ECG of 10-APR-2024 22:30, Significant changes have occurred Confirmed by Davian Callaway ([id number]) on 5/10/2024 3:33:53 PM    XR chest 1 view    Result Date: 5/9/2024  Interpreted By:  Ronnie Tsai, STUDY: XR CHEST 1 VIEW;  5/9/2024 1:23 pm   INDICATION: Signs/Symptoms:failure to  thrive.   COMPARISON: 04/13/2024   ACCESSION NUMBER(S): [id number]   ORDERING CLINICIAN: COREY KING   FINDINGS: CARDIOMEDIASTINAL SILHOUETTE AND VASCULATURE:   Cardiac size:  Within normal limits. Status post median sternotomy with mitral valve replacement. This is similar to the prior exam. Aortic shadow:  Within normal limits.   Mediastinal contours: Within normal limits.   Pulmonary vasculature:  The central vasculature is unremarkable   LUNGS: Lungs are clear.   ABDOMEN AND OTHER FINDINGS: No remarkable upper abdominal findings.   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       1.  No active cardiopulmonary disease.  There has not been significant interval change from the prior exam.   Signed by: Ronnie Tsai 5/9/2024 1:49 PM Dictation workstation:   [id number]    Susceptibility data from last 90 days.  Collected Specimen Info Organism Ampicillin Cefazolin Cefazolin (uncomplicated UTIs only) Ciprofloxacin Gentamicin Nitrofurantoin Piperacillin/Tazobactam Trimethoprim/Sulfamethoxazole   04/11/24 Urine from Straight Catheter Escherichia coli S S S S S S S S        Assessment/Plan   Principal Problem:    Generalized weakness  Diarrhea  History of CVA  Toxic and metabolic encephalopathy  Brain atrophy  Dysphagia  Hyperlipidemia  GERD  Generalized weakness  Adult failure to thrive  Poor oral intake  Dysphagia  Dehydration   Status post PEG tube placement  Hypernatremia - resolved  Hypokalemia - resolved  Vitamin d deficiency  Leukocytosis - resolved  History of C. Difficile  C. Difficile  Oropharyngeal candida  Hypoalbuminemia   Normocytic anemia improved - stable  History of CVA  Supraventricular tachycardia - resolved  Lethargy improved  Pyuria - ? urinary tract infection   Toxic encephalopathy secondary to UTI ? Pneumonitis - metabolic encephalopathy - improved  Small bilateral pleural effusions  ? Pneumonitis  Fever intermittent  Plan: Consult ID.  Start antibiotic.  Continue  oral vancomycin.  Continue tube feeding.  Monitor CBC and BMP.  Continue physical therapy and Occupational Therapy.  Will treat DVT, fall, aspiration, decubitus, DPN precautions.  This has been discussed with the patient and her  at bedside.  They are agreeable to it.      Kboi Denise MD

## 2024-05-27 LAB
ANION GAP SERPL CALC-SCNC: 13 MMOL/L
ANION GAP SERPL CALC-SCNC: 14 MMOL/L
BASOPHILS # BLD AUTO: 0.05 X10*3/UL (ref 0–0.1)
BASOPHILS NFR BLD AUTO: 0.4 %
BUN SERPL-MCNC: 24 MG/DL (ref 8–25)
BUN SERPL-MCNC: 24 MG/DL (ref 8–25)
CALCIUM SERPL-MCNC: 8.2 MG/DL (ref 8.5–10.4)
CALCIUM SERPL-MCNC: 8.3 MG/DL (ref 8.5–10.4)
CHLORIDE SERPL-SCNC: 97 MMOL/L (ref 97–107)
CHLORIDE SERPL-SCNC: 97 MMOL/L (ref 97–107)
CO2 SERPL-SCNC: 21 MMOL/L (ref 24–31)
CO2 SERPL-SCNC: 23 MMOL/L (ref 24–31)
CREAT SERPL-MCNC: 0.5 MG/DL (ref 0.4–1.6)
CREAT SERPL-MCNC: 0.5 MG/DL (ref 0.4–1.6)
EGFRCR SERPLBLD CKD-EPI 2021: >90 ML/MIN/1.73M*2
EGFRCR SERPLBLD CKD-EPI 2021: >90 ML/MIN/1.73M*2
EOSINOPHIL # BLD AUTO: 0.31 X10*3/UL (ref 0–0.4)
EOSINOPHIL NFR BLD AUTO: 2.5 %
ERYTHROCYTE [DISTWIDTH] IN BLOOD BY AUTOMATED COUNT: 14.7 % (ref 11.5–14.5)
GLUCOSE SERPL-MCNC: 163 MG/DL (ref 65–99)
GLUCOSE SERPL-MCNC: 166 MG/DL (ref 65–99)
HCT VFR BLD AUTO: 23.6 % (ref 36–46)
HGB BLD-MCNC: 7.9 G/DL (ref 12–16)
IMM GRANULOCYTES # BLD AUTO: 0.08 X10*3/UL (ref 0–0.5)
IMM GRANULOCYTES NFR BLD AUTO: 0.6 % (ref 0–0.9)
LYMPHOCYTES # BLD AUTO: 2.21 X10*3/UL (ref 0.8–3)
LYMPHOCYTES NFR BLD AUTO: 17.6 %
MCH RBC QN AUTO: 29.9 PG (ref 26–34)
MCHC RBC AUTO-ENTMCNC: 33.5 G/DL (ref 32–36)
MCV RBC AUTO: 89 FL (ref 80–100)
MONOCYTES # BLD AUTO: 0.78 X10*3/UL (ref 0.05–0.8)
MONOCYTES NFR BLD AUTO: 6.2 %
NEUTROPHILS # BLD AUTO: 9.12 X10*3/UL (ref 1.6–5.5)
NEUTROPHILS NFR BLD AUTO: 72.7 %
NRBC BLD-RTO: 0 /100 WBCS (ref 0–0)
PLATELET # BLD AUTO: 531 X10*3/UL (ref 150–450)
POTASSIUM SERPL-SCNC: 4.1 MMOL/L (ref 3.4–5.1)
POTASSIUM SERPL-SCNC: 5.3 MMOL/L (ref 3.4–5.1)
RBC # BLD AUTO: 2.64 X10*6/UL (ref 4–5.2)
SODIUM SERPL-SCNC: 131 MMOL/L (ref 133–145)
SODIUM SERPL-SCNC: 134 MMOL/L (ref 133–145)
WBC # BLD AUTO: 12.6 X10*3/UL (ref 4.4–11.3)

## 2024-05-27 PROCEDURE — 1200000002 HC GENERAL ROOM WITH TELEMETRY DAILY

## 2024-05-27 PROCEDURE — 97110 THERAPEUTIC EXERCISES: CPT | Mod: GP

## 2024-05-27 PROCEDURE — 80048 BASIC METABOLIC PNL TOTAL CA: CPT | Mod: 91 | Performed by: NURSE PRACTITIONER

## 2024-05-27 PROCEDURE — 82374 ASSAY BLOOD CARBON DIOXIDE: CPT | Performed by: INTERNAL MEDICINE

## 2024-05-27 PROCEDURE — 36415 COLL VENOUS BLD VENIPUNCTURE: CPT | Performed by: NURSE PRACTITIONER

## 2024-05-27 PROCEDURE — 36415 COLL VENOUS BLD VENIPUNCTURE: CPT | Performed by: INTERNAL MEDICINE

## 2024-05-27 PROCEDURE — 97162 PT EVAL MOD COMPLEX 30 MIN: CPT | Mod: GP

## 2024-05-27 PROCEDURE — 2500000002 HC RX 250 W HCPCS SELF ADMINISTERED DRUGS (ALT 637 FOR MEDICARE OP, ALT 636 FOR OP/ED): Performed by: INTERNAL MEDICINE

## 2024-05-27 PROCEDURE — 85025 COMPLETE CBC W/AUTO DIFF WBC: CPT | Performed by: INTERNAL MEDICINE

## 2024-05-27 PROCEDURE — 2500000001 HC RX 250 WO HCPCS SELF ADMINISTERED DRUGS (ALT 637 FOR MEDICARE OP): Performed by: INTERNAL MEDICINE

## 2024-05-27 PROCEDURE — 2500000004 HC RX 250 GENERAL PHARMACY W/ HCPCS (ALT 636 FOR OP/ED): Performed by: INTERNAL MEDICINE

## 2024-05-27 RX ADMIN — HEPARIN SODIUM 5000 UNITS: 5000 INJECTION, SOLUTION INTRAVENOUS; SUBCUTANEOUS at 12:46

## 2024-05-27 RX ADMIN — ASPIRIN 81 MG CHEWABLE TABLET 81 MG: 81 TABLET CHEWABLE at 08:16

## 2024-05-27 RX ADMIN — ATORVASTATIN CALCIUM 40 MG: 40 TABLET, FILM COATED ORAL at 21:00

## 2024-05-27 RX ADMIN — FERROUS SULFATE TAB 325 MG (65 MG ELEMENTAL FE) 1 TABLET: 325 (65 FE) TAB at 08:16

## 2024-05-27 RX ADMIN — HEPARIN SODIUM 5000 UNITS: 5000 INJECTION, SOLUTION INTRAVENOUS; SUBCUTANEOUS at 05:26

## 2024-05-27 RX ADMIN — Medication 1 TABLET: at 21:00

## 2024-05-27 RX ADMIN — HEPARIN SODIUM 5000 UNITS: 5000 INJECTION, SOLUTION INTRAVENOUS; SUBCUTANEOUS at 21:00

## 2024-05-27 RX ADMIN — OXYCODONE HYDROCHLORIDE AND ACETAMINOPHEN 500 MG: 500 TABLET ORAL at 08:16

## 2024-05-27 RX ADMIN — CLOPIDOGREL BISULFATE 75 MG: 75 TABLET ORAL at 08:16

## 2024-05-27 RX ADMIN — ACETAMINOPHEN 650 MG: 325 TABLET ORAL at 16:47

## 2024-05-27 RX ADMIN — VANCOMYCIN HYDROCHLORIDE 125 MG: 125 CAPSULE ORAL at 21:00

## 2024-05-27 RX ADMIN — FERROUS SULFATE TAB 325 MG (65 MG ELEMENTAL FE) 1 TABLET: 325 (65 FE) TAB at 16:47

## 2024-05-27 RX ADMIN — ACETAMINOPHEN 650 MG: 325 TABLET ORAL at 08:16

## 2024-05-27 RX ADMIN — METOPROLOL TARTRATE 50 MG: 50 TABLET, FILM COATED ORAL at 08:16

## 2024-05-27 RX ADMIN — METOPROLOL TARTRATE 50 MG: 50 TABLET, FILM COATED ORAL at 21:00

## 2024-05-27 RX ADMIN — Medication 1 TABLET: at 08:16

## 2024-05-27 RX ADMIN — ACETAMINOPHEN 650 MG: 325 TABLET ORAL at 02:46

## 2024-05-27 RX ADMIN — VANCOMYCIN HYDROCHLORIDE 125 MG: 125 CAPSULE ORAL at 08:16

## 2024-05-27 ASSESSMENT — COGNITIVE AND FUNCTIONAL STATUS - GENERAL
STANDING UP FROM CHAIR USING ARMS: TOTAL
DRESSING REGULAR LOWER BODY CLOTHING: TOTAL
MOVING FROM LYING ON BACK TO SITTING ON SIDE OF FLAT BED WITH BEDRAILS: TOTAL
PERSONAL GROOMING: TOTAL
DAILY ACTIVITIY SCORE: 6
TOILETING: TOTAL
TOILETING: TOTAL
WALKING IN HOSPITAL ROOM: TOTAL
MOVING TO AND FROM BED TO CHAIR: TOTAL
HELP NEEDED FOR BATHING: TOTAL
MOBILITY SCORE: 6
MOBILITY SCORE: 6
DRESSING REGULAR UPPER BODY CLOTHING: TOTAL
STANDING UP FROM CHAIR USING ARMS: TOTAL
PERSONAL GROOMING: TOTAL
CLIMB 3 TO 5 STEPS WITH RAILING: TOTAL
MOBILITY SCORE: 6
MOVING TO AND FROM BED TO CHAIR: TOTAL
STANDING UP FROM CHAIR USING ARMS: TOTAL
DRESSING REGULAR LOWER BODY CLOTHING: TOTAL
MOVING FROM LYING ON BACK TO SITTING ON SIDE OF FLAT BED WITH BEDRAILS: TOTAL
HELP NEEDED FOR BATHING: TOTAL
WALKING IN HOSPITAL ROOM: TOTAL
EATING MEALS: TOTAL
WALKING IN HOSPITAL ROOM: TOTAL
TURNING FROM BACK TO SIDE WHILE IN FLAT BAD: TOTAL
MOVING TO AND FROM BED TO CHAIR: TOTAL
EATING MEALS: TOTAL
DRESSING REGULAR UPPER BODY CLOTHING: TOTAL
CLIMB 3 TO 5 STEPS WITH RAILING: TOTAL
TURNING FROM BACK TO SIDE WHILE IN FLAT BAD: TOTAL
CLIMB 3 TO 5 STEPS WITH RAILING: TOTAL
TURNING FROM BACK TO SIDE WHILE IN FLAT BAD: TOTAL
DAILY ACTIVITIY SCORE: 6
MOVING FROM LYING ON BACK TO SITTING ON SIDE OF FLAT BED WITH BEDRAILS: TOTAL

## 2024-05-27 ASSESSMENT — PAIN - FUNCTIONAL ASSESSMENT
PAIN_FUNCTIONAL_ASSESSMENT: FLACC (FACE, LEGS, ACTIVITY, CRY, CONSOLABILITY)
PAIN_FUNCTIONAL_ASSESSMENT: 0-10

## 2024-05-27 ASSESSMENT — PAIN SCALES - GENERAL
PAINLEVEL_OUTOF10: 0 - NO PAIN

## 2024-05-27 ASSESSMENT — ACTIVITIES OF DAILY LIVING (ADL): ADL_ASSISTANCE: NEEDS ASSISTANCE

## 2024-05-27 NOTE — PROGRESS NOTES
Rebecca Samaniego is a 78 y.o. female on day 2 of admission presenting with Generalized weakness.    Subjective   Patient seen and examined.  Resting in bed in no acute distress.  Awake alert oriented x 2-3.  No complaints.     Objective     Physical Exam  Vitals and nursing note reviewed.   Constitutional:       General: She is not in acute distress.     Appearance: Normal appearance. She is obese. She is not ill-appearing, toxic-appearing or diaphoretic.   HENT:      Head: Normocephalic and atraumatic.      Right Ear: Tympanic membrane normal.      Left Ear: Tympanic membrane normal.      Nose: Nose normal.      Mouth/Throat:      Mouth: Mucous membranes are dry.      Pharynx: Oropharynx is clear.   Eyes:      Extraocular Movements: Extraocular movements intact.      Conjunctiva/sclera: Conjunctivae normal.      Pupils: Pupils are equal, round, and reactive to light.   Cardiovascular:      Rate and Rhythm: Normal rate and regular rhythm.      Pulses: Normal pulses.      Heart sounds: Normal heart sounds. No murmur heard.  Pulmonary:      Effort: Pulmonary effort is normal. No respiratory distress.      Breath sounds: Normal breath sounds. No wheezing, rhonchi or rales.      Comments: Room air. Pulse oximetry 100%.  Abdominal:      General: Bowel sounds are normal. There is no distension.      Palpations: Abdomen is soft.      Tenderness: There is no abdominal tenderness.      Comments: PEG tube in place clean. Tube feed infusing.   Genitourinary:     Comments: Deferred.  Musculoskeletal:         General: Swelling present. No tenderness. Normal range of motion.      Cervical back: Normal range of motion and neck supple.      Right lower leg: No edema.      Left lower leg: No edema.      Comments: Bilateral upper extremity edema right > left. Left upper extremity ACE wrap in place.    Skin:     General: Skin is warm and dry.      Capillary Refill: Capillary refill takes less than 2 seconds.      Findings: Bruising  "present.   Neurological:      General: No focal deficit present.      Mental Status: She is alert. She is disoriented.      Comments: Awake alert oriented x 2-3.  Speech clear coherent.  Follows all commands.  Generalized weakness.  No focal weakness.  Gait deferred.  Cranial nerves II through XII grossly intact.   Psychiatric:         Mood and Affect: Mood normal.         Behavior: Behavior normal.       Last Recorded Vitals  Blood pressure 152/75, pulse 76, temperature 36.1 °C (97 °F), temperature source Axillary, resp. rate 17, height 1.651 m (5' 5\"), weight 63.5 kg (140 lb), SpO2 93%.    Intake/Output last 3 Shifts:  I/O last 3 completed shifts:  In: 1200 (18.9 mL/kg) [NG/GT:1150; IV Piggyback:50]  Out: 0 (0 mL/kg)   Weight: 63.5 kg     Relevant Results  Results for orders placed or performed during the hospital encounter of 05/25/24 (from the past 24 hour(s))   CBC and Auto Differential   Result Value Ref Range    WBC 12.6 (H) 4.4 - 11.3 x10*3/uL    nRBC 0.0 0.0 - 0.0 /100 WBCs    RBC 2.64 (L) 4.00 - 5.20 x10*6/uL    Hemoglobin 7.9 (L) 12.0 - 16.0 g/dL    Hematocrit 23.6 (L) 36.0 - 46.0 %    MCV 89 80 - 100 fL    MCH 29.9 26.0 - 34.0 pg    MCHC 33.5 32.0 - 36.0 g/dL    RDW 14.7 (H) 11.5 - 14.5 %    Platelets 531 (H) 150 - 450 x10*3/uL    Neutrophils % 72.7 40.0 - 80.0 %    Immature Granulocytes %, Automated 0.6 0.0 - 0.9 %    Lymphocytes % 17.6 13.0 - 44.0 %    Monocytes % 6.2 2.0 - 10.0 %    Eosinophils % 2.5 0.0 - 6.0 %    Basophils % 0.4 0.0 - 2.0 %    Neutrophils Absolute 9.12 (H) 1.60 - 5.50 x10*3/uL    Immature Granulocytes Absolute, Automated 0.08 0.00 - 0.50 x10*3/uL    Lymphocytes Absolute 2.21 0.80 - 3.00 x10*3/uL    Monocytes Absolute 0.78 0.05 - 0.80 x10*3/uL    Eosinophils Absolute 0.31 0.00 - 0.40 x10*3/uL    Basophils Absolute 0.05 0.00 - 0.10 x10*3/uL   Basic Metabolic Panel   Result Value Ref Range    Glucose 163 (H) 65 - 99 mg/dL    Sodium 131 (L) 133 - 145 mmol/L    Potassium 5.3 (H) 3.4 - " 5.1 mmol/L    Chloride 97 97 - 107 mmol/L    Bicarbonate 21 (L) 24 - 31 mmol/L    Urea Nitrogen 24 8 - 25 mg/dL    Creatinine 0.50 0.40 - 1.60 mg/dL    eGFR >90 >60 mL/min/1.73m*2    Calcium 8.3 (L) 8.5 - 10.4 mg/dL    Anion Gap 13 <=19 mmol/L     Susceptibility data from last 90 days.  Collected Specimen Info Organism Ampicillin Cefazolin Cefazolin (uncomplicated UTIs only) Ciprofloxacin Gentamicin Nitrofurantoin Piperacillin/Tazobactam Trimethoprim/Sulfamethoxazole   04/11/24 Urine from Straight Catheter Escherichia coli S S S S S S S S     US gallbladder    Result Date: 5/25/2024  Interpreted By:  Anjum Banks, STUDY: US GALLBLADDER;  5/25/2024 1:33 pm   INDICATION: Signs/Symptoms:Possible acute cholecystitis on CT with weakness and elevated liver enzymes.   COMPARISON: None.   ACCESSION NUMBER(S): [id number]   ORDERING CLINICIAN: SAM CARMONA   TECHNIQUE: Multiple images of the right upper quadrant were obtained.  Exam limited due to patient's inability to cooperate.   FINDINGS: LIVER: The liver measures 14.5 cm in longest axis. No evidence of masses.     GALLBLADDER: The gallbladder is partially distended, and gallstones. No gallbladder wall thickening or surrounding fluid. The gallbladder wall thickness is 2.1. Sonographic Humphreys's sign is negative.     BILE DUCTS: No evidence of intra or extrahepatic biliary dilatation is identified; the common bile duct measures 0.3.   PANCREAS: Suboptimally visualized and evaluated. The visualized portions appear unremarkable.   RIGHT KIDNEY: The right kidney measures 9.9 in length. The renal cortical echogenicity and thickness are within normal limit.  No hydronephrosis or renal calculi are seen. Subcentimeter lower pole right renal cyst.       Cholelithiasis without evidence of acute cholecystitis.   MACRO: None     Signed by: Anjum Banks 5/25/2024 1:48 PM Dictation workstation:   [id number]    CT abdomen pelvis w IV contrast    Result Date: 5/25/2024  Interpreted By:   Carolynn Blanton, STUDY: CT ABDOMEN PELVIS W IV CONTRAST;  5/25/2024 11:04 am   INDICATION: Signs/Symptoms:Elevated liver enzymes and lipase fever tachycardia.   COMPARISON: None.   ACCESSION NUMBER(S): [id number]   ORDERING CLINICIAN: SAM CARMONA   TECHNIQUE: CT of the abdomen and pelvis was performed after administration of intravenous contrast. Standard contiguous axial images were obtained at 3 mm slice thickness through the abdomen and pelvis. Coronal and sagittal reconstructions at 3 mm slice thickness were performed.   75 ml of contrast 350 mg iohexol were administered intravenously without immediate complication.   FINDINGS: Evaluation of the abdomen is slightly limited due to dense streak artifacts from posterior lumbar fusion hardware. Please note that below findings are within this limitation.   LOWER CHEST: There are small to moderate bilateral pleural effusions, more pronounced on the left compared to the right. These are only partially included in the field of view and is limited in evaluation. There is mild compressive atelectasis in bilateral lower lobe subjacent to pleural effusions. Heart is normal in size. No pericardial effusion. Valve prosthesis is partially visualized. Distal thoracic esophagus is unremarkable.   ABDOMEN:   LIVER: Liver is enlarged measuring up to 20 cm in CC dimension. There is suggestion of mild heterogenous enhancement of the liver. However no definite evidence of mass lesions.   BILE DUCTS: The intrahepatic and extrahepatic ducts are not dilated.   GALLBLADDER: Gallbladder is moderately distended and demonstrates calcified stones in the lumen. There is also a calcified stone at the gallbladder neck. There is mild edematous thickening of the gallbladder wall with small amount of pericholecystic fluid.   PANCREAS: There is suggestion of mild edematous thickening of the pancreatic head and uncinate process with loss of normal lobulated morphology. However no significant  peripancreatic fat stranding is noted at this point in time. Evaluation of this region is however limited due to dense streak artifacts from posterior lumbar fusion hardware. No peripancreatic fluid collections are noted.   SPLEEN: The spleen is normal in size.   ADRENAL GLANDS: Bilateral adrenal glands appear normal.   KIDNEYS AND URETERS: Bilateral kidneys enhance symmetrically. No hydronephrosis. There are small subcentimeter cortical hypodense lesions in bilateral kidneys, too small to characterize and are statistically favored to represent cysts. The largest hypodense lesion is noted in the anterior cortex of the interpolar region of right kidney measuring up to 9 mm.   PELVIS:   BLADDER: Urinary bladder is moderately distended and appears grossly unremarkable.   REPRODUCTIVE ORGANS: Uterus is not distinctly visualized and is likely surgically absent. No pelvic mass lesion is noted.   BOWEL: Stomach is not well distended limiting evaluation. A gastrostomy tube is noted with its bulb located in the distal body of the stomach. Small bowel loops are normal in course and caliber and does not demonstrate segmental distention to suggest obstruction. Large bowel demonstrates small stool volume and appears grossly unremarkable. Appendix is visualized and appears unremarkable.   VESSELS: Moderate atherosclerotic calcifications of the abdominal aorta without aneurysmal dilatation. IVC appears grossly unremarkable.   PERITONEUM/RETROPERITONEUM/LYMPH NODES: There is no free or loculated fluid collection, no free intraperitoneal air. The retroperitoneum appears normal.  No abdominopelvic lymphadenopathy is present.   BONES AND ABDOMINAL WALL: Postsurgical changes of bipedicular glenn and screw fixation extending from L2 through L5 vertebral bodies is noted. Evaluation is limited due to significant metal related streak artifacts through this region. The rest of the osseous structures demonstrate extensive osseous  demineralization. No definite CT evidence of suspicious osseous lesions. There is diffuse reticular subcutaneous fat stranding throughout the abdominal wall soft tissues, most likely related fluid overload.       1.  Questionable subtle edematous thickening of the head and uncinate process of pancreas without significant peripancreatic fat stranding. This is a nonspecific finding and very early pancreatitis can be considered in the differential. No definite peripancreatic fluid collections. 2. Cholelithiasis with moderately distended gallbladder. There is mild edematous thickening of the gallbladder wall with small amount of pericholecystic fluid. While these changes could be reactive secondary to acute pancreatitis versus hepatic parenchymal disease, acute cholecystitis can be considered in the differential. Recommend clinical and laboratory correlation for concerns of acute cholecystitis. A gallbladder ultrasound can be performed for further evaluation if clinically warranted. 3. Hepatomegaly with heterogeneous enhancement of hepatic parenchyma. Recommend clinical correlation for hepatic parenchymal disease. 4. Small volume bilateral pleural effusions and mild body wall edema. Recommend correlation with fluid status. 5. Extensive osseous demineralization.   MACRO: None   Signed by: Carolynn Blanton 5/25/2024 11:38 AM Dictation workstation:   [id number]    XR chest 1 view    Result Date: 5/25/2024  Interpreted By:  Iglesia Alfaro, STUDY: XR CHEST 1 VIEW;  5/25/2024 9:37 am   INDICATION: Signs/Symptoms:generalized weakness.   COMPARISON: 05/23/2024   ACCESSION NUMBER(S): [id number]   ORDERING CLINICIAN: SAM CARMONA   FINDINGS: Unchanged bibasilar opacities. Suspect underlying pleural effusions. Pulmonary vascular congestion. Cardiomediastinal silhouette unchanged. Aortic atherosclerosis. Post sternotomy. Degenerative changes of the shoulders. Partially imaged lumbar fusion hardware.       No change compared to 2  days ago. Pulmonary vascular congestion, pleural effusions, and bibasilar opacities persist.   MACRO: None   Signed by: Iglesia Alfaro 5/25/2024 10:06 AM Dictation workstation:   [id number]     Scheduled medications  acetaminophen, 650 mg, g-tube, q8h  ascorbic acid, 500 mg, g-tube, Daily  aspirin, 81 mg, g-tube, Daily  atorvastatin, 40 mg, g-tube, Nightly  cholecalciferol, 50,000 Units, g-tube, Every Sunday  clopidogrel, 75 mg, g-tube, Daily  ferrous sulfate (325 mg ferrous sulfate), 65 mg of iron, oral, BID  heparin (porcine), 5,000 Units, subcutaneous, q8h JACQUELYN  lactobacillus acidophilus, 1 tablet, oral, BID  metoprolol tartrate, 50 mg, g-tube, BID  vancomycin, 125 mg, oral, q12h      Continuous medications     PRN medications  PRN medications: acetaminophen **OR** acetaminophen **OR** acetaminophen, acetaminophen, benzocaine-menthol, dextromethorphan-guaifenesin, guaiFENesin, ondansetron, polyethylene glycol, polyethylene glycol      ASSESSMENT:  Adult failure to thrive  Encephalopathy  History of CVA  Generalized weakness  Fever  Leukocytosis  C. Difficile  Dysphagia  PEG tube  Protein calorie malnutrition  Hyponatremia  Hyperkalemia    PLAN:  Patient is doing well this morning.  Overall, condition is stable.  Infectious disease input appreciated.  Urine culture pending no growth.  2/2 blood cultures pending no growth x 1 day.  Off antibiotics.  WBC 12.6.  Afebrile.  Non-toxic appearing.  Continue p.o Vancomycin through 5/29/2024.  Stable per Infectious disease for discharge.  Mentation stable.  No focal deficits.  Labs reviewed.  Sodium 131.  Potassium 5.3.  Repeat bmp.  Tube feeding per RD recommendations.   Decrease water flush to every 6 hours.  Monitor.  NPO.  Mouth care three times daily and as needed.  PT/OT evaluation.  Fall precautions.  Up with assistance only.  Bed and chair alarm at all times.  DVT prophylaxis.  Heparin subcutaneous.  Pressure ulcer prevention measures.  Turn and reposition every 2  hours and as needed.  Heels off bed.  Offloading.  Elevate bilateral upper extremities.  ACE wraps to bilateral upper extremities.  Supportive care.  Patient reassured.  Case management following for discharge planning.  [facility] to [facility].  Anticipate discharge pending repeat bmp results, when arrangements are made by case management.  Discussed with Dr. Denise.      Belen Martin, APRN-CNP

## 2024-05-27 NOTE — PROGRESS NOTES
Physical Therapy    Physical Therapy Evaluation & Treatment    Patient Name: Rebecca Samaniego  MRN: [id number]  Today's Date: 5/27/2024   Time Calculation  Start Time: 0850  Stop Time: 0915  Time Calculation (min): 25 min    Assessment/Plan   PT Assessment  PT Assessment Results: Decreased strength, Decreased range of motion, Decreased endurance, Impaired balance, Decreased mobility, Decreased coordination, Decreased cognition, Impaired judgement, Decreased safety awareness, Impaired tone, Decreased skin integrity  Rehab Prognosis: Fair  Evaluation/Treatment Tolerance: Patient limited by fatigue  Medical Staff Made Aware: Yes  Strengths: Support of Caregivers  Barriers to Participation: Comorbidities  End of Session Communication: Bedside nurse  Assessment Comment: The patient is a 78 y.o. female admitted to the hospital for fever at SNF. The patient currently requires depA to maxA for all functional mobility. The patient would benefit from a trial of PT services to address functional deficits.  End of Session Patient Position: Bed, 3 rail up, Alarm on   IP OR SWING BED PT PLAN  Inpatient or Swing Bed: Inpatient  PT Plan  Treatment/Interventions: Bed mobility, Transfer training, Gait training, Balance training, Neuromuscular re-education, Strengthening, Endurance training, Range of motion, Therapeutic exercise, Therapeutic activity  PT Plan: Skilled PT  PT Frequency: 3 times per week  PT Discharge Recommendations: Moderate intensity level of continued care  Equipment Recommended upon Discharge: Wheeled walker, Wheelchair  PT Recommended Transfer Status: Assist x2, Assist x1  PT - OK to Discharge: Yes (to next appropriate level of care)      Subjective     General Visit Information:  General  Reason for Referral: impaired mobility due to weakness  Referred By: Kobi Denise MD  Past Medical History Relevant to Rehab: HLD, CKD, CVA, AVR, C diff, Breast lumpectomy, hysterectomy, cervical  discectomy  Family/Caregiver Present: No  Prior to Session Communication: Bedside nurse  Patient Position Received: Bed, 3 rail up, Alarm on  Preferred Learning Style: auditory, verbal  General Comment: The patient is a 78 y.o. female admitted to the hospital from SNF due to fever and increased weakness.  Home Living:  Home Living  Type of Home: Skilled Nursing facility ([facility])  Lives With: Other (Comment) (Carestaff)  Home Adaptive Equipment: Walker rolling or standard, Cane  Home Layout: One level  Home Access: Level entry  Home Living Comments: Pt with multiple recent hospitalizations where she was d/c to [facility]. Per chart, prior to initial hospitalizaton in April pt was home with spouse  Prior Level of Function:  Prior Function Per Pt/Caregiver Report  Level of [place]: Needs assistance with ADLs, Needs assistance with homemaking, Needs assistance with functional transfers  Receives Help From: Family, Other (Comment) (carestaff)  ADL Assistance: Needs assistance (assist with bathing, dressing, meals)  Homemaking Assistance: Needs assistance (completed by family or carestaff)  Ambulatory Assistance: Needs assistance (needs assist for bed mobility, transfers, gait trials)  Vocational: Retired  Hand Dominance: Right  Prior Function Comments: Pt is a poor historian. Pt reports limited mobility at SNF and assist for ADLs, unclear prior level of function; Per chart, prior to April hospitalizaton pt was home with spouse and required assist for ADLs/IADLs and functional mobility with use of cane  Precautions:  Precautions  Hearing/Visual Limitations: wears glasses  Medical Precautions: Fall precautions, Infection precautions, Swallowing precautions (Contact precautions for c-diff; PEG tube)  Precautions Comment: + c-diff, PEG tube, IV, nicholson       Objective   Pain:  Pain Assessment  Pain Assessment: FLACC (Face, Legs, Activity, Cry, Consolability)  Pain Score: 0 - No  pain  Cognition:  Cognition  Overall Cognitive Status: Impaired at baseline  Arousal/Alertness: Inconsistent responses to stimuli  Orientation Level: Disoriented X4  Cognition Comments: delayed response to questions and commands  Insight: Severe  Task Initiation: Initiates with cues  Processing Speed: Delayed    General Assessments:  General Observation  General Observation: pt with poor skin integrity; h/o sacral ulcers and pita heel ulcers; ecchymosis noted; pt lethargic throughout eval.     Activity Tolerance  Endurance: Decreased tolerance for upright activites  Activity Tolerance Comments: lethargic during eval  Rate of Perceived Exertion (RPE): 5/10    Sensation  Sensation Comment: pt denies paresthesias, R LE hypersensitive to touch    Strength  Strength Comments: grossly 1/5 BLE; difficulty to assess due to lethargy  Coordination  Coordination Comment: increased time and effort for intentional activity    Postural Control  Posture Comment: cervical extension in supine, thoracic kyphosis    Static Sitting Balance  Static Sitting-Balance Support: Feet supported  Static Sitting-Level of Assistance: Maximum assistance, Dependent  Static Sitting-Comment/Number of Minutes: EOB for approx. 4 min  Functional Assessments:  Bed Mobility  Bed Mobility: Yes  Bed Mobility 1  Bed Mobility 1: Supine to sitting, Sitting to supine  Level of Assistance 1: Dependent  Bed Mobility Comments 1: DepA required for all aspects of bed mobility  Bed Mobility 2  Bed Mobility  2: Rolling right, Rolling left  Level of Assistance 2: Dependent  Bed Mobility Comments 2: completed for repositioning in bed    Transfers  Transfer: No (unsafe to trial transfer at this time)  Extremity/Trunk Assessments:  RLE   RLE :  (impaired strength and AAROM at end range; grossly 1/5)  LLE   LLE :  (impaired strength and AAROM at end range; grossly 1/5)  Treatments:  Therapeutic Exercise  Therapeutic Exercise Performed: Yes  Therapeutic Exercise Activity 1:  AAROM BLE throughout all planes x 10 reps    Therapeutic Activity  Therapeutic Activity Performed: Yes  Therapeutic Activity 1: Patient sat EOB for approx. 4 min with maxA to maintain trunk in midline seated position.    Bed Mobility  Bed Mobility: Yes  Bed Mobility 1  Bed Mobility 1: Supine to sitting, Sitting to supine  Level of Assistance 1: Dependent  Bed Mobility Comments 1: DepA required for all aspects of bed mobility  Bed Mobility 2  Bed Mobility  2: Rolling right, Rolling left  Level of Assistance 2: Dependent  Bed Mobility Comments 2: completed for repositioning in bed  Outcome Measures:  [facility] Basic Mobility  Turning from your back to your side while in a flat bed without using bedrails: Total  Moving from lying on your back to sitting on the side of a flat bed without using bedrails: Total  Moving to and from bed to chair (including a wheelchair): Total  Standing up from a chair using your arms (e.g. wheelchair or bedside chair): Total  To walk in hospital room: Total  Climbing 3-5 steps with railing: Total  Basic Mobility - Total Score: 6    Encounter Problems       Encounter Problems (Active)       PT Problem       Strength (Progressing)       Start:  05/27/24    Expected End:  06/27/24       The patient will demonstrate an overall strength of 3+/5 in BLE to assist with completion of functional mobility.           Bed Mobility (Progressing)       Start:  05/27/24    Expected End:  06/27/24       The patient will be able to complete bed mobility at a modA level with use of bed rails.           Transfers (Not Progressing)       Start:  05/27/24    Expected End:  06/27/24       The patient will be able to complete safe transfer of sit <> stand with minimal VC at a modA level.           Bed <> Chair Transfer (Not Progressing)       Start:  05/27/24    Expected End:  06/27/24       The patient will be able to complete a bed to chair transfer with modA and use of RW to assist with maintaining functional  mobility.                 Education Documentation  Precautions, taught by Duyen Hsieh PT at 5/27/2024 12:07 PM.  Learner: Patient  Readiness: Acceptance  Method: Explanation  Response: Needs Reinforcement  Comment: Education provided on safe sequencing of all bed mobility; VCs for general safety awareness.    Body Mechanics, taught by Duyen Hsieh PT at 5/27/2024 12:07 PM.  Learner: Patient  Readiness: Acceptance  Method: Explanation  Response: Needs Reinforcement  Comment: Education provided on safe sequencing of all bed mobility; VCs for general safety awareness.    Mobility Training, taught by Duyen Hsieh PT at 5/27/2024 12:07 PM.  Learner: Patient  Readiness: Acceptance  Method: Explanation  Response: Needs Reinforcement  Comment: Education provided on safe sequencing of all bed mobility; VCs for general safety awareness.    Education Comments  No comments found.

## 2024-05-27 NOTE — CARE PLAN
The patient's goals for the shift include      The clinical goals for the shift include Pain control.  Skin intergrity.    Over the shift, the patient did not make progress toward the following goals. Barriers to progression include . Recommendations to address these barriers include .      Problem: Skin  Goal: Decreased wound size/increased tissue granulation at next dressing change  5/27/2024 0611 by Pa Thompson RN  Outcome: Progressing  5/27/2024 0610 by Pa Thompson RN  Flowsheets (Taken 5/27/2024 0610)  Decreased wound size/increased tissue granulation at next dressing change: Protective dressings over bony prominences  Goal: Participates in plan/prevention/treatment measures  5/27/2024 0611 by Pa Thompson RN  Outcome: Progressing  5/27/2024 0610 by Pa Thompson RN  Flowsheets (Taken 5/27/2024 0610)  Participates in plan/prevention/treatment measures: Elevate heels  Goal: Prevent/manage excess moisture  5/27/2024 0611 by Pa Thompson RN  Outcome: Progressing  5/27/2024 0610 by Pa Thompson RN  Flowsheets (Taken 5/27/2024 0610)  Prevent/manage excess moisture:   Cleanse incontinence/protect with barrier cream   Monitor for/manage infection if present   Follow provider orders for dressing changes   Moisturize dry skin  Goal: Prevent/minimize sheer/friction injuries  5/27/2024 0611 by Pa Thompson RN  Outcome: Progressing  5/27/2024 0610 by Pa Thompson RN  Flowsheets (Taken 5/27/2024 0610)  Prevent/minimize sheer/friction injuries:   Complete micro-shifts as needed if patient unable. Adjust patient position to relieve pressure points, not a full turn   HOB 30 degrees or less   Turn/reposition every 2 hours/use positioning/transfer devices   Increase activity/out of bed for meals  Goal: Promote/optimize nutrition  5/27/2024 0611 by Pa Thompson RN  Outcome: Progressing  5/27/2024 0610 by Pa Thompson RN  Flowsheets (Taken 5/27/2024 0610)  Promote/optimize nutrition: Assist with feeding  Goal: Promote skin  healing  5/27/2024 0611 by Pa Thompson RN  Outcome: Progressing  5/27/2024 0610 by Pa Thompson RN  Flowsheets (Taken 5/27/2024 0610)  Promote skin healing:   Protective dressings over bony prominences   Ensure correct size (line/device) and apply per  instructions   Rotate device position/do not position patient on device   Turn/reposition every 2 hours/use positioning/transfer devices   Assess skin/pad under line(s)/device(s)

## 2024-05-28 LAB
AMYLASE SERPL-CCNC: 124 U/L (ref 28–100)
ANION GAP SERPL CALC-SCNC: 11 MMOL/L
BUN SERPL-MCNC: 23 MG/DL (ref 8–25)
CALCIUM SERPL-MCNC: 8.5 MG/DL (ref 8.5–10.4)
CHLORIDE SERPL-SCNC: 101 MMOL/L (ref 97–107)
CO2 SERPL-SCNC: 25 MMOL/L (ref 24–31)
CREAT SERPL-MCNC: 0.5 MG/DL (ref 0.4–1.6)
EGFRCR SERPLBLD CKD-EPI 2021: >90 ML/MIN/1.73M*2
ERYTHROCYTE [DISTWIDTH] IN BLOOD BY AUTOMATED COUNT: 15.3 % (ref 11.5–14.5)
GLUCOSE SERPL-MCNC: 107 MG/DL (ref 65–99)
HCT VFR BLD AUTO: 23.3 % (ref 36–46)
HGB BLD-MCNC: 7.2 G/DL (ref 12–16)
LIPASE SERPL-CCNC: 97 U/L (ref 16–63)
MCH RBC QN AUTO: 30 PG (ref 26–34)
MCHC RBC AUTO-ENTMCNC: 30.9 G/DL (ref 32–36)
MCV RBC AUTO: 97 FL (ref 80–100)
NRBC BLD-RTO: 0 /100 WBCS (ref 0–0)
PLATELET # BLD AUTO: 528 X10*3/UL (ref 150–450)
POTASSIUM SERPL-SCNC: 4.4 MMOL/L (ref 3.4–5.1)
RBC # BLD AUTO: 2.4 X10*6/UL (ref 4–5.2)
SODIUM SERPL-SCNC: 137 MMOL/L (ref 133–145)
WBC # BLD AUTO: 10.9 X10*3/UL (ref 4.4–11.3)

## 2024-05-28 PROCEDURE — 2500000001 HC RX 250 WO HCPCS SELF ADMINISTERED DRUGS (ALT 637 FOR MEDICARE OP): Performed by: NURSE PRACTITIONER

## 2024-05-28 PROCEDURE — 83690 ASSAY OF LIPASE: CPT | Performed by: NURSE PRACTITIONER

## 2024-05-28 PROCEDURE — 1210000001 HC SEMI-PRIVATE ROOM DAILY

## 2024-05-28 PROCEDURE — 80048 BASIC METABOLIC PNL TOTAL CA: CPT | Performed by: NURSE PRACTITIONER

## 2024-05-28 PROCEDURE — 82150 ASSAY OF AMYLASE: CPT | Performed by: NURSE PRACTITIONER

## 2024-05-28 PROCEDURE — 2500000001 HC RX 250 WO HCPCS SELF ADMINISTERED DRUGS (ALT 637 FOR MEDICARE OP): Performed by: INTERNAL MEDICINE

## 2024-05-28 PROCEDURE — 2500000004 HC RX 250 GENERAL PHARMACY W/ HCPCS (ALT 636 FOR OP/ED): Performed by: INTERNAL MEDICINE

## 2024-05-28 PROCEDURE — 97167 OT EVAL HIGH COMPLEX 60 MIN: CPT | Mod: GO

## 2024-05-28 PROCEDURE — 36415 COLL VENOUS BLD VENIPUNCTURE: CPT | Performed by: NURSE PRACTITIONER

## 2024-05-28 PROCEDURE — 97530 THERAPEUTIC ACTIVITIES: CPT | Mod: GO

## 2024-05-28 PROCEDURE — 2500000002 HC RX 250 W HCPCS SELF ADMINISTERED DRUGS (ALT 637 FOR MEDICARE OP, ALT 636 FOR OP/ED): Performed by: INTERNAL MEDICINE

## 2024-05-28 PROCEDURE — 97530 THERAPEUTIC ACTIVITIES: CPT | Mod: GP

## 2024-05-28 PROCEDURE — 2500000005 HC RX 250 GENERAL PHARMACY W/O HCPCS: Performed by: NURSE PRACTITIONER

## 2024-05-28 PROCEDURE — 3E0G76Z INTRODUCTION OF NUTRITIONAL SUBSTANCE INTO UPPER GI, VIA NATURAL OR ARTIFICIAL OPENING: ICD-10-PCS | Performed by: INTERNAL MEDICINE

## 2024-05-28 PROCEDURE — 85027 COMPLETE CBC AUTOMATED: CPT | Performed by: NURSE PRACTITIONER

## 2024-05-28 RX ORDER — ESOMEPRAZOLE MAGNESIUM 20 MG/1
10 GRANULE, DELAYED RELEASE ORAL NIGHTLY
Status: DISCONTINUED | OUTPATIENT
Start: 2024-05-28 | End: 2024-05-28

## 2024-05-28 RX ORDER — ESOMEPRAZOLE MAGNESIUM 20 MG/1
20 GRANULE, DELAYED RELEASE ORAL NIGHTLY
Status: DISCONTINUED | OUTPATIENT
Start: 2024-05-28 | End: 2024-05-29 | Stop reason: HOSPADM

## 2024-05-28 RX ORDER — LIDOCAINE 560 MG/1
1 PATCH PERCUTANEOUS; TOPICAL; TRANSDERMAL DAILY
Status: DISCONTINUED | OUTPATIENT
Start: 2024-05-28 | End: 2024-05-29 | Stop reason: HOSPADM

## 2024-05-28 RX ADMIN — OXYCODONE HYDROCHLORIDE AND ACETAMINOPHEN 500 MG: 500 TABLET ORAL at 08:32

## 2024-05-28 RX ADMIN — HEPARIN SODIUM 5000 UNITS: 5000 INJECTION, SOLUTION INTRAVENOUS; SUBCUTANEOUS at 21:01

## 2024-05-28 RX ADMIN — ASPIRIN 81 MG CHEWABLE TABLET 81 MG: 81 TABLET CHEWABLE at 08:32

## 2024-05-28 RX ADMIN — LIDOCAINE 1 PATCH: 4 PATCH TOPICAL at 13:05

## 2024-05-28 RX ADMIN — FERROUS SULFATE TAB 325 MG (65 MG ELEMENTAL FE) 1 TABLET: 325 (65 FE) TAB at 17:41

## 2024-05-28 RX ADMIN — FERROUS SULFATE TAB 325 MG (65 MG ELEMENTAL FE) 1 TABLET: 325 (65 FE) TAB at 08:32

## 2024-05-28 RX ADMIN — ATORVASTATIN CALCIUM 40 MG: 40 TABLET, FILM COATED ORAL at 21:01

## 2024-05-28 RX ADMIN — Medication 1 TABLET: at 08:32

## 2024-05-28 RX ADMIN — ACETAMINOPHEN 650 MG: 325 TABLET ORAL at 08:33

## 2024-05-28 RX ADMIN — Medication 1 TABLET: at 21:01

## 2024-05-28 RX ADMIN — VANCOMYCIN HYDROCHLORIDE 125 MG: 125 CAPSULE ORAL at 08:32

## 2024-05-28 RX ADMIN — HEPARIN SODIUM 5000 UNITS: 5000 INJECTION, SOLUTION INTRAVENOUS; SUBCUTANEOUS at 13:05

## 2024-05-28 RX ADMIN — METOPROLOL TARTRATE 50 MG: 50 TABLET, FILM COATED ORAL at 08:33

## 2024-05-28 RX ADMIN — HEPARIN SODIUM 5000 UNITS: 5000 INJECTION, SOLUTION INTRAVENOUS; SUBCUTANEOUS at 06:41

## 2024-05-28 RX ADMIN — ACETAMINOPHEN 650 MG: 325 TABLET ORAL at 17:41

## 2024-05-28 RX ADMIN — ESOMEPRAZOLE MAGNESIUM 20 MG: 20 GRANULE, DELAYED RELEASE ORAL at 21:01

## 2024-05-28 RX ADMIN — ACETAMINOPHEN 650 MG: 160 SOLUTION ORAL at 21:00

## 2024-05-28 RX ADMIN — VANCOMYCIN HYDROCHLORIDE 125 MG: 125 CAPSULE ORAL at 21:01

## 2024-05-28 RX ADMIN — CLOPIDOGREL BISULFATE 75 MG: 75 TABLET ORAL at 08:33

## 2024-05-28 RX ADMIN — METOPROLOL TARTRATE 50 MG: 50 TABLET, FILM COATED ORAL at 21:01

## 2024-05-28 ASSESSMENT — COGNITIVE AND FUNCTIONAL STATUS - GENERAL
WALKING IN HOSPITAL ROOM: TOTAL
TOILETING: TOTAL
TOILETING: TOTAL
CLIMB 3 TO 5 STEPS WITH RAILING: TOTAL
MOVING FROM LYING ON BACK TO SITTING ON SIDE OF FLAT BED WITH BEDRAILS: TOTAL
DRESSING REGULAR LOWER BODY CLOTHING: TOTAL
PERSONAL GROOMING: TOTAL
HELP NEEDED FOR BATHING: TOTAL
DAILY ACTIVITIY SCORE: 6
DRESSING REGULAR UPPER BODY CLOTHING: TOTAL
MOVING TO AND FROM BED TO CHAIR: TOTAL
HELP NEEDED FOR BATHING: TOTAL
MOVING TO AND FROM BED TO CHAIR: TOTAL
WALKING IN HOSPITAL ROOM: TOTAL
HELP NEEDED FOR BATHING: TOTAL
TURNING FROM BACK TO SIDE WHILE IN FLAT BAD: TOTAL
DRESSING REGULAR LOWER BODY CLOTHING: TOTAL
MOVING FROM LYING ON BACK TO SITTING ON SIDE OF FLAT BED WITH BEDRAILS: TOTAL
EATING MEALS: TOTAL
DRESSING REGULAR UPPER BODY CLOTHING: TOTAL
DAILY ACTIVITIY SCORE: 6
DRESSING REGULAR LOWER BODY CLOTHING: TOTAL
MOBILITY SCORE: 6
STANDING UP FROM CHAIR USING ARMS: TOTAL
TOILETING: TOTAL
EATING MEALS: TOTAL
EATING MEALS: TOTAL
PERSONAL GROOMING: TOTAL
DRESSING REGULAR UPPER BODY CLOTHING: TOTAL
PERSONAL GROOMING: TOTAL
STANDING UP FROM CHAIR USING ARMS: TOTAL
TURNING FROM BACK TO SIDE WHILE IN FLAT BAD: TOTAL
DAILY ACTIVITIY SCORE: 6

## 2024-05-28 ASSESSMENT — PAIN SCALES - GENERAL
PAINLEVEL_OUTOF10: 0 - NO PAIN
PAINLEVEL_OUTOF10: 3
PAINLEVEL_OUTOF10: 3
PAINLEVEL_OUTOF10: 4
PAINLEVEL_OUTOF10: 4

## 2024-05-28 ASSESSMENT — PAIN - FUNCTIONAL ASSESSMENT
PAIN_FUNCTIONAL_ASSESSMENT: 0-10
PAIN_FUNCTIONAL_ASSESSMENT: FLACC (FACE, LEGS, ACTIVITY, CRY, CONSOLABILITY)
PAIN_FUNCTIONAL_ASSESSMENT: FLACC (FACE, LEGS, ACTIVITY, CRY, CONSOLABILITY)
PAIN_FUNCTIONAL_ASSESSMENT: 0-10
PAIN_FUNCTIONAL_ASSESSMENT: 0-10

## 2024-05-28 ASSESSMENT — ACTIVITIES OF DAILY LIVING (ADL)
BATHING_ASSISTANCE: TOTAL
ADL_ASSISTANCE: NEEDS ASSISTANCE

## 2024-05-28 NOTE — PROGRESS NOTES
Occupational Therapy    Evaluation/Treatment    Patient Name: Rebecca Samaniego  MRN: [id number]  : 1945  Today's Date: 24  Time Calculation  Start Time: 810  Stop Time: 833  Time Calculation (min): 23 min       Assessment:  OT Assessment: Pt presents on eval with increased overall weakness, cognitive deficits noted, BUE edema, poor activity tolerance, poor BUE ROM, and poor sitting balance affecting her self-care and functional transfers/mobility. Pt will benefit from continued skilled OT to address these deficits.  Prognosis: Poor  Evaluation/Treatment Tolerance: Patient limited by fatigue  End of Session Communication: Bedside nurse  End of Session Patient Position: Bed, 4 rail up, Alarm on (Needs in reach.)  OT Assessment Results: Decreased ADL status, Decreased upper extremity range of motion, Decreased upper extremity strength, Decreased safe judgment during ADL, Decreased cognition, Decreased endurance, Decreased fine motor control, Decreased functional mobility, Decreased gross motor control, Decreased trunk control for functional activities  Barriers to Participation: Comorbidities, Insight into problems    Plan:  Treatment Interventions: ADL retraining, Functional transfer training, UE strengthening/ROM, Endurance training, Cognitive reorientation, Patient/family training, Equipment evaluation/education, Neuromuscular reeducation, Fine motor coordination activities, Compensatory technique education  OT Frequency: 2 times per week  OT Discharge Recommendations: Moderate intensity level of continued care, 24 hr supervision due to cognition  OT - OK to Discharge: Yes      Subjective     General:   OT Received On: 24  General  Reason for Referral: weakness, impaired ADL's/mobility  Referred By: Kobi Denise MD  Past Medical History Relevant to Rehab: HLD, CKD, CVA, AVR, C diff, Breast lumpectomy, hysterectomy, cervical discectomy  Family/Caregiver Present: No  Co-Treatment:  PT  Co-Treatment Reason: Need for 2nd skilled person for therapeutic handling during functional mobility to optimize safety and outcomes.  Prior to Session Communication: Bedside nurse  Patient Position Received: Bed, 4 rail up, Alarm on  General Comment: The patient is a 78 y.o. female admitted to the hospital from SNF due to fever and increased weakness.    Precautions:  Hearing/Visual Limitations: wears glasses  Medical Precautions: Fall precautions  Precautions Comment: + c-diff, PEG tube, IV, nicholson, Aspiration precautions    Vital Signs:  Heart Rate: 98  Heart Rate Source: Monitor  SpO2: 98 %    Pain:  Pain Assessment  Pain Assessment: FLACC (Face, Legs, Activity, Cry, Consolability)  Pain Score: 4  Pain Type: Acute pain  Pain Location: Arm  Pain Orientation: Right (with movement)  Pain Interventions: Elevated (on a pillow)    Objective     Cognition:  Overall Cognitive Status: Impaired  Orientation Level: Disoriented to place, Disoriented to time, Disoriented to situation  Following Commands: Follows one step commands with increased time  Cognition Comments: Pt presents with confusion and distorted speech patterns with difficulty understanding her.    Home Living:  Type of Home: Skilled Nursing facility ([facility])  Lives With: Other (Comment) (care staff)  Home Adaptive Equipment: Walker rolling or standard, Cane  Home Layout: One level  Home Access: Level entry    Prior Function:  Level of [place]: Needs assistance with ADLs, Needs assistance with homemaking, Needs assistance with functional transfers  Receives Help From: Family, Other (Comment) (care staff)  ADL Assistance: Needs assistance (assist with bathing, dressing, meals)  Homemaking Assistance: Needs assistance (completed by family or care staff)  Ambulatory Assistance: Needs assistance (for bed mobility, transfers, and gait trials)  Hand Dominance: Right  Prior Function Comments: Pt is a poor historian. Pt reports limited mobility  at SNF and assist for ADLs, unclear prior level of function; Per chart, prior to April hospitalizaton pt was home with spouse and required assist for ADLs/IADLs and functional mobility with use of cane    ADL:  Eating Assistance: Total  Eating Deficit: Feeding tube (PEG tube)  Grooming Assistance: Total  Bathing Assistance: Total  UE Dressing Assistance: Total  LE Dressing Assistance: Total  Toileting Assistance with Device: Total  Toileting Deficit: Urinary Catheter    Activity Tolerance:  Activity Tolerance Comments: Poor    Bed Mobility/Transfers: Bed Mobility  Bed Mobility:  (Pt completed supine to sit in bed with max A x2 and sit to supine with dep x2 due to increased overall weakness, very limited use of BUE's, and cognitive deficits.)    Transfers  Transfer: No    Functional Mobility:  Functional Mobility  Functional Mobility Performed: No    Sitting Balance:  Static Sitting Balance  Static Sitting-Balance Support: Bilateral upper extremity supported, Feet unsupported  Static Sitting-Level of Assistance: Moderate assistance  Static Sitting-Comment/Number of Minutes: Pt tolerated sitting EOB for approx. 4-5 minutes with increased posterior support required for balance.    Therapy/Activity: Therapeutic Activity  Therapeutic Activity Performed:  (See bed mobility, static sitting balance.)    Sensation:  Sensation Comment: Pt denies paresthesias, but presents with increased BUE edema    Strength:  Strength Comments: BUE shoulders 1/5, distally 2-/5 (LUE wrapped from upper arm to digit tips d/t edema per nurse)    Coordination:  Coordination Comment: BUE's impaired grossly     Hand Function:  Hand Function  Gross Grasp: Impaired  Coordination: Impaired      Outcome Measures: [facility] Daily Activity  Putting on and taking off regular lower body clothing: Total  Bathing (including washing, rinsing, drying): Total  Putting on and taking off regular upper body clothing: Total  Toileting, which includes using toilet,  bedpan or urinal: Total  Taking care of personal grooming such as brushing teeth: Total  Eating Meals: Total  Daily Activity - Total Score: 6      Education Documentation  Home Exercise Program, taught by Prakash Gonzalez Jr., OT at 5/28/2024 10:28 AM.  Learner: Patient  Readiness: Acceptance  Method: Explanation  Response: Needs Reinforcement    Body Mechanics, taught by Prakash Gonzalez Jr., OT at 5/28/2024 10:28 AM.  Learner: Patient  Readiness: Acceptance  Method: Explanation  Response: Needs Reinforcement    ADL Training, taught by Prakash Gonzalez Jr., OT at 5/28/2024 10:28 AM.  Learner: Patient  Readiness: Acceptance  Method: Explanation  Response: Needs Reinforcement    Education Comments  No comments found.      Goals:  Encounter Problems       Encounter Problems (Active)       OT Goals       Pt will complete sit<>stand functional transfers with mod A x1-2 using a RW. (Progressing)       Start:  05/28/24    Expected End:  06/25/24            Pt will complete all grooming tasks with mod A in bed or seated using a device and/or compensatory techniques as needed. (Progressing)       Start:  05/28/24    Expected End:  06/25/24            Pt will tolerate sitting EOB for at least 10 minutes with F balance during ADL's and/or therex. (Progressing)       Start:  05/28/24    Expected End:  06/25/24            Pt will participate in BUE AAROM/AROM exercises in order to enhance function and decrease edema. (Progressing)       Start:  05/28/24    Expected End:  06/25/24

## 2024-05-28 NOTE — PROGRESS NOTES
Physical Therapy    Physical Therapy Treatment    Patient Name: Rebecca Samaniego  MRN: [id number]  Today's Date: 5/28/2024  Time Calculation  Start Time: 0821  Stop Time: 0838  Time Calculation (min): 17 min    Assessment/Plan   PT Assessment  Evaluation/Treatment Tolerance: Patient limited by fatigue, Patient limited by pain  End of Session Communication: Bedside nurse  Assessment Comment: Slow progress regarding functional mobility;  tolerance to activity limited at this time;  fall risk.  End of Session Patient Position: Bed, 4 rail up, Alarm on  PT Plan  Inpatient/Swing Bed or Outpatient: Inpatient  PT Plan  Treatment/Interventions: Bed mobility, Transfer training, Gait training, Balance training, Neuromuscular re-education, Strengthening, Endurance training, Range of motion, Therapeutic exercise, Therapeutic activity  PT Plan: Skilled PT  PT Frequency: 3 times per week  PT Discharge Recommendations: Moderate intensity level of continued care  Equipment Recommended upon Discharge: Wheeled walker, Wheelchair  PT Recommended Transfer Status: Total assist  PT - OK to Discharge: Yes (with skilled physical therapy services at next level of care.)      General Visit Information:   PT  Visit  PT Received On: 05/28/24  General  Co-Treatment: OT  Co-Treatment Reason: Need for 2nd skilled person for therapeutic handling during functional mobility to optimize safety and outcomes.  Prior to Session Communication: Bedside nurse  Patient Position Received: Bed, 4 rail up, Alarm on  General Comment: RN cleared patient for treatment.  Patient appears agreeable to treatment.    Subjective   Precautions:  Precautions  Medical Precautions: Fall precautions  Precautions Comment: Aspiration precautions (PEG tube)  Vital Signs:  Vital Signs  Heart Rate: (!) 113    Objective   Pain:  Pain Assessment  Pain Assessment: FLACC (Face, Legs, Activity, Cry, Consolability)  Pain Score: 4  Pain Type: Acute pain (during movement)  Pain Location:  Arm  Pain Orientation: Right  Cognition:  Cognition  Overall Cognitive Status: Impaired (disoriented to place and time;  memory deficits;  difficult to understand speech.)  Coordination:  Movements are Fluid and Coordinated: No  Lower Body Coordination: Difficult to assess coordination pita LE due to decreased strength  Postural Control:  Static Sitting Balance  Static Sitting-Balance Support: No upper extremity supported  Static Sitting-Level of Assistance: Moderate assistance  Static Sitting-Comment/Number of Minutes: Assist required to maintain midline while sitting on side of bed due to decreased balance posterior.  Extremity/Trunk Assessments:  RLE   RLE : Exceptions to WFL  Strength RLE  RLE Overall Strength:  (1-2/5)  LLE   LLE : Exceptions to WFL  Strength LLE  LLE Overall Strength:  (1-2/5)  Activity Tolerance:  Activity Tolerance  Endurance: Decreased tolerance for upright activites  Activity Tolerance Comments: Fatigue and pain  Treatments:  Therapeutic Exercise  Therapeutic Exercise Performed: Yes  Therapeutic Exercise Activity 1: ankle pumps, heel slides, hip abduction pita LE 5 reps x 1 set;  assist with all ther ex.    Therapeutic Activity  Therapeutic Activity Performed: Yes  Therapeutic Activity 1: Sitting balance activities while sitting on side of bed x ~4 minutes.         Bed Mobility  Bed Mobility: Yes  Bed Mobility 1  Bed Mobility 1: Supine to sitting  Level of Assistance 1: Maximum assistance (x 2)  Bed Mobility Comments 1: Assist with trunk-up and pita LE during supine to sit;  max assist to scoot hips to edge of bed during supine to sit.  Bed Mobility 2  Bed Mobility  2: Sitting to supine  Level of Assistance 2: Maximum assistance (x 2)  Bed Mobility Comments 2: Assist with trunk and pita LE during sit to supine.    Ambulation/Gait Training  Ambulation/Gait Training Performed: No  Transfers  Transfer: No    Stairs  Stairs: No         Outcome Measures:       Education Documentation  No  documentation found.  Education Comments  No comments found.        OP EDUCATION:       Encounter Problems       Encounter Problems (Active)       PT Problem       Strength (Progressing)       Start:  05/27/24    Expected End:  06/27/24       The patient will demonstrate an overall strength of 3+/5 in BLE to assist with completion of functional mobility.           Bed Mobility (Progressing)       Start:  05/27/24    Expected End:  06/27/24       The patient will be able to complete bed mobility at a modA level with use of bed rails.           Transfers (Not Progressing)       Start:  05/27/24    Expected End:  06/27/24       The patient will be able to complete safe transfer of sit <> stand with minimal VC at a modA level.           Bed <> Chair Transfer (Not Progressing)       Start:  05/27/24    Expected End:  06/27/24       The patient will be able to complete a bed to chair transfer with modA and use of RW to assist with maintaining functional mobility.

## 2024-05-28 NOTE — PROGRESS NOTES
INFECTIOUS DISEASES PROGRESS NOTE    Consulted / following patient for:  Recent fever and leukocytosis  Recent C difficile    Subjective   Interval History:   No specific complaints     Objective   PHYSICAL EXAMINATION  Vital signs:  Visit Vitals  /61 (BP Location: Right arm, Patient Position: Lying)   Pulse (!) 113   Temp 36.6 °C (97.9 °F) (Axillary)   Resp 18      General: Weak, verbal, not toxic  Lungs:  Clear to auscultation, shallow  Heart:  S1, S2 normal, no pathologic murmur appreciated  Abdomen:  Soft, nontender.     Relevant Results  WBC:  10,900    Results from last 72 hours   Lab Units 05/28/24  0700   CREATININE mg/dL 0.50   ANION GAP mmol/L 11   EGFR mL/min/1.73m*2 >90     Results from last 72 hours   Lab Units 05/26/24  0626   AST U/L 63*   ALT U/L 65*   ALK PHOS U/L 188*   BILIRUBIN TOTAL mg/dL 0.2     Microbiology:  Blood: Negative X2      ASSESSMENT:  Recent C diff  No evidence for active infection or C. Difficile    PLANS:  -   Finish low dose vancomycin 5/29 as planned  -   No additional suggestions    WILL SIGN OFF.  PLEASE RE-CONSULT PRN.  THANK YOU.    Doin Morales MD  [facility]  Office:  [phone]

## 2024-05-28 NOTE — CARE PLAN
Problem: PT Problem  Goal: Strength  Description: The patient will demonstrate an overall strength of 3+/5 in BLE to assist with completion of functional mobility.    Outcome: Progressing  Goal: Bed Mobility  Description: The patient will be able to complete bed mobility at a modA level with use of bed rails.    Outcome: Progressing

## 2024-05-28 NOTE — CONSULTS
"Nutrition Assessement Note    Nutrition Assessment    Reason for Assessment: Provider consult order, Admission nursing screening (MST 2)    Pt was admitted to this hospital between 5/9-5/24/24. Pt has been sent back to the hospital with change in mental status, tachycardia, hypotension and fever. Pt had PEG placed last admission. Tube feeding infusing @ goal rate. Received consult for hyperkalemia, will continue current tube feed formula d/t one elevated reading and labs are now wnl. Will continue to monitor labs. Will provide Zeb BID for wound healing. Anticipate discharge pending repeat bmp results, when arrangements are made by case management.     Reason for Hospital Admission:  Rebecca Samaniego is a 78 y.o. female who is admitted for generalized weakness.     Past Medical History:   Diagnosis Date    Hypertension       Past Surgical History:   Procedure Laterality Date    AORTIC VALVE REPLACEMENT  02/11/2014    Aortic Valve Replacement    BREAST LUMPECTOMY  02/11/2014    Breast Surgery Lumpectomy    CERVICAL DISCECTOMY  02/11/2014    Spinal Diskectomy Cervical    HYSTERECTOMY  02/11/2014    Hysterectomy    OTHER SURGICAL HISTORY  02/11/2014    Aortic Coarctation Repair     Nutrition History:  Food and Nutrient History: Tube feed     Food Allergies/Intolerances:  None  GI Symptoms: Diarrhea  Oral Problems: Swallowing difficulty    Anthropometrics:  Ht: 165.1 cm (5' 5\"), Wt: 63.5 kg (140 lb), BMI: 23.3  IBW/kg (Dietitian Calculated): 56.82 kg          Weight Change:  Daily Weight  05/25/24 : 63.5 kg (140 lb)  05/09/24 : 63.6 kg (140 lb 3.4 oz)  04/16/24 : 66.5 kg (146 lb 9.7 oz)  03/20/24 : 60.2 kg (132 lb 12.8 oz)  02/20/24 : 67.1 kg (148 lb)  05/16/23 : 67.4 kg (148 lb 8 oz)  04/06/22 : 67.6 kg (149 lb 2 oz)  05/19/21 : 68.9 kg (152 lb)  01/13/21 : 66 kg (145 lb 7 oz)     Weight History / % Weight Change: weight has fluctuated between 132-148# over the past year             Nutrition Focused Physical Exam " Findings:             Edema  Edema Location: BLE, BUE (Deep tissue injury (DTI) to Left heel, Dry Unstagable Right heel, DTI and excoriation to Sacrum.)    Physical Findings (Nutrition Deficiency/Toxicity)  Skin: Positive    Nutrition Significant Labs:  Lab Results   Component Value Date    WBC 10.9 05/28/2024    HGB 7.2 (L) 05/28/2024    HCT 23.3 (L) 05/28/2024     (H) 05/28/2024    CHOL 182 04/12/2024    TRIG 171 (H) 04/12/2024    HDL 42.0 (L) 04/12/2024    ALT 65 (H) 05/26/2024    AST 63 (H) 05/26/2024     05/28/2024    K 4.4 05/28/2024     05/28/2024    CREATININE 0.50 05/28/2024    BUN 23 05/28/2024    CO2 25 05/28/2024    TSH 1.57 04/11/2024    HGBA1C 5.2 04/12/2024     Nutrition Specific Medications:  acetaminophen, 650 mg, g-tube, q8h  ascorbic acid, 500 mg, g-tube, Daily  aspirin, 81 mg, g-tube, Daily  atorvastatin, 40 mg, g-tube, Nightly  cholecalciferol, 50,000 Units, g-tube, Every Sunday  clopidogrel, 75 mg, g-tube, Daily  ferrous sulfate (325 mg ferrous sulfate), 65 mg of iron, oral, BID  heparin (porcine), 5,000 Units, subcutaneous, q8h JACQUELYN  lactobacillus acidophilus, 1 tablet, oral, BID  metoprolol tartrate, 50 mg, g-tube, BID  vancomycin, 125 mg, oral, q12h         Dietary Orders (From admission, onward)       Start     Ordered    05/27/24 1011  Enteral feeding with NPO 55; 150 (Every 6 hours); Water; Every 4 hours  Diet effective now        Question Answer Comment   Tube feeding formula: Jevity 1.5    Tube feeding continuous rate (mL/hr): 55    Tube feeding flush (mL): 150 Every 6 hours   Flush type: Water    Flush frequency: Every 4 hours        05/27/24 1011                  Nutrition Support Intake provides: 1980 kcals, 84 gm protein, 1003 mL free water.      Estimated Needs:   Estimated Energy Needs  Total Energy Estimated Needs (kCal):  (1627-6411)  Total Estimated Energy Need per Day (kCal/kg):  (30-35)  Method for Estimating Needs: actual wt    Estimated Protein  Needs  Total Protein Estimated Needs (g):  (76-95)  Total Protein Estimated Needs (g/kg):  (1.2-1.5)  Method for Estimating Needs: actual wt    Estimated Fluid Needs  Total Fluid Estimated Needs (mL):  (9313-1891)  Method for Estimating Needs: 1 mL/kcal        Nutrition Diagnosis   Nutrition Diagnosis:       Nutrition Diagnosis  Patient has Nutrition Diagnosis: Yes  Diagnosis Status (1): New  Nutrition Diagnosis 1: Increased nutrient needs  Related to (1): increased demand for nutrient  As Evidenced by (1): multiple pressure injuries       Nutrition Interventions/Recommendations   Nutrition Interventions and Recommendations:    Nutrition Prescription:  Individualized Nutrition Prescription Provided for : 1923-5095 kcals, 76-95 gm protein via enterla nutrition    Nutrition Interventions:   Food and/or Nutrient Delivery Interventions  Interventions: Enteral intake, Medical food supplement  Enteral Intake: Other (Comment)  Goal: continue Jevity 1.5 goal rate @ 55 mL/hr to provide 1980 kcals, 84 gm protein, 1003 mL free water. Pt will require an additional 150 mL water flush Q6H to meet hydration needs.  Medical Food Supplement: Commercial beverage  Goal: angelika BID to aid in wound healing    Education Documentation  No documentation found.           Nutrition Monitoring and Evaluation   Monitoring/Evaluation:   Food/Nutrient Related History Monitoring  Monitoring and Evaluation Plan: Enteral and parenteral nutrition intake  Enteral and Parenteral Nutrition Intake: Enteral nutrition formula/solution  Criteria: will monitor for tube feed tolerance    Nutrition Focused Physical Findings  Monitoring and Evaluation Plan: Skin  Skin: Impaired wound healing  Criteria: pt will show signs of improvement in skin integrity         Time Spent/Follow-up:   Follow Up  Time Spent (min): 30 minutes  Last Date of Nutrition Visit: 05/28/24  Nutrition Follow-Up Needed?: 3-5 days  Follow up Comment: 5/31/24

## 2024-05-28 NOTE — CARE PLAN
The patient's goals for the shift include      The clinical goals for the shift include Pt will sleep a minimum of 4 hours by the of this shift      Problem: Skin  Goal: Decreased wound size/increased tissue granulation at next dressing change  Outcome: Progressing  Goal: Participates in plan/prevention/treatment measures  Outcome: Progressing  Goal: Prevent/manage excess moisture  Outcome: Progressing  Flowsheets (Taken 5/27/2024 9362)  Prevent/manage excess moisture:   Cleanse incontinence/protect with barrier cream   Moisturize dry skin  Goal: Prevent/minimize sheer/friction injuries  Outcome: Progressing  Goal: Promote/optimize nutrition  Outcome: Progressing  Goal: Promote skin healing  Outcome: Progressing

## 2024-05-28 NOTE — PROGRESS NOTES
Rebecca Samaniego is a 78 y.o. female on day 3 of admission presenting with Generalized weakness.    Subjective   Patient seen and examined.  Resting in bed in no acute distress.  Awake alert oriented x 2-3.  Forgetful.  Mouth dry asking for water.  No other complaints.       Spoke with nursing, no new issues.    Objective     Physical Exam  Vitals and nursing note reviewed.   Constitutional:       General: She is not in acute distress.     Appearance: Normal appearance. She is obese. She is not ill-appearing, toxic-appearing or diaphoretic.   HENT:      Head: Normocephalic and atraumatic.      Right Ear: Tympanic membrane normal.      Left Ear: Tympanic membrane normal.      Nose: Nose normal.      Mouth/Throat:      Mouth: Mucous membranes are dry.      Pharynx: Oropharynx is clear.   Eyes:      Extraocular Movements: Extraocular movements intact.      Conjunctiva/sclera: Conjunctivae normal.      Pupils: Pupils are equal, round, and reactive to light.   Cardiovascular:      Rate and Rhythm: Normal rate and regular rhythm.      Pulses: Normal pulses.      Heart sounds: Normal heart sounds. No murmur heard.  Pulmonary:      Effort: Pulmonary effort is normal. No respiratory distress.      Breath sounds: Normal breath sounds. No wheezing, rhonchi or rales.      Comments: Room air. Pulse oximetry 97%.  Abdominal:      General: Bowel sounds are normal. There is no distension.      Palpations: Abdomen is soft.      Tenderness: There is no abdominal tenderness.      Comments: PEG tube in place clean. Tube feed infusing.   Genitourinary:     Comments: Deferred.  Musculoskeletal:         General: Swelling present. No tenderness. Normal range of motion.      Cervical back: Normal range of motion and neck supple.      Right lower leg: No edema.      Left lower leg: No edema.      Comments: Bilateral upper extremity edema right > left. Left upper extremity ACE wrap in place.    Skin:     General: Skin is warm and dry.       "Capillary Refill: Capillary refill takes less than 2 seconds.      Findings: Bruising present.   Neurological:      General: No focal deficit present.      Mental Status: She is alert. She is disoriented.      Comments: Awake alert oriented x 2-3.  Speech clear coherent.  Follows all commands.  Generalized weakness.  No focal weakness.  Gait deferred.  Cranial nerves II through XII grossly intact.   Psychiatric:         Mood and Affect: Mood normal.         Behavior: Behavior normal.       Last Recorded Vitals  Blood pressure 149/61, pulse (!) 113, temperature 36.6 °C (97.9 °F), temperature source Axillary, resp. rate 18, height 1.651 m (5' 5\"), weight 63.5 kg (140 lb), SpO2 98%.    Intake/Output last 3 Shifts:  I/O last 3 completed shifts:  In: 850 (13.4 mL/kg) [NG/GT:850]  Out: 0 (0 mL/kg)   Weight: 63.5 kg     Telemetry normal sinus rhythm rate 80's    Relevant Results  Results for orders placed or performed during the hospital encounter of 05/25/24 (from the past 24 hour(s))   Basic Metabolic Panel   Result Value Ref Range    Glucose 107 (H) 65 - 99 mg/dL    Sodium 137 133 - 145 mmol/L    Potassium 4.4 3.4 - 5.1 mmol/L    Chloride 101 97 - 107 mmol/L    Bicarbonate 25 24 - 31 mmol/L    Urea Nitrogen 23 8 - 25 mg/dL    Creatinine 0.50 0.40 - 1.60 mg/dL    eGFR >90 >60 mL/min/1.73m*2    Calcium 8.5 8.5 - 10.4 mg/dL    Anion Gap 11 <=19 mmol/L   CBC   Result Value Ref Range    WBC 10.9 4.4 - 11.3 x10*3/uL    nRBC 0.0 0.0 - 0.0 /100 WBCs    RBC 2.40 (L) 4.00 - 5.20 x10*6/uL    Hemoglobin 7.2 (L) 12.0 - 16.0 g/dL    Hematocrit 23.3 (L) 36.0 - 46.0 %    MCV 97 80 - 100 fL    MCH 30.0 26.0 - 34.0 pg    MCHC 30.9 (L) 32.0 - 36.0 g/dL    RDW 15.3 (H) 11.5 - 14.5 %    Platelets 528 (H) 150 - 450 x10*3/uL     Susceptibility data from last 90 days.  Collected Specimen Info Organism Ampicillin Cefazolin Cefazolin (uncomplicated UTIs only) Ciprofloxacin Gentamicin Nitrofurantoin Piperacillin/Tazobactam " Trimethoprim/Sulfamethoxazole   04/11/24 Urine from Straight Catheter Escherichia coli S S S S S S S S     No results found.    Scheduled medications  acetaminophen, 650 mg, g-tube, q8h  ascorbic acid, 500 mg, g-tube, Daily  aspirin, 81 mg, g-tube, Daily  atorvastatin, 40 mg, g-tube, Nightly  cholecalciferol, 50,000 Units, g-tube, Every Sunday  clopidogrel, 75 mg, g-tube, Daily  ferrous sulfate (325 mg ferrous sulfate), 65 mg of iron, oral, BID  heparin (porcine), 5,000 Units, subcutaneous, q8h JACQUELYN  lactobacillus acidophilus, 1 tablet, oral, BID  metoprolol tartrate, 50 mg, g-tube, BID  vancomycin, 125 mg, oral, q12h      Continuous medications     PRN medications  PRN medications: acetaminophen **OR** acetaminophen **OR** acetaminophen, acetaminophen, benzocaine-menthol, dextromethorphan-guaifenesin, guaiFENesin, ondansetron, polyethylene glycol, polyethylene glycol      ASSESSMENT:  Adult failure to thrive  Encephalopathy  History of CVA  Dementia  Generalized weakness  Fever resolved  Leukocytosis resolved  C. Difficile  Dysphagia  PEG tube  Protein calorie malnutrition  Hyponatremia resolved  Hyperkalemia resolved  Bilateral upper extremity edema, right > left    PLAN:  Patient is doing well this morning.  No new issues.  Overall, condition is stable.  Stable off antibiotics.  Leukocytosis resolved.  Afebrile.  Nontoxic-appearing.  Continue p.o. vancomycin through 5/29/2024.  Stable per Infectious disease for discharge.  Mentation stable.  No focal deficits.  Suspect encephalopathy + underlying dementia, history of CVA.  Continue to monitor mentation and provide supportive care.  Labs reviewed.  Stable.  Continue tube feed and water flushes. Mouth care performed, 3 times daily and as needed as tolerated.  PT/OT.  Fall precautions.  Up with assistance only.  Bed and chair alarm at all times.  DVT prophylaxis.  GI prophylaxis PPI.  Heparin subcutaneous.  Pressure ulcer prevention measures.  Turn and reposition  every 2 hours and as needed.  Heels off bed.  Offloading.  Elevate bilateral upper extremities.  Ace wraps to bilateral upper extremities.  Ace wrap removed from the left upper extremity, applied to the right upper extremity on examination today.  Monitor.  Continue scheduled Tylenol.  Supportive care.  Patient reassured.  PT/OT recommend moderate intensity level of continued care.  Case management following for discharge planning.  Discharge plan to [facility].  Anticipate discharge when arrangements made by case management.  Discussed with Dr. Denise.      BILLY Masters-CNP

## 2024-05-29 VITALS
OXYGEN SATURATION: 97 % | RESPIRATION RATE: 18 BRPM | SYSTOLIC BLOOD PRESSURE: 145 MMHG | WEIGHT: 140 LBS | HEART RATE: 100 BPM | DIASTOLIC BLOOD PRESSURE: 71 MMHG | BODY MASS INDEX: 23.32 KG/M2 | HEIGHT: 65 IN | TEMPERATURE: 97.7 F

## 2024-05-29 LAB
ALBUMIN SERPL-MCNC: 2.4 G/DL (ref 3.5–5)
ALP BLD-CCNC: 158 U/L (ref 35–125)
ALT SERPL-CCNC: 46 U/L (ref 5–40)
ANION GAP SERPL CALC-SCNC: 11 MMOL/L
AST SERPL-CCNC: 42 U/L (ref 5–40)
BACTERIA BLD CULT: NORMAL
BACTERIA BLD CULT: NORMAL
BACTERIA UR CULT: ABNORMAL
BILIRUB SERPL-MCNC: <0.2 MG/DL (ref 0.1–1.2)
BUN SERPL-MCNC: 25 MG/DL (ref 8–25)
CALCIUM SERPL-MCNC: 8.7 MG/DL (ref 8.5–10.4)
CHLORIDE SERPL-SCNC: 101 MMOL/L (ref 97–107)
CO2 SERPL-SCNC: 26 MMOL/L (ref 24–31)
CREAT SERPL-MCNC: 0.5 MG/DL (ref 0.4–1.6)
EGFRCR SERPLBLD CKD-EPI 2021: >90 ML/MIN/1.73M*2
ERYTHROCYTE [DISTWIDTH] IN BLOOD BY AUTOMATED COUNT: 15.6 % (ref 11.5–14.5)
GLUCOSE SERPL-MCNC: 113 MG/DL (ref 65–99)
HCT VFR BLD AUTO: 25.6 % (ref 36–46)
HGB BLD-MCNC: 7.7 G/DL (ref 12–16)
MCH RBC QN AUTO: 29.8 PG (ref 26–34)
MCHC RBC AUTO-ENTMCNC: 30.1 G/DL (ref 32–36)
MCV RBC AUTO: 99 FL (ref 80–100)
NRBC BLD-RTO: 0 /100 WBCS (ref 0–0)
PLATELET # BLD AUTO: 550 X10*3/UL (ref 150–450)
POTASSIUM SERPL-SCNC: 4.6 MMOL/L (ref 3.4–5.1)
PROT SERPL-MCNC: 6.1 G/DL (ref 5.9–7.9)
RBC # BLD AUTO: 2.58 X10*6/UL (ref 4–5.2)
SODIUM SERPL-SCNC: 138 MMOL/L (ref 133–145)
WBC # BLD AUTO: 11.5 X10*3/UL (ref 4.4–11.3)

## 2024-05-29 PROCEDURE — 85027 COMPLETE CBC AUTOMATED: CPT | Performed by: NURSE PRACTITIONER

## 2024-05-29 PROCEDURE — 36415 COLL VENOUS BLD VENIPUNCTURE: CPT | Performed by: NURSE PRACTITIONER

## 2024-05-29 PROCEDURE — 2500000005 HC RX 250 GENERAL PHARMACY W/O HCPCS: Performed by: NURSE PRACTITIONER

## 2024-05-29 PROCEDURE — 80053 COMPREHEN METABOLIC PANEL: CPT | Performed by: NURSE PRACTITIONER

## 2024-05-29 PROCEDURE — 2500000001 HC RX 250 WO HCPCS SELF ADMINISTERED DRUGS (ALT 637 FOR MEDICARE OP): Performed by: INTERNAL MEDICINE

## 2024-05-29 PROCEDURE — 2500000004 HC RX 250 GENERAL PHARMACY W/ HCPCS (ALT 636 FOR OP/ED): Performed by: INTERNAL MEDICINE

## 2024-05-29 RX ORDER — PANTOPRAZOLE SODIUM 40 MG/1
40 FOR SUSPENSION ORAL
Start: 2024-05-29

## 2024-05-29 RX ORDER — VANCOMYCIN HCL 50 MG/ML
125 SOLUTION, RECONSTITUTED, ORAL ORAL EVERY 12 HOURS
Start: 2024-05-29 | End: 2024-05-30

## 2024-05-29 RX ORDER — VANCOMYCIN HCL 50 MG/ML
125 SOLUTION, RECONSTITUTED, ORAL ORAL EVERY 12 HOURS
Status: DISCONTINUED | OUTPATIENT
Start: 2024-05-29 | End: 2024-05-29 | Stop reason: HOSPADM

## 2024-05-29 RX ORDER — FERROUS SULFATE 300 MG/5ML
60 LIQUID (ML) ORAL DAILY
Start: 2024-05-29

## 2024-05-29 RX ORDER — LIDOCAINE 560 MG/1
1 PATCH PERCUTANEOUS; TOPICAL; TRANSDERMAL DAILY
Start: 2024-05-30

## 2024-05-29 RX ADMIN — CLOPIDOGREL BISULFATE 75 MG: 75 TABLET ORAL at 09:41

## 2024-05-29 RX ADMIN — FERROUS SULFATE TAB 325 MG (65 MG ELEMENTAL FE) 1 TABLET: 325 (65 FE) TAB at 09:41

## 2024-05-29 RX ADMIN — LIDOCAINE 1 PATCH: 4 PATCH TOPICAL at 09:41

## 2024-05-29 RX ADMIN — HEPARIN SODIUM 5000 UNITS: 5000 INJECTION, SOLUTION INTRAVENOUS; SUBCUTANEOUS at 14:27

## 2024-05-29 RX ADMIN — ACETAMINOPHEN 650 MG: 325 TABLET ORAL at 11:06

## 2024-05-29 RX ADMIN — METOPROLOL TARTRATE 50 MG: 50 TABLET, FILM COATED ORAL at 09:41

## 2024-05-29 RX ADMIN — HEPARIN SODIUM 5000 UNITS: 5000 INJECTION, SOLUTION INTRAVENOUS; SUBCUTANEOUS at 06:24

## 2024-05-29 RX ADMIN — OXYCODONE HYDROCHLORIDE AND ACETAMINOPHEN 500 MG: 500 TABLET ORAL at 09:41

## 2024-05-29 RX ADMIN — Medication 1 TABLET: at 09:41

## 2024-05-29 RX ADMIN — FERROUS SULFATE TAB 325 MG (65 MG ELEMENTAL FE) 1 TABLET: 325 (65 FE) TAB at 17:02

## 2024-05-29 RX ADMIN — VANCOMYCIN HYDROCHLORIDE 125 MG: KIT at 09:41

## 2024-05-29 RX ADMIN — ASPIRIN 81 MG CHEWABLE TABLET 81 MG: 81 TABLET CHEWABLE at 09:41

## 2024-05-29 ASSESSMENT — COGNITIVE AND FUNCTIONAL STATUS - GENERAL
TURNING FROM BACK TO SIDE WHILE IN FLAT BAD: TOTAL
MOVING TO AND FROM BED TO CHAIR: TOTAL
WALKING IN HOSPITAL ROOM: TOTAL
MOBILITY SCORE: 6
MOVING FROM LYING ON BACK TO SITTING ON SIDE OF FLAT BED WITH BEDRAILS: TOTAL
HELP NEEDED FOR BATHING: TOTAL
DAILY ACTIVITIY SCORE: 6
DRESSING REGULAR LOWER BODY CLOTHING: TOTAL
TOILETING: TOTAL
STANDING UP FROM CHAIR USING ARMS: TOTAL
DRESSING REGULAR UPPER BODY CLOTHING: TOTAL
CLIMB 3 TO 5 STEPS WITH RAILING: TOTAL
EATING MEALS: TOTAL
PERSONAL GROOMING: TOTAL

## 2024-05-29 ASSESSMENT — PAIN SCALES - GENERAL: PAINLEVEL_OUTOF10: 2

## 2024-05-29 ASSESSMENT — PAIN DESCRIPTION - LOCATION: LOCATION: BACK

## 2024-05-29 NOTE — PROGRESS NOTES
Possible DC today. Pt is on oral vanco and is stable for discharge from ID standpoint. Spoke to Kaleb spouse via telephone and gave updates, answered questions. Pt came from [facility] and will return when med ready.     SECURE DISCHARGE TO RETURN TO [facility] WHEN MED READY.        05/29/24 1113   Discharge Planning   Type of Post Acute Facility Services Skilled nursing   Patient expects to be discharged to: [facility]   Does the patient need discharge transport arranged? Yes   RoundTrip coordination needed? Yes   Has discharge transport been arranged? No

## 2024-05-29 NOTE — CARE PLAN
The patient's goals for the shift include      The clinical goals for the shift include Pt will sleep a minimum of 4 hours by the end of this shift      Problem: Pain  Goal: Takes deep breaths with improved pain control throughout the shift  5/28/2024 2154 by Vida Valdes RN  Outcome: Progressing  5/28/2024 2154 by Vida Valdes RN  Outcome: Progressing  Goal: Turns in bed with improved pain control throughout the shift  5/28/2024 2154 by Vida Valdes RN  Outcome: Progressing  5/28/2024 2154 by Vida Valdes RN  Outcome: Progressing  Goal: Performs ADL's with improved pain control throughout shift  5/28/2024 2154 by Vida Valdes RN  Outcome: Progressing  5/28/2024 2154 by Vida Valdes RN  Outcome: Progressing  Goal: Participates in PT with improved pain control throughout the shift  5/28/2024 2154 by Vida Valdes RN  Outcome: Progressing  5/28/2024 2154 by Vida Valdes RN  Outcome: Progressing  Goal: Free from opioid side effects throughout the shift  5/28/2024 2154 by Vida Valdes RN  Outcome: Progressing  5/28/2024 2154 by Vida Valdes RN  Outcome: Progressing  Goal: Free from acute confusion related to pain meds throughout the shift  5/28/2024 2154 by Vida Valdes RN  Outcome: Progressing  5/28/2024 2154 by Vida Valdes RN  Outcome: Progressing     Problem: Skin  Goal: Decreased wound size/increased tissue granulation at next dressing change  5/28/2024 2154 by Vida Valdes RN  Outcome: Progressing  5/28/2024 2154 by Vida Valdes RN  Outcome: Progressing  Goal: Participates in plan/prevention/treatment measures  5/28/2024 2154 by Vida Valdes RN  Outcome: Progressing  5/28/2024 2154 by Vida Valdes RN  Outcome: Progressing  Goal: Prevent/manage excess moisture  5/28/2024 2154 by Vida Valdes RN  Outcome: Progressing  5/28/2024 2154 by Vida  BECK Valdes  Outcome: Progressing  Goal: Prevent/minimize sheer/friction injuries  5/28/2024 2154 by Vida Valdes RN  Outcome: Progressing  Flowsheets (Taken 5/28/2024 2154)  Prevent/minimize sheer/friction injuries:   Turn/reposition every 2 hours/use positioning/transfer devices   Use pull sheet  5/28/2024 2154 by Vida Valdes RN  Outcome: Progressing  Flowsheets (Taken 5/28/2024 2154)  Prevent/minimize sheer/friction injuries:   Turn/reposition every 2 hours/use positioning/transfer devices   Use pull sheet  Goal: Promote/optimize nutrition  5/28/2024 2154 by Vida Valdes RN  Outcome: Progressing  5/28/2024 2154 by Vida Valdes RN  Outcome: Progressing  Goal: Promote skin healing  5/28/2024 2154 by Vida Valdes RN  Outcome: Progressing  5/28/2024 2154 by Vida Valdes RN  Outcome: Progressing

## 2024-05-29 NOTE — PROGRESS NOTES
INFECTIOUS DISEASES PROGRESS NOTE    Consulted / following patient for:  Recent fever and leukocytosis  Recent C difficile    Subjective   Interval History:   Denies abdominal pain, diarrhea, or dysuria   is very concerned and has multiple questions about the growth of VRE in her urine    Objective   PHYSICAL EXAMINATION  Vital signs:  Visit Vitals  /71 (BP Location: Right arm, Patient Position: Lying)   Pulse 100   Temp 36.5 °C (97.7 °F) (Oral)   Resp 18      General: More alert today, verbal, not toxic  Lungs:  Clear to auscultation, shallow  Heart:  S1, S2 normal, no pathologic murmur appreciated  Abdomen:  Soft, nontender.   Urinary incontinence per RN.  Bladder scan 150 mL.  No diarrhea  Relevant Results  WBC:  10,900    Results from last 72 hours   Lab Units 05/29/24  0619   CREATININE mg/dL 0.50   ANION GAP mmol/L 11   EGFR mL/min/1.73m*2 >90     Results from last 72 hours   Lab Units 05/29/24  0619   AST U/L 42*   ALT U/L 46*   ALK PHOS U/L 158*   BILIRUBIN TOTAL mg/dL <0.2   Urinalysis: Mild pyuria  Microbiology:  Blood: Negative X2  Urine: 20,000-80,000 VRE    ASSESSMENT:  Recent C diff  No evidence for active infection or C. difficile    Asymptomatic bacteriuria/pyuria  Patient is not having urinary retention, fever, leukocytosis, abdominal tenderness, flank tenderness, or dysuria.  She is chronically incontinent.  In my judgment this is asymptomatic pyuria and bacteriuria and should not be treated with antimicrobials, particularly in a patient just recovering from C. difficile colitis.  I have tried to explain this to the patient's  in detail.  He is skeptical    PLANS:  -   Finish low dose vancomycin today as planned  -   No treatment for bacteriuria  -   No additional suggestions, discharge okay from my perspective    Text messages exchanged with Ms. Veronica Morales MD  [facility]  Office:  [phone]

## 2024-05-29 NOTE — RESEARCH NOTES
Artificial Intelligence Monitoring in Nursing (AIMS Nursing) Study    Principle Investigator - Dr. Carlo Green  Research Coordinator - Teresa Mchugh     Patient Name - Rebecca Samaniego  Date - 5/29/2024 2:14 PM  Location - [facility]    Rebecca Samaniego was not approached by Teresa Mchugh to talk about participating in the AIMS Nursing Study. The patient was not able to be approached, a research coordinator will come back at a later time.     Teresa Mchugh

## 2024-05-29 NOTE — PROGRESS NOTES
Rebecca Samaniego is a 78 y.o. female on day 4 of admission presenting with Generalized weakness.    5/28/2024: Met with spouse bedside, updated, discussed overall long-term prognosis, plan and goals of care.  Discussed discharge plan to skilled nursing rehabilitation facility.  Discussed that if she does not progress, I recommend she transition to long term care with a hospice consultation.  Spouse is agreeable though expresses concern with hospice that he does not wish to stop the tube feedings.  I advised if this is the recommendation and he does not wish to stop the tube feedings, to transition to a comfort model of care with tube feedings.  She remains a full code at this time.  Spouse notes she complains of abdominal pain and back pain, on questioning patient reports non-specific abdominal pain, and lower back pain, examination benign, amylase, lipase ordered. Follow-up.     Subjective   Patient seen and examined.  Resting in bed in no acute distress.  Eyes closed.  Arousable.  States name birth-date.  Confused as to place.  States year, 1925.  No complaints.  Review of systems is limited.  On questioning, denies any pain.      Spoke with nursing, no new issues, per report, stool less liquid.  Pain with movement only.    Objective   Contact plus precautions in place.    Physical Exam  Vitals and nursing note reviewed.   Constitutional:       General: She is not in acute distress.     Appearance: Normal appearance. She is obese. She is not ill-appearing, toxic-appearing or diaphoretic.   HENT:      Head: Normocephalic and atraumatic.      Right Ear: Tympanic membrane normal.      Left Ear: Tympanic membrane normal.      Nose: Nose normal.      Mouth/Throat:      Mouth: Mucous membranes are dry.      Pharynx: Oropharynx is clear.   Eyes:      Extraocular Movements: Extraocular movements intact.      Conjunctiva/sclera: Conjunctivae normal.      Pupils: Pupils are equal, round, and reactive to light.  "  Cardiovascular:      Rate and Rhythm: Normal rate and regular rhythm.      Pulses: Normal pulses.      Heart sounds: Normal heart sounds. No murmur heard.  Pulmonary:      Effort: Pulmonary effort is normal. No respiratory distress.      Breath sounds: Normal breath sounds. No wheezing, rhonchi or rales.      Comments: Room air. Pulse oximetry 99%.  Abdominal:      General: Bowel sounds are normal. There is no distension.      Palpations: Abdomen is soft.      Tenderness: There is no abdominal tenderness.      Comments: PEG tube in place site clean. Tube feed infusing at 55 mL/hour.   Genitourinary:     Comments: Deferred.  Musculoskeletal:         General: Swelling present. No tenderness. Normal range of motion.      Cervical back: Normal range of motion and neck supple.      Right lower leg: No edema.      Left lower leg: No edema.      Comments: Bilateral upper extremity edema right > left. Right upper extremity ACE wrap in place. Edema improved. Pain with upper extremity movement only.    Skin:     General: Skin is warm and dry.      Capillary Refill: Capillary refill takes less than 2 seconds.      Findings: Bruising present.   Neurological:      General: No focal deficit present.      Mental Status: She is alert. She is disoriented.      Comments: Awake alert oriented x 1-2.  Speech clear coherent.  Follows all commands.  Generalized weakness.  No focal weakness.  Gait deferred.  Cranial nerves II through XII grossly intact.   Psychiatric:         Mood and Affect: Mood normal.         Behavior: Behavior normal.       Last Recorded Vitals  Blood pressure 147/64, pulse 95, temperature 36.2 °C (97.2 °F), temperature source Temporal, resp. rate 18, height 1.651 m (5' 5\"), weight 63.5 kg (140 lb), SpO2 94%.    Intake/Output last 3 Shifts:  I/O last 3 completed shifts:  In: 150 (2.4 mL/kg) [NG/GT:150]  Out: 1 (0 mL/kg) [Stool:1]  Weight: 63.5 kg     Relevant Results  Results for orders placed or performed during " the hospital encounter of 05/25/24 (from the past 24 hour(s))   Lipase   Result Value Ref Range    Lipase 97 (H) 16 - 63 U/L   Amylase   Result Value Ref Range    Amylase 124 (H) 28 - 100 U/L   Comprehensive Metabolic Panel   Result Value Ref Range    Glucose 113 (H) 65 - 99 mg/dL    Sodium 138 133 - 145 mmol/L    Potassium 4.6 3.4 - 5.1 mmol/L    Chloride 101 97 - 107 mmol/L    Bicarbonate 26 24 - 31 mmol/L    Urea Nitrogen 25 8 - 25 mg/dL    Creatinine 0.50 0.40 - 1.60 mg/dL    eGFR >90 >60 mL/min/1.73m*2    Calcium 8.7 8.5 - 10.4 mg/dL    Albumin 2.4 (L) 3.5 - 5.0 g/dL    Alkaline Phosphatase 158 (H) 35 - 125 U/L    Total Protein 6.1 5.9 - 7.9 g/dL    AST 42 (H) 5 - 40 U/L    Bilirubin, Total <0.2 0.1 - 1.2 mg/dL    ALT 46 (H) 5 - 40 U/L    Anion Gap 11 <=19 mmol/L   CBC   Result Value Ref Range    WBC 11.5 (H) 4.4 - 11.3 x10*3/uL    nRBC 0.0 0.0 - 0.0 /100 WBCs    RBC 2.58 (L) 4.00 - 5.20 x10*6/uL    Hemoglobin 7.7 (L) 12.0 - 16.0 g/dL    Hematocrit 25.6 (L) 36.0 - 46.0 %    MCV 99 80 - 100 fL    MCH 29.8 26.0 - 34.0 pg    MCHC 30.1 (L) 32.0 - 36.0 g/dL    RDW 15.6 (H) 11.5 - 14.5 %    Platelets 550 (H) 150 - 450 x10*3/uL     Susceptibility data from last 90 days.  Collected Specimen Info Organism Ampicillin Cefazolin Cefazolin (uncomplicated UTIs only) Ciprofloxacin Gentamicin Nitrofurantoin Piperacillin/Tazobactam Trimethoprim/Sulfamethoxazole   05/25/24 Urine from Straight Catheter Enterococcus faecium           04/11/24 Urine from Straight Catheter Escherichia coli S S S S S S S S     No results found.    Scheduled medications  acetaminophen, 650 mg, g-tube, q8h  ascorbic acid, 500 mg, g-tube, Daily  aspirin, 81 mg, g-tube, Daily  atorvastatin, 40 mg, g-tube, Nightly  cholecalciferol, 50,000 Units, g-tube, Every Sunday  clopidogrel, 75 mg, g-tube, Daily  esomeprazole, 20 mg, oral, Nightly  ferrous sulfate (325 mg ferrous sulfate), 65 mg of iron, oral, BID  heparin (porcine), 5,000 Units, subcutaneous, q8h  JACQUELYN  lactobacillus acidophilus, 1 tablet, oral, BID  lidocaine, 1 patch, transdermal, Daily  metoprolol tartrate, 50 mg, g-tube, BID  vancomycin, 125 mg, g-tube, q12h      Continuous medications     PRN medications  PRN medications: acetaminophen **OR** acetaminophen **OR** acetaminophen, acetaminophen, benzocaine-menthol, dextromethorphan-guaifenesin, guaiFENesin, ondansetron, polyethylene glycol, polyethylene glycol        ASSESSMENT:  Adult failure to thrive  Encephalopathy - multifactorial  History of CVA  Dementia  Generalized weakness  Fever resolved  Leukocytosis resolved  C. Difficile  Dysphagia  PEG tube  Protein calorie malnutrition  Hyponatremia resolved  Hyperkalemia resolved  Bilateral upper extremity edema, right > left improved  Pyuria  Urinary tract infection Enterococcus f.    PLAN:  Patient is doing well this morning.  No new issues.  Patient denies any back or abdominal pain at this time.  Lipase, Amylase reviewed, stable.  Abdominal examination benign.  Continue to monitor.  Chart reviewed.  Urine culture preliminary + Enterococcus f.  Follow-up final culture result.  Infectious disease consultation.  Notified.  Afebrile.  Nontoxic-appearing.  Per nursing, stool less liquid.  Continue Vancomycin - complete through today.  Contact plus isolation precautions.  Skin care.  Mentation stable.  Suspect encephalopathy plus underlying dementia and history of CVA.  Monitor mentation and provide supportive care.  Continue tube feed and water flushes per RD recommendations.  Mouth care 3 times daily and as needed - as tolerated.  Elevate bilateral upper extremities and apply Ace wrap compression.  Discussed with nursing.  Continue Tylenol.  PT/OT.  Fall precaution.  Up with assistance only.  Bed and chair alarm at all times.  DVT prophylaxis.  Heparin subcutaneous.  GI prophylaxis.  PPI.  Pressure ulcer prevention measures.  Turn and reposition every 2 hours and as needed.  Heels off bed.  Offloading.   Supportive care.  Patient reassured.  PT/OT recommend moderate intensity level of continued care.  Case management following for discharge planning.  Discharge plan to [facility].  Anticipate discharge after cleared by Infectious disease when arrangements are made by case management.  Discussed with nursing and Dr. Denise.    ADDENDUM:  Per Infectious disease, Dr. Morales, no indication for antibiotics.  Okay to discharge today.  Discharge order placed.    ADDENDUM:  Spoke with spouse/durable health care power of , Kaleb Samaniego, and patient bedside.  Updated and discussed discharge plan to skilled nursing rehabilitation facility today. Patient is confused and is not a capable decision maker.  Discussed discharge CODE STATUS with spouse.  He states that he does not want cardiopulmonary resuscitation, he is okay with mechanical ventilation.  DNR CCA order placed for discharge.  [place] form signed.  Copy placed on chart.  Nursing updated.  Discussed with Dr. Denise.           Belen Martin, BILLY-CNP

## 2024-05-29 NOTE — DISCHARGE SUMMARY
Discharge Diagnosis  Adult failure to thrive  Encephalopathy - multifactorial  History of CVA  Dementia  Generalized weakness  Fever resolved  Leukocytosis resolved  C. Difficile  Dysphagia  PEG tube  Protein calorie malnutrition  Hyponatremia resolved  Hyperkalemia resolved  Bilateral upper extremity edema, right > left improved  Pyuria, bacteruria - asymptomatic    Issues Requiring Follow-Up  Above    Discharge Meds     Your medication list        START taking these medications        Instructions Last Dose Given Next Dose Due   ferrous sulfate 300 mg (60 mg iron)/5 mL syrup  Replaces: ferrous sulfate (325 mg ferrous sulfate) tablet      5 mL (60 mg of iron) by g-tube route once daily.       lidocaine 4 % patch  Start taking on: May 30, 2024      Place 1 patch over 12 hours on the skin once daily. Remove & discard patch within 12 hours or as directed by MD. Apply to back at site of pain.       vancomycin 50 mg/mL oral solution  Commonly known as: Firvanq  Replaces: vancomycin 125 mg capsule      2.5 mL (125 mg) by g-tube route every 12 hours for 1 dose. Last dose tonight.              CHANGE how you take these medications        Instructions Last Dose Given Next Dose Due   pantoprazole 40 mg packet  Commonly known as: ProtoNix  What changed: additional instructions      1 packet (40 mg) by g-tube route once daily in the morning. Take before meals. Administer 12 hours apart from Plavix.              CONTINUE taking these medications        Instructions Last Dose Given Next Dose Due   acetaminophen 650 mg suppository  Commonly known as: Tylenol      Insert 1 suppository (650 mg) into the rectum every 4 hours if needed for fever (temp greater than 38.0 C).       acetaminophen 325 mg tablet  Commonly known as: Tylenol      2 tablets (650 mg) by g-tube route every 8 hours. Do not exceed maximum daily dose of Acetaminophen.       Acidophilus capsule  Generic drug: lactobacillus acidophilus      Take 1 capsule by mouth 2  times a day. Via PEG tube.       ascorbic acid 500 mg tablet  Commonly known as: Vitamin C      1 tablet (500 mg) by g-tube route once daily.       aspirin 81 mg chewable tablet      1 tablet (81 mg) by g-tube route once daily.       atorvastatin 40 mg tablet  Commonly known as: Lipitor      1 tablet (40 mg) by g-tube route once daily at bedtime.       cholecalciferol 50,000 unit capsule  Commonly known as: Vitamin D-3      1 capsule (50,000 Units) by g-tube route 1 (one) time per week.       clopidogrel 75 mg tablet  Commonly known as: Plavix      1 tablet (75 mg) by g-tube route once daily.       metoprolol tartrate 50 mg tablet  Commonly known as: Lopressor      1 tablet by g-tube route 2 times a day.       ondansetron 4 mg tablet  Commonly known as: Zofran      1 tablet (4 mg) by g-tube route every 6 hours if needed for nausea or vomiting.       polyethylene glycol 17 gram packet  Commonly known as: Glycolax, Miralax      Take 17 g by mouth once daily as needed (Constipation.). Via PEG tube.              STOP taking these medications      ferrous sulfate (325 mg ferrous sulfate) tablet  Replaced by: ferrous sulfate 300 mg (60 mg iron)/5 mL syrup        vancomycin 125 mg capsule  Commonly known as: Vancocin  Replaced by: vancomycin 50 mg/mL oral solution                  Where to Get Your Medications        Information about where to get these medications is not yet available    Ask your nurse or doctor about these medications  ferrous sulfate 300 mg (60 mg iron)/5 mL syrup  lidocaine 4 % patch  pantoprazole 40 mg packet  vancomycin 50 mg/mL oral solution         Test Results Pending At Discharge  Pending Labs       Order Current Status    Blood Culture Preliminary result    Blood Culture Preliminary result            Hospital Course  This is a very pleasant 78-year-old  female presenting to the emergency department with altered mental status, tachycardia, hypotension and fever.  Per the record, nursing  facility staff was concerned due to weakness, confusion and diarrhea.  She is currently being treated for C. difficile.  She presented to the emergency department.  In the emergency department, initial workup was done.  Urinalysis was obtained.  CT abdomen and pelvis was obtained.  Gallbladder ultrasound showed cholelithiasis, otherwise was unremarkable.  She was started on broad-spectrum IV antibiotics.  Infectious disease was consulted.  IV antibiotics were discontinued per Infectious disease.  She was continued on Vancomycin for C. Difficile - to complete thru 5/29/2024.  The urine culture grew Enterococcus faecium.  Per Infectious disease, this is asymptomatic pyuria, bacteruria with no indication for antibiotics.  She was continued on tube feeding with water flushes per RD.  She was evaluated by therapy.  Her overall condition improved.  She is stable for discharge to skilled nursing rehabilitation when arrangements are made by case management.    Pertinent Physical Exam At Time of Discharge  See physical examination.    Outpatient Follow-Up  Future Appointments   Date Time Provider Department Center   7/10/2024  2:45 PM Shaheed Martin MD [id number]         BILLY Masters-CNP

## 2024-05-29 NOTE — CARE PLAN
The patient's goals for the shift include      The clinical goals for the shift include safety and comfort

## 2024-06-04 LAB
ATRIAL RATE: 124 BPM
ATRIAL RATE: 127 BPM
P AXIS: 87 DEGREES
P AXIS: 92 DEGREES
P OFFSET: 179 MS
P OFFSET: 183 MS
P ONSET: 133 MS
P ONSET: 133 MS
PR INTERVAL: 166 MS
PR INTERVAL: 166 MS
Q ONSET: 216 MS
Q ONSET: 216 MS
QRS COUNT: 21 BEATS
QRS COUNT: 21 BEATS
QRS DURATION: 78 MS
QRS DURATION: 82 MS
QT INTERVAL: 300 MS
QT INTERVAL: 362 MS
QTC CALCULATION(BAZETT): 431 MS
QTC CALCULATION(BAZETT): 526 MS
QTC FREDERICIA: 382 MS
QTC FREDERICIA: 465 MS
R AXIS: 158 DEGREES
R AXIS: 159 DEGREES
T AXIS: 69 DEGREES
T AXIS: 71 DEGREES
T OFFSET: 366 MS
T OFFSET: 397 MS
VENTRICULAR RATE: 124 BPM
VENTRICULAR RATE: 127 BPM

## 2024-06-05 ENCOUNTER — APPOINTMENT (OUTPATIENT)
Dept: CARDIOLOGY | Facility: HOSPITAL | Age: 79
End: 2024-06-05
Payer: MEDICARE

## 2024-06-05 ENCOUNTER — HOSPITAL ENCOUNTER (EMERGENCY)
Facility: HOSPITAL | Age: 79
Discharge: HOME | End: 2024-06-05
Attending: EMERGENCY MEDICINE
Payer: MEDICARE

## 2024-06-05 VITALS
DIASTOLIC BLOOD PRESSURE: 64 MMHG | RESPIRATION RATE: 18 BRPM | HEART RATE: 86 BPM | OXYGEN SATURATION: 100 % | SYSTOLIC BLOOD PRESSURE: 142 MMHG | TEMPERATURE: 98.1 F

## 2024-06-05 DIAGNOSIS — D50.9 IRON DEFICIENCY ANEMIA, UNSPECIFIED IRON DEFICIENCY ANEMIA TYPE: ICD-10-CM

## 2024-06-05 DIAGNOSIS — D63.8 ANEMIA OF CHRONIC DISEASE: Primary | ICD-10-CM

## 2024-06-05 DIAGNOSIS — E87.1 HYPONATREMIA: ICD-10-CM

## 2024-06-05 DIAGNOSIS — R60.1 ANASARCA: ICD-10-CM

## 2024-06-05 LAB
ABO GROUP (TYPE) IN BLOOD: NORMAL
ALBUMIN SERPL-MCNC: 2.5 G/DL (ref 3.5–5)
ALP BLD-CCNC: 563 U/L (ref 35–125)
ALT SERPL-CCNC: 143 U/L (ref 5–40)
ANION GAP SERPL CALC-SCNC: 9 MMOL/L
ANTIBODY SCREEN: NORMAL
AST SERPL-CCNC: 105 U/L (ref 5–40)
BASOPHILS # BLD AUTO: 0.03 X10*3/UL (ref 0–0.1)
BASOPHILS NFR BLD AUTO: 0.2 %
BILIRUB SERPL-MCNC: 0.2 MG/DL (ref 0.1–1.2)
BLOOD EXPIRATION DATE: NORMAL
BUN SERPL-MCNC: 47 MG/DL (ref 8–25)
CALCIUM SERPL-MCNC: 8.6 MG/DL (ref 8.5–10.4)
CHLORIDE SERPL-SCNC: 88 MMOL/L (ref 97–107)
CO2 SERPL-SCNC: 29 MMOL/L (ref 24–31)
CREAT SERPL-MCNC: 0.7 MG/DL (ref 0.4–1.6)
DISPENSE STATUS: NORMAL
EGFRCR SERPLBLD CKD-EPI 2021: 88 ML/MIN/1.73M*2
EOSINOPHIL # BLD AUTO: 0.34 X10*3/UL (ref 0–0.4)
EOSINOPHIL NFR BLD AUTO: 2.5 %
ERYTHROCYTE [DISTWIDTH] IN BLOOD BY AUTOMATED COUNT: 15.2 % (ref 11.5–14.5)
GLUCOSE SERPL-MCNC: 95 MG/DL (ref 65–99)
HCT VFR BLD AUTO: 21.3 % (ref 36–46)
HGB BLD-MCNC: 6.6 G/DL (ref 12–16)
IMM GRANULOCYTES # BLD AUTO: 0.15 X10*3/UL (ref 0–0.5)
IMM GRANULOCYTES NFR BLD AUTO: 1.1 % (ref 0–0.9)
LYMPHOCYTES # BLD AUTO: 1.74 X10*3/UL (ref 0.8–3)
LYMPHOCYTES NFR BLD AUTO: 13 %
MCH RBC QN AUTO: 30.1 PG (ref 26–34)
MCHC RBC AUTO-ENTMCNC: 31 G/DL (ref 32–36)
MCV RBC AUTO: 97 FL (ref 80–100)
MONOCYTES # BLD AUTO: 1.64 X10*3/UL (ref 0.05–0.8)
MONOCYTES NFR BLD AUTO: 12.3 %
NEUTROPHILS # BLD AUTO: 9.45 X10*3/UL (ref 1.6–5.5)
NEUTROPHILS NFR BLD AUTO: 70.9 %
NRBC BLD-RTO: 0 /100 WBCS (ref 0–0)
PLATELET # BLD AUTO: 529 X10*3/UL (ref 150–450)
POTASSIUM SERPL-SCNC: 5.6 MMOL/L (ref 3.4–5.1)
PRODUCT BLOOD TYPE: 600
PRODUCT CODE: NORMAL
PROT SERPL-MCNC: 6.6 G/DL (ref 5.9–7.9)
RBC # BLD AUTO: 2.19 X10*6/UL (ref 4–5.2)
RH FACTOR (ANTIGEN D): NORMAL
SODIUM SERPL-SCNC: 126 MMOL/L (ref 133–145)
UNIT ABO: NORMAL
UNIT NUMBER: NORMAL
UNIT RH: NORMAL
UNIT VOLUME: 350
WBC # BLD AUTO: 13.4 X10*3/UL (ref 4.4–11.3)
XM INTEP: NORMAL

## 2024-06-05 PROCEDURE — P9016 RBC LEUKOCYTES REDUCED: HCPCS

## 2024-06-05 PROCEDURE — 86850 RBC ANTIBODY SCREEN: CPT | Performed by: EMERGENCY MEDICINE

## 2024-06-05 PROCEDURE — 96374 THER/PROPH/DIAG INJ IV PUSH: CPT

## 2024-06-05 PROCEDURE — 2500000004 HC RX 250 GENERAL PHARMACY W/ HCPCS (ALT 636 FOR OP/ED): Performed by: EMERGENCY MEDICINE

## 2024-06-05 PROCEDURE — 82565 ASSAY OF CREATININE: CPT | Performed by: EMERGENCY MEDICINE

## 2024-06-05 PROCEDURE — 86920 COMPATIBILITY TEST SPIN: CPT

## 2024-06-05 PROCEDURE — 36415 COLL VENOUS BLD VENIPUNCTURE: CPT | Performed by: EMERGENCY MEDICINE

## 2024-06-05 PROCEDURE — 86900 BLOOD TYPING SEROLOGIC ABO: CPT | Performed by: EMERGENCY MEDICINE

## 2024-06-05 PROCEDURE — 36430 TRANSFUSION BLD/BLD COMPNT: CPT

## 2024-06-05 PROCEDURE — 85025 COMPLETE CBC W/AUTO DIFF WBC: CPT | Performed by: EMERGENCY MEDICINE

## 2024-06-05 PROCEDURE — 93005 ELECTROCARDIOGRAM TRACING: CPT

## 2024-06-05 PROCEDURE — 99285 EMERGENCY DEPT VISIT HI MDM: CPT | Mod: 25

## 2024-06-05 RX ORDER — ACETAMINOPHEN 325 MG/1
975 TABLET ORAL ONCE
Status: COMPLETED | OUTPATIENT
Start: 2024-06-05 | End: 2024-06-05

## 2024-06-05 RX ORDER — FUROSEMIDE 10 MG/ML
20 INJECTION INTRAMUSCULAR; INTRAVENOUS ONCE
Status: COMPLETED | OUTPATIENT
Start: 2024-06-05 | End: 2024-06-05

## 2024-06-05 RX ADMIN — ACETAMINOPHEN 975 MG: 325 TABLET ORAL at 10:56

## 2024-06-05 RX ADMIN — FUROSEMIDE 20 MG: 10 INJECTION, SOLUTION INTRAMUSCULAR; INTRAVENOUS at 11:20

## 2024-06-05 NOTE — ED PROVIDER NOTES
HPI   Chief Complaint   Patient presents with    Anemia     Patient arrives from [facility] after having abnormal labs. Per EMS, patients HGB was 6.2       78-year-old female with multiple medical comorbidities including CVA, failure to thrive, protein calorie malnutrition, PEG tube placement on prior hospitalization, chronic anemia, chronic hyponatremia, anasarca presents for evaluation of low hemoglobin at her skilled nursing facility obtained yesterday 6.2.  She has had prior transfusions in the past.  No active bleeding and recently had GI evaluation for PEG tube placement on her prior admission per chart review and was most recently admitted 5/25/2024 at which time her labs improved and she was discharged back to [facility] nursing [facility] where she chronically resides.  Patient denies any new physical complaints.  No blood or black color to her stool.  No abdominal pain.                          Buck Coma Scale Score: 14                     Patient History   Past Medical History:   Diagnosis Date    Hypertension      Past Surgical History:   Procedure Laterality Date    AORTIC VALVE REPLACEMENT  02/11/2014    Aortic Valve Replacement    BREAST LUMPECTOMY  02/11/2014    Breast Surgery Lumpectomy    CERVICAL DISCECTOMY  02/11/2014    Spinal Diskectomy Cervical    HYSTERECTOMY  02/11/2014    Hysterectomy    OTHER SURGICAL HISTORY  02/11/2014    Aortic Coarctation Repair     No family history on file.  Social History     Tobacco Use    Smoking status: Former     Types: Cigarettes    Smokeless tobacco: Never   Vaping Use    Vaping status: Never Used   Substance Use Topics    Alcohol use: Yes    Drug use: Never       Physical Exam   ED Triage Vitals   Temperature Heart Rate Respirations BP   06/05/24 0914 06/05/24 0916 06/05/24 0916 06/05/24 0916   37.1 °C (98.7 °F) 98 18 134/67      Pulse Ox Temp Source Heart Rate Source Patient Position   06/05/24 0916 06/05/24 0914 06/05/24 0916 --   100 % Axillary Monitor       BP  Location FiO2 (%)     -- --             Physical Exam  Vitals and nursing note reviewed.     General: Vitals reviewed. Awake, alert, chronically ill-appearing but in no acute distress  HEENT: NC/AT, PERRL, MMM  Neck: Supple, trachea midline  Respiratory: No respiratory distress, lungs clear to auscultation bilaterally, no wheezes, rhonchi, or rales  CV: Regular rate and regular rhythm, no murmur/gallop/rubs  Abdomen/GI: Soft, non-tender, non-distended, no rebound, guarding, or rigidity, normal bowel sounds  Extremities: Moving all extremities, no deformities, anasarca of the extremities  Neuro: A/O x 2, normal speech  Skin: Warm, dry, somewhat pale.      ED Course & MDM   ED Course as of 06/05/24 1648 Wed Jun 05, 2024   1050 Discussed with patient's PCP, Dr. Denise, who agrees that given anemia is chronic can give transfusion and sent back to facility for further management. [HOWARD]   1215 EKG on my independent interpretation: Normal sinus rhythm 85 bpm, left axis deviation, normal intervals, no acute ST or T wave abnormalities [HOWARD]      ED Course User Index  [HOWARD] Angela Panchal MD         Diagnoses as of 06/05/24 1648   Anemia of chronic disease   Iron deficiency anemia, unspecified iron deficiency anemia type   Hyponatremia   Anasarca       Medical Decision Making  78-year-old female presents for low hemoglobin in the setting of chronic anemia.  She is essentially asymptomatic although has multiple long-term medical comorbidities.  Taking iron supplements for iron deficiency.  Hemoglobin was 6.2 yesterday at facility repeat 6.6 today.  Her hemoglobin has been trending in the 7 range for extended period of time.  No evidence of active bleeding.  Labs also notable for minimal hyperkalemia 5.6 hyponatremia 126 which is somewhat chronic as well.  EKG with no changes related to hyperkalemia which is downtrending from yesterday.  Given her anasarca will give small dose of IV Lasix to help decrease the potassium some  of which may be related to hemolysis, draw.  She does have mild leukocytosis 13.4 slightly increased from yesterday of 12 but no indication of acute infection or acute infectious symptoms.  Patient was consented for transfusion of 1 unit and will discharge back to her facility for continued management.    CRITICAL CARE NOTE:    Upon my evaluation, this patient had a high probability of imminent or life-threatening deterioration due to anemia requiring transfusion, which required my direct attention, intervention, and personal management    31 total minutes of critical care were personally provided which excludes and was non concurrent with other providers and all other billable procedures.  This was for time at the bedside, re-evaluations, interpretation of lab and imaging results, discussions with consultants, and monitoring for potential decompensation.  Intervention were performed as documented above.    Amount and/or Complexity of Data Reviewed  External Data Reviewed: labs and notes.     Details: Hemoglobin reviewed over the past several months has been trending around 7 most of the past month patient taking iron due to iron deficiency.  Internal medicine discharge summary reviewed from 5/25/2024 hospitalization  Labs: ordered. Decision-making details documented in ED Course.        Procedure  Procedures     Angela Panchal MD  06/05/24 0330

## 2024-06-05 NOTE — DISCHARGE INSTRUCTIONS
Your anemia required a transfusion today.  Follow-up to have your blood counts rechecked as well as your sodium which was somewhat low today and your potassium which was mildly elevated.  Return if you have any blood or black color to your stool or other symptoms that concern you.

## 2024-06-07 LAB
ATRIAL RATE: 85 BPM
P AXIS: 59 DEGREES
P OFFSET: 176 MS
P ONSET: 125 MS
PR INTERVAL: 176 MS
Q ONSET: 213 MS
QRS COUNT: 14 BEATS
QRS DURATION: 102 MS
QT INTERVAL: 370 MS
QTC CALCULATION(BAZETT): 440 MS
QTC FREDERICIA: 415 MS
R AXIS: -46 DEGREES
T AXIS: 19 DEGREES
T OFFSET: 398 MS
VENTRICULAR RATE: 85 BPM

## 2024-06-18 NOTE — DOCUMENTATION CLARIFICATION NOTE
"    PATIENT:               BETTY CELESTIN  ACCT #:                  [id number]  MRN:                       [id number]  :                       1945  ADMIT DATE:       2024 8:49 AM  DISCH DATE:        2024 5:52 PM  RESPONDING PROVIDER #:        [id number]          PROVIDER RESPONSE TEXT:    Recent C. diff infection; SIRS    CDI QUERY TEXT:    Clarification    Instruction:    Based on your assessment of the patient and the clinical information, please provide the requested documentation by clicking on the appropriate radio button and enter any additional information if prompted.    Question: Please further clarify the most likely etiology of weakness and confusion on admission after final work up    When answering this query, please exercise your independent professional judgment. The fact that a question is being asked, does not imply that any particular answer is desired or expected.    The patient's clinical indicators include:  Clinical Information: Pt presented to the ED with fever, tachycardia, weakness, and confusion from the nursing home.  Pt discharged the prior day with same symptoms and was treated for sepsis.    Clinical Indicators:    :  WBC 13.1  :  WBC 12.6  :  WBC 11.5    VSS with HR 127s-90s, Fever on admit at 37.9    ED:  LFTs mildly elevated.\"  \"Patient given broad-spectrum antibiotics, however was only given limited fluid bolus in setting of fluid around lungs with elevated BNP and troponin.\"  \"Sepsis ... \"\"Given the patient's vital signs and clinical presentation, septic workup was initiated and patient was started on 30 ml per kilogram fluid bolus as well as broad-spectrum antibiotics.\"     ID:  \"Stop Zosyn.  Continue oral vancomycin through doses on 2024.  Will observe off antibiotics.  No clear evidence for ongoing infection process at this time.\"     PN:  \"Stable off antibiotics.  Leukocytosis resolved.  Afebrile.  Nontoxic-appearing.  Continue p.o. " "vancomycin through 5/29/2024.  Stable per Infectious disease for discharge.  Mentation stable. ... Suspect encephalopathy + underlying dementia, history of CVA.\"    5/29 ID:  \"Recent C diff.  No evidence for active infection or C. difficile.  Asymptomatic bacteriuria/pyuria.\"    6/5 - DS under Hospital Course \"This is a very pleasant 78-year-old  female presenting to the emergency department with altered mental status, tachycardia, hypotension and fever.  Per the record, nursing facility staff was concerned due to weakness, confusion and diarrhea.  She is currently being treated for C. difficile. ...  IV antibiotics were discontinued per Infectious disease.  She was continued on Vancomycin for C. Difficile - to complete thru 5/29/2024.  The urine culture grew Enterococcus faecium.  Per Infectious disease, this is asymptomatic pyuria, bacteriuria with no indication for antibiotics.\"    Treatment:  Zosyn 3.375 q6, Vanco 125 liquid bid,    Risk Factors:  Age, Recent Sepsis, Tube feedings, C. diff  Options provided:  -- C diff infection  -- Sepsis d/t ____ (please specify)  -- Other - I will add my own diagnosis  -- Refer to Clinical Documentation Reviewer    Query created by: Kole Reyes on 6/12/2024 4:06 PM      Electronically signed by:  MICHELLE CERVANTES-CNP 6/18/2024 8:38 AM          "

## 2024-06-19 ENCOUNTER — DOCUMENTATION (OUTPATIENT)
Dept: PHYSICAL THERAPY | Facility: CLINIC | Age: 79
End: 2024-06-19
Payer: MEDICARE

## 2024-06-19 NOTE — PROGRESS NOTES
Physical Therapy    Discharge Summary    Patient Name: Rebecca Samaniego  : 1945  MRN: [id number]  Today's Date: 2024    Referring Provider: Timo Avilez PA-C  Medical Diagnosis: fall, initial encounter; lumbar radiculopathy  Therapy Diagnosis: back pain, leg weakness, gait difficulty     Discharge Summary: PT    Evaluation Date: 24  Visit total to date: 1  Date of Last Visit: 24    Therapy Summary: Patient presented for evaluation, with discussion about home health PT secondary to high fall risk and poor safety. Patient did not present for further outpatient treatment sessions.     Rehab Discharge Reason: Other see above

## 2024-07-10 ENCOUNTER — APPOINTMENT (OUTPATIENT)
Dept: CARDIOLOGY | Facility: CLINIC | Age: 79
End: 2024-07-10
Payer: MEDICARE